# Patient Record
Sex: MALE | Race: WHITE | NOT HISPANIC OR LATINO | URBAN - METROPOLITAN AREA
[De-identification: names, ages, dates, MRNs, and addresses within clinical notes are randomized per-mention and may not be internally consistent; named-entity substitution may affect disease eponyms.]

---

## 2017-06-07 ENCOUNTER — APPOINTMENT (RX ONLY)
Dept: URBAN - METROPOLITAN AREA CLINIC 167 | Facility: CLINIC | Age: 82
Setting detail: DERMATOLOGY
End: 2017-06-07

## 2017-06-07 DIAGNOSIS — Z85.828 PERSONAL HISTORY OF OTHER MALIGNANT NEOPLASM OF SKIN: ICD-10-CM

## 2017-06-07 DIAGNOSIS — Z71.89 OTHER SPECIFIED COUNSELING: ICD-10-CM

## 2017-06-07 DIAGNOSIS — L57.0 ACTINIC KERATOSIS: ICD-10-CM

## 2017-06-07 DIAGNOSIS — Z87.2 PERSONAL HISTORY OF DISEASES OF THE SKIN AND SUBCUTANEOUS TISSUE: ICD-10-CM

## 2017-06-07 DIAGNOSIS — D22 MELANOCYTIC NEVI: ICD-10-CM

## 2017-06-07 DIAGNOSIS — L82.1 OTHER SEBORRHEIC KERATOSIS: ICD-10-CM

## 2017-06-07 PROBLEM — D22.5 MELANOCYTIC NEVI OF TRUNK: Status: ACTIVE | Noted: 2017-06-07

## 2017-06-07 PROCEDURE — 99213 OFFICE O/P EST LOW 20 MIN: CPT | Mod: 25

## 2017-06-07 PROCEDURE — ? COUNSELING

## 2017-06-07 PROCEDURE — ? LIQUID NITROGEN

## 2017-06-07 PROCEDURE — 17000 DESTRUCT PREMALG LESION: CPT

## 2017-06-07 PROCEDURE — 17003 DESTRUCT PREMALG LES 2-14: CPT

## 2017-06-07 ASSESSMENT — LOCATION ZONE DERM
LOCATION ZONE: ARM
LOCATION ZONE: EAR
LOCATION ZONE: LIP
LOCATION ZONE: TRUNK
LOCATION ZONE: FACE
LOCATION ZONE: SCALP

## 2017-06-07 ASSESSMENT — LOCATION DETAILED DESCRIPTION DERM
LOCATION DETAILED: RIGHT LATERAL INFERIOR CHEST
LOCATION DETAILED: LEFT PROXIMAL DORSAL FOREARM
LOCATION DETAILED: RIGHT CENTRAL MALAR CHEEK
LOCATION DETAILED: RIGHT DISTAL DORSAL FOREARM
LOCATION DETAILED: RIGHT SUPERIOR LATERAL MALAR CHEEK
LOCATION DETAILED: RIGHT INFERIOR TEMPLE
LOCATION DETAILED: RIGHT LATERAL MALAR CHEEK
LOCATION DETAILED: LEFT INFERIOR VERMILION LIP
LOCATION DETAILED: RIGHT SUPERIOR CENTRAL MALAR CHEEK
LOCATION DETAILED: RIGHT INFERIOR VERMILION LIP
LOCATION DETAILED: LEFT CENTRAL FRONTAL SCALP
LOCATION DETAILED: RIGHT SUPERIOR MEDIAL UPPER BACK
LOCATION DETAILED: RIGHT PROXIMAL DORSAL FOREARM
LOCATION DETAILED: RIGHT SUPERIOR HELIX
LOCATION DETAILED: LEFT LATERAL MALAR CHEEK

## 2017-06-07 ASSESSMENT — LOCATION SIMPLE DESCRIPTION DERM
LOCATION SIMPLE: RIGHT LIP
LOCATION SIMPLE: RIGHT EAR
LOCATION SIMPLE: LEFT SCALP
LOCATION SIMPLE: RIGHT FOREARM
LOCATION SIMPLE: LEFT FOREARM
LOCATION SIMPLE: CHEST
LOCATION SIMPLE: RIGHT CHEEK
LOCATION SIMPLE: RIGHT TEMPLE
LOCATION SIMPLE: RIGHT UPPER BACK
LOCATION SIMPLE: LEFT CHEEK
LOCATION SIMPLE: LEFT LIP

## 2017-06-07 NOTE — PROCEDURE: LIQUID NITROGEN
Number Of Freeze-Thaw Cycles: 2 freeze-thaw cycles
Detail Level: Detailed
Duration Of Freeze Thaw-Cycle (Seconds): 10
Post-Care Instructions: I reviewed with the patient in detail post-care instructions. Patient is to wear sunprotection, and avoid picking at any of the treated lesions. Pt may apply Vaseline to crusted or scabbing areas.
Consent: The patient's consent was obtained including but not limited to risks of crusting, scabbing, blistering, scarring, darker or lighter pigmentary change, recurrence, incomplete removal and infection.
Render Post-Care Instructions In Note?: no

## 2017-12-06 ENCOUNTER — APPOINTMENT (RX ONLY)
Dept: URBAN - METROPOLITAN AREA CLINIC 167 | Facility: CLINIC | Age: 82
Setting detail: DERMATOLOGY
End: 2017-12-06

## 2017-12-06 DIAGNOSIS — Z87.2 PERSONAL HISTORY OF DISEASES OF THE SKIN AND SUBCUTANEOUS TISSUE: ICD-10-CM

## 2017-12-06 DIAGNOSIS — D22 MELANOCYTIC NEVI: ICD-10-CM

## 2017-12-06 DIAGNOSIS — Z71.89 OTHER SPECIFIED COUNSELING: ICD-10-CM

## 2017-12-06 DIAGNOSIS — L82.1 OTHER SEBORRHEIC KERATOSIS: ICD-10-CM

## 2017-12-06 DIAGNOSIS — L57.0 ACTINIC KERATOSIS: ICD-10-CM

## 2017-12-06 DIAGNOSIS — Z85.828 PERSONAL HISTORY OF OTHER MALIGNANT NEOPLASM OF SKIN: ICD-10-CM

## 2017-12-06 PROBLEM — C44.329 SQUAMOUS CELL CARCINOMA OF SKIN OF OTHER PARTS OF FACE: Status: ACTIVE | Noted: 2017-12-06

## 2017-12-06 PROBLEM — D22.5 MELANOCYTIC NEVI OF TRUNK: Status: ACTIVE | Noted: 2017-12-06

## 2017-12-06 PROCEDURE — ? BIOPSY BY SHAVE METHOD AND DESTRUCTION

## 2017-12-06 PROCEDURE — 99213 OFFICE O/P EST LOW 20 MIN: CPT | Mod: 25

## 2017-12-06 PROCEDURE — 17004 DESTROY PREMAL LESIONS 15/>: CPT

## 2017-12-06 PROCEDURE — 17280 DSTR MAL LS F/E/E/N/L/M .5/<: CPT | Mod: 59

## 2017-12-06 PROCEDURE — ? COUNSELING

## 2017-12-06 PROCEDURE — ? LIQUID NITROGEN

## 2017-12-06 ASSESSMENT — LOCATION DETAILED DESCRIPTION DERM
LOCATION DETAILED: LEFT PROXIMAL DORSAL FOREARM
LOCATION DETAILED: LEFT CENTRAL EYEBROW
LOCATION DETAILED: RIGHT LATERAL EYEBROW
LOCATION DETAILED: RIGHT CENTRAL TEMPLE
LOCATION DETAILED: RIGHT SUPERIOR PARIETAL SCALP
LOCATION DETAILED: RIGHT LATERAL INFERIOR CHEST
LOCATION DETAILED: RIGHT SUPERIOR HELIX
LOCATION DETAILED: RIGHT PROXIMAL DORSAL FOREARM
LOCATION DETAILED: RIGHT SUPERIOR MEDIAL UPPER BACK
LOCATION DETAILED: LEFT SUPERIOR CRUS OF ANTIHELIX
LOCATION DETAILED: LEFT SUPERIOR HELIX
LOCATION DETAILED: RIGHT CENTRAL MALAR CHEEK
LOCATION DETAILED: LEFT INFERIOR VERMILION LIP
LOCATION DETAILED: LEFT CENTRAL ZYGOMA
LOCATION DETAILED: RIGHT DISTAL DORSAL FOREARM
LOCATION DETAILED: RIGHT SUPERIOR CENTRAL MALAR CHEEK
LOCATION DETAILED: RIGHT INFERIOR VERMILION LIP
LOCATION DETAILED: LEFT SUPERIOR PARIETAL SCALP
LOCATION DETAILED: LEFT INFERIOR FOREHEAD
LOCATION DETAILED: RIGHT SUPERIOR LATERAL MALAR CHEEK
LOCATION DETAILED: LEFT FOREHEAD

## 2017-12-06 ASSESSMENT — LOCATION ZONE DERM
LOCATION ZONE: ARM
LOCATION ZONE: LIP
LOCATION ZONE: FACE
LOCATION ZONE: SCALP
LOCATION ZONE: TRUNK
LOCATION ZONE: EAR

## 2017-12-06 ASSESSMENT — LOCATION SIMPLE DESCRIPTION DERM
LOCATION SIMPLE: LEFT EAR
LOCATION SIMPLE: RIGHT FOREARM
LOCATION SIMPLE: LEFT ZYGOMA
LOCATION SIMPLE: RIGHT TEMPLE
LOCATION SIMPLE: RIGHT EYEBROW
LOCATION SIMPLE: RIGHT LIP
LOCATION SIMPLE: RIGHT CHEEK
LOCATION SIMPLE: LEFT LIP
LOCATION SIMPLE: SCALP
LOCATION SIMPLE: RIGHT UPPER BACK
LOCATION SIMPLE: LEFT FOREHEAD
LOCATION SIMPLE: LEFT FOREARM
LOCATION SIMPLE: CHEST
LOCATION SIMPLE: LEFT EYEBROW
LOCATION SIMPLE: RIGHT EAR

## 2017-12-06 NOTE — PROCEDURE: LIQUID NITROGEN
Detail Level: Detailed
Render Post-Care Instructions In Note?: no
Post-Care Instructions: I reviewed with the patient in detail post-care instructions. Patient is to wear sunprotection, and avoid picking at any of the treated lesions. Pt may apply Vaseline to crusted or scabbing areas.
Consent: The patient's consent was obtained including but not limited to risks of crusting, scabbing, blistering, scarring, darker or lighter pigmentary change, recurrence, incomplete removal and infection.
Number Of Freeze-Thaw Cycles: 2 freeze-thaw cycles
Duration Of Freeze Thaw-Cycle (Seconds): 10

## 2017-12-06 NOTE — PROCEDURE: BIOPSY BY SHAVE METHOD AND DESTRUCTION
Lab: [id number]
Anesthesia Volume In Cc: 0.5
Lab Facility: [id number]
Billing Type: Third-Party Bill
Detail Level: Detailed
Post-Care Instructions: I reviewed with the patient in detail post-care instructions. Patient is to keep the biopsy site dry overnight, and then apply bacitracin twice daily until healed. Patient may apply hydrogen peroxide soaks to remove any crusting.
Consent: Written consent was obtained and risks were reviewed including but not limited to scarring, infection, bleeding, scabbing, incomplete removal, nerve damage and allergy to anesthesia.
Destruction Type: electrodesiccation
Bill As?: Malignant Destruction
Render Post-Care Instructions In Note?: no
Biopsy Type: H and E
Number Of Curettages: 2
Hemostasis: Aluminum Chloride
Wound Care: Aquaphor
Size Of Lesion In Cm (Optional): 0.3
Notification Instructions: Patient will be notified of biopsy results. However, patient instructed to call the office if not contacted within 2 weeks.
Anesthesia Type: 1% lidocaine with 1:100,000 epinephrine, 1% lidocaine without epinephrine and 8.4% sodium bicarbonate in a 2:2:1 Ratio

## 2018-06-20 ENCOUNTER — APPOINTMENT (RX ONLY)
Dept: URBAN - METROPOLITAN AREA CLINIC 167 | Facility: CLINIC | Age: 83
Setting detail: DERMATOLOGY
End: 2018-06-20

## 2018-06-20 DIAGNOSIS — D22 MELANOCYTIC NEVI: ICD-10-CM

## 2018-06-20 DIAGNOSIS — Z71.89 OTHER SPECIFIED COUNSELING: ICD-10-CM

## 2018-06-20 DIAGNOSIS — L57.0 ACTINIC KERATOSIS: ICD-10-CM

## 2018-06-20 DIAGNOSIS — L82.1 OTHER SEBORRHEIC KERATOSIS: ICD-10-CM

## 2018-06-20 DIAGNOSIS — Z85.828 PERSONAL HISTORY OF OTHER MALIGNANT NEOPLASM OF SKIN: ICD-10-CM

## 2018-06-20 PROBLEM — D22.5 MELANOCYTIC NEVI OF TRUNK: Status: ACTIVE | Noted: 2018-06-20

## 2018-06-20 PROCEDURE — ? LIQUID NITROGEN

## 2018-06-20 PROCEDURE — ? COUNSELING

## 2018-06-20 PROCEDURE — 17003 DESTRUCT PREMALG LES 2-14: CPT

## 2018-06-20 PROCEDURE — ? TREATMENT REGIMEN

## 2018-06-20 PROCEDURE — 17000 DESTRUCT PREMALG LESION: CPT

## 2018-06-20 PROCEDURE — 99213 OFFICE O/P EST LOW 20 MIN: CPT | Mod: 25

## 2018-06-20 ASSESSMENT — LOCATION SIMPLE DESCRIPTION DERM
LOCATION SIMPLE: LEFT SCALP
LOCATION SIMPLE: POSTERIOR SCALP
LOCATION SIMPLE: RIGHT LIP
LOCATION SIMPLE: NOSE
LOCATION SIMPLE: LEFT OCCIPITAL SCALP
LOCATION SIMPLE: LEFT TEMPLE
LOCATION SIMPLE: LEFT UPPER BACK
LOCATION SIMPLE: SCALP
LOCATION SIMPLE: LEFT FOREARM
LOCATION SIMPLE: CHEST
LOCATION SIMPLE: RIGHT CHEEK
LOCATION SIMPLE: RIGHT FOREARM
LOCATION SIMPLE: RIGHT OCCIPITAL SCALP
LOCATION SIMPLE: RIGHT EAR
LOCATION SIMPLE: LEFT LIP

## 2018-06-20 ASSESSMENT — LOCATION DETAILED DESCRIPTION DERM
LOCATION DETAILED: RIGHT INFERIOR VERMILION LIP
LOCATION DETAILED: MID-OCCIPITAL SCALP
LOCATION DETAILED: RIGHT LATERAL INFERIOR CHEST
LOCATION DETAILED: LEFT SUPERIOR OCCIPITAL SCALP
LOCATION DETAILED: NASAL DORSUM
LOCATION DETAILED: POSTERIOR MID-PARIETAL SCALP
LOCATION DETAILED: LEFT PROXIMAL DORSAL FOREARM
LOCATION DETAILED: RIGHT PROXIMAL DORSAL FOREARM
LOCATION DETAILED: RIGHT SUPERIOR HELIX
LOCATION DETAILED: LEFT INFERIOR VERMILION LIP
LOCATION DETAILED: RIGHT SUPERIOR OCCIPITAL SCALP
LOCATION DETAILED: LEFT INFERIOR TEMPLE
LOCATION DETAILED: LEFT SUPERIOR PARIETAL SCALP
LOCATION DETAILED: LEFT DISTAL DORSAL FOREARM
LOCATION DETAILED: LEFT MEDIAL FRONTAL SCALP
LOCATION DETAILED: LEFT SUPERIOR MEDIAL UPPER BACK
LOCATION DETAILED: RIGHT CENTRAL MALAR CHEEK

## 2018-06-20 ASSESSMENT — LOCATION ZONE DERM
LOCATION ZONE: TRUNK
LOCATION ZONE: FACE
LOCATION ZONE: EAR
LOCATION ZONE: ARM
LOCATION ZONE: SCALP
LOCATION ZONE: NOSE
LOCATION ZONE: LIP

## 2018-06-20 NOTE — PROCEDURE: TREATMENT REGIMEN
Initiate Treatment: Efudex apply to temples, forehead and scalp twice a day for 2 weeks
Detail Level: Detailed

## 2018-06-20 NOTE — PROCEDURE: LIQUID NITROGEN
Duration Of Freeze Thaw-Cycle (Seconds): 10
Render Post-Care Instructions In Note?: no
Post-Care Instructions: I reviewed with the patient in detail post-care instructions. Patient is to wear sunprotection, and avoid picking at any of the treated lesions. Pt may apply Vaseline to crusted or scabbing areas.
Detail Level: Simple
Number Of Freeze-Thaw Cycles: 2 freeze-thaw cycles
Consent: The patient's consent was obtained including but not limited to risks of crusting, scabbing, blistering, scarring, darker or lighter pigmentary change, recurrence, incomplete removal and infection.

## 2018-10-10 ENCOUNTER — TRANSFERRED RECORDS (OUTPATIENT)
Dept: HEALTH INFORMATION MANAGEMENT | Facility: CLINIC | Age: 83
End: 2018-10-10

## 2018-10-10 LAB — EJECTION FRACTION: 25

## 2018-10-30 ENCOUNTER — TRANSFERRED RECORDS (OUTPATIENT)
Dept: HEALTH INFORMATION MANAGEMENT | Facility: CLINIC | Age: 83
End: 2018-10-30

## 2018-11-06 ENCOUNTER — TRANSFERRED RECORDS (OUTPATIENT)
Dept: HEALTH INFORMATION MANAGEMENT | Facility: CLINIC | Age: 83
End: 2018-11-06

## 2018-11-12 ENCOUNTER — TRANSFERRED RECORDS (OUTPATIENT)
Dept: HEALTH INFORMATION MANAGEMENT | Facility: CLINIC | Age: 83
End: 2018-11-12

## 2018-12-19 ENCOUNTER — APPOINTMENT (RX ONLY)
Dept: URBAN - METROPOLITAN AREA CLINIC 167 | Facility: CLINIC | Age: 83
Setting detail: DERMATOLOGY
End: 2018-12-19

## 2018-12-19 DIAGNOSIS — Z85.828 PERSONAL HISTORY OF OTHER MALIGNANT NEOPLASM OF SKIN: ICD-10-CM

## 2018-12-19 DIAGNOSIS — D22 MELANOCYTIC NEVI: ICD-10-CM

## 2018-12-19 DIAGNOSIS — Z71.89 OTHER SPECIFIED COUNSELING: ICD-10-CM

## 2018-12-19 DIAGNOSIS — L57.0 ACTINIC KERATOSIS: ICD-10-CM

## 2018-12-19 DIAGNOSIS — L82.1 OTHER SEBORRHEIC KERATOSIS: ICD-10-CM

## 2018-12-19 PROBLEM — D22.5 MELANOCYTIC NEVI OF TRUNK: Status: ACTIVE | Noted: 2018-12-19

## 2018-12-19 PROBLEM — E03.9 HYPOTHYROIDISM, UNSPECIFIED: Status: ACTIVE | Noted: 2018-12-19

## 2018-12-19 PROCEDURE — ? LIQUID NITROGEN

## 2018-12-19 PROCEDURE — ? TREATMENT REGIMEN

## 2018-12-19 PROCEDURE — 17003 DESTRUCT PREMALG LES 2-14: CPT

## 2018-12-19 PROCEDURE — ? COUNSELING

## 2018-12-19 PROCEDURE — 99213 OFFICE O/P EST LOW 20 MIN: CPT | Mod: 25

## 2018-12-19 PROCEDURE — 17000 DESTRUCT PREMALG LESION: CPT

## 2018-12-19 ASSESSMENT — LOCATION SIMPLE DESCRIPTION DERM
LOCATION SIMPLE: LEFT EAR
LOCATION SIMPLE: LEFT FOREARM
LOCATION SIMPLE: LEFT FOREHEAD
LOCATION SIMPLE: RIGHT ZYGOMA
LOCATION SIMPLE: LEFT TEMPLE
LOCATION SIMPLE: LEFT UPPER BACK
LOCATION SIMPLE: RIGHT TEMPLE
LOCATION SIMPLE: RIGHT EAR
LOCATION SIMPLE: LEFT CHEEK
LOCATION SIMPLE: CHEST
LOCATION SIMPLE: RIGHT SCALP
LOCATION SIMPLE: RIGHT LIP
LOCATION SIMPLE: LEFT LIP
LOCATION SIMPLE: RIGHT FOREARM
LOCATION SIMPLE: RIGHT CHEEK

## 2018-12-19 ASSESSMENT — LOCATION DETAILED DESCRIPTION DERM
LOCATION DETAILED: LEFT INFERIOR VERMILION LIP
LOCATION DETAILED: RIGHT CENTRAL MALAR CHEEK
LOCATION DETAILED: LEFT SUPERIOR FOREHEAD
LOCATION DETAILED: LEFT TRIANGULAR FOSSA
LOCATION DETAILED: LEFT PROXIMAL DORSAL FOREARM
LOCATION DETAILED: RIGHT MEDIAL FRONTAL SCALP
LOCATION DETAILED: LEFT DISTAL DORSAL FOREARM
LOCATION DETAILED: LEFT INFERIOR TEMPLE
LOCATION DETAILED: LEFT FOREHEAD
LOCATION DETAILED: RIGHT INFERIOR VERMILION LIP
LOCATION DETAILED: RIGHT CENTRAL TEMPLE
LOCATION DETAILED: RIGHT SUPERIOR HELIX
LOCATION DETAILED: RIGHT CENTRAL ZYGOMA
LOCATION DETAILED: RIGHT LATERAL INFERIOR CHEST
LOCATION DETAILED: RIGHT CENTRAL FRONTAL SCALP
LOCATION DETAILED: RIGHT PROXIMAL DORSAL FOREARM
LOCATION DETAILED: LEFT SUPERIOR MEDIAL UPPER BACK
LOCATION DETAILED: LEFT LATERAL MALAR CHEEK

## 2018-12-19 ASSESSMENT — LOCATION ZONE DERM
LOCATION ZONE: EAR
LOCATION ZONE: TRUNK
LOCATION ZONE: FACE
LOCATION ZONE: LIP
LOCATION ZONE: SCALP
LOCATION ZONE: ARM

## 2018-12-19 NOTE — PROCEDURE: LIQUID NITROGEN
Consent: The patient's consent was obtained including but not limited to risks of crusting, scabbing, blistering, scarring, darker or lighter pigmentary change, recurrence, incomplete removal and infection.
Detail Level: Simple
Number Of Freeze-Thaw Cycles: 2 freeze-thaw cycles
Render Post-Care Instructions In Note?: no
Duration Of Freeze Thaw-Cycle (Seconds): 10
Post-Care Instructions: I reviewed with the patient in detail post-care instructions. Patient is to wear sunprotection, and avoid picking at any of the treated lesions. Pt may apply Vaseline to crusted or scabbing areas.

## 2018-12-19 NOTE — PROCEDURE: TREATMENT REGIMEN
Detail Level: Zone
Initiate Treatment: Efudex every night at bedtime to face for 2-3 weeks and twice a day to arms, patient has prescription at home

## 2019-04-16 ENCOUNTER — TRANSFERRED RECORDS (OUTPATIENT)
Dept: HEALTH INFORMATION MANAGEMENT | Facility: CLINIC | Age: 84
End: 2019-04-16

## 2019-04-22 ENCOUNTER — TRANSFERRED RECORDS (OUTPATIENT)
Dept: HEALTH INFORMATION MANAGEMENT | Facility: CLINIC | Age: 84
End: 2019-04-22

## 2019-04-26 ENCOUNTER — TRANSFERRED RECORDS (OUTPATIENT)
Dept: HEALTH INFORMATION MANAGEMENT | Facility: CLINIC | Age: 84
End: 2019-04-26

## 2019-05-02 ENCOUNTER — TRANSFERRED RECORDS (OUTPATIENT)
Dept: HEALTH INFORMATION MANAGEMENT | Facility: CLINIC | Age: 84
End: 2019-05-02

## 2019-06-25 ENCOUNTER — TELEPHONE (OUTPATIENT)
Dept: FAMILY MEDICINE | Facility: CLINIC | Age: 84
End: 2019-06-25

## 2019-06-25 NOTE — TELEPHONE ENCOUNTER
"Reason for Call:  Same Day Appointment, Requested Provider:  Mumtaz Hernandez MD    PCP: No primary care provider on file.    Reason for visit: NP visit and referral for arrhythmia    Duration of symptoms: \"couple of years\"    Have you been treated for this in the past? Yes -  In [place]. Recently moved to [facility]    Additional comments: Dudley Woo, son, indicates that he had a conversation with Dr. Hernandez about taking on his parents as new patients. Indicates he was verbally told it was ok.    Can we leave a detailed message on this number? YES    Phone number patient can be reached at: Home number on file [phone] (home)  *no C2C on file for son Isaac Blum Time: afternoon    Call taken on 6/25/2019 at 10:47 AM by Ning Spicer    "

## 2019-07-23 ENCOUNTER — OFFICE VISIT (OUTPATIENT)
Dept: FAMILY MEDICINE | Facility: CLINIC | Age: 84
End: 2019-07-23
Payer: MEDICARE

## 2019-07-23 VITALS
HEIGHT: 67 IN | WEIGHT: 175 LBS | SYSTOLIC BLOOD PRESSURE: 134 MMHG | TEMPERATURE: 97 F | BODY MASS INDEX: 27.47 KG/M2 | HEART RATE: 76 BPM | OXYGEN SATURATION: 96 % | DIASTOLIC BLOOD PRESSURE: 78 MMHG

## 2019-07-23 DIAGNOSIS — I35.1 SEVERE AORTIC INSUFFICIENCY: ICD-10-CM

## 2019-07-23 DIAGNOSIS — I48.20 CHRONIC ATRIAL FIBRILLATION (H): ICD-10-CM

## 2019-07-23 DIAGNOSIS — R26.89 BALANCE PROBLEM: ICD-10-CM

## 2019-07-23 DIAGNOSIS — I34.0 MITRAL VALVE INSUFFICIENCY, UNSPECIFIED ETIOLOGY: ICD-10-CM

## 2019-07-23 DIAGNOSIS — I10 ESSENTIAL HYPERTENSION: ICD-10-CM

## 2019-07-23 DIAGNOSIS — Z96.60 HISTORY OF JOINT REPLACEMENT, UNSPECIFIED JOINT: ICD-10-CM

## 2019-07-23 DIAGNOSIS — E03.9 HYPOTHYROIDISM, UNSPECIFIED TYPE: ICD-10-CM

## 2019-07-23 DIAGNOSIS — G62.9 NEUROPATHY: ICD-10-CM

## 2019-07-23 DIAGNOSIS — C61 PROSTATE CANCER (H): ICD-10-CM

## 2019-07-23 DIAGNOSIS — Z95.0 PACEMAKER: ICD-10-CM

## 2019-07-23 DIAGNOSIS — I42.9 CARDIOMYOPATHY, UNSPECIFIED TYPE (H): Primary | ICD-10-CM

## 2019-07-23 LAB
ERYTHROCYTE [DISTWIDTH] IN BLOOD BY AUTOMATED COUNT: 13.8 % (ref 10–15)
HCT VFR BLD AUTO: 37.5 % (ref 40–53)
HGB BLD-MCNC: 13.2 G/DL (ref 13.3–17.7)
MCH RBC QN AUTO: 35.3 PG (ref 26.5–33)
MCHC RBC AUTO-ENTMCNC: 35.2 G/DL (ref 31.5–36.5)
MCV RBC AUTO: 100 FL (ref 78–100)
PLATELET # BLD AUTO: 145 10E9/L (ref 150–450)
RBC # BLD AUTO: 3.74 10E12/L (ref 4.4–5.9)
WBC # BLD AUTO: 6.2 10E9/L (ref 4–11)

## 2019-07-23 PROCEDURE — 85027 COMPLETE CBC AUTOMATED: CPT | Performed by: INTERNAL MEDICINE

## 2019-07-23 PROCEDURE — 80048 BASIC METABOLIC PNL TOTAL CA: CPT | Performed by: INTERNAL MEDICINE

## 2019-07-23 PROCEDURE — 36415 COLL VENOUS BLD VENIPUNCTURE: CPT | Performed by: INTERNAL MEDICINE

## 2019-07-23 PROCEDURE — 99203 OFFICE O/P NEW LOW 30 MIN: CPT | Performed by: INTERNAL MEDICINE

## 2019-07-23 RX ORDER — POTASSIUM CHLORIDE 750 MG/1
10 CAPSULE, EXTENDED RELEASE ORAL 2 TIMES DAILY
COMMUNITY
End: 2020-02-12

## 2019-07-23 RX ORDER — LEVOTHYROXINE SODIUM 125 UG/1
125 TABLET ORAL DAILY
COMMUNITY
End: 2019-07-23

## 2019-07-23 RX ORDER — AMIODARONE HYDROCHLORIDE 200 MG/1
200 TABLET ORAL DAILY
COMMUNITY
End: 2019-11-25

## 2019-07-23 RX ORDER — HYDROCHLOROTHIAZIDE 25 MG/1
25 TABLET ORAL DAILY
COMMUNITY
End: 2019-09-18

## 2019-07-23 RX ORDER — LOSARTAN POTASSIUM 100 MG/1
100 TABLET ORAL DAILY
COMMUNITY
End: 2019-11-25

## 2019-07-23 RX ORDER — ALLOPURINOL 300 MG/1
300 TABLET ORAL DAILY
COMMUNITY
End: 2019-11-25

## 2019-07-23 RX ORDER — CIPROFLOXACIN 500 MG/1
500 TABLET, FILM COATED ORAL 2 TIMES DAILY
COMMUNITY
End: 2019-07-23

## 2019-07-23 RX ORDER — ACETAMINOPHEN 500 MG
500-1000 TABLET ORAL EVERY 6 HOURS PRN
COMMUNITY

## 2019-07-23 RX ORDER — POTASSIUM CHLORIDE 750 MG/1
10 CAPSULE, EXTENDED RELEASE ORAL 2 TIMES DAILY
Status: CANCELLED | OUTPATIENT
Start: 2019-07-23

## 2019-07-23 RX ORDER — MULTIVIT WITH MINERALS/LUTEIN
1 TABLET ORAL DAILY
COMMUNITY

## 2019-07-23 RX ORDER — POTASSIUM CHLORIDE 750 MG/1
10 TABLET, EXTENDED RELEASE ORAL 2 TIMES DAILY
Qty: 90 TABLET | Refills: 3 | Status: SHIPPED | OUTPATIENT
Start: 2019-07-23 | End: 2019-09-18

## 2019-07-23 RX ORDER — CARVEDILOL 6.25 MG/1
12.5 TABLET ORAL 2 TIMES DAILY WITH MEALS
COMMUNITY
End: 2019-09-18

## 2019-07-23 RX ORDER — PERPHENAZINE 16 MG
600 TABLET ORAL DAILY
COMMUNITY

## 2019-07-23 RX ORDER — FUROSEMIDE 20 MG
20 TABLET ORAL DAILY
COMMUNITY
End: 2019-07-25

## 2019-07-23 RX ORDER — AMOXICILLIN 500 MG/1
CAPSULE ORAL
Qty: 4 CAPSULE | Refills: 3 | Status: SHIPPED | OUTPATIENT
Start: 2019-07-23 | End: 2019-11-25

## 2019-07-23 RX ORDER — LEVOTHYROXINE SODIUM 25 UG/1
25 TABLET ORAL DAILY
COMMUNITY
End: 2019-11-25

## 2019-07-23 ASSESSMENT — MIFFLIN-ST. JEOR: SCORE: 1432.42

## 2019-07-23 NOTE — PROGRESS NOTES
This is an 86-year-old who presents with his wife to establish care.  He is new to me in this clinic.  There are some cardiology records but not a lot.  His son is a colleague of stacey, Dr. Isaac Woo.  The patient and his wife recently moved back here from [place], prior to that [place], and prior to that here.  [place] [facility].    The patient overall is doing well and feels well.  His medical history as noted and reviewed.  He was having dyspnea on exertion but that has resolved.  No chest pain, orthopnea, or PND.  He does have chronic edema.  He takes his medications regularly.  He has a history of prostate cancer years ago with negative PSAs.  He has some chronic balance issues and neuropathy but nothing new.    A complete review of systems is otherwise negative.    Past Medical History:   Diagnosis Date     Balance problem     few years     Cardiomyopathy (H) 10/2018    [place]     Chronic atrial fibrillation (H) 2018    cardioversion 4/19     Essential hypertension      Gross hematuria 04/2019    cysto, ct neg     History of gout     none for years     Hypothyroidism      Mitral regurgitation      Neuropathy 1990    both lower legs     Pacemaker 2018     Prostate cancer (H) 2008    xrt, fine since     Severe aortic insufficiency      Past Surgical History:   Procedure Laterality Date     bilateral hip replacement  2017    done 6 months apart     lip surgery for skin cancer       Social History     Socioeconomic History     Marital status:      Spouse name: Not on file     Number of children: 3     Years of education: Not on file     Highest education level: Not on file   Occupational History     Occupation: owned marketing services company in [place]   Social Needs     Financial resource strain: Not on file     Food insecurity:     Worry: Not on file     Inability: Not on file     Transportation needs:     Medical: Not on file     Non-medical:  Not on file   Tobacco Use     Smoking status: Never Smoker     Smokeless tobacco: Never Used   Substance and Sexual Activity     Alcohol use: Yes     Comment: minimal     Drug use: Never     Sexual activity: Not Currently   Lifestyle     Physical activity:     Days per week: Not on file     Minutes per session: Not on file     Stress: Not on file   Relationships     Social connections:     Talks on phone: Not on file     Gets together: Not on file     Attends Gnosticist service: Not on file     Active member of club or organization: Not on file     Attends meetings of clubs or organizations: Not on file     Relationship status: Not on file     Intimate partner violence:     Fear of current or ex partner: Not on file     Emotionally abused: Not on file     Physically abused: Not on file     Forced sexual activity: Not on file   Other Topics Concern     Not on file   Social History Narrative     Not on file     Current Outpatient Medications   Medication Sig Dispense Refill     acetaminophen (TYLENOL) 500 MG tablet Take 500-1,000 mg by mouth every 6 hours as needed for mild pain       allopurinol (ZYLOPRIM) 300 MG tablet Take 300 mg by mouth daily       alpha-lipoic acid 600 MG capsule        amiodarone (PACERONE/CODARONE) 200 MG tablet Take 200 mg by mouth daily       amoxicillin (AMOXIL) 500 MG capsule Take 4 pills one hour prior to dental work 4 capsule 3     apixaban ANTICOAGULANT (ELIQUIS) 5 MG tablet Take 2.5 mg by mouth 2 times daily       carvedilol (COREG) 6.25 MG tablet Take 12.5 mg by mouth 2 times daily (with meals)        cholecalciferol (VITAMIN D3) 5000 units TABS tablet Take 2,000 Units by mouth daily       Cyanocobalamin (VITAMIN B 12 PO) Take 2,500 mcg by mouth       furosemide (LASIX) 20 MG tablet Take 20 mg by mouth daily       hydrochlorothiazide (HYDRODIURIL) 25 MG tablet Take 25 mg by mouth daily       levothyroxine (SYNTHROID/LEVOTHROID) 25 MCG tablet Take 25 mcg by mouth daily       losartan  "(COZAAR) 100 MG tablet Take 100 mg by mouth daily       multivitamin (CENTRUM SILVER) tablet Take 1 tablet by mouth daily       Niacin (VITAMIN B-3 OR) Take 500 mg by mouth       potassium chloride ER (K-DUR/KLOR-CON M) 10 MEQ CR tablet Take 1 tablet (10 mEq) by mouth 2 times daily 90 tablet 3     potassium chloride ER (MICRO-K) 10 MEQ CR capsule Take 10 mEq by mouth daily        Pyridoxine HCl (VITAMIN B6) 250 MG TABS Take 150 mg by mouth       No Known Allergies  FAMILY HISTORY NOTED AND REVIEWED    REVIEW OF SYSTEMS: above    PHYSICAL EXAM    /78 (BP Location: Right arm, Patient Position: Chair, Cuff Size: Adult Large)   Pulse 76   Temp 97  F (36.1  C) (Oral)   Ht 1.702 m (5' 7\")   Wt 79.4 kg (175 lb)   SpO2 96%   BMI 27.41 kg/m      Patient appears non toxic  Mouth - tongue midline and within normal limits, mucous membranes and posterior pharynx within normal limits, no lesions seen.  Neck - no masses, lesions or tenderness  Nodes - no supraclavicular, cervical or axially adenopathy .  Lungs - faint bilat crackles, soft, lower 1/3, no wheezing  Cardiovascular - regular rate and rhythm, 1/6 systolic murmer, rub or gallop, no jvp 1+ bilat lower leg edema, carotids within normal limits, no bruits.  Abdomen - normal active bowel sounds, soft, non tender, no masses, guarding or rebound, no hepatosplenomegaly      Labs sent    ASSESSMENT:  1. Cmyop, ?idiopathic, well compensated  2. bilat edema, he does have fair amt of salt and suspect in part this, doubt clots, also vavle dz  3. Aortic insuff  4. Mitral insuff  5. Chronic afib, pacer, on eliquis  6. Hypertension, controlled  7. Pacer  8. Hypothyroidism  9. Cap, fran  10. Balance issues, chronic  11. Neuropathy, chronic    PLAN:  To get me records  Labs today  Limit salt  Call if changes  Follow up with cards  See me 4 months or prn      Mumtaz Hernandez M.D.                  "

## 2019-07-23 NOTE — LETTER
[place] [address] [facility]  [address]  [place]  [address]  Tel: [phone]    July 25, 2019    Lico Woo  [id number]  [facility] [address]        Dear Mr. Woo,    It was very nice meeting you.  Enclosed are your labs for your records.    You should have received a phone call from my nurse.  If not let me know.  Your creatinine or kidney test is just slightly elevated above normal which I am not worried about but does need follow-up.  Your hemoglobin is also slightly low and again I am not worried but should be followed.  Please be sure to see me in 4 months.  If you have any questions let me know.        Sincerely,    Mumtaz Hernandez MD/BERONICA          Enclosure: Lab Results  Results for orders placed or performed in visit on 07/23/19   CBC with platelets   Result Value Ref Range    WBC 6.2 4.0 - 11.0 10e9/L    RBC Count 3.74 (L) 4.4 - 5.9 10e12/L    Hemoglobin 13.2 (L) 13.3 - 17.7 g/dL    Hematocrit 37.5 (L) 40.0 - 53.0 %     78 - 100 fl    MCH 35.3 (H) 26.5 - 33.0 pg    MCHC 35.2 31.5 - 36.5 g/dL    RDW 13.8 10.0 - 15.0 %    Platelet Count 145 (L) 150 - 450 10e9/L   Basic metabolic panel   Result Value Ref Range    Sodium 143 133 - 144 mmol/L    Potassium 4.4 3.4 - 5.3 mmol/L    Chloride 108 94 - 109 mmol/L    Carbon Dioxide 29 20 - 32 mmol/L    Anion Gap 6 3 - 14 mmol/L    Glucose 95 70 - 99 mg/dL    Urea Nitrogen 41 (H) 7 - 30 mg/dL    Creatinine 1.51 (H) 0.66 - 1.25 mg/dL    GFR Estimate 41 (L) >60 mL/min/[1.73_m2]    GFR Estimate If Black 48 (L) >60 mL/min/[1.73_m2]    Calcium 9.3 8.5 - 10.1 mg/dL

## 2019-07-23 NOTE — PATIENT INSTRUCTIONS
I will send you the labs from today    Please get me your records    See cardiology.  Dr. Ray De Leon or Dr. Wills are both very good.    See me in 4 months    Mumtaz Hernandez M.D.

## 2019-07-24 LAB
ANION GAP SERPL CALCULATED.3IONS-SCNC: 6 MMOL/L (ref 3–14)
BUN SERPL-MCNC: 41 MG/DL (ref 7–30)
CALCIUM SERPL-MCNC: 9.3 MG/DL (ref 8.5–10.1)
CHLORIDE SERPL-SCNC: 108 MMOL/L (ref 94–109)
CO2 SERPL-SCNC: 29 MMOL/L (ref 20–32)
CREAT SERPL-MCNC: 1.51 MG/DL (ref 0.66–1.25)
GFR SERPL CREATININE-BSD FRML MDRD: 41 ML/MIN/{1.73_M2}
GLUCOSE SERPL-MCNC: 95 MG/DL (ref 70–99)
POTASSIUM SERPL-SCNC: 4.4 MMOL/L (ref 3.4–5.3)
SODIUM SERPL-SCNC: 143 MMOL/L (ref 133–144)

## 2019-07-25 ENCOUNTER — TELEPHONE (OUTPATIENT)
Dept: FAMILY MEDICINE | Facility: CLINIC | Age: 84
End: 2019-07-25

## 2019-07-25 DIAGNOSIS — I35.1 SEVERE AORTIC INSUFFICIENCY: Primary | ICD-10-CM

## 2019-07-25 RX ORDER — FUROSEMIDE 20 MG
20 TABLET ORAL DAILY
Qty: 90 TABLET | Refills: 3 | Status: SHIPPED | OUTPATIENT
Start: 2019-07-25 | End: 2020-06-08

## 2019-07-25 NOTE — TELEPHONE ENCOUNTER
Spoke with patient:     States his son, Dr. Woo, will be sending all of pt's records to Dr. Hernandez for review/scanning into system from his PCP in [place]. Expects this to be done today/tomorrow. This should include lab results.     Rec'd fax # for Cardiology [phone]    Cardiologist is Dr. Rocky Vickers     Faxed lab results as requested

## 2019-07-25 NOTE — TELEPHONE ENCOUNTER
Please call the patient regarding his blood tests.  They need to be faxed to his cardiologist and please find out who that is.  I believe he has the fax number as well.    Please let the patient know that his creatinine test is just slightly elevated.  I do not have an old one to compare to and he should check with cardiology on this.    Please let him know that his hemoglobin is also just very slightly low and again have him check with cardiology to see if that is any different.    I should see him in 4 months.    Mumtaz Hernandez M.D.

## 2019-08-07 ENCOUNTER — DOCUMENTATION ONLY (OUTPATIENT)
Dept: CARDIOLOGY | Facility: CLINIC | Age: 84
End: 2019-08-07

## 2019-09-18 ENCOUNTER — OFFICE VISIT (OUTPATIENT)
Dept: CARDIOLOGY | Facility: CLINIC | Age: 84
End: 2019-09-18
Payer: MEDICARE

## 2019-09-18 VITALS
HEART RATE: 72 BPM | BODY MASS INDEX: 28.49 KG/M2 | DIASTOLIC BLOOD PRESSURE: 74 MMHG | SYSTOLIC BLOOD PRESSURE: 136 MMHG | HEIGHT: 67 IN | WEIGHT: 181.5 LBS

## 2019-09-18 DIAGNOSIS — I50.22 CHRONIC SYSTOLIC CHF (CONGESTIVE HEART FAILURE) (H): ICD-10-CM

## 2019-09-18 DIAGNOSIS — I34.0 NON-RHEUMATIC MITRAL REGURGITATION: ICD-10-CM

## 2019-09-18 DIAGNOSIS — I42.0 DILATED CARDIOMYOPATHY (H): ICD-10-CM

## 2019-09-18 DIAGNOSIS — I35.1 NONRHEUMATIC AORTIC VALVE INSUFFICIENCY: ICD-10-CM

## 2019-09-18 DIAGNOSIS — I48.0 PAROXYSMAL ATRIAL FIBRILLATION (H): ICD-10-CM

## 2019-09-18 DIAGNOSIS — I50.22 CHRONIC SYSTOLIC CHF (CONGESTIVE HEART FAILURE) (H): Primary | ICD-10-CM

## 2019-09-18 DIAGNOSIS — I10 ESSENTIAL HYPERTENSION: ICD-10-CM

## 2019-09-18 LAB
ANION GAP SERPL CALCULATED.3IONS-SCNC: 10.1 MMOL/L (ref 6–17)
AST SERPL W P-5'-P-CCNC: 26 U/L (ref 0–45)
BUN SERPL-MCNC: 32 MG/DL (ref 7–30)
CALCIUM SERPL-MCNC: 10.3 MG/DL (ref 8.5–10.5)
CHLORIDE SERPL-SCNC: 103 MMOL/L (ref 98–107)
CO2 SERPL-SCNC: 34 MMOL/L (ref 23–29)
CREAT SERPL-MCNC: 1.66 MG/DL (ref 0.7–1.3)
GFR SERPL CREATININE-BSD FRML MDRD: 39 ML/MIN/{1.73_M2}
GLUCOSE SERPL-MCNC: 93 MG/DL (ref 70–105)
POTASSIUM SERPL-SCNC: 4.1 MMOL/L (ref 3.5–5.1)
SODIUM SERPL-SCNC: 143 MMOL/L (ref 136–145)
T4 FREE SERPL-MCNC: 0.99 NG/DL (ref 0.76–1.46)
TSH SERPL DL<=0.005 MIU/L-ACNC: 9.41 MU/L (ref 0.4–4)

## 2019-09-18 PROCEDURE — 84439 ASSAY OF FREE THYROXINE: CPT | Performed by: INTERNAL MEDICINE

## 2019-09-18 PROCEDURE — 84443 ASSAY THYROID STIM HORMONE: CPT | Performed by: INTERNAL MEDICINE

## 2019-09-18 PROCEDURE — 80048 BASIC METABOLIC PNL TOTAL CA: CPT | Performed by: INTERNAL MEDICINE

## 2019-09-18 PROCEDURE — 36415 COLL VENOUS BLD VENIPUNCTURE: CPT | Performed by: INTERNAL MEDICINE

## 2019-09-18 PROCEDURE — 93000 ELECTROCARDIOGRAM COMPLETE: CPT | Performed by: INTERNAL MEDICINE

## 2019-09-18 PROCEDURE — 84450 TRANSFERASE (AST) (SGOT): CPT | Performed by: INTERNAL MEDICINE

## 2019-09-18 PROCEDURE — 99204 OFFICE O/P NEW MOD 45 MIN: CPT | Performed by: INTERNAL MEDICINE

## 2019-09-18 RX ORDER — CARVEDILOL 25 MG/1
25 TABLET ORAL 2 TIMES DAILY WITH MEALS
Qty: 180 TABLET | Refills: 3 | Status: SHIPPED | OUTPATIENT
Start: 2019-09-18 | End: 2020-08-10

## 2019-09-18 ASSESSMENT — MIFFLIN-ST. JEOR: SCORE: 1461.91

## 2019-09-18 NOTE — PROGRESS NOTES
Service Date: 09/18/2019      HISTORY OF PRESENT ILLNESS:  I had the opportunity to see Mr. Lico Woo in Cardiology Clinic today at the [facility] in [place] for consultation regarding a cardiomyopathy with valvular heart disease.  As you recall, he is an 86-year-old gentleman who was living down in [place] and receiving cardiology care there.  He recently moved back to [place] in May and is now resuming his cardiology care here with me.  He has a history of valvular heart disease followed by his cardiologist periodically with echocardiograms.  This was being monitored conservatively until last fall.  This consisted of mild aortic stenosis, mild to moderate aortic regurgitation, and moderate mitral regurgitation.  Last fall, he was noted to have a significant decline in left ventricular function with an ejection fraction dropping from 50% to 25%.  It seems that Mr. Woo likely developed atrial fibrillation at that time, although it is unclear to me whether his rates were controlled.  He was having symptoms of shortness of breath with exertion, fatigue, and lower extremity edema.  He was immediately implanted with a biventricular pacemaker and started on Eliquis for stroke prevention.  Unfortunately, the atrial fibrillation persisted and it does not appear that his heart rates were well controlled, even after pacemaker implantation.  I see notes suggesting heart rates around 100 beats per minute with persistent atrial fibrillation.  Eventually, he was started on amiodarone 600 mg a day for a month and then referred for cardioversion.  The cardioversion was performed in 04/2019 and his amiodarone dose was reduced to 200 mg a day.  He has been maintained consistently on carvedilol and losartan since the diagnosis of his cardiomyopathy was made.  He had been on atenolol prior to that.      Since his cardioversion last April, he has been doing much better.  His weeping lower extremity edema  has resolved.  His shortness of breath with exertion has improved and he has had a bit more energy.      He did undergo Lexiscan nuclear perfusion imaging stress testing on 10/30/2018 for evaluation of possible coronary artery disease causing his cardiomyopathy.  This did not show evidence of myocardial ischemia or infarction, but did confirm severe left ventricular dysfunction with an ejection fraction of 25%.  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 Nevertheless, he did go on to implantation of a biventricular pacemaker, presumably for improvement in left ventricular function.  I do not see any suggestion in the records that he had sick sinus syndrome or any other indication for permanent pacemaker implantation.      PHYSICAL EXAMINATION:  His blood pressure is 136/74, heart rate 72 and weight 181 pounds.  His lungs are clear.  Heart rhythm is regular.  He has no cardiac murmurs or carotid bruits.  He has a trace of lower extremity edema with chronic venous stasis changes.      Laboratory studies today show a creatinine of 1.66, BUN 32 and potassium 4.1.  He has had chronic kidney disease and his last creatinine in July was 1.51.  His TSH was 9.41 with a history of hypothyroidism on thyroid supplement.      He had an electrocardiogram today which I reviewed personally.  It appears to show sinus rhythm with synchronous ventricular pacing.  Despite apparent biventricular pacing, the QRS duration was still at 170 milliseconds.      ASSESSMENT AND PLAN:  Mr. Lico Woo is an 86-year-old gentleman with a history of mild to moderate valvular heart disease, including mild aortic stenosis, mild to moderate aortic regurgitation, and moderate mitral regurgitation in addition to hypertension, who was diagnosed with cardiomyopathy 1 year ago with an ejection fraction of  25%.  He was also found to have atrial fibrillation at that time, although records are unclear regarding the adequacy of his rate control.  His nuclear stress testing at the time was normal except for low ejection fraction and he was implanted with a biventricular pacemaker and started on carvedilol instead of atenolol and his dose of losartan increased.  He eventually underwent loading with amiodarone and subsequent cardioversion in 04/2019 with a decrease in his maintenance amiodarone dose to 200 mg a day.  Fortunately, after that procedure, he has started feeling much better.  At this time, I do not clearly see significant congestive heart failure symptoms beyond  New York Heart Association class I or II.  He has no angina.  He has no symptoms of palpitations.  He appears to be maintaining sinus rhythm.      I am pleased that he is doing better.  However, I think some additional evaluation would certainly be reasonable.  I will update his echocardiogram for reevaluation of left ventricular function.  I will enroll him in our Device Clinic for management of his biventricular pacemaker.  If his ejection fraction remains below 35%, we may wish to consider upgrading his pacemaker to an ICD for primary prevention of sudden cardiac death.  I will recheck some laboratory studies today including basic metabolic panel, thyroid functions and liver functions for monitoring of his amiodarone.      I will increase his carvedilol from 12.5 mg b.i.d. to 25 mg b.i.d., have him continue his low-dose furosemide 10 mg a day and stop hydrochlorothiazide due to concerns about his kidney function.  I will refer his elevated TSH level back to Dr. Hernandez to consider adjustment of his Synthroid dose which is currently 25 mcg daily.      I will have him return again to the [facility] in 1 month for reevaluation of these medication adjustments and discussion of his test results.      Thank you for referring Mr. Woo to me for  evaluation in the [facility].  If there are any further questions or concerns, please feel free to contact me.      cc:   [place] [address]         ALESHA STRANGE MD, Located within [facility]             D: 2019   T: 2019   MT: RANJAN      Name:     KYLER THOMAS   MRN:      [id number]        Account:      [id number]   :      1933           Service Date: 2019      Document: [id number]     0

## 2019-09-18 NOTE — PROGRESS NOTES
HPI and Plan:   See dictation    Orders Placed This Encounter   Procedures     Basic metabolic panel     TSH with free T4 reflex     AST     Basic metabolic panel     Hemoglobin     Basic metabolic panel     [facility] with [facility] - [facility] visit     Follow-Up with [facility] - Return MD visit     EKG 12-lead complete w/read - Clinics (performed today)     Echocardiogram Complete       Orders Placed This Encounter   Medications     carvedilol (COREG) 25 MG tablet     Sig: Take 1 tablet (25 mg) by mouth 2 times daily (with meals)     Dispense:  180 tablet     Refill:  3       Medications Discontinued During This Encounter   Medication Reason     potassium chloride ER (K-DUR/KLOR-CON M) 10 MEQ CR tablet Stopped by Patient     carvedilol (COREG) 6.25 MG tablet Reorder     hydrochlorothiazide (HYDRODIURIL) 25 MG tablet          Encounter Diagnoses   Name Primary?     Paroxysmal atrial fibrillation (H)      Essential hypertension      Chronic systolic CHF (congestive heart failure) (H) Yes     Non-rheumatic mitral regurgitation      Nonrheumatic aortic valve insufficiency      Dilated cardiomyopathy (H)        CURRENT MEDICATIONS:  Current Outpatient Medications   Medication Sig Dispense Refill     acetaminophen (TYLENOL) 500 MG tablet Take 500-1,000 mg by mouth every 6 hours as needed for mild pain       allopurinol (ZYLOPRIM) 300 MG tablet Take 300 mg by mouth daily       alpha-lipoic acid 600 MG capsule        amiodarone (PACERONE/CODARONE) 200 MG tablet Take 200 mg by mouth daily       amoxicillin (AMOXIL) 500 MG capsule Take 4 pills one hour prior to dental work 4 capsule 3     apixaban ANTICOAGULANT (ELIQUIS) 5 MG tablet Take 2.5 mg by mouth 2 times daily       carvedilol (COREG) 25 MG tablet Take 1 tablet (25 mg) by mouth 2 times daily (with meals) 180 tablet 3     cholecalciferol (VITAMIN D3) 5000 units TABS tablet Take 2,000 Units by mouth daily       Cyanocobalamin (VITAMIN B 12 PO) Take 2,500 mcg by mouth        furosemide (LASIX) 20 MG tablet Take 1 tablet (20 mg) by mouth daily (Patient taking differently: Take 10 mg by mouth daily ) 90 tablet 3     levothyroxine (SYNTHROID/LEVOTHROID) 25 MCG tablet Take 25 mcg by mouth daily       losartan (COZAAR) 100 MG tablet Take 100 mg by mouth daily       multivitamin (CENTRUM SILVER) tablet Take 1 tablet by mouth daily       Niacin (VITAMIN B-3 OR) Take 500 mg by mouth       potassium chloride ER (MICRO-K) 10 MEQ CR capsule Take 10 mEq by mouth daily        Pyridoxine HCl (VITAMIN B6) 250 MG TABS Take 150 mg by mouth         ALLERGIES   No Known Allergies    PAST MEDICAL HISTORY:  Past Medical History:   Diagnosis Date     Aortic regurgitation     mild-moderate per 10/2018 Echo     Aortic stenosis     mild per 10/2018 Echo     Balance problem     few years     Cardiomyopathy (H) 10/2018    [place]; Echo 10/2018, EF 25%, down from 50% 12/2017, per notes, pt declines further work up to find etiology of drop in EF     Chronic atrial fibrillation (H) 2018    cardioversion 4/19     Chronic systolic CHF (congestive heart failure) (H)      Essential hypertension      Gross hematuria 04/2019    cysto, ct neg     History of gout     none for years     Hypothyroidism      Mitral regurgitation     moderate per 10/2018 Echo     Neuropathy 1990    both lower legs     Pacemaker 2018     Prostate cancer (H) 2008    xrt, fine since     Tricuspid regurgitation     mioderate per 10/2018 Echo       PAST SURGICAL HISTORY:  Past Surgical History:   Procedure Laterality Date     bilateral hip replacement  2017    done 6 months apart     IMPLANT PACEMAKER  11/12/2018    CRT-P     lip surgery for skin cancer         FAMILY HISTORY:  History reviewed. No pertinent family history.    SOCIAL HISTORY:  Social History     Socioeconomic History     Marital status:      Spouse name: None     Number of children: 3     Years of education: None     Highest education level: None   Occupational History  "    Occupation: owned marketing services company in [place]   Social Needs     Financial resource strain: None     Food insecurity:     Worry: None     Inability: None     Transportation needs:     Medical: None     Non-medical: None   Tobacco Use     Smoking status: Never Smoker     Smokeless tobacco: Never Used   Substance and Sexual Activity     Alcohol use: Yes     Comment: minimal     Drug use: Never     Sexual activity: Not Currently   Lifestyle     Physical activity:     Days per week: None     Minutes per session: None     Stress: None   Relationships     Social connections:     Talks on phone: None     Gets together: None     Attends Christianity service: None     Active member of club or organization: None     Attends meetings of clubs or organizations: None     Relationship status: None     Intimate partner violence:     Fear of current or ex partner: None     Emotionally abused: None     Physically abused: None     Forced sexual activity: None   Other Topics Concern     None   Social History Narrative     None       Review of Systems:  Skin:  Positive for pigmentation;scaling     Eyes:  Positive for glasses    ENT:  Positive for tinnitus    Respiratory:  Negative       Cardiovascular:    edema;Positive for slight edema and balance is off  Gastroenterology: Negative      Genitourinary:  Negative      Musculoskeletal:  Negative      Neurologic:  Positive for numbness or tingling of feet cold fingers  Psychiatric:  Negative      Heme/Lymph/Imm:  Negative      Endocrine:  Positive for thyroid disorder      Physical Exam:  Vitals: /74   Pulse 72   Ht 1.702 m (5' 7\")   Wt 82.3 kg (181 lb 8 oz)   BMI 28.43 kg/m      Constitutional:  cooperative, alert and oriented, well developed, well nourished, in no acute distress        Skin:  warm and dry to the touch, no apparent skin lesions or masses noted   pacemaker incision in the left infraclavicular area was well-healed      Head:  normocephalic, no masses or " lesions        Eyes:  pupils equal and round, conjunctivae and lids unremarkable, sclera white, no xanthalasma, EOMS intact, no nystagmus        Lymph:No Cervical lymphadenopathy present     ENT:  no pallor or cyanosis, dentition good        Neck:  carotid pulses are full and equal bilaterally, JVP normal, no carotid bruit        Respiratory:  normal breath sounds, clear to auscultation, normal A-P diameter, normal symmetry, normal respiratory excursion, no use of accessory muscles         Cardiac: regular rhythm, normal S1/S2, no S3 or S4, apical impulse not displaced, no murmurs, gallops or rubs   distant heart sounds no presence of murmur          pulses full and equal, no bruits auscultated                                        GI:  abdomen soft, non-tender, BS normoactive, no mass, no HSM, no bruits        Extremities and Muscular Skeletal:  no deformities, clubbing, cyanosis, erythema observed stasis pigmentation bilateral LE edema;trace          Neurological:  no gross motor deficits;affect appropriate        Psych:  Alert and Oriented x 3        CC  Mumtaz Hernandez MD  [address]  [place] [address]

## 2019-09-18 NOTE — LETTER
9/18/2019    Mumtaz Hernandez MD  [address]  [place] [address]    RE: Lico Amna       Dear Colleague,    I had the pleasure of seeing Lico Woo in the [facility].    HPI and Plan:   See dictation    Orders Placed This Encounter   Procedures     Basic metabolic panel     TSH with free T4 reflex     AST     Basic metabolic panel     Hemoglobin     Basic metabolic panel     [facility] with [facility] visit     [facility] with [facility] - Return MD visit     EKG 12-lead complete w/read - Clinics (performed today)     Echocardiogram Complete       Orders Placed This Encounter   Medications     carvedilol (COREG) 25 MG tablet     Sig: Take 1 tablet (25 mg) by mouth 2 times daily (with meals)     Dispense:  180 tablet     Refill:  3       Medications Discontinued During This Encounter   Medication Reason     potassium chloride ER (K-DUR/KLOR-CON M) 10 MEQ CR tablet Stopped by Patient     carvedilol (COREG) 6.25 MG tablet Reorder     hydrochlorothiazide (HYDRODIURIL) 25 MG tablet          Encounter Diagnoses   Name Primary?     Paroxysmal atrial fibrillation (H)      Essential hypertension      Chronic systolic CHF (congestive heart failure) (H) Yes     Non-rheumatic mitral regurgitation      Nonrheumatic aortic valve insufficiency      Dilated cardiomyopathy (H)        CURRENT MEDICATIONS:  Current Outpatient Medications   Medication Sig Dispense Refill     acetaminophen (TYLENOL) 500 MG tablet Take 500-1,000 mg by mouth every 6 hours as needed for mild pain       allopurinol (ZYLOPRIM) 300 MG tablet Take 300 mg by mouth daily       alpha-lipoic acid 600 MG capsule        amiodarone (PACERONE/CODARONE) 200 MG tablet Take 200 mg by mouth daily       amoxicillin (AMOXIL) 500 MG capsule Take 4 pills one hour prior to dental work 4 capsule 3     apixaban ANTICOAGULANT (ELIQUIS) 5 MG tablet Take 2.5 mg by mouth 2 times daily       carvedilol (COREG) 25 MG tablet Take 1  tablet (25 mg) by mouth 2 times daily (with meals) 180 tablet 3     cholecalciferol (VITAMIN D3) 5000 units TABS tablet Take 2,000 Units by mouth daily       Cyanocobalamin (VITAMIN B 12 PO) Take 2,500 mcg by mouth       furosemide (LASIX) 20 MG tablet Take 1 tablet (20 mg) by mouth daily (Patient taking differently: Take 10 mg by mouth daily ) 90 tablet 3     levothyroxine (SYNTHROID/LEVOTHROID) 25 MCG tablet Take 25 mcg by mouth daily       losartan (COZAAR) 100 MG tablet Take 100 mg by mouth daily       multivitamin (CENTRUM SILVER) tablet Take 1 tablet by mouth daily       Niacin (VITAMIN B-3 OR) Take 500 mg by mouth       potassium chloride ER (MICRO-K) 10 MEQ CR capsule Take 10 mEq by mouth daily        Pyridoxine HCl (VITAMIN B6) 250 MG TABS Take 150 mg by mouth         ALLERGIES   No Known Allergies    PAST MEDICAL HISTORY:  Past Medical History:   Diagnosis Date     Aortic regurgitation     mild-moderate per 10/2018 Echo     Aortic stenosis     mild per 10/2018 Echo     Balance problem     few years     Cardiomyopathy (H) 10/2018    [place]; Echo 10/2018, EF 25%, down from 50% 12/2017, per notes, pt declines further work up to find etiology of drop in EF     Chronic atrial fibrillation (H) 2018    cardioversion 4/19     Chronic systolic CHF (congestive heart failure) (H)      Essential hypertension      Gross hematuria 04/2019    cysto, ct neg     History of gout     none for years     Hypothyroidism      Mitral regurgitation     moderate per 10/2018 Echo     Neuropathy 1990    both lower legs     Pacemaker 2018     Prostate cancer (H) 2008    xrt, fine since     Tricuspid regurgitation     mioderate per 10/2018 Echo       PAST SURGICAL HISTORY:  Past Surgical History:   Procedure Laterality Date     bilateral hip replacement  2017    done 6 months apart     IMPLANT PACEMAKER  11/12/2018    CRT-P     lip surgery for skin cancer         FAMILY HISTORY:  History reviewed. No pertinent family  "history.    SOCIAL HISTORY:  Social History     Socioeconomic History     Marital status:      Spouse name: None     Number of children: 3     Years of education: None     Highest education level: None   Occupational History     Occupation: owned marketing services company in [place]   Social Needs     Financial resource strain: None     Food insecurity:     Worry: None     Inability: None     Transportation needs:     Medical: None     Non-medical: None   Tobacco Use     Smoking status: Never Smoker     Smokeless tobacco: Never Used   Substance and Sexual Activity     Alcohol use: Yes     Comment: minimal     Drug use: Never     Sexual activity: Not Currently   Lifestyle     Physical activity:     Days per week: None     Minutes per session: None     Stress: None   Relationships     Social connections:     Talks on phone: None     Gets together: None     Attends Roman Catholic service: None     Active member of club or organization: None     Attends meetings of clubs or organizations: None     Relationship status: None     Intimate partner violence:     Fear of current or ex partner: None     Emotionally abused: None     Physically abused: None     Forced sexual activity: None   Other Topics Concern     None   Social History Narrative     None       Review of Systems:  Skin:  Positive for pigmentation;scaling     Eyes:  Positive for glasses    ENT:  Positive for tinnitus    Respiratory:  Negative       Cardiovascular:    edema;Positive for slight edema and balance is off  Gastroenterology: Negative      Genitourinary:  Negative      Musculoskeletal:  Negative      Neurologic:  Positive for numbness or tingling of feet cold fingers  Psychiatric:  Negative      Heme/Lymph/Imm:  Negative      Endocrine:  Positive for thyroid disorder      Physical Exam:  Vitals: /74   Pulse 72   Ht 1.702 m (5' 7\")   Wt 82.3 kg (181 lb 8 oz)   BMI 28.43 kg/m       Constitutional:  cooperative, alert and oriented, well " developed, well nourished, in no acute distress        Skin:  warm and dry to the touch, no apparent skin lesions or masses noted   pacemaker incision in the left infraclavicular area was well-healed      Head:  normocephalic, no masses or lesions        Eyes:  pupils equal and round, conjunctivae and lids unremarkable, sclera white, no xanthalasma, EOMS intact, no nystagmus        Lymph:No Cervical lymphadenopathy present     ENT:  no pallor or cyanosis, dentition good        Neck:  carotid pulses are full and equal bilaterally, JVP normal, no carotid bruit        Respiratory:  normal breath sounds, clear to auscultation, normal A-P diameter, normal symmetry, normal respiratory excursion, no use of accessory muscles         Cardiac: regular rhythm, normal S1/S2, no S3 or S4, apical impulse not displaced, no murmurs, gallops or rubs   distant heart sounds no presence of murmur          pulses full and equal, no bruits auscultated                                        GI:  abdomen soft, non-tender, BS normoactive, no mass, no HSM, no bruits        Extremities and Muscular Skeletal:  no deformities, clubbing, cyanosis, erythema observed stasis pigmentation bilateral LE edema;trace          Neurological:  no gross motor deficits;affect appropriate        Psych:  Alert and Oriented x 3        CC  Mumtaz Hernandez MD  [address]  [place] [address]                Thank you for allowing me to participate in the care of your patient.      Sincerely,     ALESHA STRANGE MD     [facility]    cc:   Mumtaz Hernandez MD  [address]  [place] [address]

## 2019-09-18 NOTE — LETTER
9/18/2019      Mumtaz Hernandez MD  [address] [facility] [address]  [facility] [address]      RE: Lico Woo       Dear Colleague,    I had the pleasure of seeing Lico Woo in the [facility].    Service Date: 09/18/2019      HISTORY OF PRESENT ILLNESS:  I had the opportunity to see Mr. Lico Woo in Cardiology Clinic today at the [facility] in [place] for consultation regarding a cardiomyopathy with valvular heart disease.  As you recall, he is an 86-year-old gentleman who was living down in [place] and receiving cardiology care there.  He recently moved back to [place] in May and is now resuming his cardiology care here with me.  He has a history of valvular heart disease followed by his cardiologist periodically with echocardiograms.  This was being monitored conservatively until last fall.  This consisted of mild aortic stenosis, mild to moderate aortic regurgitation, and moderate mitral regurgitation.  Last fall, he was noted to have a significant decline in left ventricular function with an ejection fraction dropping from 50% to 25%.  It seems that Mr. Woo likely developed atrial fibrillation at that time, although it is unclear to me whether his rates were controlled.  He was having symptoms of shortness of breath with exertion, fatigue, and lower extremity edema.  He was immediately implanted with a biventricular pacemaker and started on Eliquis for stroke prevention.  Unfortunately, the atrial fibrillation persisted and it does not appear that his heart rates were well controlled, even after pacemaker implantation.  I see notes suggesting heart rates around 100 beats per minute with persistent atrial fibrillation.  Eventually, he was started on amiodarone 600 mg a day for a month and then referred for cardioversion.  The cardioversion was performed in 04/2019 and his amiodarone dose was reduced to 200 mg a day.  He has been maintained consistently on  carvedilol and losartan since the diagnosis of his cardiomyopathy was made.  He had been on atenolol prior to that.      Since his cardioversion last April, he has been doing much better.  His weeping lower extremity edema has resolved.  His shortness of breath with exertion has improved and he has had a bit more energy.      He did undergo Lexiscan nuclear perfusion imaging stress testing on 10/30/2018 for evaluation of possible coronary artery disease causing his cardiomyopathy.  This did not show evidence of myocardial ischemia or infarction, but did confirm severe left ventricular dysfunction with an ejection fraction of 25%.  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 Nevertheless, he did go on to implantation of a biventricular pacemaker, presumably for improvement in left ventricular function.  I do not see any suggestion in the records that he had sick sinus syndrome or any other indication for permanent pacemaker implantation.      PHYSICAL EXAMINATION:  His blood pressure is 136/74, heart rate 72 and weight 181 pounds.  His lungs are clear.  Heart rhythm is regular.  He has no cardiac murmurs or carotid bruits.  He has a trace of lower extremity edema with chronic venous stasis changes.      Laboratory studies today show a creatinine of 1.66, BUN 32 and potassium 4.1.  He has had chronic kidney disease and his last creatinine in July was 1.51.  His TSH was 9.41 with a history of hypothyroidism on thyroid supplement.      He had an electrocardiogram today which I reviewed personally.  It appears to show sinus rhythm with synchronous ventricular pacing.  Despite apparent biventricular pacing, the QRS duration was still at 170 milliseconds.      ASSESSMENT AND PLAN:  Mr. Lico Woo is an 86-year-old gentleman with a history of mild to moderate valvular heart  disease, including mild aortic stenosis, mild to moderate aortic regurgitation, and moderate mitral regurgitation in addition to hypertension, who was diagnosed with cardiomyopathy 1 year ago with an ejection fraction of 25%.  He was also found to have atrial fibrillation at that time, although records are unclear regarding the adequacy of his rate control.  His nuclear stress testing at the time was normal except for low ejection fraction and he was implanted with a biventricular pacemaker and started on carvedilol instead of atenolol and his dose of losartan increased.  He eventually underwent loading with amiodarone and subsequent cardioversion in 04/2019 with a decrease in his maintenance amiodarone dose to 200 mg a day.  Fortunately, after that procedure, he has started feeling much better.  At this time, I do not clearly see significant congestive heart failure symptoms beyond  [place] Heart Association class I or II.  He has no angina.  He has no symptoms of palpitations.  He appears to be maintaining sinus rhythm.      I am pleased that he is doing better.  However, I think some additional evaluation would certainly be reasonable.  I will update his echocardiogram for reevaluation of left ventricular function.  I will enroll him in our Device Clinic for management of his biventricular pacemaker.  If his ejection fraction remains below 35%, we may wish to consider upgrading his pacemaker to an ICD for primary prevention of sudden cardiac death.  I will recheck some laboratory studies today including basic metabolic panel, thyroid functions and liver functions for monitoring of his amiodarone.      I will increase his carvedilol from 12.5 mg b.i.d. to 25 mg b.i.d., have him continue his low-dose furosemide 10 mg a day and stop hydrochlorothiazide due to concerns about his kidney function.  I will refer his elevated TSH level back to Dr. Hernandez to consider adjustment of his Synthroid dose which is currently  25 mcg daily.      I will have him return again to the [facility] in 1 month for reevaluation of these medication adjustments and discussion of his test results.      Thank you for referring Mr. Thomas to me for evaluation in the [facility].  If there are any further questions or concerns, please feel free to contact me.      cc:   [place] [address]         ALESHA STRANGE MD, [facility]             D: 2019   T: 2019   MT: RANJAN      Name:     KYLER THOMAS   MRN:      [id number]        Account:      [id number]   :      1933           Service Date: 2019      Document: [id number]         Outpatient Encounter Medications as of 2019   Medication Sig Dispense Refill     acetaminophen (TYLENOL) 500 MG tablet Take 500-1,000 mg by mouth every 6 hours as needed for mild pain       allopurinol (ZYLOPRIM) 300 MG tablet Take 300 mg by mouth daily       alpha-lipoic acid 600 MG capsule        amiodarone (PACERONE/CODARONE) 200 MG tablet Take 200 mg by mouth daily       amoxicillin (AMOXIL) 500 MG capsule Take 4 pills one hour prior to dental work 4 capsule 3     apixaban ANTICOAGULANT (ELIQUIS) 5 MG tablet Take 2.5 mg by mouth 2 times daily       carvedilol (COREG) 25 MG tablet Take 1 tablet (25 mg) by mouth 2 times daily (with meals) 180 tablet 3     cholecalciferol (VITAMIN D3) 5000 units TABS tablet Take 2,000 Units by mouth daily       Cyanocobalamin (VITAMIN B 12 PO) Take 2,500 mcg by mouth       furosemide (LASIX) 20 MG tablet Take 1 tablet (20 mg) by mouth daily (Patient taking differently: Take 10 mg by mouth daily ) 90 tablet 3     levothyroxine (SYNTHROID/LEVOTHROID) 25 MCG tablet Take 25 mcg by mouth daily       losartan (COZAAR) 100 MG tablet Take 100 mg by mouth daily       multivitamin (CENTRUM SILVER) tablet Take 1 tablet by mouth daily       Niacin (VITAMIN B-3 OR) Take 500 mg by mouth        potassium chloride ER (MICRO-K) 10 MEQ CR capsule Take 10 mEq by mouth daily        Pyridoxine HCl (VITAMIN B6) 250 MG TABS Take 150 mg by mouth       [DISCONTINUED] carvedilol (COREG) 6.25 MG tablet Take 12.5 mg by mouth 2 times daily (with meals)        [DISCONTINUED] hydrochlorothiazide (HYDRODIURIL) 25 MG tablet Take 25 mg by mouth daily       [DISCONTINUED] potassium chloride ER (K-DUR/KLOR-CON M) 10 MEQ CR tablet Take 1 tablet (10 mEq) by mouth 2 times daily 90 tablet 3     No facility-administered encounter medications on file as of 9/18/2019.        Again, thank you for allowing me to participate in the care of your patient.      Sincerely,    ALESHA STRANGE MD     [facility]

## 2019-09-19 ENCOUNTER — HOSPITAL ENCOUNTER (OUTPATIENT)
Dept: CARDIOLOGY | Facility: CLINIC | Age: 84
Discharge: HOME | End: 2019-09-19
Attending: INTERNAL MEDICINE | Admitting: INTERNAL MEDICINE
Payer: MEDICARE

## 2019-09-19 ENCOUNTER — CARE COORDINATION (OUTPATIENT)
Dept: CARDIOLOGY | Facility: CLINIC | Age: 84
End: 2019-09-19

## 2019-09-19 DIAGNOSIS — I77.810 AORTIC ROOT DILATATION (H): Primary | ICD-10-CM

## 2019-09-19 DIAGNOSIS — I50.22 CHRONIC SYSTOLIC CHF (CONGESTIVE HEART FAILURE) (H): ICD-10-CM

## 2019-09-19 PROCEDURE — 93306 TTE W/DOPPLER COMPLETE: CPT

## 2019-09-19 PROCEDURE — 93306 TTE W/DOPPLER COMPLETE: CPT | Mod: 26 | Performed by: INTERNAL MEDICINE

## 2019-09-19 NOTE — PROGRESS NOTES
Pt seen in clinic yesterday as a new patient OV w/Dr. Strange. Assessment/plan is below. Pt EF still reported < 35%, willl pt need an EP Consult for ICD consideration?    Echo results sent to Dr. Strange for review/recommendations. Next f/u scheduled with SHERRILL on 19. Maria Luisa Pina RN on 2019 at 2:54 PM      The following med changes were made:    - Increase his carvedilol from 12.5 mg b.i.d. to 25 mg b.i.d.  - Continue his low-dose furosemide 10 mg a day.  - Stop hydrochlorothiazide due to concerns about his kidney function.   - Refer his elevated TSH level back to Dr. Hernandez to consider adjustment of his Synthroid dose which is currently 25 mcg daily.       Recent Results (from the past 4320 hour(s))   Echocardiogram Complete    Narrative    [id number]  [id number]  [id number]  [id number]^ALEXANDR^ALESHA^YAQUELIN        [facility]  [facility]  Echocardiography Laboratory  [address] [place] [address] [address]  [place] [address]  Phone [phone]  Fax [phone]        Name: KYLER THOMAS  MRN: [id number]  : 1933  Study Date: 2019 12:53 PM  Age: 86 yrs  Gender: Male  Patient Location: [facility]  Reason For Study: Chronic systolic CHF  Ordering Physician: ALESHA STRANGE  Referring Physician: FRANK HERNANDEZ  Performed By: Poonam Blackburn     BSA: 1.9 m2  Height: 64 in  Weight: 181 lb  HR: 77  BP: 145/88 mmHg  _____________________________________________________________________________  __     Procedure  Complete Echo Adult.     _____________________________________________________________________________  __        Interpretation Summary     Left ventricular systolic function is moderate to severely reduced. LVEF 34%  based on biplane 2D tracing.  The right ventricle is severely dilated with normal global systolic function.  Aortic valve is heavily calcified, cannot exclude native bicuspid morphology.  There does appear to be fusion of the right and left coronary cusps,  however  this could be due to heavy calcification. Doppler profile consistent with  sclerosis without stenosis. Vmax 1.9 m/s, mean gradient 9 mmHg. Trace to mild  AV insufficiency.  Right ventricular systolic pressure is elevated, approximated at 52mmHg plus  the right atrial pressure (estimated RAP 3 mmHg).  Moderate aortic root dilatation. Max diameter of visualized portion 4.5cm.  The ascending aorta is Moderately dilated. Max diameter of visualized portion  5 cm.     No prior studies available for comparison.  _____________________________________________________________________________  __        Left Ventricle  The left ventricle is normal in size. There is borderline concentric left  ventricular hypertrophy. Left ventricular systolic function is moderate to  severely reduced. LVEF 34% based on biplane 2D tracing. Grade II or moderate  diastolic dysfunction. Septal wall motion abnormality may reflect pacemaker  activation.     Right Ventricle  The right ventricle is severely dilated. The right ventricular systolic  function is normal. There is a pacemaker lead in the right ventricle.     Atria  The left atrium is severely dilated. The right atrium is mildly dilated. There  is no color Doppler evidence of an atrial shunt.     Mitral Valve  The mitral valve is normal in structure and function. There is trace mitral  regurgitation.     Tricuspid Valve  There is trace tricuspid regurgitation. The right ventricular systolic  pressure is approximated at 52mmHg plus the right atrial pressure.        Aortic Valve  Aortic valve is heavily calcified. Cannot exclude native bicuspid AV, there  does appear to be fusion of the right and left coronary cusps, however this  could be due to heavy calcification. Vmax 1.9 m/s, mean gradient 9 mmHg. There  is trace to mild aortic regurgitation.     Pulmonic Valve  There is trace to mild pulmonic valvular regurgitation. Reduced PV  acceleration time suggesting elevated PA  pressures.     Vessels  Moderate aortic root dilatation. Max diameter of visualized portion 4.5cm. The  ascending aorta is Moderately dilated. Max diameter of visualized portion 5  cm. The inferior vena cava is normal. IVC diameter <2.1 cm collapsing >50%  with sniff suggests a normal RA pressure of 3 mmHg.     Pericardium  There is no pericardial effusion.     _____________________________________________________________________________  __  MMode/2D Measurements & Calculations  IVSd: 1.2 cm  LVIDd: 5.3 cm  LVIDs: 4.4 cm  LVPWd: 1.2 cm  FS: 15.9 %  LV mass(C)d: 249.2 grams  LV mass(C)dI: 132.9 grams/m2  Ao root diam: 4.5 cm  LA dimension: 4.3 cm  asc Aorta Diam: 5.0 cm  LA/Ao: 0.95  LVOT diam: 2.5 cm  LVOT area: 5.1 cm2  LA Volume (BP): 144.0 ml     LA Volume Index (BP): 77.0 ml/m2  RWT: 0.45        Doppler Measurements & Calculations  MV E max carlos: 88.8 cm/sec  MV A max carlos: 65.5 cm/sec  MV E/A: 1.4  MV dec time: 0.20 sec  Ao V2 max: 207.5 cm/sec  Ao max P.2 mmHg  Ao V2 mean: 143.1 cm/sec  Ao mean P.4 mmHg  Ao V2 VTI: 46.4 cm  LAUREN(I,D): 1.7 cm2  LAUREN(V,D): 1.4 cm2  LV V1 max P.3 mmHg  LV V1 max: 56.6 cm/sec  LV V1 VTI: 15.2 cm  SV(LVOT): 76.8 ml  SI(LVOT): 41.0 ml/m2  PA V2 max: 220.3 cm/sec  PA max P.5 mmHg  PA acc time: 0.07 sec     TR max carlos: 359.7 cm/sec  TR max P.8 mmHg  Qp/Qs (V,LVOT): 12.6/1.0  AV Carlos Ratio (DI): 0.27  LAUREN Index (cm2/m2): 0.88  E/E' av.9  Lateral E/e': 20.5  Medial E/e': 19.2           _____________________________________________________________________________  __           Report approved by: Jesus Albarran 2019 02:03 PM

## 2019-09-19 NOTE — LETTER
2019       TO: Lico Thomas   [address]  [facility] [address]       Dear Mr. Thomas,    The results of your recent echocardiogram.    Results for orders placed or performed during the hospital encounter of 19   Echocardiogram Complete    Narrative    [id number]  [id number]  [id number]  [id number]^ALEXANDR^ALESHA^YAQUELIN        [facility]  [facility]  Echocardiography Laboratory  [address]  [address]  [place] [address]  Phone [phone]  Fax [phone]        Name: LICO THOMAS  MRN: [id number]  : 1933  Study Date: 2019 12:53 PM  Age: 86 yrs  Gender: Male  Patient Location: [facility]  Reason For Study: Chronic systolic CHF  Ordering Physician: ALESHA STRANGE  Referring Physician: FRANK GARDNER  Performed By: Poonam Blackburn     BSA: 1.9 m2  Height: 64 in  Weight: 181 lb  HR: 77  BP: 145/88 mmHg  _____________________________________________________________________________  __     Procedure  Complete Echo Adult.     _____________________________________________________________________________  __        Interpretation Summary     Left ventricular systolic function is moderate to severely reduced. LVEF 34%  based on biplane 2D tracing.  The right ventricle is severely dilated with normal global systolic function.  Aortic valve is heavily calcified, cannot exclude native bicuspid morphology.  There does appear to be fusion of the right and left coronary cusps, however  this could be due to heavy calcification. Doppler profile consistent with  sclerosis without stenosis. Vmax 1.9 m/s, mean gradient 9 mmHg. Trace to mild  AV insufficiency.  Right ventricular systolic pressure is elevated, approximated at 52mmHg plus  the right atrial pressure (estimated RAP 3 mmHg).  Moderate aortic root dilatation. Max diameter of visualized portion 4.5cm.  The ascending aorta is Moderately dilated. Max diameter of visualized portion  5 cm.     No  prior studies available for comparison.  _____________________________________________________________________________  __        Left Ventricle  The left ventricle is normal in size. There is borderline concentric left  ventricular hypertrophy. Left ventricular systolic function is moderate to  severely reduced. LVEF 34% based on biplane 2D tracing. Grade II or moderate  diastolic dysfunction. Septal wall motion abnormality may reflect pacemaker  activation.     Right Ventricle  The right ventricle is severely dilated. The right ventricular systolic  function is normal. There is a pacemaker lead in the right ventricle.     Atria  The left atrium is severely dilated. The right atrium is mildly dilated. There  is no color Doppler evidence of an atrial shunt.     Mitral Valve  The mitral valve is normal in structure and function. There is trace mitral  regurgitation.     Tricuspid Valve  There is trace tricuspid regurgitation. The right ventricular systolic  pressure is approximated at 52mmHg plus the right atrial pressure.        Aortic Valve  Aortic valve is heavily calcified. Cannot exclude native bicuspid AV, there  does appear to be fusion of the right and left coronary cusps, however this  could be due to heavy calcification. Vmax 1.9 m/s, mean gradient 9 mmHg. There  is trace to mild aortic regurgitation.     Pulmonic Valve  There is trace to mild pulmonic valvular regurgitation. Reduced PV  acceleration time suggesting elevated PA pressures.     Vessels  Moderate aortic root dilatation. Max diameter of visualized portion 4.5cm. The  ascending aorta is Moderately dilated. Max diameter of visualized portion 5  cm. The inferior vena cava is normal. IVC diameter <2.1 cm collapsing >50%  with sniff suggests a normal RA pressure of 3 mmHg.     Pericardium  There is no pericardial effusion.     _____________________________________________________________________________  __  MMode/2D Measurements &  Calculations  IVSd: 1.2 cm  LVIDd: 5.3 cm  LVIDs: 4.4 cm  LVPWd: 1.2 cm  FS: 15.9 %  LV mass(C)d: 249.2 grams  LV mass(C)dI: 132.9 grams/m2  Ao root diam: 4.5 cm  LA dimension: 4.3 cm  asc Aorta Diam: 5.0 cm  LA/Ao: 0.95  LVOT diam: 2.5 cm  LVOT area: 5.1 cm2  LA Volume (BP): 144.0 ml     LA Volume Index (BP): 77.0 ml/m2  RWT: 0.45        Doppler Measurements & Calculations  MV E max carlos: 88.8 cm/sec  MV A max carlos: 65.5 cm/sec  MV E/A: 1.4  MV dec time: 0.20 sec  Ao V2 max: 207.5 cm/sec  Ao max P.2 mmHg  Ao V2 mean: 143.1 cm/sec  Ao mean P.4 mmHg  Ao V2 VTI: 46.4 cm  LAUREN(I,D): 1.7 cm2  LAUREN(V,D): 1.4 cm2  LV V1 max P.3 mmHg  LV V1 max: 56.6 cm/sec  LV V1 VTI: 15.2 cm  SV(LVOT): 76.8 ml  SI(LVOT): 41.0 ml/m2  PA V2 max: 220.3 cm/sec  PA max P.5 mmHg  PA acc time: 0.07 sec     TR max carlos: 359.7 cm/sec  TR max P.8 mmHg  Qp/Qs (V,LVOT): 12.6/1.0  AV Carlos Ratio (DI): 0.27  LAUREN Index (cm2/m2): 0.88  E/E' av.9  Lateral E/e': 20.5  Medial E/e': 19.2           _____________________________________________________________________________  __           Report approved by: Jesus Albarran 2019 02:03 PM             would like you to have another echo in 6 months to monitor the size of your aortic root. Also, it was stated on your echo report that you could have a possible bicuspid valve (your aortic valve typically has three leaflets, and bicuspid means that it only has two leaflets).  said it is difficult to tell if this is something you were born with (congenital), or if it appears to be bicuspid due to calcification. When a person does have a bicuspid aortic valve it increases the risk of developing valvular heart disease and dilatation of their aorta.  said that your children may want to have a screening echo to see if they have a bicuspid aortic valve as there can be a  genetic component if it is a congenital bicuspid valve. Please my chart or call [phone] if you  have any questions.       Sincerely,    [facility]

## 2019-09-25 ENCOUNTER — ANCILLARY PROCEDURE (OUTPATIENT)
Dept: CARDIOLOGY | Facility: CLINIC | Age: 84
End: 2019-09-25
Attending: INTERNAL MEDICINE
Payer: MEDICARE

## 2019-09-25 ENCOUNTER — DOCUMENTATION ONLY (OUTPATIENT)
Dept: CARDIOLOGY | Facility: CLINIC | Age: 84
End: 2019-09-25

## 2019-09-25 DIAGNOSIS — I42.0 DILATED CARDIOMYOPATHY (H): Primary | ICD-10-CM

## 2019-09-25 DIAGNOSIS — I50.22 CHRONIC SYSTOLIC CHF (CONGESTIVE HEART FAILURE) (H): ICD-10-CM

## 2019-09-25 DIAGNOSIS — Z95.0 CARDIAC PACEMAKER IN SITU: ICD-10-CM

## 2019-09-25 PROCEDURE — 93281 PM DEVICE PROGR EVAL MULTI: CPT | Performed by: INTERNAL MEDICINE

## 2019-09-25 NOTE — TELEPHONE ENCOUNTER
Pt was seen in device clinic today after transferring care from his [place] clinic.  Pt has a Medtronic Percepta CRT-P and has had 99.8% BiV pacing since his last check on 4/26/19.      On review of arrhythmia episodes, pt had 1 NSVT episode logged on 4/27/19 at 1947.  EGM shows 9 beats NSVT in the 150's.  Pt cannot recall symptoms at time of event.  EF 34% per echo on 9/19/19.      Will route to Dr. De Leon for notification and review per device clinic protocol.      MABEL Mata

## 2019-09-30 NOTE — TELEPHONE ENCOUNTER
One episode of NSVT on BiV PPM check. EF still below 35%. Stable medical therapy for nonischemic CM for > 6 months. Please arrange an EP consult for possible device upgrade to BiV ICD if pt wishes to consider that. Thanks. EE

## 2019-10-01 LAB
MDC_IDC_EPISODE_DTM: NORMAL
MDC_IDC_EPISODE_DURATION: 1 S
MDC_IDC_EPISODE_DURATION: 14 S
MDC_IDC_EPISODE_DURATION: 5 S
MDC_IDC_EPISODE_DURATION: 51 S
MDC_IDC_EPISODE_DURATION: NORMAL S
MDC_IDC_EPISODE_ID: 5006
MDC_IDC_EPISODE_ID: 5007
MDC_IDC_EPISODE_ID: 5008
MDC_IDC_EPISODE_ID: 75
MDC_IDC_EPISODE_ID: 76
MDC_IDC_EPISODE_TYPE: NORMAL
MDC_IDC_LEAD_IMPLANT_DT: NORMAL
MDC_IDC_LEAD_LOCATION: NORMAL
MDC_IDC_LEAD_LOCATION_DETAIL_1: NORMAL
MDC_IDC_LEAD_MFG: NORMAL
MDC_IDC_LEAD_MODEL: NORMAL
MDC_IDC_LEAD_POLARITY_TYPE: NORMAL
MDC_IDC_LEAD_SERIAL: NORMAL
MDC_IDC_MSMT_BATTERY_DTM: NORMAL
MDC_IDC_MSMT_BATTERY_REMAINING_LONGEVITY: 89 MO
MDC_IDC_MSMT_BATTERY_RRT_TRIGGER: 2.6
MDC_IDC_MSMT_BATTERY_STATUS: NORMAL
MDC_IDC_MSMT_BATTERY_VOLTAGE: 3 V
MDC_IDC_MSMT_LEADCHNL_LV_IMPEDANCE_VALUE: 228 OHM
MDC_IDC_MSMT_LEADCHNL_LV_IMPEDANCE_VALUE: 228 OHM
MDC_IDC_MSMT_LEADCHNL_LV_IMPEDANCE_VALUE: 285 OHM
MDC_IDC_MSMT_LEADCHNL_LV_IMPEDANCE_VALUE: 323 OHM
MDC_IDC_MSMT_LEADCHNL_LV_IMPEDANCE_VALUE: 361 OHM
MDC_IDC_MSMT_LEADCHNL_LV_IMPEDANCE_VALUE: 380 OHM
MDC_IDC_MSMT_LEADCHNL_LV_IMPEDANCE_VALUE: 418 OHM
MDC_IDC_MSMT_LEADCHNL_LV_IMPEDANCE_VALUE: 494 OHM
MDC_IDC_MSMT_LEADCHNL_LV_IMPEDANCE_VALUE: 494 OHM
MDC_IDC_MSMT_LEADCHNL_LV_IMPEDANCE_VALUE: 589 OHM
MDC_IDC_MSMT_LEADCHNL_LV_PACING_THRESHOLD_AMPLITUDE: 3.25 V
MDC_IDC_MSMT_LEADCHNL_LV_PACING_THRESHOLD_PULSEWIDTH: 0.4 MS
MDC_IDC_MSMT_LEADCHNL_RA_IMPEDANCE_VALUE: 323 OHM
MDC_IDC_MSMT_LEADCHNL_RA_IMPEDANCE_VALUE: 513 OHM
MDC_IDC_MSMT_LEADCHNL_RA_PACING_THRESHOLD_AMPLITUDE: 0.62 V
MDC_IDC_MSMT_LEADCHNL_RA_PACING_THRESHOLD_PULSEWIDTH: 0.4 MS
MDC_IDC_MSMT_LEADCHNL_RA_SENSING_INTR_AMPL: 2.88 MV
MDC_IDC_MSMT_LEADCHNL_RA_SENSING_INTR_AMPL: 3.62 MV
MDC_IDC_MSMT_LEADCHNL_RV_IMPEDANCE_VALUE: 342 OHM
MDC_IDC_MSMT_LEADCHNL_RV_IMPEDANCE_VALUE: 551 OHM
MDC_IDC_MSMT_LEADCHNL_RV_PACING_THRESHOLD_AMPLITUDE: 0.88 V
MDC_IDC_MSMT_LEADCHNL_RV_PACING_THRESHOLD_PULSEWIDTH: 0.4 MS
MDC_IDC_MSMT_LEADCHNL_RV_SENSING_INTR_AMPL: 5.75 MV
MDC_IDC_MSMT_LEADCHNL_RV_SENSING_INTR_AMPL: 7.25 MV
MDC_IDC_PG_IMPLANT_DTM: NORMAL
MDC_IDC_PG_MFG: NORMAL
MDC_IDC_PG_MODEL: NORMAL
MDC_IDC_PG_SERIAL: NORMAL
MDC_IDC_PG_TYPE: NORMAL
MDC_IDC_SESS_CLINIC_NAME: NORMAL
MDC_IDC_SESS_DTM: NORMAL
MDC_IDC_SESS_TYPE: NORMAL
MDC_IDC_SET_BRADY_AT_MODE_SWITCH_RATE: 171 {BEATS}/MIN
MDC_IDC_SET_BRADY_LOWRATE: 70 {BEATS}/MIN
MDC_IDC_SET_BRADY_MAX_SENSOR_RATE: 115 {BEATS}/MIN
MDC_IDC_SET_BRADY_MAX_TRACKING_RATE: 115 {BEATS}/MIN
MDC_IDC_SET_BRADY_MODE: NORMAL
MDC_IDC_SET_BRADY_PAV_DELAY_HIGH: 100 MS
MDC_IDC_SET_BRADY_PAV_DELAY_LOW: 130 MS
MDC_IDC_SET_BRADY_SAV_DELAY_HIGH: 70 MS
MDC_IDC_SET_BRADY_SAV_DELAY_LOW: 100 MS
MDC_IDC_SET_CRT_LVRV_DELAY: 20 MS
MDC_IDC_SET_CRT_PACED_CHAMBERS: NORMAL
MDC_IDC_SET_LEADCHNL_LV_PACING_AMPLITUDE: 2.5 V
MDC_IDC_SET_LEADCHNL_LV_PACING_ANODE_ELECTRODE_1: NORMAL
MDC_IDC_SET_LEADCHNL_LV_PACING_ANODE_LOCATION_1: NORMAL
MDC_IDC_SET_LEADCHNL_LV_PACING_CAPTURE_MODE: NORMAL
MDC_IDC_SET_LEADCHNL_LV_PACING_CATHODE_ELECTRODE_1: NORMAL
MDC_IDC_SET_LEADCHNL_LV_PACING_CATHODE_LOCATION_1: NORMAL
MDC_IDC_SET_LEADCHNL_LV_PACING_POLARITY: NORMAL
MDC_IDC_SET_LEADCHNL_LV_PACING_PULSEWIDTH: 1 MS
MDC_IDC_SET_LEADCHNL_RA_PACING_AMPLITUDE: 1.5 V
MDC_IDC_SET_LEADCHNL_RA_PACING_ANODE_ELECTRODE_1: NORMAL
MDC_IDC_SET_LEADCHNL_RA_PACING_ANODE_LOCATION_1: NORMAL
MDC_IDC_SET_LEADCHNL_RA_PACING_CAPTURE_MODE: NORMAL
MDC_IDC_SET_LEADCHNL_RA_PACING_CATHODE_ELECTRODE_1: NORMAL
MDC_IDC_SET_LEADCHNL_RA_PACING_CATHODE_LOCATION_1: NORMAL
MDC_IDC_SET_LEADCHNL_RA_PACING_POLARITY: NORMAL
MDC_IDC_SET_LEADCHNL_RA_PACING_PULSEWIDTH: 0.4 MS
MDC_IDC_SET_LEADCHNL_RA_SENSING_ANODE_ELECTRODE_1: NORMAL
MDC_IDC_SET_LEADCHNL_RA_SENSING_ANODE_LOCATION_1: NORMAL
MDC_IDC_SET_LEADCHNL_RA_SENSING_CATHODE_ELECTRODE_1: NORMAL
MDC_IDC_SET_LEADCHNL_RA_SENSING_CATHODE_LOCATION_1: NORMAL
MDC_IDC_SET_LEADCHNL_RA_SENSING_POLARITY: NORMAL
MDC_IDC_SET_LEADCHNL_RA_SENSING_SENSITIVITY: 0.6 MV
MDC_IDC_SET_LEADCHNL_RV_PACING_AMPLITUDE: 2 V
MDC_IDC_SET_LEADCHNL_RV_PACING_ANODE_ELECTRODE_1: NORMAL
MDC_IDC_SET_LEADCHNL_RV_PACING_ANODE_LOCATION_1: NORMAL
MDC_IDC_SET_LEADCHNL_RV_PACING_CAPTURE_MODE: NORMAL
MDC_IDC_SET_LEADCHNL_RV_PACING_CATHODE_ELECTRODE_1: NORMAL
MDC_IDC_SET_LEADCHNL_RV_PACING_CATHODE_LOCATION_1: NORMAL
MDC_IDC_SET_LEADCHNL_RV_PACING_POLARITY: NORMAL
MDC_IDC_SET_LEADCHNL_RV_PACING_PULSEWIDTH: 0.4 MS
MDC_IDC_SET_LEADCHNL_RV_SENSING_ANODE_ELECTRODE_1: NORMAL
MDC_IDC_SET_LEADCHNL_RV_SENSING_ANODE_LOCATION_1: NORMAL
MDC_IDC_SET_LEADCHNL_RV_SENSING_CATHODE_ELECTRODE_1: NORMAL
MDC_IDC_SET_LEADCHNL_RV_SENSING_CATHODE_LOCATION_1: NORMAL
MDC_IDC_SET_LEADCHNL_RV_SENSING_POLARITY: NORMAL
MDC_IDC_SET_LEADCHNL_RV_SENSING_SENSITIVITY: 0.9 MV
MDC_IDC_SET_ZONE_DETECTION_INTERVAL: 350 MS
MDC_IDC_SET_ZONE_DETECTION_INTERVAL: 400 MS
MDC_IDC_SET_ZONE_TYPE: NORMAL
MDC_IDC_STAT_BRADY_AP_VP_PERCENT: 99.01 %
MDC_IDC_STAT_BRADY_AP_VS_PERCENT: 0.09 %
MDC_IDC_STAT_BRADY_AS_VP_PERCENT: 0.84 %
MDC_IDC_STAT_BRADY_AS_VS_PERCENT: 0.07 %
MDC_IDC_STAT_BRADY_DTM_END: NORMAL
MDC_IDC_STAT_BRADY_DTM_START: NORMAL
MDC_IDC_STAT_BRADY_RA_PERCENT_PACED: 99.12 %
MDC_IDC_STAT_BRADY_RV_PERCENT_PACED: 99.84 %
MDC_IDC_STAT_CRT_DTM_END: NORMAL
MDC_IDC_STAT_CRT_DTM_START: NORMAL
MDC_IDC_STAT_CRT_LV_PERCENT_PACED: 99.81 %
MDC_IDC_STAT_CRT_PERCENT_PACED: 99.81 %
MDC_IDC_STAT_EPISODE_RECENT_COUNT: 0
MDC_IDC_STAT_EPISODE_RECENT_COUNT: 0
MDC_IDC_STAT_EPISODE_RECENT_COUNT: 1
MDC_IDC_STAT_EPISODE_RECENT_COUNT_DTM_END: NORMAL
MDC_IDC_STAT_EPISODE_RECENT_COUNT_DTM_START: NORMAL
MDC_IDC_STAT_EPISODE_TOTAL_COUNT: 1
MDC_IDC_STAT_EPISODE_TOTAL_COUNT: 29
MDC_IDC_STAT_EPISODE_TOTAL_COUNT: 45
MDC_IDC_STAT_EPISODE_TOTAL_COUNT_DTM_END: NORMAL
MDC_IDC_STAT_EPISODE_TOTAL_COUNT_DTM_START: NORMAL
MDC_IDC_STAT_EPISODE_TYPE: NORMAL

## 2019-10-01 NOTE — PROGRESS NOTES
I left message for pt to call back to review echo and device check results.  wants pt to follow up with SULAIMAN RHODES to discuss upgrade to biV ICD.     Pt will follow up with Lacy Angeles in November for CORE follow up. Will confirm with  when he wants pt to have his next echo or if he wants any other imaging of his aorta. Echo reported aortic root dilatation of 4.5 cm, and ascending aorta 5 cm. Last echo was 10/2018 and at that time his aortic root was 3.9 cm, and his ascending aorta was 4.2 cm. Dee MONROE October 1, 2019, 2:18 PM

## 2019-10-02 NOTE — TELEPHONE ENCOUNTER
Echo images reviewed. EF looks more like 25-30% to me. I don't always trust the Biplane measurements. The aorta is enlarged at about 5 cm. AV looks bicuspid. I would not recommend surgery of the aorta at this size for his age. Recheck echo in 6 months. EE

## 2019-10-04 NOTE — PROGRESS NOTES
Pt called me back and I reviewed echo, and device check results with him. I reviewed 's recommendation that he have a visit with one of our EP MDs to discuss upgrade to possible ICD. Pt transferred to scheduling to set up appt. I reviewed echo results and let pt know his aortic dilatation has worsened so he needs closer follow up with an echo in 6 months. Echo mentioned possible bicuspid valve. I don't see that mentioned in his previous echo.      Pt requested that I mail him a copy of his echo report and device check.Pt said he has 3 sons who are MDs. He is going to mail copies of his echo/ device check to them, and would like copies sent to his cardiology clinic in [place].    I reviewed the possibility of bicuspid valve with  to clarify if this is something his sons need to follow up on.  said it is hard to differentiate if it is congential bicuspid valve or if he just has heavily calcified valves that have fused together. It would not hurt for his sons to get echo to rule out bicuspid valve. I left message for pt to call back to discuss. Dee MONROE October 4, 2019, 3:20 PM

## 2019-10-08 NOTE — TELEPHONE ENCOUNTER
1 episode of brief NSVT noted. We are making medication adjustments and will consider upgrade to BiV ICD if EF remains low after medication optimization. Pt to return in 1 month. EE

## 2019-10-09 NOTE — PROGRESS NOTES
Letter mailed to pt with echo results and recommendations from . Pt left message yesterday to discuss echo further as I need to mention to him his possible bicuspid aortic valve. Dee MONROE October 9, 2019, 10:23 AM

## 2019-10-22 NOTE — PROGRESS NOTES
Pt left message to discuss his echo results. Pt said he did receive my letter about his echo results. I left another message for pt to call back to review. Dee MONROE October 22, 2019, 2:20 PM

## 2019-11-05 ENCOUNTER — OFFICE VISIT (OUTPATIENT)
Dept: CARDIOLOGY | Facility: CLINIC | Age: 84
End: 2019-11-05
Attending: INTERNAL MEDICINE
Payer: MEDICARE

## 2019-11-05 VITALS
OXYGEN SATURATION: 98 % | HEART RATE: 88 BPM | DIASTOLIC BLOOD PRESSURE: 78 MMHG | SYSTOLIC BLOOD PRESSURE: 134 MMHG | WEIGHT: 182.9 LBS | BODY MASS INDEX: 28.71 KG/M2 | HEIGHT: 67 IN

## 2019-11-05 DIAGNOSIS — R29.898 LEG FATIGUE: Primary | ICD-10-CM

## 2019-11-05 DIAGNOSIS — I50.22 CHRONIC SYSTOLIC CHF (CONGESTIVE HEART FAILURE) (H): ICD-10-CM

## 2019-11-05 DIAGNOSIS — I50.810 RVF (RIGHT VENTRICULAR FAILURE) (H): ICD-10-CM

## 2019-11-05 DIAGNOSIS — R09.89 BIBASILAR CRACKLES: ICD-10-CM

## 2019-11-05 DIAGNOSIS — I73.9 CLAUDICATION OF BOTH LOWER EXTREMITIES (H): ICD-10-CM

## 2019-11-05 LAB
ANION GAP SERPL CALCULATED.3IONS-SCNC: 11.4 MMOL/L (ref 6–17)
BUN SERPL-MCNC: 33 MG/DL (ref 7–30)
CALCIUM SERPL-MCNC: 9.8 MG/DL (ref 8.5–10.5)
CHLORIDE SERPL-SCNC: 106 MMOL/L (ref 98–107)
CO2 SERPL-SCNC: 31 MMOL/L (ref 23–29)
CREAT SERPL-MCNC: 1.7 MG/DL (ref 0.7–1.3)
GFR SERPL CREATININE-BSD FRML MDRD: 38 ML/MIN/{1.73_M2}
GLUCOSE SERPL-MCNC: 103 MG/DL (ref 70–105)
HGB BLD-MCNC: 13.2 G/DL (ref 13.3–17.7)
POTASSIUM SERPL-SCNC: 4.4 MMOL/L (ref 3.5–5.1)
SODIUM SERPL-SCNC: 144 MMOL/L (ref 136–145)

## 2019-11-05 PROCEDURE — 85018 HEMOGLOBIN: CPT | Performed by: INTERNAL MEDICINE

## 2019-11-05 PROCEDURE — 99214 OFFICE O/P EST MOD 30 MIN: CPT | Performed by: PHYSICIAN ASSISTANT

## 2019-11-05 PROCEDURE — 36415 COLL VENOUS BLD VENIPUNCTURE: CPT | Performed by: INTERNAL MEDICINE

## 2019-11-05 PROCEDURE — 80048 BASIC METABOLIC PNL TOTAL CA: CPT | Performed by: INTERNAL MEDICINE

## 2019-11-05 ASSESSMENT — MIFFLIN-ST. JEOR: SCORE: 1468.26

## 2019-11-05 NOTE — PATIENT INSTRUCTIONS
Call CORE nurse for any questions or concerns Mon-Fri 8am-4pm:                                                #[phone]                                       For concerns after hours:                                               #[phone]     1: Medication changes:   For now continue current medications.    Please think about trying Entresto.  It would replace your losartan.       2: Plan from today: we'll do a study to look at your leg arteries to see if that's causing fatigue.   Please see a sleep doctor to if the right side of your heart is weak from having sleep apnea.    We'll also do a chest xray next week to see if we can determine why you have crackles in your lungs.   See Dr. Morin to talk about the ICD.       3: Lab results: see attached; **  Component      Latest Ref Rng & Units 9/18/2019 11/5/2019   Sodium      136 - 145 mmol/L 143 144   Potassium      3.5 - 5.1 mmol/L 4.1 4.4   Chloride      98 - 107 mmol/L 103 106   Carbon Dioxide      23 - 29 mmol/L 34 (H) 31 (H)   Anion Gap      6 - 17 mmol/L 10.1 11.4   Glucose      70 - 105 mg/dL 93 103   Urea Nitrogen      7 - 30 mg/dL 32 (H) 33 (H)   Creatinine      0.70 - 1.30 mg/dL 1.66 (H) 1.70 (H)   GFR Estimate      >60 mL/min/1.73:m2 39 (L) 38 (L)   GFR Estimate If Black      >60 mL/min/1.73:m2 48 (L) 46 (L)   Calcium      8.5 - 10.5 mg/dL 10.3 9.8

## 2019-11-05 NOTE — LETTER
2019      Mumtaz Hernandez MD  [address]  [facility] [address]      RE: Lico Woo       Dear Colleague,    I had the pleasure of seeing Lico Woo in the [facility].    Service Date: 2019      PRIMARY CARDIOLOGIST:  Dr. De Leon.      REASON FOR VISIT:  [facility] enrollment.      HISTORY OF PRESENT ILLNESS:  Mr. Woo is a delightful 86-year-old gentleman who recently moved from [place] to [place] where he previously received cardiology care there.  He has a past cardiac history significant for the followin.  Valvular disease including mild to moderate mitral regurgitation, mild aortic stenosis and mild to moderate aortic regurgitation.   2.  Atrial fibrillation which apparently was initially diagnosed in the  with tachycardia, eventually undergoing cardioversion in 2019 and started on amiodarone with an initial loading dose and then on 200 mg a day on anticoagulation.   3.  Cardiomyopathy with EF maintained at 50% for quite some time, but dropped to 25% sometime in the , we think around the time of diagnosis with AFib, with last echocardiogram done in our clinic in 2019 showing ongoing EF about 35% with severely dilated RV.   4.  Biventricular pacemaker in place as of the  for his cardiomyopathy.   5.  Hypertension.   6.  Hypothyroidism.   7.  History of falls.      He comes in today for enrollment in the [facility].  When he was seen by Dr. De Leon about a month ago, he was doing okay and previously had had weeping edema in his legs.  Today he comes in stating he is doing alright.  He still gets labored breathing if he works too hard, but it is better than before.  He does not necessarily feel short of breath.  He just notes that his breathing is quick and more rapid and more difficult than he would like.  If he stops and rests, it resolves fairly quickly.  He is able to sleep flat with 1 pillow.  He  also walks with a cane with minimal difficulty.  His weights have ranged at home are mostly around 178 pounds but at one point was as high as 205 and another point as low as 175.  He denies chest pain, syncope or presyncope.  He has, though, had multiple falls over the years and he has a hard time keeping his balance.  At times he feels like he were drunk, but this happens way before he started amiodarone and does not seem to be associated with edema in his legs.      SOCIAL HISTORY:  He lives at [facility] with his wife, Brinda.  He eats 1 meal a day in the dining room.  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  He is a lifelong nonsmoker.  Rare alcohol use.      PHYSICAL EXAMINATION:   GENERAL:  Reveals an elderly, well-developed, well-nourished gentleman in no acute distress who appears his stated age.   HEENT:  Normocephalic, atraumatic.   HEART:  Regular with both systolic and diastolic murmurs heard throughout the precordium.   LUNGS:  With bibasilar rales in the lower lobes.  Middle lobes and upper lobes are clear.  No wheezes or rhonchi.   EXTREMITIES:  He has trace peripheral edema in his ankles normally to mid shin.   NECK:  I do not appreciate JVP.     I do not have him get on exam table as he walks with a cane.      ASSESSMENT AND PLAN:   1.  Cardiomyopathy, presumed to be tachycardia-induced but has not recovered his EF with biventricular pacing and rate control.  He has been on appropriate medications including losartan 100 mg daily and carvedilol 25 mg b.i.d.  He is taking only 20 mg of Lasix and seems to be doing okay with that.  At this point, we talked about options for improving his situation.  I recommended him on Entresto.  He is not willing to start that today, but he would like to think about it.  He did mention the mortality and morbidity benefits with this as well as decreased hospitalizations.  He was given information and will consider.  Given his  ongoing bibasilar rales, I did ask him to get a chest x-ray, which will be complete that I can then review and see if he has pulmonary edema or other cause that may need increased diuresis or pulmonary workup.  He does have a dilated RV on echo, and he does have the body habitus that would be consistent with sleep apnea and I have referred him for a sleep study.   2.  Atrial fibrillation on amiodarone.  Last labs were completed for amio monitoring.  He will be seeing EP next week.  At that point, he will be considered for an ICD, which was reviewed today.   3.  CKD, relatively stable with creatinine 1.7.   4.  Leg weakness and fatigue.  We will get exercise ABIs to look for pulmonary arterial disease as a possible cause.  I did encourage ongoing exercise.   5.  Hypothyroidism, per Dr. Hernandez.   6.  Ischemic status is unclear.  He apparently underwent a stress test in 10/2018.  This was a Lexiscan nuclear stress test at [facility] in [place].  This showed no significant ischemia, but if we cannot get his EF to recover, could consider further workup.   7.  Hypertension, reasonable control.      Thank you for allowing me to participate in this delightful patient's care.  He has followup with Dr. Morin next week.  Ii would like him to see a sleep doctor, and I will see him back in 1 month.  He was given heart failure education by our nurse today..  He also was given instruction to weigh daily and will call with weight gain or change in symptoms.      Thank you for allowing me to participate in this delightful patient's care.      LORIE Guillen PA-C             D: 2019   T: 2019   MT: ABBIE      Name:     KYLER THOMAS   MRN:      [id number]        Account:      [id number]   :      1933           Service Date: 2019      Document: [id number]           Outpatient Encounter Medications as of 2019   Medication Sig Dispense Refill     acetaminophen (TYLENOL)  500 MG tablet Take 500-1,000 mg by mouth every 6 hours as needed for mild pain       allopurinol (ZYLOPRIM) 300 MG tablet Take 300 mg by mouth daily       alpha-lipoic acid 600 MG capsule        amiodarone (PACERONE/CODARONE) 200 MG tablet Take 200 mg by mouth daily       amoxicillin (AMOXIL) 500 MG capsule Take 4 pills one hour prior to dental work 4 capsule 3     apixaban ANTICOAGULANT (ELIQUIS) 5 MG tablet Take 2.5 mg by mouth 2 times daily       carvedilol (COREG) 25 MG tablet Take 1 tablet (25 mg) by mouth 2 times daily (with meals) 180 tablet 3     cholecalciferol (VITAMIN D3) 5000 units TABS tablet Take 2,000 Units by mouth daily       Cyanocobalamin (VITAMIN B 12 PO) Take 2,500 mcg by mouth       furosemide (LASIX) 20 MG tablet Take 1 tablet (20 mg) by mouth daily 90 tablet 3     levothyroxine (SYNTHROID/LEVOTHROID) 25 MCG tablet Take 25 mcg by mouth daily       losartan (COZAAR) 100 MG tablet Take 100 mg by mouth daily       multivitamin (CENTRUM SILVER) tablet Take 1 tablet by mouth daily       Niacin (VITAMIN B-3 OR) Take 500 mg by mouth       potassium chloride ER (MICRO-K) 10 MEQ CR capsule Take 10 mEq by mouth daily        Pyridoxine HCl (VITAMIN B6) 250 MG TABS Take 150 mg by mouth       No facility-administered encounter medications on file as of 11/5/2019.        Again, thank you for allowing me to participate in the care of your patient.      Sincerely,    Lacy Angeles PA-C     [facility]

## 2019-11-05 NOTE — LETTER
11/5/2019    Mumtaz Hernandez MD  [address]  [place] [address]    RE: Lico Kimmonster       Dear Colleague,    I had the pleasure of seeing Lico Kimmonster in the [facility].    [id number]  HPI and Plan:   See dictation    Orders this Visit:  Orders Placed This Encounter   Procedures     US BANG Doppler with Exercise Bilateral     X-ray Chest 2 vws*     Basic metabolic panel     N terminal pro BNP outpatient     SLEEP EVALUATION & MANAGEMENT REFERRAL - [facility] [phone]  (Age 18 and up)     Follow-Up with [facility] - [facility]LL visit     No orders of the defined types were placed in this encounter.    There are no discontinued medications.      Encounter Diagnoses   Name Primary?     Chronic systolic CHF (congestive heart failure) (H)      Leg fatigue Yes     Claudication of both lower extremities (H)      RVF (right ventricular failure) (H)      Bibasilar crackles        CURRENT MEDICATIONS:  Current Outpatient Medications   Medication Sig Dispense Refill     acetaminophen (TYLENOL) 500 MG tablet Take 500-1,000 mg by mouth every 6 hours as needed for mild pain       allopurinol (ZYLOPRIM) 300 MG tablet Take 300 mg by mouth daily       alpha-lipoic acid 600 MG capsule        amiodarone (PACERONE/CODARONE) 200 MG tablet Take 200 mg by mouth daily       amoxicillin (AMOXIL) 500 MG capsule Take 4 pills one hour prior to dental work 4 capsule 3     apixaban ANTICOAGULANT (ELIQUIS) 5 MG tablet Take 2.5 mg by mouth 2 times daily       carvedilol (COREG) 25 MG tablet Take 1 tablet (25 mg) by mouth 2 times daily (with meals) 180 tablet 3     cholecalciferol (VITAMIN D3) 5000 units TABS tablet Take 2,000 Units by mouth daily       Cyanocobalamin (VITAMIN B 12 PO) Take 2,500 mcg by mouth       furosemide (LASIX) 20 MG tablet Take 1 tablet (20 mg) by mouth daily 90 tablet 3     levothyroxine (SYNTHROID/LEVOTHROID) 25 MCG tablet Take 25 mcg by mouth daily        losartan (COZAAR) 100 MG tablet Take 100 mg by mouth daily       multivitamin (CENTRUM SILVER) tablet Take 1 tablet by mouth daily       Niacin (VITAMIN B-3 OR) Take 500 mg by mouth       potassium chloride ER (MICRO-K) 10 MEQ CR capsule Take 10 mEq by mouth daily        Pyridoxine HCl (VITAMIN B6) 250 MG TABS Take 150 mg by mouth         ALLERGIES   No Known Allergies    PAST MEDICAL HISTORY:  Past Medical History:   Diagnosis Date     Aortic regurgitation     mild-moderate per 10/2018 Echo     Aortic stenosis     mild per 10/2018 Echo     Balance problem     few years     Cardiomyopathy (H) 10/2018    [place]; Echo 10/2018, EF 25%, down from 50% 12/2017, per notes, pt declines further work up to find etiology of drop in EF     Chronic atrial fibrillation 2018    cardioversion 4/19     Chronic systolic CHF (congestive heart failure) (H)      Essential hypertension      Gross hematuria 04/2019    cysto, ct neg     History of gout     none for years     Hypothyroidism      Mitral regurgitation     moderate per 10/2018 Echo     Neuropathy 1990    both lower legs     Pacemaker 2018     Prostate cancer (H) 2008    xrt, fine since     Tricuspid regurgitation     mioderate per 10/2018 Echo       PAST SURGICAL HISTORY:  Past Surgical History:   Procedure Laterality Date     bilateral hip replacement  2017    done 6 months apart     IMPLANT PACEMAKER  11/12/2018    CRT-P     lip surgery for skin cancer         FAMILY HISTORY:  History reviewed. No pertinent family history.    SOCIAL HISTORY:  Social History     Socioeconomic History     Marital status:      Spouse name: None     Number of children: 3     Years of education: None     Highest education level: None   Occupational History     Occupation: owned marketing services company in [employer]   Social Needs     Financial resource strain: None     Food insecurity:     Worry: None     Inability: None     Transportation needs:     Medical: None     Non-medical: None  "  Tobacco Use     Smoking status: Never Smoker     Smokeless tobacco: Never Used   Substance and Sexual Activity     Alcohol use: Yes     Comment: minimal     Drug use: Never     Sexual activity: Not Currently   Lifestyle     Physical activity:     Days per week: None     Minutes per session: None     Stress: None   Relationships     Social connections:     Talks on phone: None     Gets together: None     Attends Pentecostalism service: None     Active member of club or organization: None     Attends meetings of clubs or organizations: None     Relationship status: None     Intimate partner violence:     Fear of current or ex partner: None     Emotionally abused: None     Physically abused: None     Forced sexual activity: None   Other Topics Concern     None   Social History Narrative     None       Review of Systems:  Skin:  Positive for pigmentation;scaling   Eyes:  Positive for glasses  ENT:  Positive for tinnitus  Respiratory:  Negative    Cardiovascular:  Negative    Gastroenterology: Negative    Genitourinary:  Negative    Musculoskeletal:  Negative    Neurologic:  Positive for numbness or tingling of feet  Psychiatric:  Negative    Heme/Lymph/Imm:  Negative    Endocrine:  Positive for thyroid disorder    Physical Exam:  Vitals: /78   Pulse 88   Ht 1.702 m (5' 7\")   Wt 83 kg (182 lb 14.4 oz)   SpO2 98%   BMI 28.65 kg/m      Please refer to dictation for physical exam    Recent Lab Results:  LIPID RESULTS:  No results found for: CHOL, HDL, LDL, TRIG, CHOLHDLRATIO    LIVER ENZYME RESULTS:  Lab Results   Component Value Date    AST 26 09/18/2019       CBC RESULTS:  Lab Results   Component Value Date    WBC 6.2 07/23/2019    RBC 3.74 (L) 07/23/2019    HGB 13.2 (L) 11/05/2019    HCT 37.5 (L) 07/23/2019     07/23/2019    MCH 35.3 (H) 07/23/2019    MCHC 35.2 07/23/2019    RDW 13.8 07/23/2019     (L) 07/23/2019       BMP RESULTS:  Lab Results   Component Value Date     11/05/2019    " POTASSIUM 4.4 11/05/2019    CHLORIDE 106 11/05/2019    CO2 31 (H) 11/05/2019    ANIONGAP 11.4 11/05/2019     11/05/2019    BUN 33 (H) 11/05/2019    CR 1.70 (H) 11/05/2019    GFRESTIMATED 38 (L) 11/05/2019    GFRESTBLACK 46 (L) 11/05/2019    MIGUEL ÁNGEL 9.8 11/05/2019        A1C RESULTS:  No results found for: A1C    INR RESULTS:  No results found for: INR        CC  Ray De Leon MD  [address]  [place] [id number]        Thank you for allowing me to participate in the care of your patient.      Sincerely,     Lacy Angeles PA-C     [facility]    cc:   Ray De Leon MD  [address]  [place] [id number]

## 2019-11-06 NOTE — NURSING NOTE
Late entry:    [facility]    Met with Will after their visit with Lacy RIVERA. He shared he has 2 sons who are physicians. 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 He's originally from [place], lived in [place] for many years, and recently returned to [place] and lives at a senior facility without an active nursing services.    Living situation:   With wife    Med set up:   Self    Barriers to HF follow-up:   None identified today; exhibited good insight and appropriate questions asked today    CM/HF education topics we reviewed:   Sodium   Fluids   Daily weights   Symptoms of HF to report to [facility]    Education materials provided:   Guide to HF   HF [facility]   [facility] RN contact info   Drug info/pamphlet to consider: entresto       Return appointment:   Future Appointments   Date Time Provider Department Center   11/11/2019 12:45 PM Jovana Morin MD [facility]   11/25/2019  2:00 PM Mumtaz Hernandez MD [facility] CS   12/3/2019  3:00 PM [facility]   12/3/2019  3:50 PM Lacy Angeles PA-C [facility]   1/6/2020 12:00 AM [facility] TECH1 [facility] CLIN   2/12/2020 12:00 PM [facility]   2/12/2020 12:45 PM Ray De Leon MD [facility]   Need to arrange diagnostics rec'd by Lacy RIVERA ( CXR, ABIs) msg'd scheduling to call pt to arrange. Reviewed that referral for sleep med to r/o GABI was ordered and sleep clinic should be contacting him to arrange consult.     Medication changes:   None today    Labs: Lab results from today were reviewed with the patient during the office visit. A copy of the results were provided on the After Visit Summary.     It was a pleasure meeting with Will today.     Ariella Ornelas RN BSN   4:37 PM 11/06/19

## 2019-11-08 NOTE — PROGRESS NOTES
Service Date: 2019      PRIMARY CARDIOLOGIST:  Dr. De Leon.      REASON FOR VISIT:  [facility] enrollment.      HISTORY OF PRESENT ILLNESS:  Mr. Woo is a delightful 86-year-old gentleman who recently moved from [place] to [place] where he previously received cardiology care there.  He has a past cardiac history significant for the followin.  Valvular disease including mild to moderate mitral regurgitation, mild aortic stenosis and mild to moderate aortic regurgitation.   2.  Atrial fibrillation which apparently was initially diagnosed in the  with tachycardia, eventually undergoing cardioversion in 2019 and started on amiodarone with an initial loading dose and then on 200 mg a day on anticoagulation.   3.  Cardiomyopathy with EF maintained at 50% for quite some time, but dropped to 25% sometime in the , we think around the time of diagnosis with AFib, with last echocardiogram done in our clinic in 2019 showing ongoing EF about 35% with severely dilated RV.   4.  Biventricular pacemaker in place as of the  for his cardiomyopathy.   5.  Hypertension.   6.  Hypothyroidism.   7.  History of falls.      He comes in today for enrollment in the [facility].  When he was seen by Dr. De Leon about a month ago, he was doing okay and previously had had weeping edema in his legs.  Today he comes in stating he is doing alright.  He still gets labored breathing if he works too hard, but it is better than before.  He does not necessarily feel short of breath.  He just notes that his breathing is quick and more rapid and more difficult than he would like.  If he stops and rests, it resolves fairly quickly.  He is able to sleep flat with 1 pillow.  He also walks with a cane with minimal difficulty.  His weights have ranged at home are mostly around 178 pounds but at one point was as high as 205 and another point as low as 175.  He denies chest pain, syncope or  presyncope.  He has, though, had multiple falls over the years and he has a hard time keeping his balance.  At times he feels like he were drunk, but this happens way before he started amiodarone and does not seem to be associated with edema in his legs.      SOCIAL HISTORY:  He lives at [facility] with his wife, Brinda.  He eats 1 meal a day in the dining room.  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  He is a lifelong nonsmoker.  Rare alcohol use.      PHYSICAL EXAMINATION:   GENERAL:  Reveals an elderly, well-developed, well-nourished gentleman in no acute distress who appears his stated age.   HEENT:  Normocephalic, atraumatic.   HEART:  Regular with both systolic and diastolic murmurs heard throughout the precordium.   LUNGS:  With bibasilar rales in the lower lobes.  Middle lobes and upper lobes are clear.  No wheezes or rhonchi.   EXTREMITIES:  He has trace peripheral edema in his ankles normally to mid shin.   NECK:  I do not appreciate JVP.     I do not have him get on exam table as he walks with a cane.      ASSESSMENT AND PLAN:   1.  Cardiomyopathy, presumed to be tachycardia-induced but has not recovered his EF with biventricular pacing and rate control.  He has been on appropriate medications including losartan 100 mg daily and carvedilol 25 mg b.i.d.  He is taking only 20 mg of Lasix and seems to be doing okay with that.  At this point, we talked about options for improving his situation.  I recommended him on Entresto.  He is not willing to start that today, but he would like to think about it.  He did mention the mortality and morbidity benefits with this as well as decreased hospitalizations.  He was given information and will consider.  Given his ongoing bibasilar rales, I did ask him to get a chest x-ray, which will be complete that I can then review and see if he has pulmonary edema or other cause that may need increased diuresis or pulmonary workup.  He  does have a dilated RV on echo, and he does have the body habitus that would be consistent with sleep apnea and I have referred him for a sleep study.   2.  Atrial fibrillation on amiodarone.  Last labs were completed for amio monitoring.  He will be seeing EP next week.  At that point, he will be considered for an ICD, which was reviewed today.   3.  CKD, relatively stable with creatinine 1.7.   4.  Leg weakness and fatigue.  We will get exercise ABIs to look for pulmonary arterial disease as a possible cause.  I did encourage ongoing exercise.   5.  Hypothyroidism, per Dr. Hernandez.   6.  Ischemic status is unclear.  He apparently underwent a stress test in 10/2018.  This was a Lexiscan nuclear stress test at [facility] in [place].  This showed no significant ischemia, but if we cannot get his EF to recover, could consider further workup.   7.  Hypertension, reasonable control.      Thank you for allowing me to participate in this delightful patient's care.  He has followup with Dr. Morin next week.  Ii would like him to see a sleep doctor, and I will see him back in 1 month.  He was given heart failure education by our nurse today..  He also was given instruction to weigh daily and will call with weight gain or change in symptoms.      Thank you for allowing me to participate in this delightful patient's care.      LORIE Guillen PA-C             D: 2019   T: 2019   MT: ABBIE      Name:     KYLER THOMAS   MRN:      [id number]        Account:      [id number]   :      1933           Service Date: 2019      Document: [id number]

## 2019-11-08 NOTE — PROGRESS NOTES
[id number]  HPI and Plan:   See dictation    Orders this Visit:  Orders Placed This Encounter   Procedures     US BANG Doppler with Exercise Bilateral     X-ray Chest 2 vws*     Basic metabolic panel     N terminal pro BNP outpatient     SLEEP EVALUATION & MANAGEMENT REFERRAL - ADULT -[facility] [phone]  (Age 18 and up)     Follow-Up with [facility] - [facility]LL visit     No orders of the defined types were placed in this encounter.    There are no discontinued medications.      Encounter Diagnoses   Name Primary?     Chronic systolic CHF (congestive heart failure) (H)      Leg fatigue Yes     Claudication of both lower extremities (H)      RVF (right ventricular failure) (H)      Bibasilar crackles        CURRENT MEDICATIONS:  Current Outpatient Medications   Medication Sig Dispense Refill     acetaminophen (TYLENOL) 500 MG tablet Take 500-1,000 mg by mouth every 6 hours as needed for mild pain       allopurinol (ZYLOPRIM) 300 MG tablet Take 300 mg by mouth daily       alpha-lipoic acid 600 MG capsule        amiodarone (PACERONE/CODARONE) 200 MG tablet Take 200 mg by mouth daily       amoxicillin (AMOXIL) 500 MG capsule Take 4 pills one hour prior to dental work 4 capsule 3     apixaban ANTICOAGULANT (ELIQUIS) 5 MG tablet Take 2.5 mg by mouth 2 times daily       carvedilol (COREG) 25 MG tablet Take 1 tablet (25 mg) by mouth 2 times daily (with meals) 180 tablet 3     cholecalciferol (VITAMIN D3) 5000 units TABS tablet Take 2,000 Units by mouth daily       Cyanocobalamin (VITAMIN B 12 PO) Take 2,500 mcg by mouth       furosemide (LASIX) 20 MG tablet Take 1 tablet (20 mg) by mouth daily 90 tablet 3     levothyroxine (SYNTHROID/LEVOTHROID) 25 MCG tablet Take 25 mcg by mouth daily       losartan (COZAAR) 100 MG tablet Take 100 mg by mouth daily       multivitamin (CENTRUM SILVER) tablet Take 1 tablet by mouth daily       Niacin (VITAMIN B-3 OR) Take 500 mg by mouth       potassium chloride ER  (MICRO-K) 10 MEQ CR capsule Take 10 mEq by mouth daily        Pyridoxine HCl (VITAMIN B6) 250 MG TABS Take 150 mg by mouth         ALLERGIES   No Known Allergies    PAST MEDICAL HISTORY:  Past Medical History:   Diagnosis Date     Aortic regurgitation     mild-moderate per 10/2018 Echo     Aortic stenosis     mild per 10/2018 Echo     Balance problem     few years     Cardiomyopathy (H) 10/2018    [place]; Echo 10/2018, EF 25%, down from 50% 12/2017, per notes, pt declines further work up to find etiology of drop in EF     Chronic atrial fibrillation 2018    cardioversion 4/19     Chronic systolic CHF (congestive heart failure) (H)      Essential hypertension      Gross hematuria 04/2019    cysto, ct neg     History of gout     none for years     Hypothyroidism      Mitral regurgitation     moderate per 10/2018 Echo     Neuropathy 1990    both lower legs     Pacemaker 2018     Prostate cancer (H) 2008    xrt, fine since     Tricuspid regurgitation     mioderate per 10/2018 Echo       PAST SURGICAL HISTORY:  Past Surgical History:   Procedure Laterality Date     bilateral hip replacement  2017    done 6 months apart     IMPLANT PACEMAKER  11/12/2018    CRT-P     lip surgery for skin cancer         FAMILY HISTORY:  History reviewed. No pertinent family history.    SOCIAL HISTORY:  Social History     Socioeconomic History     Marital status:      Spouse name: None     Number of children: 3     Years of education: None     Highest education level: None   Occupational History     Occupation: owned marketing services company in [place]   Social Needs     Financial resource strain: None     Food insecurity:     Worry: None     Inability: None     Transportation needs:     Medical: None     Non-medical: None   Tobacco Use     Smoking status: Never Smoker     Smokeless tobacco: Never Used   Substance and Sexual Activity     Alcohol use: Yes     Comment: minimal     Drug use: Never     Sexual activity: Not Currently  "  Lifestyle     Physical activity:     Days per week: None     Minutes per session: None     Stress: None   Relationships     Social connections:     Talks on phone: None     Gets together: None     Attends [employer] service: None     Active member of club or organization: None     Attends meetings of clubs or organizations: None     Relationship status: None     Intimate partner violence:     Fear of current or ex partner: None     Emotionally abused: None     Physically abused: None     Forced sexual activity: None   Other Topics Concern     None   Social History Narrative     None       Review of Systems:  Skin:  Positive for pigmentation;scaling   Eyes:  Positive for glasses  ENT:  Positive for tinnitus  Respiratory:  Negative    Cardiovascular:  Negative    Gastroenterology: Negative    Genitourinary:  Negative    Musculoskeletal:  Negative    Neurologic:  Positive for numbness or tingling of feet  Psychiatric:  Negative    Heme/Lymph/Imm:  Negative    Endocrine:  Positive for thyroid disorder    Physical Exam:  Vitals: /78   Pulse 88   Ht 1.702 m (5' 7\")   Wt 83 kg (182 lb 14.4 oz)   SpO2 98%   BMI 28.65 kg/m     Please refer to dictation for physical exam    Recent Lab Results:  LIPID RESULTS:  No results found for: CHOL, HDL, LDL, TRIG, CHOLHDLRATIO    LIVER ENZYME RESULTS:  Lab Results   Component Value Date    AST 26 09/18/2019       CBC RESULTS:  Lab Results   Component Value Date    WBC 6.2 07/23/2019    RBC 3.74 (L) 07/23/2019    HGB 13.2 (L) 11/05/2019    HCT 37.5 (L) 07/23/2019     07/23/2019    MCH 35.3 (H) 07/23/2019    MCHC 35.2 07/23/2019    RDW 13.8 07/23/2019     (L) 07/23/2019       BMP RESULTS:  Lab Results   Component Value Date     11/05/2019    POTASSIUM 4.4 11/05/2019    CHLORIDE 106 11/05/2019    CO2 31 (H) 11/05/2019    ANIONGAP 11.4 11/05/2019     11/05/2019    BUN 33 (H) 11/05/2019    CR 1.70 (H) 11/05/2019    GFRESTIMATED 38 (L) 11/05/2019    " GFRESTBLACK 46 (L) 11/05/2019    MIGUEL ÁNGEL 9.8 11/05/2019        A1C RESULTS:  No results found for: A1C    INR RESULTS:  No results found for: INR        CC  Ray De Leon MD  [address]  [place] [id number]

## 2019-11-11 ENCOUNTER — OFFICE VISIT (OUTPATIENT)
Dept: CARDIOLOGY | Facility: CLINIC | Age: 84
End: 2019-11-11
Payer: MEDICARE

## 2019-11-11 ENCOUNTER — HOSPITAL ENCOUNTER (OUTPATIENT)
Dept: GENERAL RADIOLOGY | Facility: CLINIC | Age: 84
Discharge: HOME | End: 2019-11-11
Attending: PHYSICIAN ASSISTANT | Admitting: PHYSICIAN ASSISTANT
Payer: MEDICARE

## 2019-11-11 VITALS
HEIGHT: 67 IN | SYSTOLIC BLOOD PRESSURE: 116 MMHG | WEIGHT: 184.9 LBS | DIASTOLIC BLOOD PRESSURE: 73 MMHG | HEART RATE: 77 BPM | BODY MASS INDEX: 29.02 KG/M2

## 2019-11-11 DIAGNOSIS — Z95.0 CARDIAC PACEMAKER IN SITU: ICD-10-CM

## 2019-11-11 DIAGNOSIS — I50.22 CHRONIC SYSTOLIC CHF (CONGESTIVE HEART FAILURE) (H): Primary | ICD-10-CM

## 2019-11-11 DIAGNOSIS — I48.19 PERSISTENT ATRIAL FIBRILLATION (H): ICD-10-CM

## 2019-11-11 DIAGNOSIS — R09.89 BIBASILAR CRACKLES: ICD-10-CM

## 2019-11-11 PROCEDURE — 71046 X-RAY EXAM CHEST 2 VIEWS: CPT

## 2019-11-11 PROCEDURE — 99214 OFFICE O/P EST MOD 30 MIN: CPT | Performed by: INTERNAL MEDICINE

## 2019-11-11 ASSESSMENT — MIFFLIN-ST. JEOR: SCORE: 1477.33

## 2019-11-11 NOTE — PROGRESS NOTES
HPI and Plan:   See dictation    No orders of the defined types were placed in this encounter.      No orders of the defined types were placed in this encounter.      There are no discontinued medications.      Encounter Diagnoses   Name Primary?     Chronic systolic CHF (congestive heart failure) (H) Yes     Paroxysmal atrial fibrillation (H)      Cardiac pacemaker in situ        CURRENT MEDICATIONS:  Current Outpatient Medications   Medication Sig Dispense Refill     acetaminophen (TYLENOL) 500 MG tablet Take 500-1,000 mg by mouth every 6 hours as needed for mild pain       allopurinol (ZYLOPRIM) 300 MG tablet Take 300 mg by mouth daily       alpha-lipoic acid 600 MG capsule        amiodarone (PACERONE/CODARONE) 200 MG tablet Take 200 mg by mouth daily       amoxicillin (AMOXIL) 500 MG capsule Take 4 pills one hour prior to dental work 4 capsule 3     apixaban ANTICOAGULANT (ELIQUIS) 5 MG tablet Take 2.5 mg by mouth 2 times daily       carvedilol (COREG) 25 MG tablet Take 1 tablet (25 mg) by mouth 2 times daily (with meals) 180 tablet 3     cholecalciferol (VITAMIN D3) 5000 units TABS tablet Take 2,000 Units by mouth daily       Cyanocobalamin (VITAMIN B 12 PO) Take 2,500 mcg by mouth       furosemide (LASIX) 20 MG tablet Take 1 tablet (20 mg) by mouth daily 90 tablet 3     levothyroxine (SYNTHROID/LEVOTHROID) 25 MCG tablet Take 25 mcg by mouth daily       losartan (COZAAR) 100 MG tablet Take 100 mg by mouth daily       multivitamin (CENTRUM SILVER) tablet Take 1 tablet by mouth daily       Niacin (VITAMIN B-3 OR) Take 500 mg by mouth       potassium chloride ER (MICRO-K) 10 MEQ CR capsule Take 10 mEq by mouth 2 times daily        Pyridoxine HCl (VITAMIN B6) 250 MG TABS Take 150 mg by mouth         ALLERGIES   No Known Allergies    PAST MEDICAL HISTORY:  Past Medical History:   Diagnosis Date     Aortic regurgitation     mild-moderate per 10/2018 Echo     Aortic stenosis     mild per 10/2018 Echo     Balance  problem     few years     Cardiomyopathy (H) 10/2018    [place]; Echo 10/2018, EF 25%, down from 50% 12/2017, per notes, pt declines further work up to find etiology of drop in EF     Chronic atrial fibrillation 2018    cardioversion 4/19     Chronic systolic CHF (congestive heart failure) (H)      Essential hypertension      Gross hematuria 04/2019    cysto, ct neg     History of gout     none for years     Hypothyroidism      Mitral regurgitation     moderate per 10/2018 Echo     Neuropathy 1990    both lower legs     Pacemaker 2018     Prostate cancer (H) 2008    xrt, fine since     Tricuspid regurgitation     mioderate per 10/2018 Echo       PAST SURGICAL HISTORY:  Past Surgical History:   Procedure Laterality Date     bilateral hip replacement  2017    done 6 months apart     IMPLANT PACEMAKER  11/12/2018    CRT-P     lip surgery for skin cancer         FAMILY HISTORY:  Family History   Problem Relation Age of Onset     Myocardial Infarction Mother      Cerebrovascular Disease Father        SOCIAL HISTORY:  Social History     Socioeconomic History     Marital status:      Spouse name: None     Number of children: 3     Years of education: None     Highest education level: None   Occupational History     Occupation: owned marketing services company in [place]   Social Needs     Financial resource strain: None     Food insecurity:     Worry: None     Inability: None     Transportation needs:     Medical: None     Non-medical: None   Tobacco Use     Smoking status: Never Smoker     Smokeless tobacco: Never Used   Substance and Sexual Activity     Alcohol use: Yes     Comment: minimal     Drug use: Never     Sexual activity: Not Currently   Lifestyle     Physical activity:     Days per week: None     Minutes per session: None     Stress: None   Relationships     Social connections:     Talks on phone: None     Gets together: None     Attends Rastafari service: None     Active member of club or organization:  None     Attends meetings of clubs or organizations: None     Relationship status: None     Intimate partner violence:     Fear of current or ex partner: None     Emotionally abused: None     Physically abused: None     Forced sexual activity: None   Other Topics Concern     Parent/sibling w/ CABG, MI or angioplasty before 65F 55M? Not Asked   Social History Narrative     None       Review of Systems:  Skin:  Positive for scaling;pigmentation     Eyes:  Positive for glasses    ENT:  Positive for tinnitus    Respiratory:  Positive for dyspnea on exertion     Cardiovascular:  Negative      Gastroenterology: Negative      Genitourinary:  Negative      Musculoskeletal:  Positive for   frequent falls  Neurologic:  Positive for      Psychiatric:  Negative      Heme/Lymph/Imm:  Negative      Endocrine:  Positive for thyroid disorder      [id number]

## 2019-11-11 NOTE — LETTER
11/11/2019      Mumtaz Hernandez MD  [address]  [place] [address]      RE: Lico Woo       Dear Colleague,    I had the pleasure of seeing Lico Woo in the [facility].    Service Date: 11/11/2019      HISTORY OF PRESENT ILLNESS:  It was my pleasure seeing Mr. Lico Woo, a delightful 86-year-old gentleman who has been referred by my colleagues, Dr. Ray De Leon and SAL Guillen for consideration of ICD therapy.      Mr. Woo has the following medical issues:   A.  Nonischemic cardiomyopathy, current EF 34%.  EF used to be around 50% for years, but decreased to 25% in 2018 and most recently has improved to 34%.   B.  Persistent atrial fibrillation diagnosed in the fall of 2018.  Treated with amiodarone and cardioversion.  He is currently treated with amiodarone 200 mg daily and apixaban.   C.  Valvular disease with mild to moderate MR, mild AS, and mild to moderate AI.   D.  Biventricular pacemaker implanted in 2008 for bundle branch block and cardiomyopathy.   E.  Hypertension.   F.  Hypothyroidism.   G.  Leg weakness with falls.   H.  Chronic kidney disease, stage III.      Mr. Woo has been feeling reasonably well.  He tells me he feels well overall, being mentally alert and able to ambulate without difficulty.  He does not have orthopnea, PND or significant recent weight gain.  He tells me that over the past 2 or 3 years his legs have become weaker, so he has a harder time getting up from a chair.      His current cardiac medications include Losartan 100 mg daily, carvedilol 25 mg b.i.d., furosemide 20 mg daily, potassium chloride, amiodarone and apixaban.      The patient is a lifelong nonsmoker and does not drink much alcohol.  He lives with his wife in an independent living apartment at [facility].  He has 2 sons who are physicians, one locally and one in [place].      PHYSICAL EXAMINATION:   VITAL SIGNS:  Blood pressure 116/73, pulse 77 and  regular, weight 84 kg, height 170 cm.   GENERAL:  He is a pleasant elderly man who is alert, oriented, and in no distress.  He is accompanied by his wife.   HEENT:  Head is normocephalic, atraumatic.  He wears glasses.  Mouth, oropharynx clear.   NECK:  Supple without thyromegaly or lymphadenopathy, 1+ carotid pulse without bruits.   LUNGS:  With minimal basilar crackles, mostly on the right side.   CARDIOVASCULAR:  Normal JVP, regular rhythm with +1/6 systolic ejection murmur at the right upper sternal border.  No gallop, murmur or rub.   ABDOMEN:  Mildly obese, soft, nontender.   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   NEUROLOGIC:  Alert and oriented, normal strength in upper extremities.  Slightly reduced strength in upper extremities in that he had a little bit of difficulty getting up from a sitting position.      DIAGNOSTIC STUDIES:  Recent laboratory tests:  Sodium 144, potassium 4.4, creatinine 1.7.  TSH 9.4, hemoglobin 13.2.        His 12-lead ECG from 09/18/2019 showed sinus rhythm with ventricular pacing.  QRS duration is 170 milliseconds.  QRS morphology suggests LV capture.      I have reviewed his most recent device interrogations.  His device functions properly.  LV threshold is moderately elevated.  There was an episode of nonsustained VT in the 150s.  Estimated longevity 5.6 years.      Most recent echocardiogram in 09/2019 showed LVEF of 34% with global LV dysfunction, severely dilated right ventricle with normal function.  Calcified aortic valve with sclerosis, but no significant stenosis.  Mild AI.  Moderate pulmonary hypertension, estimated RVSP of 50 mmHg plus RA pressure.  Ascending aorta maximum diameter was 5 cm.      IMPRESSION:  Mr. Woo is an 86-year-old gentleman who has a nonischemic cardiomyopathy and EF of 34%.  He had a biventricular pacemaker implanted 1 year ago.  He had atrial fibrillation persistently which was felt  to aggravate CHF symptomatology.  He has maintained sinus rhythm after cardioversion and with continued amiodarone therapy.  It is reasonable to continue amiodarone and perform the typical surveillance for thyroid, liver function tests and yearly chest x-rays (and PFTs in case of new symptoms of dyspnea).      ICD implantation in this group of very elderly patients with nonischemic cardiomyopathy has become a point of debate in the field of Electrophysiology.  On one hand, [place] guidelines suggest that this procedure is reasonable.  On the other hand,  guidelines including the results of a recent large study from [place] suggests questionable benefit of ICD therapy for primary prevention in this very elderly age group.      I discussed the rationale for considering an ICD with  and Mrs. Woo.  I must say that Mr. Woo had reviewed the pamphlet regarding ICD therapy in quite detail and could mention several of the statistics quoted in the pamphlet which I was quite impressed with.  I discussed how we perform the procedure, what are the risks and benefits.  I explained there is an approximately 2% risk of serious complication including but not limited to infection, pneumothorax, and lead dislodgement.      After a good discussion, it became clear to me that Mr. Woo understands exactly that the CRT ICD would not improve his current quality of life, but would offer protection from sudden cardiac death.  He told me he is not quite certain whether this is something he desires at the moment.  He would like to discuss this with his 2 sons and get back to me at a later time.  We will certainly follow his wishes.      RECOMMENDATIONS:   A.  Consider upgrade of a CRT-P to CRT-D, details as above.   B.  For long-term cardiomyopathy/CHF management continue seeing Dr. De Leon and SAL Guillen.      I do appreciate the opportunity to be part of his care.      cc:   Mumtaz Hernandez MD   [facility]    [address]    [address]   [place]  [address]      Lacy Angeles PA-C   [facility]    [address]   [place]  [address]      Ray De Leon MD, [facility] [place]    [address]   [place]  [address]         KULDEEP ROSSI MD             D: 2019   T: 2019   MT: RANJAN      Name:     KYLER THOMAS   MRN:      [id number]        Account:      [id number]   :      1933           Service Date: 2019      Document: [id number]         Outpatient Encounter Medications as of 2019   Medication Sig Dispense Refill     acetaminophen (TYLENOL) 500 MG tablet Take 500-1,000 mg by mouth every 6 hours as needed for mild pain       allopurinol (ZYLOPRIM) 300 MG tablet Take 300 mg by mouth daily       alpha-lipoic acid 600 MG capsule        amiodarone (PACERONE/CODARONE) 200 MG tablet Take 200 mg by mouth daily       amoxicillin (AMOXIL) 500 MG capsule Take 4 pills one hour prior to dental work 4 capsule 3     apixaban ANTICOAGULANT (ELIQUIS) 5 MG tablet Take 2.5 mg by mouth 2 times daily       carvedilol (COREG) 25 MG tablet Take 1 tablet (25 mg) by mouth 2 times daily (with meals) 180 tablet 3     cholecalciferol (VITAMIN D3) 5000 units TABS tablet Take 2,000 Units by mouth daily       Cyanocobalamin (VITAMIN B 12 PO) Take 2,500 mcg by mouth       furosemide (LASIX) 20 MG tablet Take 1 tablet (20 mg) by mouth daily 90 tablet 3     levothyroxine (SYNTHROID/LEVOTHROID) 25 MCG tablet Take 25 mcg by mouth daily       losartan (COZAAR) 100 MG tablet Take 100 mg by mouth daily       multivitamin (CENTRUM SILVER) tablet Take 1 tablet by mouth daily       Niacin (VITAMIN B-3 OR) Take 500 mg by mouth       potassium chloride ER (MICRO-K) 10 MEQ CR capsule Take 10 mEq by mouth 2 times daily        Pyridoxine HCl (VITAMIN B6) 250 MG TABS Take 150 mg by mouth       No facility-administered encounter  medications on file as of 11/11/2019.        Again, thank you for allowing me to participate in the care of your patient.      Sincerely,    Jovana Morin MD     [facility]

## 2019-11-11 NOTE — PROGRESS NOTES
Service Date: 11/11/2019      HISTORY OF PRESENT ILLNESS:    It was my pleasure seeing Mr. Lico Woo, a delightful 86-year-old gentleman who has been referred by my colleagues, Dr. Ray De Leon and SAL Guillen for consideration of ICD therapy.      Mr. Woo has the following medical issues:   A.  Nonischemic cardiomyopathy, current EF 34%.  EF used to be around 50% for years, but decreased to 25% in 2018 and most recently has improved to 34%.   B.  Persistent atrial fibrillation diagnosed in the fall of 2018.  Treated with amiodarone and cardioversion.  He is currently on amiodarone 200 mg daily and apixaban.   C.  Valvular disease with mild to moderate MR, mild AS, and mild to moderate AI.   D.  Biventricular pacemaker implanted in 2018 for bundle branch block and cardiomyopathy.   E.  Hypertension.   F.  Hypothyroidism.   G.  Leg weakness with falls.   H.  Chronic kidney disease, stage III.   I.   NSVT detected by his pacemaker.     Mr. Woo has been feeling reasonably well.  He says he feels well overall, being mentally alert and able to ambulate without difficulty.  He does not have orthopnea, PND or significant recent weight gain.  He tells me that over the past 2 or 3 years his legs have become weaker, so he has a harder time getting up from a chair.      His current cardiac medications include Losartan 100 mg daily, carvedilol 25 mg b.i.d., furosemide 20 mg daily, potassium chloride, amiodarone and apixaban.      The patient is a lifelong nonsmoker and does not drink much alcohol.  He lives with his wife in an independent living apartment at [facility].  He has 2 sons who are physicians, one locally and one in [place].        PHYSICAL EXAMINATION:   VITAL SIGNS:  Blood pressure 116/73, pulse 77 and regular, weight 84 kg, height 170 cm.   GENERAL:  He is a pleasant elderly man who is alert, oriented, and in no distress.  He is accompanied by his wife.   HEENT:  Head is normocephalic,  atraumatic.  He wears glasses.  Mouth, oropharynx clear.   NECK:  Supple without thyromegaly or lymphadenopathy, 1+ carotid pulse without bruits.   LUNGS:  With minimal basilar crackles, mostly on the right side.   CARDIOVASCULAR:  Normal JVP, regular rhythm with +1/6 systolic ejection murmur at the right upper sternal border.  No gallop, murmur or rub.   ABDOMEN:  Mildly obese, soft, nontender.   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   NEUROLOGIC:  Alert and oriented, normal strength in upper extremities.  Slightly reduced strength in upper extremities in that he had a little bit of difficulty getting up from a sitting position.   VASCULAR: 1-2+ radial pulses bilaterally.        DIAGNOSTIC STUDIES:    Recent laboratory tests:  Sodium 144, potassium 4.4, creatinine 1.7.  TSH 9.4, hemoglobin 13.2.        His 12-lead ECG from 09/18/2019 showed sinus rhythm with ventricular pacing.  QRS duration is 170 milliseconds.  QRS morphology supports LV capture.      I have reviewed his most recent device interrogations.  His device functions properly.  LV threshold is moderately elevated.  There was an episode of nonsustained VT in the 150s.  Estimated longevity 5.6 years.      Most recent echocardiogram in 09/2019 showed LVEF of 34% with global LV dysfunction, severely dilated right ventricle with normal function.  Calcified aortic valve with sclerosis, but no significant stenosis.  Mild AI.  Moderate pulmonary hypertension, estimated RVSP of 50 mmHg plus RA pressure.  Ascending aorta maximum diameter was 5 cm.        IMPRESSION:    1.  Nonischemic cardiomyopathy.  Mr. Woo is an 86-year-old gentleman with nonischemic cardiomyopathy and EF of 34%.  A biventricular pacemaker was implanted 1 year ago.  He had persistent atrial fibrillation which aggravated CHF symptomatology.  Thankfully, he has maintained sinus rhythm after cardioversion, on amiodarone.  It is  reasonable to continue amiodarone and complete the typical surveillance tests for thyroid, liver function and yearly chest x-rays (add PFTs in case of new dyspnea or persistent cough).      ICD implantation for primary prevention in the group of very elderly patients with nonischemic cardiomyopathy has become a point of debate in the field of Electrophysiology.  On one hand, [place] guidelines suggest that the procedure is reasonable.  On the other hand,  guidelines, reflecting a recent large study from [place], suggest no clear benefit of ICD therapy (for primary prevention) in this age group.       I did discuss the rationale for an ICD with  and Mrs. Woo and provided a lukewarm recommendation.  Mr. Woo had reviewed the pamphlet regarding ICD therapy in detail and accurately quoted several statistics.  I was quite impressed.  I discussed how we perform the procedure, what are its risks and benefits.  I explained there is an approximately 2% risk of serious complication, including but not limited to infection, pneumothorax, cardiac perforation and lead dislodgement.      After a good discussion, it became clear that Mr. Woo understands that a CRT-D would not improve his quality of life, but may offer at best modest protection from sudden cardiac death.  He stated he is not certain whether sudden death protection is something he strongly desires at the moment.  He would like to further discuss this with his 2 sons and get back to me at a later time.      RECOMMENDATIONS:   A.  Consider upgrading his CRT-P to CRT-D, details as above.   B.  For long-term cardiomyopathy/CHF management continue seeing Dr. De Leon and SAL Guillen.      I do appreciate the opportunity to be part of his care.         KULDEEP ROSSI MD, FACC         cc:   Mumtaz Hernandez MD   [address]  [address]      Lacy Angeles PA-C   [address]  [address]  [address]      Ray De Leon MD, [facility] at [address]  [address]          D: 2019   T: 2019   MT: RANJAN      Name:     KYLER THOMAS   MRN:      [id number]        Account:      [id number]   :      1933           Service Date: 2019      Document: [id number]

## 2019-11-11 NOTE — LETTER
11/11/2019    Mumtaz Hernandez MD  [address]  [place] [address]    RE: Lico Amna       Dear Colleague,    I had the pleasure of seeing Lico Amna in the [facility].    HPI and Plan:   See dictation    No orders of the defined types were placed in this encounter.      No orders of the defined types were placed in this encounter.      There are no discontinued medications.      Encounter Diagnoses   Name Primary?     Chronic systolic CHF (congestive heart failure) (H) Yes     Paroxysmal atrial fibrillation (H)      Cardiac pacemaker in situ        CURRENT MEDICATIONS:  Current Outpatient Medications   Medication Sig Dispense Refill     acetaminophen (TYLENOL) 500 MG tablet Take 500-1,000 mg by mouth every 6 hours as needed for mild pain       allopurinol (ZYLOPRIM) 300 MG tablet Take 300 mg by mouth daily       alpha-lipoic acid 600 MG capsule        amiodarone (PACERONE/CODARONE) 200 MG tablet Take 200 mg by mouth daily       amoxicillin (AMOXIL) 500 MG capsule Take 4 pills one hour prior to dental work 4 capsule 3     apixaban ANTICOAGULANT (ELIQUIS) 5 MG tablet Take 2.5 mg by mouth 2 times daily       carvedilol (COREG) 25 MG tablet Take 1 tablet (25 mg) by mouth 2 times daily (with meals) 180 tablet 3     cholecalciferol (VITAMIN D3) 5000 units TABS tablet Take 2,000 Units by mouth daily       Cyanocobalamin (VITAMIN B 12 PO) Take 2,500 mcg by mouth       furosemide (LASIX) 20 MG tablet Take 1 tablet (20 mg) by mouth daily 90 tablet 3     levothyroxine (SYNTHROID/LEVOTHROID) 25 MCG tablet Take 25 mcg by mouth daily       losartan (COZAAR) 100 MG tablet Take 100 mg by mouth daily       multivitamin (CENTRUM SILVER) tablet Take 1 tablet by mouth daily       Niacin (VITAMIN B-3 OR) Take 500 mg by mouth       potassium chloride ER (MICRO-K) 10 MEQ CR capsule Take 10 mEq by mouth 2 times daily        Pyridoxine HCl (VITAMIN B6) 250 MG TABS Take 150 mg by mouth          ALLERGIES   No Known Allergies    PAST MEDICAL HISTORY:  Past Medical History:   Diagnosis Date     Aortic regurgitation     mild-moderate per 10/2018 Echo     Aortic stenosis     mild per 10/2018 Echo     Balance problem     few years     Cardiomyopathy (H) 10/2018    [place]; Echo 10/2018, EF 25%, down from 50% 12/2017, per notes, pt declines further work up to find etiology of drop in EF     Chronic atrial fibrillation 2018    cardioversion 4/19     Chronic systolic CHF (congestive heart failure) (H)      Essential hypertension      Gross hematuria 04/2019    cysto, ct neg     History of gout     none for years     Hypothyroidism      Mitral regurgitation     moderate per 10/2018 Echo     Neuropathy 1990    both lower legs     Pacemaker 2018     Prostate cancer (H) 2008    xrt, fine since     Tricuspid regurgitation     mioderate per 10/2018 Echo       PAST SURGICAL HISTORY:  Past Surgical History:   Procedure Laterality Date     bilateral hip replacement  2017    done 6 months apart     IMPLANT PACEMAKER  11/12/2018    CRT-P     lip surgery for skin cancer         FAMILY HISTORY:  Family History   Problem Relation Age of Onset     Myocardial Infarction Mother      Cerebrovascular Disease Father        SOCIAL HISTORY:  Social History     Socioeconomic History     Marital status:      Spouse name: None     Number of children: 3     Years of education: None     Highest education level: None   Occupational History     Occupation: owned marketing services company in [employer]   Social Needs     Financial resource strain: None     Food insecurity:     Worry: None     Inability: None     Transportation needs:     Medical: None     Non-medical: None   Tobacco Use     Smoking status: Never Smoker     Smokeless tobacco: Never Used   Substance and Sexual Activity     Alcohol use: Yes     Comment: minimal     Drug use: Never     Sexual activity: Not Currently   Lifestyle     Physical activity:     Days per week:  None     Minutes per session: None     Stress: None   Relationships     Social connections:     Talks on phone: None     Gets together: None     Attends Yazdanism service: None     Active member of club or organization: None     Attends meetings of clubs or organizations: None     Relationship status: None     Intimate partner violence:     Fear of current or ex partner: None     Emotionally abused: None     Physically abused: None     Forced sexual activity: None   Other Topics Concern     Parent/sibling w/ CABG, MI or angioplasty before 65F 55M? Not Asked   Social History Narrative     None       Review of Systems:  Skin:  Positive for scaling;pigmentation     Eyes:  Positive for glasses    ENT:  Positive for tinnitus    Respiratory:  Positive for dyspnea on exertion     Cardiovascular:  Negative      Gastroenterology: Negative      Genitourinary:  Negative      Musculoskeletal:  Positive for   frequent falls  Neurologic:  Positive for      Psychiatric:  Negative      Heme/Lymph/Imm:  Negative      Endocrine:  Positive for thyroid disorder      [id number]            Thank you for allowing me to participate in the care of your patient.      Sincerely,     Jovana Morin MD     [facility]    cc:   Mumtaz Hernandez MD  [address] [address]

## 2019-11-13 ENCOUNTER — CARE COORDINATION (OUTPATIENT)
Dept: CARDIOLOGY | Facility: CLINIC | Age: 84
End: 2019-11-13

## 2019-11-13 DIAGNOSIS — I50.22 CHRONIC SYSTOLIC CHF (CONGESTIVE HEART FAILURE) (H): Primary | ICD-10-CM

## 2019-11-13 NOTE — PROGRESS NOTES
Received phone call from patient regarding scheduling PFT's. Order placed. Patient confirmed understanding of this. Instructed to call if he has any further questions.       MABEL Zazueta November 13, 2019 2:53 PM

## 2019-11-13 NOTE — PROGRESS NOTES
Some atelectasis noted, this is likely source of crackles.  Not read as edema.  Given that he's on amiodarone, should have pfts.  Lacy Angeles PA-C 11/12/2019 5:25 PM       Called patient to review results and to schedule PFT's done. Left voice mail for call back.       MABEL Zazueta November 13, 2019 10:42 AM

## 2019-11-15 ENCOUNTER — HOSPITAL ENCOUNTER (OUTPATIENT)
Dept: ULTRASOUND IMAGING | Facility: CLINIC | Age: 84
Discharge: HOME | End: 2019-11-15
Attending: PHYSICIAN ASSISTANT | Admitting: PHYSICIAN ASSISTANT
Payer: MEDICARE

## 2019-11-15 DIAGNOSIS — I73.9 CLAUDICATION OF BOTH LOWER EXTREMITIES (H): ICD-10-CM

## 2019-11-15 PROCEDURE — 93922 UPR/L XTREMITY ART 2 LEVELS: CPT | Mod: 26 | Performed by: INTERNAL MEDICINE

## 2019-11-15 PROCEDURE — 93922 UPR/L XTREMITY ART 2 LEVELS: CPT

## 2019-11-19 ENCOUNTER — CARE COORDINATION (OUTPATIENT)
Dept: CARDIOLOGY | Facility: CLINIC | Age: 84
End: 2019-11-19

## 2019-11-19 NOTE — PROGRESS NOTES
Notes recorded by Lacy Angeles PA-C on 11/19/2019 at 9:50 AM CST  Arteries to the legs are normal at rest.  Unable to exercise due to weakness.  Unlikely given very normal flow at rest that is would worsen significantly enough to cause weakness with exercise.  Please have him see Dr. Hernandez to discuss other causes.    Called patient to follow up on Ultrasound of LE. Left voice mail for call back.       MABEL Zazueta November 19, 2019 10:07 AM

## 2019-11-19 NOTE — PROGRESS NOTES
[facility]    Called pt and LVM w/ Lacy Angeles PAC's message re: 11/15/19 BANG results, noting that he sees Dr. Hernandez on 11/25. Asked him to call [facility] RN w/ questions/concerns.      Notes recorded by Lacy Angeles PA-C on 11/19/2019 at 9:50 AM CST  Arteries to the legs are normal at rest.  Unable to exercise due to weakness.  Unlikely given very normal flow at rest that is would worsen significantly enough to cause weakness with exercise.  Please have him see Dr. Hernandez to discuss other causes.    Ariella Ornelas RN BSN   3:50 PM 11/19/19

## 2019-11-20 NOTE — PROGRESS NOTES
Received voice mail from patient about ultrasound to LE. Called patient and left voice mail for call back.       MABEL Zazueta November 20, 2019 3:53 PM

## 2019-11-25 ENCOUNTER — OFFICE VISIT (OUTPATIENT)
Dept: FAMILY MEDICINE | Facility: CLINIC | Age: 84
End: 2019-11-25
Payer: MEDICARE

## 2019-11-25 VITALS
HEART RATE: 76 BPM | BODY MASS INDEX: 28.51 KG/M2 | SYSTOLIC BLOOD PRESSURE: 136 MMHG | DIASTOLIC BLOOD PRESSURE: 78 MMHG | OXYGEN SATURATION: 97 % | WEIGHT: 182 LBS | TEMPERATURE: 97 F

## 2019-11-25 DIAGNOSIS — Z87.39 HISTORY OF GOUT: ICD-10-CM

## 2019-11-25 DIAGNOSIS — E03.9 HYPOTHYROIDISM, UNSPECIFIED TYPE: ICD-10-CM

## 2019-11-25 DIAGNOSIS — I48.91 ATRIAL FIBRILLATION, UNSPECIFIED TYPE (H): ICD-10-CM

## 2019-11-25 DIAGNOSIS — I42.0 DILATED CARDIOMYOPATHY (H): ICD-10-CM

## 2019-11-25 DIAGNOSIS — R26.89 BALANCE PROBLEM: Primary | ICD-10-CM

## 2019-11-25 DIAGNOSIS — N18.30 CKD (CHRONIC KIDNEY DISEASE) STAGE 3, GFR 30-59 ML/MIN (H): ICD-10-CM

## 2019-11-25 DIAGNOSIS — Z96.60 HISTORY OF JOINT REPLACEMENT, UNSPECIFIED JOINT: ICD-10-CM

## 2019-11-25 DIAGNOSIS — I10 ESSENTIAL HYPERTENSION: ICD-10-CM

## 2019-11-25 PROCEDURE — 99214 OFFICE O/P EST MOD 30 MIN: CPT | Performed by: INTERNAL MEDICINE

## 2019-11-25 RX ORDER — AMIODARONE HYDROCHLORIDE 200 MG/1
200 TABLET ORAL DAILY
Qty: 90 TABLET | Refills: 3 | Status: SHIPPED | OUTPATIENT
Start: 2019-11-25 | End: 2020-02-12

## 2019-11-25 RX ORDER — AMOXICILLIN 500 MG/1
CAPSULE ORAL
Qty: 4 CAPSULE | Refills: 3 | Status: SHIPPED | OUTPATIENT
Start: 2019-11-25 | End: 2021-07-29

## 2019-11-25 RX ORDER — LOSARTAN POTASSIUM 100 MG/1
100 TABLET ORAL DAILY
Qty: 90 TABLET | Refills: 3 | Status: SHIPPED | OUTPATIENT
Start: 2019-11-25 | End: 2020-02-12

## 2019-11-25 RX ORDER — ALLOPURINOL 300 MG/1
300 TABLET ORAL DAILY
Qty: 90 TABLET | Refills: 3 | Status: SHIPPED | OUTPATIENT
Start: 2019-11-25 | End: 2020-11-03

## 2019-11-25 RX ORDER — LEVOTHYROXINE SODIUM 25 UG/1
25 TABLET ORAL DAILY
Qty: 90 TABLET | Refills: 3 | Status: SHIPPED | OUTPATIENT
Start: 2019-11-25 | End: 2020-05-15

## 2019-11-25 NOTE — PROGRESS NOTES
Patient presents for follow-up of multiple medical problems with his wife.  He recently has been in cardiology as noted and reviewed on November 11.  He was seen by EP for a nonischemic cardiomyopathy.  He also has persistent A. fib for which he takes amiodarone and apixaban.  He has valvular disease with mild to moderate MR and mild left ear and mild to moderate aortic insufficiency.  He has a biventricular pacemaker in place.  At that visit the patient was feeling well.  A discussion was had it was decided to continue amiodarone.  We discussed ICD implantation and it was decided at that point not to pursue that.    He was also seen by the nurse practitioner that they produced lung crackles and he did have a chest x-ray as noted and reviewed.  Because of his dilated RV she was concerned with sleep apnea and referred him for a sleep study.  He does have CKD and labs have been stable.    At this time overall the patient notes he is doing quite well.  His biggest issue which sounds like is been present for years his bilateral leg weakness although it sounds like it could be more of a balance issue.  If he uses a cane is fine.  His legs just feel tired but no pain or claudication.  He does not describe back pain and he does not have tingling or numbness.  No loss of control of his bowels or bladder.  He notes he has had neuropathy for years with occasional sharp pain in his feet.    He does not have cardiovascular symptoms, respiratory or GI symptoms.  He takes his medicines regularly.    Past Medical History:   Diagnosis Date     Aortic regurgitation     mild-moderate per 10/2018 Echo     Aortic stenosis     mild per 10/2018 Echo     Balance problem     few years     Cardiomyopathy (H) 10/2018    [place]; Echo 10/2018, EF 25%, down from 50% 12/2017, per notes, pt declines further work up to find etiology of drop in EF     Chronic atrial fibrillation 2018    cardioversion 4/19     Chronic systolic CHF (congestive heart  failure) (H)      Essential hypertension      Gross hematuria 04/2019    cysto, ct neg     History of gout     none for years     Hypothyroidism      Mitral regurgitation     moderate per 10/2018 Echo     Neuropathy 1990    both lower legs     Pacemaker 2018     Prostate cancer (H) 2008    xrt, fine since     Tricuspid regurgitation     mioderate per 10/2018 Echo     Past Surgical History:   Procedure Laterality Date     bilateral hip replacement  2017    done 6 months apart     IMPLANT PACEMAKER  11/12/2018    CRT-P     lip surgery for skin cancer       Social History     Socioeconomic History     Marital status:      Spouse name: Not on file     Number of children: 3     Years of education: Not on file     Highest education level: Not on file   Occupational History     Occupation: owned marketing services company in [place]   Social Needs     Financial resource strain: Not on file     Food insecurity:     Worry: Not on file     Inability: Not on file     Transportation needs:     Medical: Not on file     Non-medical: Not on file   Tobacco Use     Smoking status: Never Smoker     Smokeless tobacco: Never Used   Substance and Sexual Activity     Alcohol use: Yes     Comment: minimal     Drug use: Never     Sexual activity: Not Currently   Lifestyle     Physical activity:     Days per week: Not on file     Minutes per session: Not on file     Stress: Not on file   Relationships     Social connections:     Talks on phone: Not on file     Gets together: Not on file     Attends Scientology service: Not on file     Active member of club or organization: Not on file     Attends meetings of clubs or organizations: Not on file     Relationship status: Not on file     Intimate partner violence:     Fear of current or ex partner: Not on file     Emotionally abused: Not on file     Physically abused: Not on file     Forced sexual activity: Not on file   Other Topics Concern     Parent/sibling w/ CABG, MI or angioplasty  before 65F 55M? Not Asked   Social History Narrative     Not on file     Current Outpatient Medications   Medication Sig Dispense Refill     acetaminophen (TYLENOL) 500 MG tablet Take 500-1,000 mg by mouth every 6 hours as needed for mild pain       allopurinol (ZYLOPRIM) 300 MG tablet Take 1 tablet (300 mg) by mouth daily 90 tablet 3     alpha-lipoic acid 600 MG capsule        amiodarone (PACERONE/CODARONE) 200 MG tablet Take 1 tablet (200 mg) by mouth daily 90 tablet 3     amoxicillin (AMOXIL) 500 MG capsule Take 4 pills one hour prior to dental work 4 capsule 3     apixaban ANTICOAGULANT (ELIQUIS) 5 MG tablet Take 1/2 pill twice daily 45 tablet 3     apixaban ANTICOAGULANT (ELIQUIS) 5 MG tablet Take 2.5 mg by mouth 2 times daily       carvedilol (COREG) 25 MG tablet Take 1 tablet (25 mg) by mouth 2 times daily (with meals) 180 tablet 3     cholecalciferol (VITAMIN D3) 5000 units TABS tablet Take 2,000 Units by mouth daily       Cyanocobalamin (VITAMIN B 12 PO) Take 2,500 mcg by mouth       furosemide (LASIX) 20 MG tablet Take 1 tablet (20 mg) by mouth daily 90 tablet 3     levothyroxine (SYNTHROID/LEVOTHROID) 25 MCG tablet Take 1 tablet (25 mcg) by mouth daily 90 tablet 3     losartan (COZAAR) 100 MG tablet Take 1 tablet (100 mg) by mouth daily 90 tablet 3     multivitamin (CENTRUM SILVER) tablet Take 1 tablet by mouth daily       Niacin (VITAMIN B-3 OR) Take 500 mg by mouth       potassium chloride ER (MICRO-K) 10 MEQ CR capsule Take 10 mEq by mouth 2 times daily        Pyridoxine HCl (VITAMIN B6) 250 MG TABS Take 150 mg by mouth       No Known Allergies  FAMILY HISTORY NOTED AND REVIEWED    REVIEW OF SYSTEMS: above    PHYSICAL EXAM    /78   Pulse 76   Temp 97  F (36.1  C) (Tympanic)   Wt 82.6 kg (182 lb)   SpO2 97%   BMI 28.51 kg/m       Patient appears non toxic  Lungs - bibasilar crackles, otherwise clear  Cardiovascular - regular rate and rhythm, no murmer, rub or gallop, no jvp or edema, carotids  within normal limits, no bruits.  Abdomen - normal active bowel sounds, soft, non tender, no masses, guarding or rebound, no hepatosplenomegaly    Labs noted    ASSESSMENT:  1. Cmyop, stable, med mgmt  2. Afib, on eliquis  3. Prior gout, no issues  4. Lung crackles, could be atelect, doubt pulm fibrosis, no signs of chf  5. Leg weakness, or balance issue.  I did discuss with the patient and his wife seeing a neurologist for further evaluation but they would like to wait at this time.  6. Hypothyroid, on med  7. Ckd, stable  8. ritesh win, follow up cards  9. Hypertension, controlled    PLAN:  Call if changes  Follow up cards  See me 6 months and check labs then      Mumtaz Hernandez M.D.

## 2019-12-03 ENCOUNTER — OFFICE VISIT (OUTPATIENT)
Dept: CARDIOLOGY | Facility: CLINIC | Age: 84
End: 2019-12-03
Payer: MEDICARE

## 2019-12-03 VITALS
HEART RATE: 84 BPM | WEIGHT: 184.1 LBS | SYSTOLIC BLOOD PRESSURE: 110 MMHG | BODY MASS INDEX: 28.9 KG/M2 | HEIGHT: 67 IN | OXYGEN SATURATION: 95 % | DIASTOLIC BLOOD PRESSURE: 70 MMHG

## 2019-12-03 DIAGNOSIS — I50.22 CHRONIC SYSTOLIC CHF (CONGESTIVE HEART FAILURE) (H): ICD-10-CM

## 2019-12-03 LAB — NT-PROBNP SERPL-MCNC: 6634 PG/ML (ref 0–450)

## 2019-12-03 PROCEDURE — 99214 OFFICE O/P EST MOD 30 MIN: CPT | Performed by: PHYSICIAN ASSISTANT

## 2019-12-03 PROCEDURE — 36415 COLL VENOUS BLD VENIPUNCTURE: CPT | Performed by: INTERNAL MEDICINE

## 2019-12-03 PROCEDURE — 80048 BASIC METABOLIC PNL TOTAL CA: CPT | Performed by: INTERNAL MEDICINE

## 2019-12-03 PROCEDURE — 83880 ASSAY OF NATRIURETIC PEPTIDE: CPT | Performed by: PHYSICIAN ASSISTANT

## 2019-12-03 ASSESSMENT — MIFFLIN-ST. JEOR: SCORE: 1473.7

## 2019-12-03 NOTE — LETTER
12/3/2019      Mumtaz Hernandez MD  [address]  [facility] [address]      RE: Lico Woo       Dear Colleague,    I had the pleasure of seeing Lico Woo in the [facility].    Service Date: 2019      PRIMARY CARDIOLOGIST:  Dr. De Leon.      REASON FOR VISIT:  [facility] followup.      HISTORY OF PRESENT ILLNESS:  Mr. Woo is a delightful 86-year-old gentleman with past medical history significant for the followin.  Valvular disease including mild to moderate MR, mild aortic stenosis and mild AR.   2.  AFib, which was initially diagnosed in the  with tachycardia, eventually undergoing cardioversion in 2019.  Started on amiodarone and anticoagulation.   3.  Cardiomyopathy with EF previously about 50%, dropped down to 25% in the , we believe at the time of diagnosis of AFib, with last echo in September showing an EF about 35% with biventricular failure.   4.  BiV pacemaker placed in the  for his cardiomyopathy.  Recent discussion with Dr. Morin, the patient declined an ICD.   5.  Hypertension.   6.  Hypothyroidism.   7.  History of falls and disequilibrium.      I met him in the [facility] about a month ago.  At that point, he had seen Dr. De Leon and had weeping in his legs.  We discussed getting a sleep study and trial of Entresto, and he declined both.  He underwent a chest x-ray that did show some atelectasis and is scheduled for PFTs next week.      He comes in today feeling well.  He denies chest pain, shortness of breath, orthopnea or PND.  He is able to walk 20-30 minutes in the hallways and gets winded but only because he pushes himself.  He does not have to stop very often.  His weight at home has been about 179 pounds and it is up a little bit from Thanksgiving, he thinks caloric and not volume.  He has not had any pedal edema.  He tells me today that he sleeps well.  He gets up a couple times to go to the  bathroom and falls right to sleep.  He wakes up rested and has not been told he snores.  He sleeps slightly elevated in a recliner and thinks it is unlikely he has sleep apnea.  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  He does not remember any significant treatment with that.      SOCIAL HISTORY:  He is living at [facility] with his wife, Brinda.  He eats 1 meal a day in the dining room and then gets a soup that he then needs for lunch the following day.  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  Lifelong nonsmoker.  Rare alcohol use.      PHYSICAL EXAMINATION:   GENERAL:  Elderly, well-developed, well-nourished gentleman in no acute distress, appears his stated age but a healthy 86.   HEENT:  He is normocephalic, atraumatic.   HEART:  Regular with both systolic and diastolic murmurs throughout the precordium.   LUNGS:  Minimal bibasilar rales today which are improved from previous with clear upper lobes, no rhonchi.   EXTREMITIES:  He has trace peripheral edema.   NECK:  I do not appreciate JVP at 90 degrees.     He walks with a cane.      ASSESSMENT AND PLAN:   1.  Cardiomyopathy, presumed to be tachycardia-induced but has not recovered with biventricular pacing and excellent medications including losartan and carvedilol.  He did have a stress test at one point last year that did not show ischemia.  At this point with no anginal symptoms, I am not particularly interested in pushing him towards angiogram given the risks for that and will follow conservatively.  I will defer to Dr. De Leon if he would like to send him for a repeat stress test or angiography.  We again discussed Entresto.  The patient states that he feels well, and his Eliquis is already expensive.  He does not want to be put on another expensive medication.  At this point I am  agreeable to this.  He is not on spironolactone due to renal insufficiency.  We did again discuss today limiting his salt and also having him eat, at least put away the salt shaker as he does not have a lot of control over the food in his dining room.  He will also watch his weights and call with weight gain of 3 pounds in 1 day or 5 pounds in 1 week.  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  He thinks this is low risk and at his age does not feel like he needs to go down that road.  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  If these are markedly abnormal, I will refer him to Pulmonary and he is agreeable to that.   2.  AFib, on amiodarone and Eliquis for anticoagulation appropriately.   3.  BiV ICD in place.  Last device check September here shows 99% BiV pacing, well controlled.   4.  CKD.  Labs pending.   5.  Hypothyroidism, per Dr. Hernandez.   6.  Hypertension, excellent control.      This kind gentleman currently has class II, stage C heart failure and is doing extremely well.  He will see Dr. De Leon back in about 2-1/2 months.  I will be happy to see him sooner with concerns.  Thank you for allowing me to participate in his care.      LORIE Guillen PA-C             D: 2019   T: 2019   MT: ABBIE      Name:     KYLER THOMAS   MRN:      [id number]        Account:      [id number]   :      1933           Service Date: 2019      Document: [id number]         Outpatient Encounter Medications as of 12/3/2019   Medication Sig Dispense Refill     acetaminophen (TYLENOL) 500 MG tablet Take 500-1,000 mg by mouth every 6 hours as needed for mild pain       allopurinol (ZYLOPRIM) 300 MG tablet Take 1 tablet (300 mg) by mouth daily 90 tablet 3     alpha-lipoic acid 600 MG capsule Take by mouth daily        amiodarone (PACERONE/CODARONE) 200 MG tablet Take 1 tablet  (200 mg) by mouth daily 90 tablet 3     amoxicillin (AMOXIL) 500 MG capsule Take 4 pills one hour prior to dental work 4 capsule 3     apixaban ANTICOAGULANT (ELIQUIS) 5 MG tablet Take 1/2 pill twice daily 45 tablet 3     carvedilol (COREG) 25 MG tablet Take 1 tablet (25 mg) by mouth 2 times daily (with meals) 180 tablet 3     cholecalciferol (VITAMIN D3) 5000 units TABS tablet Take 2,000 Units by mouth daily       Cyanocobalamin (VITAMIN B 12 PO) Take 2,500 mcg by mouth       furosemide (LASIX) 20 MG tablet Take 1 tablet (20 mg) by mouth daily 90 tablet 3     levothyroxine (SYNTHROID/LEVOTHROID) 25 MCG tablet Take 1 tablet (25 mcg) by mouth daily 90 tablet 3     losartan (COZAAR) 100 MG tablet Take 1 tablet (100 mg) by mouth daily 90 tablet 3     multivitamin (CENTRUM SILVER) tablet Take 1 tablet by mouth daily       Niacin (VITAMIN B-3 OR) Take 500 mg by mouth       potassium chloride ER (MICRO-K) 10 MEQ CR capsule Take 10 mEq by mouth 2 times daily        Pyridoxine HCl (VITAMIN B6) 250 MG TABS Take 150 mg by mouth       [DISCONTINUED] apixaban ANTICOAGULANT (ELIQUIS) 5 MG tablet Take 2.5 mg by mouth 2 times daily       No facility-administered encounter medications on file as of 12/3/2019.        Again, thank you for allowing me to participate in the care of your patient.      Sincerely,    Lacy Angeles PA-C     [facility]

## 2019-12-03 NOTE — PATIENT INSTRUCTIONS
Call [facility] nurse for any questions or concerns Mon-Fri 8am-4pm:                                                #[phone]                                       For concerns after hours:                                               #[phone]     1: Medication changes: continue current medications.    Please keep an eye on your salt intake, please try and not use the salt shaker.    Keep weighing yourself, please call if your weight is up more than 3 pounds in a day or 5 pounds in a week.      2: Plan from today:   We'll get your breathing tests next week, if they're significantly abnormal we'll have you see a lung doctor.    We'll have you see Dr. De Leon as scheduled in February.      3: Lab results: you labs are still running, we'll call with the results.

## 2019-12-03 NOTE — LETTER
12/3/2019    Mumtaz Hernandez MD  [address] [facility] [address]  [place] [address]    RE: Lico Amna       Dear Colleague,    I had the pleasure of seeing Lico Amna in the [facility].    [id number]  HPI and Plan:   See dictation    Orders this Visit:  No orders of the defined types were placed in this encounter.    No orders of the defined types were placed in this encounter.    Medications Discontinued During This Encounter   Medication Reason     apixaban ANTICOAGULANT (ELIQUIS) 5 MG tablet Discontinued by another Health Care Provider         Encounter Diagnosis   Name Primary?     Chronic systolic CHF (congestive heart failure) (H)        CURRENT MEDICATIONS:  Current Outpatient Medications   Medication Sig Dispense Refill     acetaminophen (TYLENOL) 500 MG tablet Take 500-1,000 mg by mouth every 6 hours as needed for mild pain       allopurinol (ZYLOPRIM) 300 MG tablet Take 1 tablet (300 mg) by mouth daily 90 tablet 3     alpha-lipoic acid 600 MG capsule Take by mouth daily        amiodarone (PACERONE/CODARONE) 200 MG tablet Take 1 tablet (200 mg) by mouth daily 90 tablet 3     amoxicillin (AMOXIL) 500 MG capsule Take 4 pills one hour prior to dental work 4 capsule 3     apixaban ANTICOAGULANT (ELIQUIS) 5 MG tablet Take 1/2 pill twice daily 45 tablet 3     carvedilol (COREG) 25 MG tablet Take 1 tablet (25 mg) by mouth 2 times daily (with meals) 180 tablet 3     cholecalciferol (VITAMIN D3) 5000 units TABS tablet Take 2,000 Units by mouth daily       Cyanocobalamin (VITAMIN B 12 PO) Take 2,500 mcg by mouth       furosemide (LASIX) 20 MG tablet Take 1 tablet (20 mg) by mouth daily 90 tablet 3     levothyroxine (SYNTHROID/LEVOTHROID) 25 MCG tablet Take 1 tablet (25 mcg) by mouth daily 90 tablet 3     losartan (COZAAR) 100 MG tablet Take 1 tablet (100 mg) by mouth daily 90 tablet 3     multivitamin (CENTRUM SILVER) tablet Take 1 tablet by mouth daily       Niacin (VITAMIN B-3 OR) Take 500 mg  by mouth       potassium chloride ER (MICRO-K) 10 MEQ CR capsule Take 10 mEq by mouth 2 times daily        Pyridoxine HCl (VITAMIN B6) 250 MG TABS Take 150 mg by mouth         ALLERGIES   No Known Allergies    PAST MEDICAL HISTORY:  Past Medical History:   Diagnosis Date     Aortic regurgitation     mild-moderate per 10/2018 Echo     Aortic stenosis     mild per 10/2018 Echo     Balance problem     few years     Cardiomyopathy (H) 10/2018    [place]; Echo 10/2018, EF 25%, down from 50% 12/2017, per notes, pt declines further work up to find etiology of drop in EF     Chronic atrial fibrillation 2018    cardioversion 4/19     Chronic systolic CHF (congestive heart failure) (H)      Essential hypertension      Gross hematuria 04/2019    cysto, ct neg     History of gout     none for years     Hypothyroidism      Mitral regurgitation     moderate per 10/2018 Echo     Neuropathy 1990    both lower legs     Pacemaker 2018     Prostate cancer (H) 2008    xrt, fine since     Tricuspid regurgitation     mioderate per 10/2018 Echo       PAST SURGICAL HISTORY:  Past Surgical History:   Procedure Laterality Date     bilateral hip replacement  2017    done 6 months apart     IMPLANT PACEMAKER  11/12/2018    CRT-P     lip surgery for skin cancer         FAMILY HISTORY:  Family History   Problem Relation Age of Onset     Myocardial Infarction Mother      Cerebrovascular Disease Father        SOCIAL HISTORY:  Social History     Socioeconomic History     Marital status:      Spouse name: None     Number of children: 3     Years of education: None     Highest education level: None   Occupational History     Occupation: owned marketing services company in [facility]   Social Needs     Financial resource strain: None     Food insecurity:     Worry: None     Inability: None     Transportation needs:     Medical: None     Non-medical: None   Tobacco Use     Smoking status: Never Smoker     Smokeless tobacco: Never Used   Substance  "and Sexual Activity     Alcohol use: Yes     Comment: minimal     Drug use: Never     Sexual activity: Not Currently   Lifestyle     Physical activity:     Days per week: None     Minutes per session: None     Stress: None   Relationships     Social connections:     Talks on phone: None     Gets together: None     Attends [employer] service: None     Active member of club or organization: None     Attends meetings of clubs or organizations: None     Relationship status: None     Intimate partner violence:     Fear of current or ex partner: None     Emotionally abused: None     Physically abused: None     Forced sexual activity: None   Other Topics Concern     Parent/sibling w/ CABG, MI or angioplasty before 65F 55M? Not Asked   Social History Narrative     None       Review of Systems:  Skin:  Positive for scaling;pigmentation   Eyes:  Positive for glasses  ENT:  Positive for tinnitus  Respiratory:  Positive for dyspnea on exertion  Cardiovascular:    edema;Positive for  Gastroenterology: Negative    Genitourinary:  Negative    Musculoskeletal:  Positive for    Neurologic:  Positive for numbness or tingling of feet  Psychiatric:  Negative    Heme/Lymph/Imm:  Negative    Endocrine:  Positive for thyroid disorder    Physical Exam:  Vitals: /70   Pulse 84   Ht 1.702 m (5' 7\")   Wt 83.5 kg (184 lb 1.6 oz)   SpO2 95%   BMI 28.83 kg/m      Please refer to dictation for physical exam    Recent Lab Results:  LIPID RESULTS:  No results found for: CHOL, HDL, LDL, TRIG, CHOLHDLRATIO    LIVER ENZYME RESULTS:  Lab Results   Component Value Date    AST 26 09/18/2019       CBC RESULTS:  Lab Results   Component Value Date    WBC 6.2 07/23/2019    RBC 3.74 (L) 07/23/2019    HGB 13.2 (L) 11/05/2019    HCT 37.5 (L) 07/23/2019     07/23/2019    MCH 35.3 (H) 07/23/2019    MCHC 35.2 07/23/2019    RDW 13.8 07/23/2019     (L) 07/23/2019       BMP RESULTS:  Lab Results   Component Value Date     11/05/2019    " POTASSIUM 4.4 2019    CHLORIDE 106 2019    CO2 31 (H) 2019    ANIONGAP 11.4 2019     2019    BUN 33 (H) 2019    CR 1.70 (H) 2019    GFRESTIMATED 38 (L) 2019    GFRESTBLACK 46 (L) 2019    MIGUEL ÁNGEL 9.8 2019        A1C RESULTS:  No results found for: A1C    INR RESULTS:  No results found for: INR        CC  Lacy Angeles PA-C  [address]  [place] [address]        Service Date: 2019      PRIMARY CARDIOLOGIST:  Dr. De Leon.      REASON FOR VISIT:  [facility] followup.      HISTORY OF PRESENT ILLNESS:  Mr. Woo is a delightful 86-year-old gentleman with past medical history significant for the followin.  Valvular disease including mild to moderate MR, mild aortic stenosis and mild AR.   2.  AFib, which was initially diagnosed in the  with tachycardia, eventually undergoing cardioversion in 2019.  Started on amiodarone and anticoagulation.   3.  Cardiomyopathy with EF previously about 50%, dropped down to 25% in the , we believe at the time of diagnosis of AFib, with last echo in September showing an EF about 35% with biventricular failure.   4.  BiV pacemaker placed in the  for his cardiomyopathy.  Recent discussion with Dr. Morin, the patient declined an ICD.   5.  Hypertension.   6.  Hypothyroidism.   7.  History of falls and disequilibrium.      I met him in the [facility] about a month ago.  At that point, he had seen Dr. De Leon and had weeping in his legs.  We discussed getting a sleep study and trial of Entresto, and he declined both.  He underwent a chest x-ray that did show some atelectasis and is scheduled for PFTs next week.      He comes in today feeling well.  He denies chest pain, shortness of breath, orthopnea or PND.  He is able to walk 20-30 minutes in the hallways and gets winded but only because he pushes himself.  He does not have to stop very often.  His weight at  home has been about 179 pounds and it is up a little bit from Thanksgiving, he thinks caloric and not volume.  He has not had any pedal edema.  He tells me today that he sleeps well.  He gets up a couple times to go to the bathroom and falls right to sleep.  He wakes up rested and has not been told he snores.  He sleeps slightly elevated in a recliner and thinks it is unlikely he has sleep apnea.  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  He does not remember any significant treatment with that.      SOCIAL HISTORY:  He is living at [facility] with his wife, Brinda.  He eats 1 meal a day in the dining room and then gets a soup that he then needs for lunch the following day.  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  Lifelong nonsmoker.  Rare alcohol use.      PHYSICAL EXAMINATION:   GENERAL:  Elderly, well-developed, well-nourished gentleman in no acute distress, appears his stated age but a healthy 86.   HEENT:  He is normocephalic, atraumatic.   HEART:  Regular with both systolic and diastolic murmurs throughout the precordium.   LUNGS:  Minimal bibasilar rales today which are improved from previous with clear upper lobes, no rhonchi.   EXTREMITIES:  He has trace peripheral edema.   NECK:  I do not appreciate JVP at 90 degrees.     He walks with a cane.      ASSESSMENT AND PLAN:   1.  Cardiomyopathy, presumed to be tachycardia-induced but has not recovered with biventricular pacing and excellent medications including losartan and carvedilol.  He did have a stress test at one point last year that did not show ischemia.  At this point with no anginal symptoms, I am not particularly interested in pushing him towards angiogram given the risks for that and will follow conservatively.  I will defer to Dr. De Leon if he would like to send him for a  repeat stress test or angiography.  We again discussed Entresto.  The patient states that he feels well, and his Eliquis is already expensive.  He does not want to be put on another expensive medication.  At this point I am agreeable to this.  He is not on spironolactone due to renal insufficiency.  We did again discuss today limiting his salt and also having him eat, at least put away the salt shaker as he does not have a lot of control over the food in his dining room.  He will also watch his weights and call with weight gain of 3 pounds in 1 day or 5 pounds in 1 week.  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  He thinks this is low risk and at his age does not feel like he needs to go down that road.  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  If these are markedly abnormal, I will refer him to Pulmonary and he is agreeable to that.   2.  AFib, on amiodarone and Eliquis for anticoagulation appropriately.   3.  BiV ICD in place.  Last device check September here shows 99% BiV pacing, well controlled.   4.  CKD.  Labs pending.   5.  Hypothyroidism, per Dr. Hernandez.   6.  Hypertension, excellent control.      This kind gentleman currently has class II, stage C heart failure and is doing extremely well.  He will see Dr. De Leon back in about 2-1/2 months.  I will be happy to see him sooner with concerns.  Thank you for allowing me to participate in his care.      LORIE Guillen PA-C             D: 2019   T: 2019   MT: ABBIE      Name:     KYLER THOMAS   MRN:      [id number]        Account:      [id number]   :      1933           Service Date: 2019      Document: [id number]       Thank you for allowing me to participate in the care of your patient.      Sincerely,     Lacy Angeles PA-C     [facility]    cc:   Lacy Pineda  More, LORIE  [address]  [place] [id number]

## 2019-12-03 NOTE — PROGRESS NOTES
[id number]  HPI and Plan:   See dictation    Orders this Visit:  No orders of the defined types were placed in this encounter.    No orders of the defined types were placed in this encounter.    Medications Discontinued During This Encounter   Medication Reason     apixaban ANTICOAGULANT (ELIQUIS) 5 MG tablet Discontinued by another Health Care Provider         Encounter Diagnosis   Name Primary?     Chronic systolic CHF (congestive heart failure) (H)        CURRENT MEDICATIONS:  Current Outpatient Medications   Medication Sig Dispense Refill     acetaminophen (TYLENOL) 500 MG tablet Take 500-1,000 mg by mouth every 6 hours as needed for mild pain       allopurinol (ZYLOPRIM) 300 MG tablet Take 1 tablet (300 mg) by mouth daily 90 tablet 3     alpha-lipoic acid 600 MG capsule Take by mouth daily        amiodarone (PACERONE/CODARONE) 200 MG tablet Take 1 tablet (200 mg) by mouth daily 90 tablet 3     amoxicillin (AMOXIL) 500 MG capsule Take 4 pills one hour prior to dental work 4 capsule 3     apixaban ANTICOAGULANT (ELIQUIS) 5 MG tablet Take 1/2 pill twice daily 45 tablet 3     carvedilol (COREG) 25 MG tablet Take 1 tablet (25 mg) by mouth 2 times daily (with meals) 180 tablet 3     cholecalciferol (VITAMIN D3) 5000 units TABS tablet Take 2,000 Units by mouth daily       Cyanocobalamin (VITAMIN B 12 PO) Take 2,500 mcg by mouth       furosemide (LASIX) 20 MG tablet Take 1 tablet (20 mg) by mouth daily 90 tablet 3     levothyroxine (SYNTHROID/LEVOTHROID) 25 MCG tablet Take 1 tablet (25 mcg) by mouth daily 90 tablet 3     losartan (COZAAR) 100 MG tablet Take 1 tablet (100 mg) by mouth daily 90 tablet 3     multivitamin (CENTRUM SILVER) tablet Take 1 tablet by mouth daily       Niacin (VITAMIN B-3 OR) Take 500 mg by mouth       potassium chloride ER (MICRO-K) 10 MEQ CR capsule Take 10 mEq by mouth 2 times daily        Pyridoxine HCl (VITAMIN B6) 250 MG TABS Take 150 mg by mouth         ALLERGIES   No Known Allergies    PAST  MEDICAL HISTORY:  Past Medical History:   Diagnosis Date     Aortic regurgitation     mild-moderate per 10/2018 Echo     Aortic stenosis     mild per 10/2018 Echo     Balance problem     few years     Cardiomyopathy (H) 10/2018    [place]; Echo 10/2018, EF 25%, down from 50% 12/2017, per notes, pt declines further work up to find etiology of drop in EF     Chronic atrial fibrillation 2018    cardioversion 4/19     Chronic systolic CHF (congestive heart failure) (H)      Essential hypertension      Gross hematuria 04/2019    cysto, ct neg     History of gout     none for years     Hypothyroidism      Mitral regurgitation     moderate per 10/2018 Echo     Neuropathy 1990    both lower legs     Pacemaker 2018     Prostate cancer (H) 2008    xrt, fine since     Tricuspid regurgitation     mioderate per 10/2018 Echo       PAST SURGICAL HISTORY:  Past Surgical History:   Procedure Laterality Date     bilateral hip replacement  2017    done 6 months apart     IMPLANT PACEMAKER  11/12/2018    CRT-P     lip surgery for skin cancer         FAMILY HISTORY:  Family History   Problem Relation Age of Onset     Myocardial Infarction Mother      Cerebrovascular Disease Father        SOCIAL HISTORY:  Social History     Socioeconomic History     Marital status:      Spouse name: None     Number of children: 3     Years of education: None     Highest education level: None   Occupational History     Occupation: owned marketing services company in [employer]   Social Needs     Financial resource strain: None     Food insecurity:     Worry: None     Inability: None     Transportation needs:     Medical: None     Non-medical: None   Tobacco Use     Smoking status: Never Smoker     Smokeless tobacco: Never Used   Substance and Sexual Activity     Alcohol use: Yes     Comment: minimal     Drug use: Never     Sexual activity: Not Currently   Lifestyle     Physical activity:     Days per week: None     Minutes per session: None     Stress:  "None   Relationships     Social connections:     Talks on phone: None     Gets together: None     Attends Pentecostalism service: None     Active member of club or organization: None     Attends meetings of clubs or organizations: None     Relationship status: None     Intimate partner violence:     Fear of current or ex partner: None     Emotionally abused: None     Physically abused: None     Forced sexual activity: None   Other Topics Concern     Parent/sibling w/ CABG, MI or angioplasty before 65F 55M? Not Asked   Social History Narrative     None       Review of Systems:  Skin:  Positive for scaling;pigmentation   Eyes:  Positive for glasses  ENT:  Positive for tinnitus  Respiratory:  Positive for dyspnea on exertion  Cardiovascular:    edema;Positive for  Gastroenterology: Negative    Genitourinary:  Negative    Musculoskeletal:  Positive for    Neurologic:  Positive for numbness or tingling of feet  Psychiatric:  Negative    Heme/Lymph/Imm:  Negative    Endocrine:  Positive for thyroid disorder    Physical Exam:  Vitals: /70   Pulse 84   Ht 1.702 m (5' 7\")   Wt 83.5 kg (184 lb 1.6 oz)   SpO2 95%   BMI 28.83 kg/m     Please refer to dictation for physical exam    Recent Lab Results:  LIPID RESULTS:  No results found for: CHOL, HDL, LDL, TRIG, CHOLHDLRATIO    LIVER ENZYME RESULTS:  Lab Results   Component Value Date    AST 26 09/18/2019       CBC RESULTS:  Lab Results   Component Value Date    WBC 6.2 07/23/2019    RBC 3.74 (L) 07/23/2019    HGB 13.2 (L) 11/05/2019    HCT 37.5 (L) 07/23/2019     07/23/2019    MCH 35.3 (H) 07/23/2019    MCHC 35.2 07/23/2019    RDW 13.8 07/23/2019     (L) 07/23/2019       BMP RESULTS:  Lab Results   Component Value Date     11/05/2019    POTASSIUM 4.4 11/05/2019    CHLORIDE 106 11/05/2019    CO2 31 (H) 11/05/2019    ANIONGAP 11.4 11/05/2019     11/05/2019    BUN 33 (H) 11/05/2019    CR 1.70 (H) 11/05/2019    GFRESTIMATED 38 (L) 11/05/2019    " GFRESTBLACK 46 (L) 11/05/2019    MIGUEL ÁNGEL 9.8 11/05/2019        A1C RESULTS:  No results found for: A1C    INR RESULTS:  No results found for: INR        CC  Lacy Angeles PA-C  [address]  [place] [id number]

## 2019-12-04 LAB
ANION GAP SERPL CALCULATED.3IONS-SCNC: 10.3 MMOL/L (ref 6–17)
BUN SERPL-MCNC: 38 MG/DL (ref 7–30)
CALCIUM SERPL-MCNC: 9.5 MG/DL (ref 8.5–10.5)
CHLORIDE SERPL-SCNC: 107 MMOL/L (ref 98–107)
CO2 SERPL-SCNC: 31 MMOL/L (ref 23–29)
CREAT SERPL-MCNC: 1.76 MG/DL (ref 0.7–1.3)
GFR SERPL CREATININE-BSD FRML MDRD: 37 ML/MIN/{1.73_M2}
GLUCOSE SERPL-MCNC: 117 MG/DL (ref 70–105)
POTASSIUM SERPL-SCNC: 4.3 MMOL/L (ref 3.5–5.1)
SODIUM SERPL-SCNC: 144 MMOL/L (ref 136–145)

## 2019-12-04 NOTE — PROGRESS NOTES
Service Date: 2019      PRIMARY CARDIOLOGIST:  Dr. De Leon.      REASON FOR VISIT:  [facility] followup.      HISTORY OF PRESENT ILLNESS:  Mr. Woo is a delightful 86-year-old gentleman with past medical history significant for the followin.  Valvular disease including mild to moderate MR, mild aortic stenosis and mild AR.   2.  AFib, which was initially diagnosed in the  with tachycardia, eventually undergoing cardioversion in 2019.  Started on amiodarone and anticoagulation.   3.  Cardiomyopathy with EF previously about 50%, dropped down to 25% in the , we believe at the time of diagnosis of AFib, with last echo in September showing an EF about 35% with biventricular failure.   4.  BiV pacemaker placed in the  for his cardiomyopathy.  Recent discussion with Dr. Morin, the patient declined an ICD.   5.  Hypertension.   6.  Hypothyroidism.   7.  History of falls and disequilibrium.      I met him in the [facility] about a month ago.  At that point, he had seen Dr. De Leon and had weeping in his legs.  We discussed getting a sleep study and trial of Entresto, and he declined both.  He underwent a chest x-ray that did show some atelectasis and is scheduled for PFTs next week.      He comes in today feeling well.  He denies chest pain, shortness of breath, orthopnea or PND.  He is able to walk 20-30 minutes in the hallways and gets winded but only because he pushes himself.  He does not have to stop very often.  His weight at home has been about 179 pounds and it is up a little bit from Thanksgiving, he thinks caloric and not volume.  He has not had any pedal edema.  He tells me today that he sleeps well.  He gets up a couple times to go to the bathroom and falls right to sleep.  He wakes up rested and has not been told he snores.  He sleeps slightly elevated in a recliner and thinks it is unlikely he has sleep apnea.  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  He does not remember any significant treatment with that.      SOCIAL HISTORY:  He is living at [place] with his wife, Brinda.  He eats 1 meal a day in the dining room and then gets a soup that he then needs for lunch the following day.  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  Lifelong nonsmoker.  Rare alcohol use.      PHYSICAL EXAMINATION:   GENERAL:  Elderly, well-developed, well-nourished gentleman in no acute distress, appears his stated age but a healthy 86.   HEENT:  He is normocephalic, atraumatic.   HEART:  Regular with both systolic and diastolic murmurs throughout the precordium.   LUNGS:  Minimal bibasilar rales today which are improved from previous with clear upper lobes, no rhonchi.   EXTREMITIES:  He has trace peripheral edema.   NECK:  I do not appreciate JVP at 90 degrees.     He walks with a cane.      ASSESSMENT AND PLAN:   1.  Cardiomyopathy, presumed to be tachycardia-induced but has not recovered with biventricular pacing and excellent medications including losartan and carvedilol.  He did have a stress test at one point last year that did not show ischemia.  At this point with no anginal symptoms, I am not particularly interested in pushing him towards angiogram given the risks for that and will follow conservatively.  I will defer to Dr. De Leon if he would like to send him for a repeat stress test or angiography.  We again discussed Entresto.  The patient states that he feels well, and his Eliquis is already expensive.  He does not want to be put on another expensive medication.  At this point I am agreeable to this.  He is not on spironolactone due to renal insufficiency.  We did again discuss today limiting his salt and also having him eat, at least put away the salt shaker as he does not have a lot of control over the  food in his dining room.  He will also watch his weights and call with weight gain of 3 pounds in 1 day or 5 pounds in 1 week.  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  He thinks this is low risk and at his age does not feel like he needs to go down that road.  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  If these are markedly abnormal, I will refer him to Pulmonary and he is agreeable to that.   2.  AFib, on amiodarone and Eliquis for anticoagulation appropriately.   3.  BiV ICD in place.  Last device check September here shows 99% BiV pacing, well controlled.   4.  CKD.  Labs pending.   5.  Hypothyroidism, per Dr. Hernandez.   6.  Hypertension, excellent control.      This kind gentleman currently has class II, stage C heart failure and is doing extremely well.  He will see Dr. De Leon back in about 2-1/2 months.  I will be happy to see him sooner with concerns.  Thank you for allowing me to participate in his care.      LORIE Guillen PA-C             D: 2019   T: 2019   MT: ABBIE      Name:     KYLER THOMAS   MRN:      [id number]        Account:      [id number]   :      1933           Service Date: 2019      Document: [id number]

## 2019-12-05 ENCOUNTER — CARE COORDINATION (OUTPATIENT)
Dept: CARDIOLOGY | Facility: CLINIC | Age: 84
End: 2019-12-05

## 2019-12-05 NOTE — PROGRESS NOTES
Notes recorded by Lacy Angeles PA-C on 12/5/2019 at 9:26 AM CST  NTprobnp done for baseline.  Appeared euvolemic.    Cr slightly elevated and is bun.  This is slightly higher than previous.  At this point he should stay on losartan.  Entresto may improve his renal function if insurance would cover it better next year.    Pls apologize that late lab results.        Called patient to review lab results. Left voice mail for call back.         MABEL Zazueta December 5, 2019 10:07 AM

## 2019-12-05 NOTE — PROGRESS NOTES
Received phone call back from patient regarding lab results. Confirmed understanding of this. Discussed the possibility of switching to Entresto next year, but patient at this time would like to continue on Lorsartan due to cost. Spoke with patient about doing a tier exception or a prior authorization to try and decrease the cost of the medication. Patient stated that he will think about this and get back to us in 2020. Instructed patient to call us if he experiences any symptoms and we could get him seen prior to his follow up appointment. Has follow up scheduled with Dr. De Leon 2/12/2020. Patient states that he feels great, weights have been stable and has not experienced any symptoms.           Future Appointments   Date Time Provider Department Center   12/10/2019  2:00 PM 1[facility]   1/6/2020 12:00 AM GONZALEZ TECH1 [facility]   2/12/2020 12:00 PM GONZALEZ LAB S[facility]   2/12/2020 12:45 PM Ray De Leon MD [facility]   5/26/2020 11:00 AM Mumtaz Hernandez MD [facility] Zazueta RN December 5, 2019 10:29 AM

## 2019-12-10 ENCOUNTER — HOSPITAL ENCOUNTER (OUTPATIENT)
Dept: CARDIAC REHAB | Facility: CLINIC | Age: 84
End: 2019-12-10
Attending: PHYSICIAN ASSISTANT
Payer: MEDICARE

## 2019-12-10 DIAGNOSIS — I50.22 CHRONIC SYSTOLIC CHF (CONGESTIVE HEART FAILURE) (H): ICD-10-CM

## 2019-12-10 PROCEDURE — 94729 DIFFUSING CAPACITY: CPT

## 2019-12-10 PROCEDURE — 94375 RESPIRATORY FLOW VOLUME LOOP: CPT

## 2019-12-10 PROCEDURE — 40001038 ZZH STATISTIC RESPIRATORY TESTING VISIT

## 2019-12-10 PROCEDURE — 94726 PLETHYSMOGRAPHY LUNG VOLUMES: CPT

## 2019-12-17 LAB
DLCOUNC-%PRED-PRE: 80 %
DLCOUNC-PRE: 15.86 ML/MIN/MMHG
DLCOUNC-PRED: 19.58 ML/MIN/MMHG
ERV-%PRED-PRE: 146 %
ERV-PRE: 0.37 L
ERV-PRED: 0.25 L
EXPTIME-PRE: 5.44 SEC
FEF2575-%PRED-PRE: 168 %
FEF2575-PRE: 2.66 L/SEC
FEF2575-PRED: 1.58 L/SEC
FEFMAX-%PRED-PRE: 120 %
FEFMAX-PRE: 6.22 L/SEC
FEFMAX-PRED: 5.18 L/SEC
FEV1-%PRED-PRE: 95 %
FEV1-PRE: 2.14 L
FEV1FEV6-PRE: 86 %
FEV1FEV6-PRED: 75 %
FEV1FVC-PRE: 85 %
FEV1FVC-PRED: 75 %
FEV1SVC-PRE: 86 %
FEV1SVC-PRED: 66 %
FIFMAX-PRE: 5.21 L/SEC
FRCPLETH-%PRED-PRE: 70 %
FRCPLETH-PRE: 2.48 L
FRCPLETH-PRED: 3.5 L
FVC-%PRED-PRE: 84 %
FVC-PRE: 2.53 L
FVC-PRED: 3.01 L
IC-%PRED-PRE: 67 %
IC-PRE: 2.12 L
IC-PRED: 3.12 L
RVPLETH-%PRED-PRE: 75 %
RVPLETH-PRE: 2.1 L
RVPLETH-PRED: 2.8 L
TLCPLETH-%PRED-PRE: 77 %
TLCPLETH-PRE: 4.6 L
TLCPLETH-PRED: 5.95 L
VA-%PRED-PRE: 86 %
VA-PRE: 4.34 L
VC-%PRED-PRE: 73 %
VC-PRE: 2.5 L
VC-PRED: 3.38 L

## 2020-01-15 ENCOUNTER — ANCILLARY PROCEDURE (OUTPATIENT)
Dept: CARDIOLOGY | Facility: CLINIC | Age: 85
End: 2020-01-15
Attending: INTERNAL MEDICINE
Payer: MEDICARE

## 2020-01-15 DIAGNOSIS — Z95.0 CARDIAC PACEMAKER IN SITU: ICD-10-CM

## 2020-01-15 DIAGNOSIS — I42.0 DILATED CARDIOMYOPATHY (H): ICD-10-CM

## 2020-01-15 LAB
MDC_IDC_LEAD_IMPLANT_DT: NORMAL
MDC_IDC_LEAD_LOCATION: NORMAL
MDC_IDC_LEAD_LOCATION_DETAIL_1: NORMAL
MDC_IDC_LEAD_MFG: NORMAL
MDC_IDC_LEAD_MODEL: NORMAL
MDC_IDC_LEAD_POLARITY_TYPE: NORMAL
MDC_IDC_LEAD_SERIAL: NORMAL
MDC_IDC_MSMT_BATTERY_DTM: NORMAL
MDC_IDC_MSMT_BATTERY_REMAINING_LONGEVITY: 96 MO
MDC_IDC_MSMT_BATTERY_RRT_TRIGGER: 2.6
MDC_IDC_MSMT_BATTERY_STATUS: NORMAL
MDC_IDC_MSMT_BATTERY_VOLTAGE: 3 V
MDC_IDC_MSMT_LEADCHNL_LV_IMPEDANCE_VALUE: 247 OHM
MDC_IDC_MSMT_LEADCHNL_LV_IMPEDANCE_VALUE: 266 OHM
MDC_IDC_MSMT_LEADCHNL_LV_IMPEDANCE_VALUE: 399 OHM
MDC_IDC_MSMT_LEADCHNL_LV_IMPEDANCE_VALUE: 437 OHM
MDC_IDC_MSMT_LEADCHNL_LV_IMPEDANCE_VALUE: 475 OHM
MDC_IDC_MSMT_LEADCHNL_LV_IMPEDANCE_VALUE: 532 OHM
MDC_IDC_MSMT_LEADCHNL_LV_IMPEDANCE_VALUE: 608 OHM
MDC_IDC_MSMT_LEADCHNL_LV_IMPEDANCE_VALUE: 627 OHM
MDC_IDC_MSMT_LEADCHNL_LV_IMPEDANCE_VALUE: 646 OHM
MDC_IDC_MSMT_LEADCHNL_LV_IMPEDANCE_VALUE: 817 OHM
MDC_IDC_MSMT_LEADCHNL_LV_PACING_THRESHOLD_AMPLITUDE: 1.62 V
MDC_IDC_MSMT_LEADCHNL_LV_PACING_THRESHOLD_PULSEWIDTH: 1 MS
MDC_IDC_MSMT_LEADCHNL_RA_IMPEDANCE_VALUE: 323 OHM
MDC_IDC_MSMT_LEADCHNL_RA_IMPEDANCE_VALUE: 437 OHM
MDC_IDC_MSMT_LEADCHNL_RA_PACING_THRESHOLD_AMPLITUDE: 0.75 V
MDC_IDC_MSMT_LEADCHNL_RA_PACING_THRESHOLD_PULSEWIDTH: 0.4 MS
MDC_IDC_MSMT_LEADCHNL_RA_SENSING_INTR_AMPL: 2 MV
MDC_IDC_MSMT_LEADCHNL_RA_SENSING_INTR_AMPL: 2 MV
MDC_IDC_MSMT_LEADCHNL_RV_IMPEDANCE_VALUE: 361 OHM
MDC_IDC_MSMT_LEADCHNL_RV_IMPEDANCE_VALUE: 570 OHM
MDC_IDC_MSMT_LEADCHNL_RV_PACING_THRESHOLD_AMPLITUDE: 0.88 V
MDC_IDC_MSMT_LEADCHNL_RV_PACING_THRESHOLD_PULSEWIDTH: 0.4 MS
MDC_IDC_MSMT_LEADCHNL_RV_SENSING_INTR_AMPL: 6.12 MV
MDC_IDC_MSMT_LEADCHNL_RV_SENSING_INTR_AMPL: 6.12 MV
MDC_IDC_PG_IMPLANT_DTM: NORMAL
MDC_IDC_PG_MFG: NORMAL
MDC_IDC_PG_MODEL: NORMAL
MDC_IDC_PG_SERIAL: NORMAL
MDC_IDC_PG_TYPE: NORMAL
MDC_IDC_SESS_CLINIC_NAME: NORMAL
MDC_IDC_SESS_DTM: NORMAL
MDC_IDC_SESS_TYPE: NORMAL
MDC_IDC_SET_BRADY_AT_MODE_SWITCH_RATE: 171 {BEATS}/MIN
MDC_IDC_SET_BRADY_LOWRATE: 70 {BEATS}/MIN
MDC_IDC_SET_BRADY_MAX_SENSOR_RATE: 115 {BEATS}/MIN
MDC_IDC_SET_BRADY_MAX_TRACKING_RATE: 115 {BEATS}/MIN
MDC_IDC_SET_BRADY_MODE: NORMAL
MDC_IDC_SET_BRADY_PAV_DELAY_HIGH: 100 MS
MDC_IDC_SET_BRADY_PAV_DELAY_LOW: 130 MS
MDC_IDC_SET_BRADY_SAV_DELAY_HIGH: 70 MS
MDC_IDC_SET_BRADY_SAV_DELAY_LOW: 100 MS
MDC_IDC_SET_CRT_LVRV_DELAY: 20 MS
MDC_IDC_SET_CRT_PACED_CHAMBERS: NORMAL
MDC_IDC_SET_LEADCHNL_LV_PACING_AMPLITUDE: 2.25 V
MDC_IDC_SET_LEADCHNL_LV_PACING_ANODE_ELECTRODE_1: NORMAL
MDC_IDC_SET_LEADCHNL_LV_PACING_ANODE_LOCATION_1: NORMAL
MDC_IDC_SET_LEADCHNL_LV_PACING_CAPTURE_MODE: NORMAL
MDC_IDC_SET_LEADCHNL_LV_PACING_CATHODE_ELECTRODE_1: NORMAL
MDC_IDC_SET_LEADCHNL_LV_PACING_CATHODE_LOCATION_1: NORMAL
MDC_IDC_SET_LEADCHNL_LV_PACING_POLARITY: NORMAL
MDC_IDC_SET_LEADCHNL_LV_PACING_PULSEWIDTH: 1 MS
MDC_IDC_SET_LEADCHNL_RA_PACING_AMPLITUDE: 1.5 V
MDC_IDC_SET_LEADCHNL_RA_PACING_ANODE_ELECTRODE_1: NORMAL
MDC_IDC_SET_LEADCHNL_RA_PACING_ANODE_LOCATION_1: NORMAL
MDC_IDC_SET_LEADCHNL_RA_PACING_CAPTURE_MODE: NORMAL
MDC_IDC_SET_LEADCHNL_RA_PACING_CATHODE_ELECTRODE_1: NORMAL
MDC_IDC_SET_LEADCHNL_RA_PACING_CATHODE_LOCATION_1: NORMAL
MDC_IDC_SET_LEADCHNL_RA_PACING_POLARITY: NORMAL
MDC_IDC_SET_LEADCHNL_RA_PACING_PULSEWIDTH: 0.4 MS
MDC_IDC_SET_LEADCHNL_RA_SENSING_ANODE_ELECTRODE_1: NORMAL
MDC_IDC_SET_LEADCHNL_RA_SENSING_ANODE_LOCATION_1: NORMAL
MDC_IDC_SET_LEADCHNL_RA_SENSING_CATHODE_ELECTRODE_1: NORMAL
MDC_IDC_SET_LEADCHNL_RA_SENSING_CATHODE_LOCATION_1: NORMAL
MDC_IDC_SET_LEADCHNL_RA_SENSING_POLARITY: NORMAL
MDC_IDC_SET_LEADCHNL_RA_SENSING_SENSITIVITY: 0.6 MV
MDC_IDC_SET_LEADCHNL_RV_PACING_AMPLITUDE: 2 V
MDC_IDC_SET_LEADCHNL_RV_PACING_ANODE_ELECTRODE_1: NORMAL
MDC_IDC_SET_LEADCHNL_RV_PACING_ANODE_LOCATION_1: NORMAL
MDC_IDC_SET_LEADCHNL_RV_PACING_CAPTURE_MODE: NORMAL
MDC_IDC_SET_LEADCHNL_RV_PACING_CATHODE_ELECTRODE_1: NORMAL
MDC_IDC_SET_LEADCHNL_RV_PACING_CATHODE_LOCATION_1: NORMAL
MDC_IDC_SET_LEADCHNL_RV_PACING_POLARITY: NORMAL
MDC_IDC_SET_LEADCHNL_RV_PACING_PULSEWIDTH: 0.4 MS
MDC_IDC_SET_LEADCHNL_RV_SENSING_ANODE_ELECTRODE_1: NORMAL
MDC_IDC_SET_LEADCHNL_RV_SENSING_ANODE_LOCATION_1: NORMAL
MDC_IDC_SET_LEADCHNL_RV_SENSING_CATHODE_ELECTRODE_1: NORMAL
MDC_IDC_SET_LEADCHNL_RV_SENSING_CATHODE_LOCATION_1: NORMAL
MDC_IDC_SET_LEADCHNL_RV_SENSING_POLARITY: NORMAL
MDC_IDC_SET_LEADCHNL_RV_SENSING_SENSITIVITY: 0.9 MV
MDC_IDC_SET_ZONE_DETECTION_INTERVAL: 350 MS
MDC_IDC_SET_ZONE_DETECTION_INTERVAL: 400 MS
MDC_IDC_SET_ZONE_TYPE: NORMAL
MDC_IDC_STAT_BRADY_AP_VP_PERCENT: 99.77 %
MDC_IDC_STAT_BRADY_AP_VS_PERCENT: 0.02 %
MDC_IDC_STAT_BRADY_AS_VP_PERCENT: 0.11 %
MDC_IDC_STAT_BRADY_AS_VS_PERCENT: 0.1 %
MDC_IDC_STAT_BRADY_DTM_END: NORMAL
MDC_IDC_STAT_BRADY_DTM_START: NORMAL
MDC_IDC_STAT_BRADY_RA_PERCENT_PACED: 99.87 %
MDC_IDC_STAT_BRADY_RV_PERCENT_PACED: 99.87 %
MDC_IDC_STAT_CRT_DTM_END: NORMAL
MDC_IDC_STAT_CRT_DTM_START: NORMAL
MDC_IDC_STAT_CRT_LV_PERCENT_PACED: 99.85 %
MDC_IDC_STAT_CRT_PERCENT_PACED: 99.85 %
MDC_IDC_STAT_EPISODE_RECENT_COUNT: 0
MDC_IDC_STAT_EPISODE_RECENT_COUNT_DTM_END: NORMAL
MDC_IDC_STAT_EPISODE_RECENT_COUNT_DTM_START: NORMAL
MDC_IDC_STAT_EPISODE_TOTAL_COUNT: 1
MDC_IDC_STAT_EPISODE_TOTAL_COUNT: 29
MDC_IDC_STAT_EPISODE_TOTAL_COUNT: 45
MDC_IDC_STAT_EPISODE_TOTAL_COUNT_DTM_END: NORMAL
MDC_IDC_STAT_EPISODE_TOTAL_COUNT_DTM_START: NORMAL
MDC_IDC_STAT_EPISODE_TYPE: NORMAL

## 2020-01-15 PROCEDURE — 93294 REM INTERROG EVL PM/LDLS PM: CPT | Performed by: INTERNAL MEDICINE

## 2020-01-15 PROCEDURE — 93296 REM INTERROG EVL PM/IDS: CPT | Performed by: INTERNAL MEDICINE

## 2020-01-18 DIAGNOSIS — I42.9 CARDIOMYOPATHY, UNSPECIFIED TYPE (H): ICD-10-CM

## 2020-01-18 NOTE — TELEPHONE ENCOUNTER
"Last Written Prescription Date:  ?  Last Fill Quantity: ?,  # refills: ?   Last office visit: 11/25/2019 with prescribing provider:  David   Future Office Visit:      Routing refill request to provider for review/approval because:  Medication is reported/historical    Requested Prescriptions   Pending Prescriptions Disp Refills     potassium chloride ER (K-DUR/KLOR-CON M) 10 MEQ CR tablet [Pharmacy Med Name: POTASSIUM  10MEQ CONTROLLED RELEASE TABLET  SR CHLORIDE MC TB] 180 tablet      Sig: TAKE 1 TABLET BY MOUTH TWO  TIMES DAILY       Potassium Supplements Protocol Failed - 1/18/2020  3:56 PM        Failed - Medication is active on med list        Passed - Recent (12 mo) or future (30 days) visit within the authorizing provider's department     Patient has had an office visit with the authorizing provider or a provider within the authorizing providers department within the previous 12 mos or has a future within next 30 days. See \"Patient Info\" tab in inbasket, or \"Choose Columns\" in Meds & Orders section of the refill encounter.              Passed - Patient is age 18 or older        Passed - Normal serum potassium in past 12 months     Recent Labs   Lab Test 12/03/19  1456   POTASSIUM 4.3                      "

## 2020-01-20 RX ORDER — POTASSIUM CHLORIDE 750 MG/1
TABLET, EXTENDED RELEASE ORAL
Qty: 180 TABLET | Refills: 1 | Status: SHIPPED | OUTPATIENT
Start: 2020-01-20 | End: 2020-06-08

## 2020-01-20 NOTE — TELEPHONE ENCOUNTER
Routing refill request to provider for review/approval because:  Medication is reported/historical  Denise DAMICO RN

## 2020-02-12 ENCOUNTER — OFFICE VISIT (OUTPATIENT)
Dept: CARDIOLOGY | Facility: CLINIC | Age: 85
End: 2020-02-12
Payer: MEDICARE

## 2020-02-12 VITALS
HEIGHT: 65 IN | OXYGEN SATURATION: 96 % | SYSTOLIC BLOOD PRESSURE: 122 MMHG | DIASTOLIC BLOOD PRESSURE: 77 MMHG | HEART RATE: 80 BPM | WEIGHT: 184.9 LBS | BODY MASS INDEX: 30.81 KG/M2

## 2020-02-12 DIAGNOSIS — I77.810 AORTIC ROOT DILATATION (H): ICD-10-CM

## 2020-02-12 DIAGNOSIS — I42.0 DILATED CARDIOMYOPATHY (H): ICD-10-CM

## 2020-02-12 DIAGNOSIS — I50.22 CHRONIC SYSTOLIC CHF (CONGESTIVE HEART FAILURE) (H): ICD-10-CM

## 2020-02-12 DIAGNOSIS — I10 ESSENTIAL HYPERTENSION: ICD-10-CM

## 2020-02-12 DIAGNOSIS — I48.0 PAROXYSMAL ATRIAL FIBRILLATION (H): Primary | ICD-10-CM

## 2020-02-12 DIAGNOSIS — Z95.0 CARDIAC PACEMAKER IN SITU: ICD-10-CM

## 2020-02-12 LAB
ANION GAP SERPL CALCULATED.3IONS-SCNC: 8.5 MMOL/L (ref 6–17)
BUN SERPL-MCNC: 33 MG/DL (ref 7–30)
CALCIUM SERPL-MCNC: 9.7 MG/DL (ref 8.5–10.5)
CHLORIDE SERPL-SCNC: 106 MMOL/L (ref 98–107)
CO2 SERPL-SCNC: 34 MMOL/L (ref 23–29)
CREAT SERPL-MCNC: 1.78 MG/DL (ref 0.7–1.3)
GFR SERPL CREATININE-BSD FRML MDRD: 36 ML/MIN/{1.73_M2}
GLUCOSE SERPL-MCNC: 108 MG/DL (ref 70–105)
POTASSIUM SERPL-SCNC: 4.5 MMOL/L (ref 3.5–5.1)
SODIUM SERPL-SCNC: 144 MMOL/L (ref 136–145)

## 2020-02-12 PROCEDURE — 80048 BASIC METABOLIC PNL TOTAL CA: CPT | Performed by: INTERNAL MEDICINE

## 2020-02-12 PROCEDURE — 99214 OFFICE O/P EST MOD 30 MIN: CPT | Performed by: INTERNAL MEDICINE

## 2020-02-12 PROCEDURE — 36415 COLL VENOUS BLD VENIPUNCTURE: CPT | Performed by: INTERNAL MEDICINE

## 2020-02-12 RX ORDER — AMIODARONE HYDROCHLORIDE 100 MG/1
100 TABLET ORAL DAILY
Qty: 90 TABLET | Refills: 3 | Status: SHIPPED | OUTPATIENT
Start: 2020-02-12 | End: 2020-03-16

## 2020-02-12 ASSESSMENT — MIFFLIN-ST. JEOR: SCORE: 1445.58

## 2020-02-12 NOTE — PROGRESS NOTES
Service Date: 02/12/2020      CARDIOLOGY OFFICE PROGRESS NOTE       HISTORY OF PRESENT ILLNESS:  I had the opportunity to see Mr. 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  He is an 86-year-old gentleman who moved back to [place] from [place] last year.  He was being treated for cardiomyopathy in [place].  He was actually feeling fine prior to discovery of rapid atrial fibrillation.  Apparently, this was discovered incidentally but his ejection fraction had already dropped to 25%.  I presume this was thought to be a rate-related cardiomyopathy.  Rate control medications were not very effective and he was loaded on amiodarone and cardioverted last spring.  He has been on Eliquis for stroke prevention.  He also underwent implantation of a biventricular pacemaker but not a defibrillator.      Since returning to [place], he has apparently maintained normal rhythm consistently according to his pacemaker checks.  His ejection fraction has improved somewhat from 25% to 34% by echocardiogram in 10/2019.  He has not had concerning heart failure symptoms but does get a little short of breath with activity.  He indicates that his exercise level has diminished, although he tries to continue to walk.  He has noticed a couple of different issues including balance problems, proximal leg weakness and hearing loss.  He will be addressing the hearing loss separately.  He has longstanding neuropathy in his feet causing numbness but not pain or discomfort.  He has not had any lightheadedness or syncope nor has he had any room spinning or vertigo sensations.  He denies chest pain, PND, orthopnea and significant edema.  He had 1 brief episode of heart racing lasting about 10 seconds a month ago.  This was apparently not captured on his pacemaker check.      PHYSICAL EXAMINATION:     VITAL  SIGNS:  Today, his blood pressure is 122/77, heart rate 80 and weight 184 pounds.     LUNGS:  Clear.     CARDIAC:  His heart rhythm is regular.  There are no cardiac murmurs or carotid bruits.     EXTREMITIES:  He has a trace of lower extremity edema.      IMPRESSIONS:  Mr. 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  He had a nuclear stress test for evaluation of coronary disease and it was reportedly normal.  He has a biventricular pacemaker in place and was cardioverted back to sinus rhythm and has been maintaining sinus rhythm according to his pacemaker checks.  He remains on amiodarone 200 mg a day for maintenance of normal rhythm.  He is also on Eliquis for stroke prevention.      His heart failure symptoms seem to be mild, New York Heart Association class II.  He has no angina.  He has some balance problems which I do not think are related to his cardiac issues.  He has some proximal leg weakness but we have looked at his circulation with an BANG study and it was normal.  I do not think this is related to his cardiac issue either.  I encouraged him to get more regular exercise and do some strength building, possibly with a  or at least additional walking.      I suggested that we switch his losartan to Entresto to bring his medication program up to standard of care for systolic heart failure.  He will check out the cost and proceed with that change if it is affordable.  I urged him to stop the losartan before starting the Entresto.  Otherwise, his medication program looks good and we will plan to have him follow up here in Cardiology for reassessment in the [facility] in about 3 months.  I will see him back myself in the [facility] in 6 months with an echocardiogram again at that time.      He discussed the possibility of upgrading his biventricular pacemaker to an ICD with Dr. Morin and they  decided not to pursue that.      Alesha Strange MD, FACC       cc:   Mumtaz Hernandez MD    [facility]    [address]   [place] [address]         ALESHA STRANGE MD, FACC             D: 2020   T: 2020   MT: CARLOS      Name:     KYLER THOMAS   MRN:      [id number]        Account:      [id number]   :      1933           Service Date: 2020      Document: [id number]

## 2020-02-12 NOTE — LETTER
2/12/2020      Mumtaz Hernandez MD  [address]  [facility] [address]      RE: Lico Woo       Dear Colleague,    I had the pleasure of seeing Lico Woo in the [facility].    Service Date: 02/12/2020      CARDIOLOGY OFFICE PROGRESS NOTE       HISTORY OF PRESENT ILLNESS:  I had the opportunity to see Mr. 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  He is an 86-year-old gentleman who moved back to [place] from [place] last year.  He was being treated for cardiomyopathy in [place].  He was actually feeling fine prior to discovery of rapid atrial fibrillation.  Apparently, this was discovered incidentally but his ejection fraction had already dropped to 25%.  I presume this was thought to be a rate-related cardiomyopathy.  Rate control medications were not very effective and he was loaded on amiodarone and cardioverted last spring.  He has been on Eliquis for stroke prevention.  He also underwent implantation of a biventricular pacemaker but not a defibrillator.      Since returning to [place], he has apparently maintained normal rhythm consistently according to his pacemaker checks.  His ejection fraction has improved somewhat from 25% to 34% by echocardiogram in 10/2019.  He has not had concerning heart failure symptoms but does get a little short of breath with activity.  He indicates that his exercise level has diminished, although he tries to continue to walk.  He has noticed a couple of different issues including balance problems, proximal leg weakness and hearing loss.  He will be addressing the hearing loss separately.  He has longstanding neuropathy in his feet causing numbness but not pain or discomfort.  He has not had any lightheadedness or syncope nor has he had any room spinning or vertigo sensations.  He denies chest  pain, PND, orthopnea and significant edema.  He had 1 brief episode of heart racing lasting about 10 seconds a month ago.  This was apparently not captured on his pacemaker check.      PHYSICAL EXAMINATION:     VITAL SIGNS:  Today, his blood pressure is 122/77, heart rate 80 and weight 184 pounds.     LUNGS:  Clear.     CARDIAC:  His heart rhythm is regular.  There are no cardiac murmurs or carotid bruits.     EXTREMITIES:  He has a trace of lower extremity edema.      IMPRESSIONS:  Mr. 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  He had a nuclear stress test for evaluation of coronary disease and it was reportedly normal.  He has a biventricular pacemaker in place and was cardioverted back to sinus rhythm and has been maintaining sinus rhythm according to his pacemaker checks.  He remains on amiodarone 200 mg a day for maintenance of normal rhythm.  He is also on Eliquis for stroke prevention.      His heart failure symptoms seem to be mild, New York Heart Association class II.  He has no angina.  He has some balance problems which I do not think are related to his cardiac issues.  He has some proximal leg weakness but we have looked at his circulation with an BANG study and it was normal.  I do not think this is related to his cardiac issue either.  I encouraged him to get more regular exercise and do some strength building, possibly with a  or at least additional walking.      I suggested that we switch his losartan to Entresto to bring his medication program up to standard of care for systolic heart failure.  He will check out the cost and proceed with that change if it is affordable.  I urged him to stop the losartan before starting the Entresto.  Otherwise, his medication program looks good and we will plan to have him follow up here in Cardiology for reassessment in the [facility] in about 3 months.  I  will see him back myself in the [facility] in 6 months with an echocardiogram again at that time.      He discussed the possibility of upgrading his biventricular pacemaker to an ICD with Dr. Morin and they decided not to pursue that.      Alesha Strange MD, [facility]       cc:   Mumtaz Hernandez MD    [facility]    [address]   [place] [address]         ALESHA STRANGE MD, [facility]             D: 2020   T: 2020   MT: CARLOS      Name:     KYLER THOMAS   MRN:      [id number]        Account:      [id number]   :      1933           Service Date: 2020      Document: [id number]         Outpatient Encounter Medications as of 2020   Medication Sig Dispense Refill     acetaminophen (TYLENOL) 500 MG tablet Take 500-1,000 mg by mouth every 6 hours as needed for mild pain       allopurinol (ZYLOPRIM) 300 MG tablet Take 1 tablet (300 mg) by mouth daily 90 tablet 3     alpha-lipoic acid 600 MG capsule Take by mouth daily        amiodarone (PACERONE/CODARONE) 100 MG TABS tablet Take 1 tablet (100 mg) by mouth daily 90 tablet 3     amoxicillin (AMOXIL) 500 MG capsule Take 4 pills one hour prior to dental work 4 capsule 3     apixaban ANTICOAGULANT (ELIQUIS) 5 MG tablet Take 1/2 pill twice daily 45 tablet 3     carvedilol (COREG) 25 MG tablet Take 1 tablet (25 mg) by mouth 2 times daily (with meals) 180 tablet 3     cholecalciferol (VITAMIN D3) 5000 units TABS tablet Take 2,000 Units by mouth daily       Cyanocobalamin (VITAMIN B 12 PO) Take 2,500 mcg by mouth       furosemide (LASIX) 20 MG tablet Take 1 tablet (20 mg) by mouth daily 90 tablet 3     levothyroxine (SYNTHROID/LEVOTHROID) 25 MCG tablet Take 1 tablet (25 mcg) by mouth daily 90 tablet 3     multivitamin (CENTRUM SILVER) tablet Take 1 tablet by mouth daily       Niacin (VITAMIN B-3 OR) Take 500 mg by mouth       potassium chloride ER (K-DUR/KLOR-CON M) 10 MEQ CR tablet TAKE 1 TABLET BY MOUTH TWO  TIMES DAILY 180 tablet 1      Pyridoxine HCl (VITAMIN B6) 250 MG TABS Take 150 mg by mouth       sacubitril-valsartan (ENTRESTO) 49-51 MG per tablet Take 1 tablet by mouth 2 times daily 180 tablet 3     [DISCONTINUED] amiodarone (PACERONE/CODARONE) 200 MG tablet Take 1 tablet (200 mg) by mouth daily 90 tablet 3     [DISCONTINUED] losartan (COZAAR) 100 MG tablet Take 1 tablet (100 mg) by mouth daily 90 tablet 3     [DISCONTINUED] potassium chloride ER (MICRO-K) 10 MEQ CR capsule Take 10 mEq by mouth 2 times daily        No facility-administered encounter medications on file as of 2/12/2020.              Again, thank you for allowing me to participate in the care of your patient.      Sincerely,    ALESHA STRANGE MD     [facility]

## 2020-02-12 NOTE — PROGRESS NOTES
HPI and Plan:   See dictation    Orders Placed This Encounter   Procedures     Basic metabolic panel     Basic metabolic panel     Follow-Up with [facility] - Return MD visit     Follow-Up with [facility] - SHERRILL visit     Echocardiogram Complete       Orders Placed This Encounter   Medications     amiodarone (PACERONE/CODARONE) 100 MG TABS tablet     Sig: Take 1 tablet (100 mg) by mouth daily     Dispense:  90 tablet     Refill:  3     sacubitril-valsartan (ENTRESTO) 49-51 MG per tablet     Sig: Take 1 tablet by mouth 2 times daily     Dispense:  180 tablet     Refill:  3       Medications Discontinued During This Encounter   Medication Reason     potassium chloride ER (MICRO-K) 10 MEQ CR capsule Medication Reconciliation Clean Up     amiodarone (PACERONE/CODARONE) 200 MG tablet      losartan (COZAAR) 100 MG tablet          Encounter Diagnoses   Name Primary?     Paroxysmal atrial fibrillation (H) Yes     Dilated cardiomyopathy (H)      Cardiac pacemaker in situ      Aortic root dilatation (H)      Chronic systolic CHF (congestive heart failure) (H)      Essential hypertension        CURRENT MEDICATIONS:  Current Outpatient Medications   Medication Sig Dispense Refill     acetaminophen (TYLENOL) 500 MG tablet Take 500-1,000 mg by mouth every 6 hours as needed for mild pain       allopurinol (ZYLOPRIM) 300 MG tablet Take 1 tablet (300 mg) by mouth daily 90 tablet 3     alpha-lipoic acid 600 MG capsule Take by mouth daily        amiodarone (PACERONE/CODARONE) 100 MG TABS tablet Take 1 tablet (100 mg) by mouth daily 90 tablet 3     amoxicillin (AMOXIL) 500 MG capsule Take 4 pills one hour prior to dental work 4 capsule 3     apixaban ANTICOAGULANT (ELIQUIS) 5 MG tablet Take 1/2 pill twice daily 45 tablet 3     carvedilol (COREG) 25 MG tablet Take 1 tablet (25 mg) by mouth 2 times daily (with meals) 180 tablet 3     cholecalciferol (VITAMIN D3) 5000 units TABS tablet Take 2,000 Units by mouth daily       Cyanocobalamin  (VITAMIN B 12 PO) Take 2,500 mcg by mouth       furosemide (LASIX) 20 MG tablet Take 1 tablet (20 mg) by mouth daily 90 tablet 3     levothyroxine (SYNTHROID/LEVOTHROID) 25 MCG tablet Take 1 tablet (25 mcg) by mouth daily 90 tablet 3     multivitamin (CENTRUM SILVER) tablet Take 1 tablet by mouth daily       Niacin (VITAMIN B-3 OR) Take 500 mg by mouth       potassium chloride ER (K-DUR/KLOR-CON M) 10 MEQ CR tablet TAKE 1 TABLET BY MOUTH TWO  TIMES DAILY 180 tablet 1     Pyridoxine HCl (VITAMIN B6) 250 MG TABS Take 150 mg by mouth       sacubitril-valsartan (ENTRESTO) 49-51 MG per tablet Take 1 tablet by mouth 2 times daily 180 tablet 3       ALLERGIES   No Known Allergies    PAST MEDICAL HISTORY:  Past Medical History:   Diagnosis Date     Aortic regurgitation     mild-moderate per 10/2018 Echo     Aortic stenosis     mild per 10/2018 Echo     Balance problem     few years     Cardiomyopathy (H) 10/2018    [place]; Echo 10/2018, EF 25%, down from 50% 12/2017, per notes, pt declines further work up to find etiology of drop in EF     Chronic atrial fibrillation 2018    cardioversion 4/19     Chronic systolic CHF (congestive heart failure) (H)      Essential hypertension      Gross hematuria 04/2019    cysto, ct neg     History of gout     none for years     Hypothyroidism      Mitral regurgitation     moderate per 10/2018 Echo     Neuropathy 1990    both lower legs     Pacemaker 2018     Prostate cancer (H) 2008    xrt, fine since     Tricuspid regurgitation     mioderate per 10/2018 Echo       PAST SURGICAL HISTORY:  Past Surgical History:   Procedure Laterality Date     bilateral hip replacement  2017    done 6 months apart     IMPLANT PACEMAKER  11/12/2018    CRT-P     lip surgery for skin cancer         FAMILY HISTORY:  Family History   Problem Relation Age of Onset     Myocardial Infarction Mother      Cerebrovascular Disease Father        SOCIAL HISTORY:  Social History     Socioeconomic History     Marital  status:      Spouse name: None     Number of children: 3     Years of education: None     Highest education level: None   Occupational History     Occupation: owned marketing services company in [place]   Social Needs     Financial resource strain: None     Food insecurity:     Worry: None     Inability: None     Transportation needs:     Medical: None     Non-medical: None   Tobacco Use     Smoking status: Never Smoker     Smokeless tobacco: Never Used   Substance and Sexual Activity     Alcohol use: Yes     Comment: 1 drink week     Drug use: Never     Sexual activity: Not Currently   Lifestyle     Physical activity:     Days per week: None     Minutes per session: None     Stress: None   Relationships     Social connections:     Talks on phone: None     Gets together: None     Attends Oriental orthodox service: None     Active member of club or organization: None     Attends meetings of clubs or organizations: None     Relationship status: None     Intimate partner violence:     Fear of current or ex partner: None     Emotionally abused: None     Physically abused: None     Forced sexual activity: None   Other Topics Concern     Parent/sibling w/ CABG, MI or angioplasty before 65F 55M? Not Asked   Social History Narrative     None       Review of Systems:  Skin:  Negative       Eyes:  Positive for glasses    ENT:  Positive for tinnitus;hearing loss sneezing  Respiratory:  Positive for dyspnea on exertion     Cardiovascular:  Negative;chest pain;palpitations;lightheadedness;dizziness;syncope or near-syncope;cyanosis;exercise intolerance;fatigue Positive for;edema    Gastroenterology: Negative      Genitourinary:  Negative      Musculoskeletal:  Positive for muscular weakness balance off, states fell within last month 1X  Neurologic:  Positive for local weakness neuropathy  Psychiatric:  Negative      Heme/Lymph/Imm:  Negative      Endocrine:  Positive for thyroid disorder      Physical Exam:  Vitals: /77 (BP  "Location: Right arm, Cuff Size: Adult Large)   Pulse 80   Ht 1.651 m (5' 5\")   Wt 83.9 kg (184 lb 14.4 oz)   SpO2 96%   BMI 30.77 kg/m      Constitutional:  cooperative, alert and oriented, well developed, well nourished, in no acute distress        Skin:  warm and dry to the touch, no apparent skin lesions or masses noted   pacemaker incision in the left infraclavicular area was well-healed      Head:  normocephalic, no masses or lesions        Eyes:  pupils equal and round, conjunctivae and lids unremarkable, sclera white, no xanthalasma, EOMS intact, no nystagmus        Lymph:No Cervical lymphadenopathy present     ENT:  no pallor or cyanosis, dentition good        Neck:  carotid pulses are full and equal bilaterally, JVP normal, no carotid bruit        Respiratory:  normal breath sounds, clear to auscultation, normal A-P diameter, normal symmetry, normal respiratory excursion, no use of accessory muscles         Cardiac: regular rhythm, normal S1/S2, no S3 or S4, apical impulse not displaced, no murmurs, gallops or rubs   distant heart sounds no presence of murmur          pulses full and equal, no bruits auscultated                                        GI:  abdomen soft, non-tender, BS normoactive, no mass, no HSM, no bruits        Extremities and Muscular Skeletal:  no deformities, clubbing, cyanosis, erythema observed stasis pigmentation bilateral LE edema;trace          Neurological:  no gross motor deficits;affect appropriate        Psych:  Alert and Oriented x 3        CC  No referring provider defined for this encounter.              "

## 2020-02-12 NOTE — LETTER
2/12/2020    Mumtaz Hernandez MD  [address]  [place] [address]    RE: Lico Amna       Dear Colleague,    I had the pleasure of seeing Lico Woo in the [facility].    HPI and Plan:   See dictation    Orders Placed This Encounter   Procedures     Basic metabolic panel     Basic metabolic panel     Follow-Up with [facility] - Return MD visit     Follow-Up with [facility] - SHERRILL visit     Echocardiogram Complete       Orders Placed This Encounter   Medications     amiodarone (PACERONE/CODARONE) 100 MG TABS tablet     Sig: Take 1 tablet (100 mg) by mouth daily     Dispense:  90 tablet     Refill:  3     sacubitril-valsartan (ENTRESTO) 49-51 MG per tablet     Sig: Take 1 tablet by mouth 2 times daily     Dispense:  180 tablet     Refill:  3       Medications Discontinued During This Encounter   Medication Reason     potassium chloride ER (MICRO-K) 10 MEQ CR capsule Medication Reconciliation Clean Up     amiodarone (PACERONE/CODARONE) 200 MG tablet      losartan (COZAAR) 100 MG tablet          Encounter Diagnoses   Name Primary?     Paroxysmal atrial fibrillation (H) Yes     Dilated cardiomyopathy (H)      Cardiac pacemaker in situ      Aortic root dilatation (H)      Chronic systolic CHF (congestive heart failure) (H)      Essential hypertension        CURRENT MEDICATIONS:  Current Outpatient Medications   Medication Sig Dispense Refill     acetaminophen (TYLENOL) 500 MG tablet Take 500-1,000 mg by mouth every 6 hours as needed for mild pain       allopurinol (ZYLOPRIM) 300 MG tablet Take 1 tablet (300 mg) by mouth daily 90 tablet 3     alpha-lipoic acid 600 MG capsule Take by mouth daily        amiodarone (PACERONE/CODARONE) 100 MG TABS tablet Take 1 tablet (100 mg) by mouth daily 90 tablet 3     amoxicillin (AMOXIL) 500 MG capsule Take 4 pills one hour prior to dental work 4 capsule 3     apixaban ANTICOAGULANT (ELIQUIS) 5 MG tablet Take 1/2 pill twice daily 45 tablet 3      carvedilol (COREG) 25 MG tablet Take 1 tablet (25 mg) by mouth 2 times daily (with meals) 180 tablet 3     cholecalciferol (VITAMIN D3) 5000 units TABS tablet Take 2,000 Units by mouth daily       Cyanocobalamin (VITAMIN B 12 PO) Take 2,500 mcg by mouth       furosemide (LASIX) 20 MG tablet Take 1 tablet (20 mg) by mouth daily 90 tablet 3     levothyroxine (SYNTHROID/LEVOTHROID) 25 MCG tablet Take 1 tablet (25 mcg) by mouth daily 90 tablet 3     multivitamin (CENTRUM SILVER) tablet Take 1 tablet by mouth daily       Niacin (VITAMIN B-3 OR) Take 500 mg by mouth       potassium chloride ER (K-DUR/KLOR-CON M) 10 MEQ CR tablet TAKE 1 TABLET BY MOUTH TWO  TIMES DAILY 180 tablet 1     Pyridoxine HCl (VITAMIN B6) 250 MG TABS Take 150 mg by mouth       sacubitril-valsartan (ENTRESTO) 49-51 MG per tablet Take 1 tablet by mouth 2 times daily 180 tablet 3       ALLERGIES   No Known Allergies    PAST MEDICAL HISTORY:  Past Medical History:   Diagnosis Date     Aortic regurgitation     mild-moderate per 10/2018 Echo     Aortic stenosis     mild per 10/2018 Echo     Balance problem     few years     Cardiomyopathy (H) 10/2018    [place]; Echo 10/2018, EF 25%, down from 50% 12/2017, per notes, pt declines further work up to find etiology of drop in EF     Chronic atrial fibrillation 2018    cardioversion 4/19     Chronic systolic CHF (congestive heart failure) (H)      Essential hypertension      Gross hematuria 04/2019    cysto, ct neg     History of gout     none for years     Hypothyroidism      Mitral regurgitation     moderate per 10/2018 Echo     Neuropathy 1990    both lower legs     Pacemaker 2018     Prostate cancer (H) 2008    xrt, fine since     Tricuspid regurgitation     mioderate per 10/2018 Echo       PAST SURGICAL HISTORY:  Past Surgical History:   Procedure Laterality Date     bilateral hip replacement  2017    done 6 months apart     IMPLANT PACEMAKER  11/12/2018    CRT-P     lip surgery for skin cancer          FAMILY HISTORY:  Family History   Problem Relation Age of Onset     Myocardial Infarction Mother      Cerebrovascular Disease Father        SOCIAL HISTORY:  Social History     Socioeconomic History     Marital status:      Spouse name: None     Number of children: 3     Years of education: None     Highest education level: None   Occupational History     Occupation: owned marketing services company in [place]   Social Needs     Financial resource strain: None     Food insecurity:     Worry: None     Inability: None     Transportation needs:     Medical: None     Non-medical: None   Tobacco Use     Smoking status: Never Smoker     Smokeless tobacco: Never Used   Substance and Sexual Activity     Alcohol use: Yes     Comment: 1 drink week     Drug use: Never     Sexual activity: Not Currently   Lifestyle     Physical activity:     Days per week: None     Minutes per session: None     Stress: None   Relationships     Social connections:     Talks on phone: None     Gets together: None     Attends Hinduism service: None     Active member of club or organization: None     Attends meetings of clubs or organizations: None     Relationship status: None     Intimate partner violence:     Fear of current or ex partner: None     Emotionally abused: None     Physically abused: None     Forced sexual activity: None   Other Topics Concern     Parent/sibling w/ CABG, MI or angioplasty before 65F 55M? Not Asked   Social History Narrative     None       Review of Systems:  Skin:  Negative       Eyes:  Positive for glasses    ENT:  Positive for tinnitus;hearing loss sneezing  Respiratory:  Positive for dyspnea on exertion     Cardiovascular:  Negative;chest pain;palpitations;lightheadedness;dizziness;syncope or near-syncope;cyanosis;exercise intolerance;fatigue Positive for;edema    Gastroenterology: Negative      Genitourinary:  Negative      Musculoskeletal:  Positive for muscular weakness balance off, states fell  "within last month 1X  Neurologic:  Positive for local weakness neuropathy  Psychiatric:  Negative      Heme/Lymph/Imm:  Negative      Endocrine:  Positive for thyroid disorder      Physical Exam:  Vitals: /77 (BP Location: Right arm, Cuff Size: Adult Large)   Pulse 80   Ht 1.651 m (5' 5\")   Wt 83.9 kg (184 lb 14.4 oz)   SpO2 96%   BMI 30.77 kg/m       Constitutional:  cooperative, alert and oriented, well developed, well nourished, in no acute distress        Skin:  warm and dry to the touch, no apparent skin lesions or masses noted   pacemaker incision in the left infraclavicular area was well-healed      Head:  normocephalic, no masses or lesions        Eyes:  pupils equal and round, conjunctivae and lids unremarkable, sclera white, no xanthalasma, EOMS intact, no nystagmus        Lymph:No Cervical lymphadenopathy present     ENT:  no pallor or cyanosis, dentition good        Neck:  carotid pulses are full and equal bilaterally, JVP normal, no carotid bruit        Respiratory:  normal breath sounds, clear to auscultation, normal A-P diameter, normal symmetry, normal respiratory excursion, no use of accessory muscles         Cardiac: regular rhythm, normal S1/S2, no S3 or S4, apical impulse not displaced, no murmurs, gallops or rubs   distant heart sounds no presence of murmur          pulses full and equal, no bruits auscultated                                        GI:  abdomen soft, non-tender, BS normoactive, no mass, no HSM, no bruits        Extremities and Muscular Skeletal:  no deformities, clubbing, cyanosis, erythema observed stasis pigmentation bilateral LE edema;trace          Neurological:  no gross motor deficits;affect appropriate        Psych:  Alert and Oriented x 3        CC  No referring provider defined for this encounter.                Thank you for allowing me to participate in the care of your patient.      Sincerely,     ALESHA STRANGE MD     [facility]    cc:   No referring provider defined for this encounter.

## 2020-02-15 ENCOUNTER — MEDICAL CORRESPONDENCE (OUTPATIENT)
Dept: HEALTH INFORMATION MANAGEMENT | Facility: CLINIC | Age: 85
End: 2020-02-15

## 2020-02-20 ENCOUNTER — OFFICE VISIT (OUTPATIENT)
Dept: FAMILY MEDICINE | Facility: CLINIC | Age: 85
End: 2020-02-20
Payer: MEDICARE

## 2020-02-20 VITALS
TEMPERATURE: 97 F | BODY MASS INDEX: 30.87 KG/M2 | DIASTOLIC BLOOD PRESSURE: 76 MMHG | HEART RATE: 84 BPM | SYSTOLIC BLOOD PRESSURE: 122 MMHG | WEIGHT: 185.3 LBS | HEIGHT: 65 IN | OXYGEN SATURATION: 94 %

## 2020-02-20 DIAGNOSIS — H90.11 CONDUCTIVE HEARING LOSS OF RIGHT EAR, UNSPECIFIED HEARING STATUS ON CONTRALATERAL SIDE: Primary | ICD-10-CM

## 2020-02-20 PROCEDURE — 69209 REMOVE IMPACTED EAR WAX UNI: CPT | Mod: RT | Performed by: INTERNAL MEDICINE

## 2020-02-20 PROCEDURE — 99212 OFFICE O/P EST SF 10 MIN: CPT | Mod: 25 | Performed by: INTERNAL MEDICINE

## 2020-02-20 ASSESSMENT — MIFFLIN-ST. JEOR: SCORE: 1447.4

## 2020-02-20 NOTE — PROGRESS NOTES
The patient presents with his wife for hearing loss in his right ear.  It occurred suddenly 2 weeks ago after going in the shower.  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  He does not feel ill.  No coughs or colds.  No headaches.  Some chronic tinnitus.  No dizziness.    Past Medical History:   Diagnosis Date     Aortic regurgitation     mild-moderate per 10/2018 Echo     Aortic stenosis     mild per 10/2018 Echo     Balance problem     few years     Cardiomyopathy (H) 10/2018    [place]; Echo 10/2018, EF 25%, down from 50% 12/2017, per notes, pt declines further work up to find etiology of drop in EF     Chronic atrial fibrillation 2018    cardioversion 4/19     Chronic systolic CHF (congestive heart failure) (H)      Essential hypertension      Gross hematuria 04/2019    cysto, ct neg     History of gout     none for years     Hypothyroidism      Mitral regurgitation     moderate per 10/2018 Echo     Neuropathy 1990    both lower legs     Pacemaker 2018     Prostate cancer (H) 2008    xrt, fine since     Tricuspid regurgitation     mioderate per 10/2018 Echo     Past Surgical History:   Procedure Laterality Date     bilateral hip replacement  2017    done 6 months apart     IMPLANT PACEMAKER  11/12/2018    CRT-P     lip surgery for skin cancer       Social History     Socioeconomic History     Marital status:      Spouse name: Not on file     Number of children: 3     Years of education: Not on file     Highest education level: Not on file   Occupational History     Occupation: owned marketing services company in [place]   Social Needs     Financial resource strain: Not on file     Food insecurity:     Worry: Not on file     Inability: Not on file     Transportation needs:     Medical: Not on file     Non-medical: Not on file   Tobacco Use     Smoking status: Never Smoker     Smokeless tobacco: Never Used   Substance and Sexual Activity     Alcohol use: Yes     Comment: 1  drink week     Drug use: Never     Sexual activity: Not Currently   Lifestyle     Physical activity:     Days per week: Not on file     Minutes per session: Not on file     Stress: Not on file   Relationships     Social connections:     Talks on phone: Not on file     Gets together: Not on file     Attends Mandaeism service: Not on file     Active member of club or organization: Not on file     Attends meetings of clubs or organizations: Not on file     Relationship status: Not on file     Intimate partner violence:     Fear of current or ex partner: Not on file     Emotionally abused: Not on file     Physically abused: Not on file     Forced sexual activity: Not on file   Other Topics Concern     Parent/sibling w/ CABG, MI or angioplasty before 65F 55M? Not Asked   Social History Narrative     Not on file     Current Outpatient Medications   Medication Sig Dispense Refill     acetaminophen (TYLENOL) 500 MG tablet Take 500-1,000 mg by mouth every 6 hours as needed for mild pain       allopurinol (ZYLOPRIM) 300 MG tablet Take 1 tablet (300 mg) by mouth daily 90 tablet 3     alpha-lipoic acid 600 MG capsule Take by mouth daily        amiodarone (PACERONE/CODARONE) 100 MG TABS tablet Take 1 tablet (100 mg) by mouth daily 90 tablet 3     amoxicillin (AMOXIL) 500 MG capsule Take 4 pills one hour prior to dental work 4 capsule 3     apixaban ANTICOAGULANT (ELIQUIS) 5 MG tablet Take 1/2 pill twice daily 45 tablet 3     carvedilol (COREG) 25 MG tablet Take 1 tablet (25 mg) by mouth 2 times daily (with meals) 180 tablet 3     cholecalciferol (VITAMIN D3) 5000 units TABS tablet Take 2,000 Units by mouth daily       Cyanocobalamin (VITAMIN B 12 PO) Take 2,500 mcg by mouth       furosemide (LASIX) 20 MG tablet Take 1 tablet (20 mg) by mouth daily 90 tablet 3     levothyroxine (SYNTHROID/LEVOTHROID) 25 MCG tablet Take 1 tablet (25 mcg) by mouth daily 90 tablet 3     multivitamin (CENTRUM SILVER) tablet Take 1 tablet by mouth  "daily       Niacin (VITAMIN B-3 OR) Take 500 mg by mouth       potassium chloride ER (K-DUR/KLOR-CON M) 10 MEQ CR tablet TAKE 1 TABLET BY MOUTH TWO  TIMES DAILY 180 tablet 1     Pyridoxine HCl (VITAMIN B6) 250 MG TABS Take 150 mg by mouth       sacubitril-valsartan (ENTRESTO) 49-51 MG per tablet Take 1 tablet by mouth 2 times daily 180 tablet 3     No Known Allergies  FAMILY HISTORY NOTED AND REVIEWED    REVIEW OF SYSTEMS: above    PHYSICAL EXAM    /76 (BP Location: Left arm, Patient Position: Sitting, Cuff Size: Adult Large)   Pulse 84   Temp 97  F (36.1  C) (Oral)   Ht 1.651 m (5' 5\")   Wt 84.1 kg (185 lb 4.8 oz)   SpO2 94%   BMI 30.84 kg/m      Patient appears non toxic  Left tm and canal, min wax, otherwise within normal limits  Right canal obstructed by wax, after flushing hearing back to normal, tm and canal within normal limits    ASSESSMENT:  Hearing loss, due to cerumen    PLAN:  Call if not back to normal    Mumtaz Hernandez M.D.        "

## 2020-02-20 NOTE — PROGRESS NOTES
Patient identified using two patient identifiers.  Ear exam showing wax occlusion completed by provider.  Solution: warm water was placed in the right ear(s) via irrigation tool: elephant ear.  Rell Pedro CMA on 2/20/2020 at 2:55 PM

## 2020-03-16 DIAGNOSIS — I48.0 PAROXYSMAL ATRIAL FIBRILLATION (H): ICD-10-CM

## 2020-03-16 RX ORDER — AMIODARONE HYDROCHLORIDE 200 MG/1
100 TABLET ORAL DAILY
Qty: 45 TABLET | Refills: 3 | Status: SHIPPED | OUTPATIENT
Start: 2020-03-16 | End: 2021-04-14

## 2020-03-16 NOTE — TELEPHONE ENCOUNTER
Per letter from pt, his insurance does not cover amiodarone 100 mg tabs. Pt letter states he needs refill for 200 mg tab, take 1/2 tab (100 mg) daily sent to [facility]. Rx sent. Maria Luisa Pina RN on 3/16/2020 at 3:54 PM

## 2020-04-13 DIAGNOSIS — I48.91 ATRIAL FIBRILLATION, UNSPECIFIED TYPE (H): ICD-10-CM

## 2020-04-13 NOTE — TELEPHONE ENCOUNTER
apixaban ANTICOAGULANT (ELIQUIS) 5 MG tablet  45 tablet  3  11/25/2019   --    Sig: Take 1/2 pill twice daily      Last Written Prescription Date:  11-  Last Fill Quantity: 45,  # refills: 3   Last office visit: 2/20/2020 with prescribing provider:     Future Office Visit:   Next 5 appointments (look out 90 days)    May 26, 2020 11:00 AM CDT  Office Visit with Mumtaz Hernandez MD  [facility] [address] [address] [phone]         Requested Prescriptions   Pending Prescriptions Disp Refills     ELIQUIS ANTICOAGULANT 5 MG tablet [Pharmacy Med Name: ELIQUIS  5MG  TAB] 90 tablet      Sig: TAKE 1/2 TABLET BY MOUTH  TWO TIMES DAILY       Direct Oral Anticoagulant Agents Failed - 4/13/2020  5:41 AM        Failed - Normal Platelets on file in past 12 months     Recent Labs   Lab Test 07/23/19  1544   *               Failed - Patient is 18-79 years of age        Failed - Serum creatinine less than or equal to 1.4 on file in past 12 mos     Recent Labs   Lab Test 02/12/20  1210   CR 1.78*       Ok to refill medication if creatinine is low          Passed - Medication is active on med list        Passed - Weight is greater than 60 kg for the past year     Wt Readings from Last 3 Encounters:   02/20/20 84.1 kg (185 lb 4.8 oz)   02/12/20 83.9 kg (184 lb 14.4 oz)   12/03/19 83.5 kg (184 lb 1.6 oz)             Passed - Recent (6 mo) or future (30 days) visit within the authorizing provider's specialty

## 2020-04-14 RX ORDER — APIXABAN 5 MG/1
TABLET, FILM COATED ORAL
Qty: 90 TABLET | Refills: 0 | Status: SHIPPED | OUTPATIENT
Start: 2020-04-14 | End: 2020-07-24

## 2020-04-14 NOTE — TELEPHONE ENCOUNTER
Routing refill request to provider for review/approval because:  Labs out of range:  PLT, Age, Cr    DYLAN BurlesonN, RN  [employer] [facility]

## 2020-04-29 ENCOUNTER — TELEPHONE (OUTPATIENT)
Dept: CARDIOLOGY | Facility: CLINIC | Age: 85
End: 2020-04-29

## 2020-04-29 ENCOUNTER — ANCILLARY PROCEDURE (OUTPATIENT)
Dept: CARDIOLOGY | Facility: CLINIC | Age: 85
End: 2020-04-29
Attending: INTERNAL MEDICINE
Payer: MEDICARE

## 2020-04-29 DIAGNOSIS — Z95.0 PACEMAKER: ICD-10-CM

## 2020-04-29 LAB
MDC_IDC_LEAD_IMPLANT_DT: NORMAL
MDC_IDC_LEAD_LOCATION: NORMAL
MDC_IDC_LEAD_LOCATION_DETAIL_1: NORMAL
MDC_IDC_LEAD_MFG: NORMAL
MDC_IDC_LEAD_MODEL: NORMAL
MDC_IDC_LEAD_POLARITY_TYPE: NORMAL
MDC_IDC_LEAD_SERIAL: NORMAL
MDC_IDC_MSMT_BATTERY_DTM: NORMAL
MDC_IDC_MSMT_BATTERY_REMAINING_LONGEVITY: 95 MO
MDC_IDC_MSMT_BATTERY_RRT_TRIGGER: 2.6
MDC_IDC_MSMT_BATTERY_STATUS: NORMAL
MDC_IDC_MSMT_BATTERY_VOLTAGE: 2.99 V
MDC_IDC_MSMT_LEADCHNL_LV_IMPEDANCE_VALUE: 228 OHM
MDC_IDC_MSMT_LEADCHNL_LV_IMPEDANCE_VALUE: 380 OHM
MDC_IDC_MSMT_LEADCHNL_LV_IMPEDANCE_VALUE: 418 OHM
MDC_IDC_MSMT_LEADCHNL_LV_IMPEDANCE_VALUE: 494 OHM
MDC_IDC_MSMT_LEADCHNL_LV_IMPEDANCE_VALUE: 551 OHM
MDC_IDC_MSMT_LEADCHNL_LV_IMPEDANCE_VALUE: 570 OHM
MDC_IDC_MSMT_LEADCHNL_LV_IMPEDANCE_VALUE: 627 OHM
MDC_IDC_MSMT_LEADCHNL_LV_IMPEDANCE_VALUE: 703 OHM
MDC_IDC_MSMT_LEADCHNL_LV_IMPEDANCE_VALUE: 722 OHM
MDC_IDC_MSMT_LEADCHNL_LV_IMPEDANCE_VALUE: 817 OHM
MDC_IDC_MSMT_LEADCHNL_LV_PACING_THRESHOLD_AMPLITUDE: 1.5 V
MDC_IDC_MSMT_LEADCHNL_LV_PACING_THRESHOLD_PULSEWIDTH: 1 MS
MDC_IDC_MSMT_LEADCHNL_RA_IMPEDANCE_VALUE: 285 OHM
MDC_IDC_MSMT_LEADCHNL_RA_IMPEDANCE_VALUE: 399 OHM
MDC_IDC_MSMT_LEADCHNL_RA_PACING_THRESHOLD_AMPLITUDE: 0.75 V
MDC_IDC_MSMT_LEADCHNL_RA_PACING_THRESHOLD_PULSEWIDTH: 0.4 MS
MDC_IDC_MSMT_LEADCHNL_RA_SENSING_INTR_AMPL: 2 MV
MDC_IDC_MSMT_LEADCHNL_RA_SENSING_INTR_AMPL: 2 MV
MDC_IDC_MSMT_LEADCHNL_RV_IMPEDANCE_VALUE: 323 OHM
MDC_IDC_MSMT_LEADCHNL_RV_IMPEDANCE_VALUE: 532 OHM
MDC_IDC_MSMT_LEADCHNL_RV_PACING_THRESHOLD_AMPLITUDE: 0.88 V
MDC_IDC_MSMT_LEADCHNL_RV_PACING_THRESHOLD_PULSEWIDTH: 0.4 MS
MDC_IDC_MSMT_LEADCHNL_RV_SENSING_INTR_AMPL: 6.12 MV
MDC_IDC_MSMT_LEADCHNL_RV_SENSING_INTR_AMPL: 6.12 MV
MDC_IDC_PG_IMPLANT_DTM: NORMAL
MDC_IDC_PG_MFG: NORMAL
MDC_IDC_PG_MODEL: NORMAL
MDC_IDC_PG_SERIAL: NORMAL
MDC_IDC_PG_TYPE: NORMAL
MDC_IDC_SESS_CLINIC_NAME: NORMAL
MDC_IDC_SESS_DTM: NORMAL
MDC_IDC_SESS_TYPE: NORMAL
MDC_IDC_SET_BRADY_AT_MODE_SWITCH_RATE: 171 {BEATS}/MIN
MDC_IDC_SET_BRADY_LOWRATE: 70 {BEATS}/MIN
MDC_IDC_SET_BRADY_MAX_SENSOR_RATE: 115 {BEATS}/MIN
MDC_IDC_SET_BRADY_MAX_TRACKING_RATE: 115 {BEATS}/MIN
MDC_IDC_SET_BRADY_MODE: NORMAL
MDC_IDC_SET_BRADY_PAV_DELAY_HIGH: 100 MS
MDC_IDC_SET_BRADY_PAV_DELAY_LOW: 130 MS
MDC_IDC_SET_BRADY_SAV_DELAY_HIGH: 70 MS
MDC_IDC_SET_BRADY_SAV_DELAY_LOW: 100 MS
MDC_IDC_SET_CRT_LVRV_DELAY: 20 MS
MDC_IDC_SET_CRT_PACED_CHAMBERS: NORMAL
MDC_IDC_SET_LEADCHNL_LV_PACING_AMPLITUDE: 2 V
MDC_IDC_SET_LEADCHNL_LV_PACING_ANODE_ELECTRODE_1: NORMAL
MDC_IDC_SET_LEADCHNL_LV_PACING_ANODE_LOCATION_1: NORMAL
MDC_IDC_SET_LEADCHNL_LV_PACING_CAPTURE_MODE: NORMAL
MDC_IDC_SET_LEADCHNL_LV_PACING_CATHODE_ELECTRODE_1: NORMAL
MDC_IDC_SET_LEADCHNL_LV_PACING_CATHODE_LOCATION_1: NORMAL
MDC_IDC_SET_LEADCHNL_LV_PACING_POLARITY: NORMAL
MDC_IDC_SET_LEADCHNL_LV_PACING_PULSEWIDTH: 1 MS
MDC_IDC_SET_LEADCHNL_RA_PACING_AMPLITUDE: 1.75 V
MDC_IDC_SET_LEADCHNL_RA_PACING_ANODE_ELECTRODE_1: NORMAL
MDC_IDC_SET_LEADCHNL_RA_PACING_ANODE_LOCATION_1: NORMAL
MDC_IDC_SET_LEADCHNL_RA_PACING_CAPTURE_MODE: NORMAL
MDC_IDC_SET_LEADCHNL_RA_PACING_CATHODE_ELECTRODE_1: NORMAL
MDC_IDC_SET_LEADCHNL_RA_PACING_CATHODE_LOCATION_1: NORMAL
MDC_IDC_SET_LEADCHNL_RA_PACING_POLARITY: NORMAL
MDC_IDC_SET_LEADCHNL_RA_PACING_PULSEWIDTH: 0.4 MS
MDC_IDC_SET_LEADCHNL_RA_SENSING_ANODE_ELECTRODE_1: NORMAL
MDC_IDC_SET_LEADCHNL_RA_SENSING_ANODE_LOCATION_1: NORMAL
MDC_IDC_SET_LEADCHNL_RA_SENSING_CATHODE_ELECTRODE_1: NORMAL
MDC_IDC_SET_LEADCHNL_RA_SENSING_CATHODE_LOCATION_1: NORMAL
MDC_IDC_SET_LEADCHNL_RA_SENSING_POLARITY: NORMAL
MDC_IDC_SET_LEADCHNL_RA_SENSING_SENSITIVITY: 0.6 MV
MDC_IDC_SET_LEADCHNL_RV_PACING_AMPLITUDE: 2 V
MDC_IDC_SET_LEADCHNL_RV_PACING_ANODE_ELECTRODE_1: NORMAL
MDC_IDC_SET_LEADCHNL_RV_PACING_ANODE_LOCATION_1: NORMAL
MDC_IDC_SET_LEADCHNL_RV_PACING_CAPTURE_MODE: NORMAL
MDC_IDC_SET_LEADCHNL_RV_PACING_CATHODE_ELECTRODE_1: NORMAL
MDC_IDC_SET_LEADCHNL_RV_PACING_CATHODE_LOCATION_1: NORMAL
MDC_IDC_SET_LEADCHNL_RV_PACING_POLARITY: NORMAL
MDC_IDC_SET_LEADCHNL_RV_PACING_PULSEWIDTH: 0.4 MS
MDC_IDC_SET_LEADCHNL_RV_SENSING_ANODE_ELECTRODE_1: NORMAL
MDC_IDC_SET_LEADCHNL_RV_SENSING_ANODE_LOCATION_1: NORMAL
MDC_IDC_SET_LEADCHNL_RV_SENSING_CATHODE_ELECTRODE_1: NORMAL
MDC_IDC_SET_LEADCHNL_RV_SENSING_CATHODE_LOCATION_1: NORMAL
MDC_IDC_SET_LEADCHNL_RV_SENSING_POLARITY: NORMAL
MDC_IDC_SET_LEADCHNL_RV_SENSING_SENSITIVITY: 0.9 MV
MDC_IDC_SET_ZONE_DETECTION_INTERVAL: 350 MS
MDC_IDC_SET_ZONE_DETECTION_INTERVAL: 400 MS
MDC_IDC_SET_ZONE_TYPE: NORMAL
MDC_IDC_STAT_BRADY_AP_VP_PERCENT: 99.7 %
MDC_IDC_STAT_BRADY_AP_VS_PERCENT: 0.02 %
MDC_IDC_STAT_BRADY_AS_VP_PERCENT: 0.1 %
MDC_IDC_STAT_BRADY_AS_VS_PERCENT: 0.18 %
MDC_IDC_STAT_BRADY_DTM_END: NORMAL
MDC_IDC_STAT_BRADY_DTM_START: NORMAL
MDC_IDC_STAT_BRADY_RA_PERCENT_PACED: 99.88 %
MDC_IDC_STAT_BRADY_RV_PERCENT_PACED: 99.8 %
MDC_IDC_STAT_CRT_DTM_END: NORMAL
MDC_IDC_STAT_CRT_DTM_START: NORMAL
MDC_IDC_STAT_CRT_LV_PERCENT_PACED: 99.77 %
MDC_IDC_STAT_CRT_PERCENT_PACED: 99.77 %
MDC_IDC_STAT_EPISODE_RECENT_COUNT: 0
MDC_IDC_STAT_EPISODE_RECENT_COUNT_DTM_END: NORMAL
MDC_IDC_STAT_EPISODE_RECENT_COUNT_DTM_START: NORMAL
MDC_IDC_STAT_EPISODE_TOTAL_COUNT: 1
MDC_IDC_STAT_EPISODE_TOTAL_COUNT: 29
MDC_IDC_STAT_EPISODE_TOTAL_COUNT: 45
MDC_IDC_STAT_EPISODE_TOTAL_COUNT_DTM_END: NORMAL
MDC_IDC_STAT_EPISODE_TOTAL_COUNT_DTM_START: NORMAL
MDC_IDC_STAT_EPISODE_TYPE: NORMAL

## 2020-04-29 PROCEDURE — 93294 REM INTERROG EVL PM/LDLS PM: CPT | Performed by: INTERNAL MEDICINE

## 2020-04-29 PROCEDURE — 93296 REM INTERROG EVL PM/IDS: CPT | Performed by: INTERNAL MEDICINE

## 2020-04-29 NOTE — TELEPHONE ENCOUNTER
I was asked to call pt by device tech to assess for any heart failure symptoms because his PPM check today showed elevated OptiVol, trending up since February. He is a [facility] pt who sees Dr. De Leon.         Attempted to call pt, no answer, left VM to call back to device clinic for another questions. Awaiting return call.

## 2020-04-30 NOTE — TELEPHONE ENCOUNTER
Pt returned call from yesterday. His OptiVol was elevated on routine PPM check yesterday. I asked about symptoms, he said he is feeling fine. Specifically he said his ankles get occasionally swollen but nothing out of the ordinary lately, his daily weight is very consistent, and he has no SOB, he is able to do his daily walks with no changes.     Reviewed date of next remote with pt. He states no other questions.

## 2020-05-14 ENCOUNTER — CARE COORDINATION (OUTPATIENT)
Dept: CARDIOLOGY | Facility: CLINIC | Age: 85
End: 2020-05-14

## 2020-05-14 NOTE — PROGRESS NOTES
Call from patient stating he has concerns over the cost of Entresto. The reported cost of the drug for his is $6400 with his quarterly copay of $400. This is up from his copay in feburary of $120. Patient is on entresto 49/51 mg twice a day and recently he decreased his night dose by half - taking a full pill in am and 1/2 pill in pm. Patient is calling to review if Dr De Leon would be ok with him cutting the pill in half to reduce his cost. Patient states he has a 10 day supply left. Patient reports he is doing well without compaints.    Plan per DR De Leon visit 2/12/2020:  I suggested that we switch his losartan to Entresto to bring his medication program up to standard of care for systolic heart failure.  He will check out the cost and proceed with that change if it is affordable.  I urged him to stop the losartan before starting the Entresto.  Otherwise, his medication program looks good and we will plan to have him follow up here in Cardiology for reassessment in the [facility] in about 3 months.  I will see him back myself in the [facility] in 6 months with an echocardiogram again at that time.     Due for  3 month SHERRILL and has time sensitive questions on drugs. Patient is available for visit with Lacy HUANG tomorrow [phone] via phone. Messaged to Scheduling to finalize appointment.  Margie Ruiz RN 05/14/2020 5:59 PM

## 2020-05-15 ENCOUNTER — ALLIED HEALTH/NURSE VISIT (OUTPATIENT)
Dept: CARDIOLOGY | Facility: CLINIC | Age: 85
End: 2020-05-15
Payer: MEDICARE

## 2020-05-15 ENCOUNTER — CARE COORDINATION (OUTPATIENT)
Dept: CARDIOLOGY | Facility: CLINIC | Age: 85
End: 2020-05-15

## 2020-05-15 ENCOUNTER — VIRTUAL VISIT (OUTPATIENT)
Dept: CARDIOLOGY | Facility: CLINIC | Age: 85
End: 2020-05-15
Payer: MEDICARE

## 2020-05-15 VITALS — DIASTOLIC BLOOD PRESSURE: 70 MMHG | HEART RATE: 72 BPM | SYSTOLIC BLOOD PRESSURE: 109 MMHG

## 2020-05-15 DIAGNOSIS — I48.0 PAROXYSMAL ATRIAL FIBRILLATION (H): Primary | ICD-10-CM

## 2020-05-15 DIAGNOSIS — E03.9 HYPOTHYROIDISM, UNSPECIFIED TYPE: ICD-10-CM

## 2020-05-15 DIAGNOSIS — I10 ESSENTIAL HYPERTENSION: Primary | ICD-10-CM

## 2020-05-15 DIAGNOSIS — I50.22 CHRONIC SYSTOLIC CHF (CONGESTIVE HEART FAILURE) (H): ICD-10-CM

## 2020-05-15 DIAGNOSIS — I42.0 DILATED CARDIOMYOPATHY (H): ICD-10-CM

## 2020-05-15 DIAGNOSIS — I73.9 CLAUDICATION OF BOTH LOWER EXTREMITIES (H): ICD-10-CM

## 2020-05-15 DIAGNOSIS — I77.810 AORTIC ROOT DILATATION (H): ICD-10-CM

## 2020-05-15 LAB
ALBUMIN SERPL-MCNC: 3.9 G/DL (ref 3.4–5)
ALP SERPL-CCNC: 93 U/L (ref 40–150)
ALT SERPL W P-5'-P-CCNC: 28 U/L (ref 0–70)
ANION GAP SERPL CALCULATED.3IONS-SCNC: 4 MMOL/L (ref 3–14)
AST SERPL W P-5'-P-CCNC: 27 U/L (ref 0–45)
BILIRUB SERPL-MCNC: 0.7 MG/DL (ref 0.2–1.3)
BUN SERPL-MCNC: 37 MG/DL (ref 7–30)
CALCIUM SERPL-MCNC: 9.4 MG/DL (ref 8.5–10.1)
CHLORIDE SERPL-SCNC: 109 MMOL/L (ref 94–109)
CO2 SERPL-SCNC: 30 MMOL/L (ref 20–32)
CREAT SERPL-MCNC: 1.93 MG/DL (ref 0.66–1.25)
GFR SERPL CREATININE-BSD FRML MDRD: 30 ML/MIN/{1.73_M2}
GLUCOSE SERPL-MCNC: 101 MG/DL (ref 70–99)
HGB BLD-MCNC: 12.4 G/DL (ref 13.3–17.7)
POTASSIUM SERPL-SCNC: 4.4 MMOL/L (ref 3.4–5.3)
PROT SERPL-MCNC: 7.9 G/DL (ref 6.8–8.8)
SODIUM SERPL-SCNC: 143 MMOL/L (ref 133–144)
T4 FREE SERPL-MCNC: 0.46 NG/DL (ref 0.76–1.46)
TSH SERPL DL<=0.005 MIU/L-ACNC: 92.14 MU/L (ref 0.4–4)

## 2020-05-15 PROCEDURE — 84443 ASSAY THYROID STIM HORMONE: CPT | Performed by: PHYSICIAN ASSISTANT

## 2020-05-15 PROCEDURE — 36415 COLL VENOUS BLD VENIPUNCTURE: CPT | Performed by: PHYSICIAN ASSISTANT

## 2020-05-15 PROCEDURE — 85018 HEMOGLOBIN: CPT | Performed by: PHYSICIAN ASSISTANT

## 2020-05-15 PROCEDURE — 99211 OFF/OP EST MAY X REQ PHY/QHP: CPT

## 2020-05-15 PROCEDURE — 80053 COMPREHEN METABOLIC PANEL: CPT | Performed by: PHYSICIAN ASSISTANT

## 2020-05-15 PROCEDURE — 84439 ASSAY OF FREE THYROXINE: CPT | Performed by: PHYSICIAN ASSISTANT

## 2020-05-15 PROCEDURE — 99443: CPT | Performed by: PHYSICIAN ASSISTANT

## 2020-05-15 RX ORDER — LOSARTAN POTASSIUM 100 MG/1
100 TABLET ORAL DAILY
COMMUNITY
End: 2020-05-15

## 2020-05-15 RX ORDER — LEVOTHYROXINE SODIUM 25 UG/1
50 TABLET ORAL DAILY
Qty: 90 TABLET | Refills: 3 | COMMUNITY
Start: 2020-05-15 | End: 2020-05-19 | Stop reason: DRUGHIGH

## 2020-05-15 NOTE — LETTER
5/15/2020      RE: Lioc Woo  [address]  [place] [facility] [address]       Dear Colleague,    Thank you for the opportunity to participate in the care of your patient, Lico Woo, at the [facility] Please see a copy of my visit note below.    HPI: (Please see Dr. De Leon's note from 2/2020 for the patient's detailed history)    In brief, Mr. 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  He had a nuclear stress test for evaluation of coronary disease and it was reportedly normal.  He has a biventricular pacemaker in place and was cardioverted back to sinus rhythm and has been maintaining sinus rhythm according to his pacemaker checks.  He remains on amiodarone 200 mg a day for maintenance of normal rhythm.  He is also on Eliquis for stroke prevention (low dose which he states is due to frequent bruising).     Last visit, he reported stable FC II symptoms. Dr. De Leon switched Will from Losartan to Entresto to optimize his CMP regimen. He's noted no changes in his symptoms since that time. Entresto is now becoming cost-prohibitive. For this reason, he has reduced his dosing from 49/51 BID to one full tab in the AM and 1/2 tab in the PM to try and extended his amount. Of concern, he just realized that he failed to stop taking his Losartan as it was automatically refilled from [facility]. Thankfully, he feels fine. Specifically, he denies symptoms consistent with angioedema, hypotension, or reduced UOP. Of note, he has not had a BMP drawn since that time.     [employer]onic Percepta Quad (CRT-P) Device Check on 4/29/20  AP: 99.1% BiVP: 99.8%  OptiVol was elevated on routine PPM check - RN asked about symptoms, he said he is feeling fine. Specifically he said his ankles get occasionally swollen but nothing out of the ordinary lately, his  daily weight is very consistent, and he has no SOB, he is able to do his daily walks without problem.     Assessment/Plan:  1. Chronic HFrEF - We will check a CMP on him today (as well as TFT's) given concomitant Losartan and Entresto use for the past 3 months, as well as AMIO monitoring. He will discontinue the Losartan immediately. Will discuss with [place] whether he is able to take advantage of the patient assistance program. In the meantime, he will continue the Entresto at 1 tab in the AM and 1/2 tab in the PM. We will continue to prescribe it at 1 tab BID in case this becomes more affordable. I will call him if further adjustments are needed prior to the weekend based on lab results. We will also get a BP on him when he goes in for the labs, and he will purchase an [employer] home BP cuff this weekend for ongoing monitoring.    Follow-up:  - Video visit with me in 2 weeks.  - Dr. De Leon in 3 months + labs.     Phone call duration: 25 minutes    [place] Chawla PA-C  [facility]  Pager: [phone]  Text Page  (7:30am - 4pm M-F)     Please do not hesitate to contact me if you have any questions/concerns.     Sincerely,     [place] Chawla PA-C

## 2020-05-15 NOTE — PROGRESS NOTES
"Lico Woo is a 87 year old male who is being evaluated via a billable telephone visit.  This visit is being conducted as a virtual visit due to the emphasis on mitigation of the COVID-19 virus pandemic. The clinician has decided that the risk of an in-office visit outweighs the benefit for this patient.     The patient has been notified of following:     \"This telephone visit will be conducted via a call between you and your physician/provider. We have found that certain health care needs can be provided without the need for a physical exam.  This service lets us provide the care you need with a short phone conversation.  If a prescription is necessary we can send it directly to your pharmacy.  If lab work is needed we can place an order for that and you can then stop by our lab to have the test done at a later time.  Telephone visits are billed at different rates depending on your insurance coverage. During this emergency period, for some insurers they may be billed the same as an in-person visit.  Please reach out to your insurance provider with any questions.  If during the course of the call the physician/provider feels a telephone visit is not appropriate, you will not be charged for this service.\"    Patient has given verbal consent for Telephone visit?  Yes    How would you like to obtain your AVS? Mail a copy    MA STUART for [facility] follow-ups:  - Any difficulty breathing, including when trying to lie in bed at night?  Denies  - Any leg swelling (?compression or wraps), abdominal bloating, reduction in appetite? Denies edema.  - Any chest pain or palpitations? Denies  - Weight trend over the past week? -  - O2 or CPAP use? No  - Do they have a pulse oximeter they can use to check O2 sat/HR?  No  Today's Home Vitals:  Weight- has not taken any vitals at home  BP- -  Pulse- -  COLLETTE Soliz, MARYJANE    I have reviewed and updated the patient's Past Medical History, Social History, Family History and Medication " List.    ALLERGIES  Patient has no known allergies.    MEDICATIONS  Current Outpatient Medications   Medication Sig Dispense Refill     acetaminophen (TYLENOL) 500 MG tablet Take 500-1,000 mg by mouth every 6 hours as needed for mild pain       allopurinol (ZYLOPRIM) 300 MG tablet Take 1 tablet (300 mg) by mouth daily 90 tablet 3     alpha-lipoic acid 600 MG capsule Take by mouth daily        amiodarone (PACERONE) 200 MG tablet Take 0.5 tablets (100 mg) by mouth daily 45 tablet 3     amoxicillin (AMOXIL) 500 MG capsule Take 4 pills one hour prior to dental work 4 capsule 3     carvedilol (COREG) 25 MG tablet Take 1 tablet (25 mg) by mouth 2 times daily (with meals) 180 tablet 3     cholecalciferol (VITAMIN D3) 5000 units TABS tablet Take 2,000 Units by mouth daily       Cyanocobalamin (VITAMIN B 12 PO) Take 2,500 mcg by mouth       ELIQUIS ANTICOAGULANT 5 MG tablet TAKE 1/2 TABLET BY MOUTH  TWO TIMES DAILY 90 tablet 0     furosemide (LASIX) 20 MG tablet Take 1 tablet (20 mg) by mouth daily 90 tablet 3     levothyroxine (SYNTHROID/LEVOTHROID) 25 MCG tablet Take 1 tablet (25 mcg) by mouth daily 90 tablet 3     losartan (COZAAR) 100 MG tablet Take 100 mg by mouth daily       multivitamin (CENTRUM SILVER) tablet Take 1 tablet by mouth daily       Niacin (VITAMIN B-3 OR) Take 500 mg by mouth       potassium chloride ER (K-DUR/KLOR-CON M) 10 MEQ CR tablet TAKE 1 TABLET BY MOUTH TWO  TIMES DAILY 180 tablet 1     Pyridoxine HCl (VITAMIN B6) 250 MG TABS Take 150 mg by mouth       sacubitril-valsartan (ENTRESTO) 49-51 MG per tablet Take 1 tablet by mouth 2 times daily 180 tablet 3       HPI: (Please see Dr. De Leon's note from 2/2020 for the patient's detailed history)    In brief, Mr. 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  He had a nuclear stress test for evaluation of coronary disease and it was reportedly normal.  He has a  biventricular pacemaker in place and was cardioverted back to sinus rhythm and has been maintaining sinus rhythm according to his pacemaker checks.  He remains on amiodarone 200 mg a day for maintenance of normal rhythm.  He is also on Eliquis for stroke prevention (low dose which he states is due to frequent bruising).     Last visit, he reported stable FC II symptoms. Dr. De Leon switched Will from Losartan to Entresto to optimize his CMP regimen. He's noted no changes in his symptoms since that time. Entresto is now becoming cost-prohibitive. For this reason, he has reduced his dosing from 49/51 BID to one full tab in the AM and 1/2 tab in the PM to try and extended his amount. Of concern, he just realized that he failed to stop taking his Losartan as it was automatically refilled from [facility]. Thankfully, he feels fine. Specifically, he denies symptoms consistent with angioedema, hypotension, or reduced UOP. Of note, he has not had a BMP drawn since that time.     Medtronic Percepta Quad (CRT-P) Device Check on 4/29/20  AP: 99.1% BiVP: 99.8%  OptiVol was elevated on routine PPM check - RN asked about symptoms, he said he is feeling fine. Specifically he said his ankles get occasionally swollen but nothing out of the ordinary lately, his daily weight is very consistent, and he has no SOB, he is able to do his daily walks without problem.     Assessment/Plan:  1. Chronic HFrEF - We will check a CMP on him today (as well as TFT's) given concomitant Losartan and Entresto use for the past 3 months, as well as AMIO monitoring. He will discontinue the Losartan immediately. Will discuss with [place] whether he is able to take advantage of the patient assistance program. In the meantime, he will continue the Entresto at 1 tab in the AM and 1/2 tab in the PM. We will continue to prescribe it at 1 tab BID in case this becomes more affordable. I will call him if further adjustments are needed prior to the weekend based  on lab results. We will also get a BP on him when he goes in for the labs, and he will purchase an [employer] home BP cuff this weekend for ongoing monitoring.    Follow-up:  - Video visit with me in 2 weeks.  - Dr. De Leon in 3 months + labs.     Phone call duration: 25 minutes    [place] Chawla PA-C  [facility]  Pager: [phone]  Text Page  (7:30am - 4pm M-F)

## 2020-05-15 NOTE — PROGRESS NOTES
[facility]  Notes recorded by [place] Chawla PA-C on 5/15/2020 at 4:24 PM CDT   Please let Will know that his renal function is thankfully not too far off from his baseline. Blood pressure measured earlier today was OK. Therefore, I think we should be fine with our initial plan of just stopping the Losartan.   Regarding his TFT's; I attempted to get ahold of Dr. Perdomo for direction however there is no answer at that office. For now, I would ask him to increase his levothyroxine from 25 to 50 daily and would like him to follow up with Dr. Perdomo next week. I will also route this to Dr. De Leon as he is on (low dose) amiodarone therapy. Thank you!         Called pt, reviewed lab results from today. Reviewed need to increase Levothyroxine to 50mcg daily. Pt has enough tablets, and will increased to 2 tablets daily (he has 25mcg tablets). Reviewed need to have visit with PCP next week. Pt has previously schedule visit with Dr. Hernandez on 5/26, but he will call office next week to update them on this, and see if that can be moved up. Will also send this note as FYI to Dr. Hernandez, so he is aware we are making adjustments to thyroid medication and he can follow up with pt when he see's him next. Pt denied further questions at this time.     Med list updated.   DYLAN WelchN, RN, CHFN  05/15/20 at 4:32 PM

## 2020-05-18 DIAGNOSIS — E03.9 HYPOTHYROIDISM, UNSPECIFIED TYPE: ICD-10-CM

## 2020-05-18 RX ORDER — LEVOTHYROXINE SODIUM 50 UG/1
50 TABLET ORAL DAILY
Qty: 90 TABLET | Refills: 3 | Status: CANCELLED | OUTPATIENT
Start: 2020-05-18

## 2020-05-18 NOTE — TELEPHONE ENCOUNTER
Please call the patient regarding his thyroid tests.  Please find out if he has been taking his Synthroid and if he takes it daily or does he forget it often.  Please find out exactly what dose he has been on and let me know.    Thank you  Mumtaz Hernandez M.D.

## 2020-05-18 NOTE — TELEPHONE ENCOUNTER
"Spoke with Pt:     Takes this everyday, usually around noon- States that he does have an empty stomach at this time    Denies having skipped or missed doses \"100% accurate\"     Increased to 50mcg (Increased by heart center on 5/15)     Pt needs refill if he is to remain on this double dose- pended, but if alternative dosing, please advise/send RX    Also- Pt has visit scheduled for next week- OK to convert to Telephone visit?     Thank you,   Ivone CASEY RN            "

## 2020-05-19 RX ORDER — LEVOTHYROXINE SODIUM 75 UG/1
75 TABLET ORAL DAILY
Qty: 90 TABLET | Refills: 3 | Status: SHIPPED | OUTPATIENT
Start: 2020-05-19 | End: 2020-06-22 | Stop reason: DRUGHIGH

## 2020-05-19 NOTE — TELEPHONE ENCOUNTER
Spoke with Pt:     Pt agrees with increasing dose to 75mcg daily for 4 weeks, and then 100mcg daily after that.     He will start today     Lab Only scheduled for 6/29 for 6 week check up     Ivone CASEY RN

## 2020-05-19 NOTE — TELEPHONE ENCOUNTER
He will need a higher dose of Synthroid than that.  I would like him to change his Synthroid dose to a total daily dose of 75 mcg daily for the next 4 weeks and then increase it to 100 mcg daily after that.  He needs to do a follow-up blood test in 6 weeks.    Thank you    Mumtaz Hernandez M.D.

## 2020-05-20 ENCOUNTER — CARE COORDINATION (OUTPATIENT)
Dept: CARDIOLOGY | Facility: CLINIC | Age: 85
End: 2020-05-20

## 2020-05-20 NOTE — PROGRESS NOTES
[facility]    Called patient in response to his message re:the cost of entresto. Patient last seen by [place] Chawla on 5/15. Per her clinic notes:  Dr. De Leon switched Will from Losartan to Entresto to optimize his CMP regimen. He's noted no changes in his symptoms since that time. Entresto is now becoming cost-prohibitive. For this reason, he has reduced his dosing from 49/51 BID to one full tab in the AM and 1/2 tab in the PM to try and extended his amount....Will discuss with [place] whether he is able to take advantage of the patient assistance program.     Patient has not heard anything and is now down to a 5 day supply. I sent an urgent staff message to pharmacy brigitte and will re-connect w/patient as soon as we get a response. Patent verbalized understanding.  Oly Martinez RN on 5/20/2020 at 3:16 PM

## 2020-06-06 DIAGNOSIS — I42.9 CARDIOMYOPATHY, UNSPECIFIED TYPE (H): ICD-10-CM

## 2020-06-06 DIAGNOSIS — I35.1 SEVERE AORTIC INSUFFICIENCY: ICD-10-CM

## 2020-06-08 RX ORDER — FUROSEMIDE 20 MG
TABLET ORAL
Qty: 90 TABLET | Refills: 3 | Status: SHIPPED | OUTPATIENT
Start: 2020-06-08 | End: 2020-11-13

## 2020-06-08 RX ORDER — POTASSIUM CHLORIDE 750 MG/1
TABLET, EXTENDED RELEASE ORAL
Qty: 180 TABLET | Refills: 1 | Status: SHIPPED | OUTPATIENT
Start: 2020-06-08 | End: 2020-12-31

## 2020-06-22 ENCOUNTER — VIRTUAL VISIT (OUTPATIENT)
Dept: CARDIOLOGY | Facility: CLINIC | Age: 85
End: 2020-06-22
Attending: PHYSICIAN ASSISTANT
Payer: MEDICARE

## 2020-06-22 VITALS
HEIGHT: 65 IN | BODY MASS INDEX: 30.66 KG/M2 | SYSTOLIC BLOOD PRESSURE: 125 MMHG | HEART RATE: 82 BPM | WEIGHT: 184 LBS | DIASTOLIC BLOOD PRESSURE: 78 MMHG

## 2020-06-22 DIAGNOSIS — I50.22 CHRONIC SYSTOLIC CHF (CONGESTIVE HEART FAILURE) (H): ICD-10-CM

## 2020-06-22 DIAGNOSIS — I42.0 DILATED CARDIOMYOPATHY (H): ICD-10-CM

## 2020-06-22 PROCEDURE — 99214 OFFICE O/P EST MOD 30 MIN: CPT | Mod: 95 | Performed by: PHYSICIAN ASSISTANT

## 2020-06-22 RX ORDER — LEVOTHYROXINE SODIUM 100 UG/1
100 TABLET ORAL DAILY
COMMUNITY
End: 2021-12-06

## 2020-06-22 ASSESSMENT — MIFFLIN-ST. JEOR: SCORE: 1436.5

## 2020-06-22 NOTE — PROGRESS NOTES
"Lico Woo is a 87 year old male who is being evaluated via a billable video visit.      The patient has been notified of following:     \"This video visit will be conducted via a call between you and your physician/provider. We have found that certain health care needs can be provided without the need for an in-person physical exam.  This service lets us provide the care you need with a video conversation.  If a prescription is necessary we can send it directly to your pharmacy.  If lab work is needed we can place an order for that and you can then stop by our lab to have the test done at a later time.    Video visits are billed at different rates depending on your insurance coverage.  Please reach out to your insurance provider with any questions.    If during the course of the call the physician/provider feels a video visit is not appropriate, you will not be charged for this service.\"    Patient has given verbal consent for Video visit? Yes    How would you like to obtain your AVS? Mail a copy     Patient would like the video invitation sent by: Text to cell phone: [phone]    Will anyone else be joining your video visit? No       Review Of Systems  Skin: NEGATIVE  Eyes:Ears/Nose/Throat: wears glasses, tinnitus  Respiratory: developed chronic cough-allergies?  Cardiovascular:edema, sleeping more than normal  Gastrointestinal: NEGATIVE  Genitourinary:less quantity  Musculoskeletal: NEGATIVE  Neurologic: NEGATIVE  Psychiatric: NEGATIVE  Hematologic/Lymphatic/Immunologic: NEGATIVE  Endocrine:  Thyroid disorder  Vitals today are:  /78  Pulse 82  Weight  184.0 lb  Patient would like to discuss going off Entresto and going on Losartan due to cost of Entresto.  MARYJANE Rodriguez      Video-Visit Details    Type of service:  Video Visit [facility] [phone]    Video Start Time: 1:19 PM  Video End Time: 1:50 PM    Originating Location (pt. Location): Home    Distant Location (provider location):  [facility]     Platform used for Video Visit: DOX    I have reviewed and updated the patient's Past Medical History, Social History, Family History and Medication List.    ALLERGIES  Patient has no known allergies.    MEDICATIONS  Current Outpatient Medications   Medication Sig Dispense Refill     acetaminophen (TYLENOL) 500 MG tablet Take 500-1,000 mg by mouth every 6 hours as needed for mild pain       allopurinol (ZYLOPRIM) 300 MG tablet Take 1 tablet (300 mg) by mouth daily 90 tablet 3     alpha-lipoic acid 600 MG capsule Take by mouth daily        amiodarone (PACERONE) 200 MG tablet Take 0.5 tablets (100 mg) by mouth daily 45 tablet 3     amoxicillin (AMOXIL) 500 MG capsule Take 4 pills one hour prior to dental work 4 capsule 3     carvedilol (COREG) 25 MG tablet Take 1 tablet (25 mg) by mouth 2 times daily (with meals) 180 tablet 3     cholecalciferol (VITAMIN D3) 5000 units TABS tablet Take 2,000 Units by mouth daily       Cyanocobalamin (VITAMIN B 12 PO) Take 2,500 mcg by mouth       ELIQUIS ANTICOAGULANT 5 MG tablet TAKE 1/2 TABLET BY MOUTH  TWO TIMES DAILY 90 tablet 0     furosemide (LASIX) 20 MG tablet TAKE 1 TABLET BY MOUTH  DAILY 90 tablet 3     levothyroxine (SYNTHROID/LEVOTHROID) 100 MCG tablet Take 100 mcg by mouth daily       multivitamin (CENTRUM SILVER) tablet Take 1 tablet by mouth daily       Niacin (VITAMIN B-3 OR) Take 500 mg by mouth       potassium chloride ER (KLOR-CON M) 10 MEQ CR tablet TAKE 1 TABLET BY MOUTH TWO  TIMES DAILY 180 tablet 1     Pyridoxine HCl (VITAMIN B6) 250 MG TABS Take 150 mg by mouth       sacubitril-valsartan (ENTRESTO) 49-51 MG per tablet Take 1 tablet by mouth 2 times daily 180 tablet 3       HPI: (Please see Dr. De Leon's note from 2/2020 for the patient's detailed history)    In brief, Mr. Lico Woo is an 86-year-old gentleman with nonischemic cardiomyopathy, presumed to be due to rapid atrial fibrillation.  He had a nuclear stress test  for evaluation of coronary disease and it was reportedly normal.  He has a biventricular pacemaker in place and was cardioverted back to sinus rhythm and has been maintaining sinus rhythm according to his pacemaker checks.  He is completely BiV paced per his device check in April. He remains on low-dose amiodarone 100 mg a day for maintenance of normal rhythm.  He is also on Eliquis for stroke prevention (low dose given age and renal function).    In February, Dr. De Leon switched Will from Losartan to Entresto to optimize his CMP regimen. Of concern, he forgot to stop taking his Losartan and at his last visit with me on 5/15, we made sure he discontinued that. I had him get labs that day which showed (not surprisingly) VADIM with a creatinine of 1.9 (up from prior of 1.7). I also checked his TSH which was very high around 90. I increased his levothyroxine and referred him to his PCP who increased this further. He's now on 100 mcg daily and his PCP will continue to follow this.     Unfortunately, Entresto is now cost-prohibitive. For this reason, he has been taking  49/51 mg one full tab in the AM and 1/2 tab in the PM to try and extended his amount. He notes no symptomatic improvement on the Entresto. He walks the halls of his building routinely and walks his dog during the day. He stops occasionally to catch his breath and then can continue. Otherwise he notices no problems with his breathing. He tells me that he's still fatigued even though his thyroid supplement has been adjusted. Has follow up with Dr. Perdomo for repeat labs next week. He also notices pedal edema which is new. He began to notice this after he had several falls over the course of one week about a week ago. He states he's had issues with falling in the past, which may be partially attributable to his peripheral neuropathy. No history of syncope or dizziness. I asked him to discuss the falls with Dr. Perdomo as well.  He's been taking his vitals  consistently since our last visit. His weight has come up a little from about 180 lbs to 184 lbs. His average SBP is 120 mmHg, DBP is 78 mmHg. HR average is 75 bpm.     Component      Latest Ref Rng & Units 2/12/2020 5/15/2020   Sodium      133 - 144 mmol/L 144 143   Potassium      3.4 - 5.3 mmol/L 4.5 4.4   Chloride      94 - 109 mmol/L 106 109   Carbon Dioxide      20 - 32 mmol/L 34 (H) 30   Anion Gap      3 - 14 mmol/L 8.5 4   Glucose      70 - 99 mg/dL 108 (H) 101 (H)   Urea Nitrogen      7 - 30 mg/dL 33 (H) 37 (H)   Creatinine      0.66 - 1.25 mg/dL 1.78 (H) 1.93 (H)   GFR Estimate      >60 mL/min/1.73:m2 36 (L) 30 (L)   GFR Estimate If Black      >60 mL/min/1.73:m2 44 (L) 35 (L)   Calcium      8.5 - 10.1 mg/dL 9.7 9.4   Bilirubin Total      0.2 - 1.3 mg/dL  0.7   Albumin      3.4 - 5.0 g/dL  3.9   Protein Total      6.8 - 8.8 g/dL  7.9   Alkaline Phosphatase      40 - 150 U/L  93   ALT      0 - 70 U/L  28   AST      0 - 45 U/L  27   Hemoglobin      13.3 - 17.7 g/dL  12.4 (L)   TSH      0.40 - 4.00 mU/L  92.14 (H)   T4 Free      0.76 - 1.46 ng/dL  0.46 (L)       Assessment/Plan:  1. Chronic HFrEF - Stable, FC II sxs with new-onset pedal edema and mild weight gain. This may be related to his uncontrolled thyroid function (on AMIO + synthroid) and synthroid is being adjusted by Dr. Hernandez, due for repeat TFT's and renal function next week.  Unfortunately, he is unable to afford Entresto and notes no symptomatic benefit on it. We will put him back on Losartan once he runs out of the current bottle of Entresto. Increase Furosemide to 40 mg daily x 1 week. After that, if he notes no improvement or recurrence of peripheral edema, he will notify our clinic.     Follow-up:  - Dr. De Leon in 3 months + labs.     Phone call duration: 31 minutes    [place] Chawla PA-C  [facility]  Pager: [phone]  Text Page  (7:30am - 4pm M-F)

## 2020-06-22 NOTE — PATIENT INSTRUCTIONS
Today, we discussed the following:   - Medication changes:  Increase the furosemide to 40 mg daily x 1 week. If no improvement in your weight/symptoms, call me. Likewise, if you go back down to the original dose after one week and you gain weight and swelling again, call me.  Once you run out of the Entresto, you can go back to the Losartan.    We will look out for the blood draw for the kidney function and thyroid with Dr. Perdomo.    - Follow up: If everything goes well, we will plan to have you see Dr. De Leon in 2 months.    Please, remember to continue the followin.  Weigh yourself daily. Call if your weight is up > than 2 pounds in one day, or 5 pounds in one week; if you feel more short of breath or have worsening swelling in your legs or abdomen.  2.  Eat a low sodium diet (less than 2,000mg or 2g daily). If you eat less salt, you will retain less fluid.   3.  Avoid alcohol, as this can worsen heart failure.   4.  Avoid NSAIDs as able (For example, Ibuprofen / aleve / advil / naprosen / diclofenac).    Please call [facility] nurse for any questions or concerns Mon-Fri 8am-4pm:   [phone]  For concerns after hours:   [phone] option 2   Scheduling phone number:   [phone]    Thank you for visiting with us today.   [place] Chawla PA-C  ______________________________________________________________

## 2020-06-22 NOTE — LETTER
6/22/2020    Mumtaz Hernandez MD  [address]  [place] [address]    RE: Lico Woo       Dear Colleague,    I had the pleasure of seeing Lico Woo in the [facility].    Lico Woo is a 87 year old male who is being evaluated via a billable video visit.      I have reviewed and updated the patient's Past Medical History, Social History, Family History and Medication List.    ALLERGIES  Patient has no known allergies.    MEDICATIONS  Current Outpatient Medications   Medication Sig Dispense Refill     acetaminophen (TYLENOL) 500 MG tablet Take 500-1,000 mg by mouth every 6 hours as needed for mild pain       allopurinol (ZYLOPRIM) 300 MG tablet Take 1 tablet (300 mg) by mouth daily 90 tablet 3     alpha-lipoic acid 600 MG capsule Take by mouth daily        amiodarone (PACERONE) 200 MG tablet Take 0.5 tablets (100 mg) by mouth daily 45 tablet 3     amoxicillin (AMOXIL) 500 MG capsule Take 4 pills one hour prior to dental work 4 capsule 3     carvedilol (COREG) 25 MG tablet Take 1 tablet (25 mg) by mouth 2 times daily (with meals) 180 tablet 3     cholecalciferol (VITAMIN D3) 5000 units TABS tablet Take 2,000 Units by mouth daily       Cyanocobalamin (VITAMIN B 12 PO) Take 2,500 mcg by mouth       ELIQUIS ANTICOAGULANT 5 MG tablet TAKE 1/2 TABLET BY MOUTH  TWO TIMES DAILY 90 tablet 0     furosemide (LASIX) 20 MG tablet TAKE 1 TABLET BY MOUTH  DAILY 90 tablet 3     levothyroxine (SYNTHROID/LEVOTHROID) 100 MCG tablet Take 100 mcg by mouth daily       multivitamin (CENTRUM SILVER) tablet Take 1 tablet by mouth daily       Niacin (VITAMIN B-3 OR) Take 500 mg by mouth       potassium chloride ER (KLOR-CON M) 10 MEQ CR tablet TAKE 1 TABLET BY MOUTH TWO  TIMES DAILY 180 tablet 1     Pyridoxine HCl (VITAMIN B6) 250 MG TABS Take 150 mg by mouth       sacubitril-valsartan (ENTRESTO) 49-51 MG per tablet Take 1 tablet by mouth 2 times daily 180 tablet 3       HPI: (Please see   Moises's note from 2/2020 for the patient's detailed history)    In brief, Mr. Lico Woo is an 86-year-old gentleman with nonischemic cardiomyopathy, presumed to be due to rapid atrial fibrillation.  He had a nuclear stress test for evaluation of coronary disease and it was reportedly normal.  He has a biventricular pacemaker in place and was cardioverted back to sinus rhythm and has been maintaining sinus rhythm according to his pacemaker checks.  He is completely BiV paced per his device check in April. He remains on low-dose amiodarone 100 mg a day for maintenance of normal rhythm.  He is also on Eliquis for stroke prevention (low dose given age and renal function).    In February, Dr. De Leon switched Will from Losartan to Entresto to optimize his CMP regimen. Of concern, he forgot to stop taking his Losartan and at his last visit with me on 5/15, we made sure he discontinued that. I had him get labs that day which showed (not surprisingly) VADIM with a creatinine of 1.9 (up from prior of 1.7). I also checked his TSH which was very high around 90. I increased his levothyroxine and referred him to his PCP who increased this further. He's now on 100 mcg daily and his PCP will continue to follow this.     Unfortunately, Entresto is now cost-prohibitive. For this reason, he has been taking  49/51 mg one full tab in the AM and 1/2 tab in the PM to try and extended his amount. He notes no symptomatic improvement on the Entresto. He walks the halls of his building routinely and walks his dog during the day. He stops occasionally to catch his breath and then can continue. Otherwise he notices no problems with his breathing. He tells me that he's still fatigued even though his thyroid supplement has been adjusted. Has follow up with Dr. Perdomo for repeat labs next week. He also notices pedal edema which is new. He began to notice this after he had several falls over the course of one week about a week ago. He states  he's had issues with falling in the past, which may be partially attributable to his peripheral neuropathy. No history of syncope or dizziness. I asked him to discuss the falls with Dr. Perdomo as well.  He's been taking his vitals consistently since our last visit. His weight has come up a little from about 180 lbs to 184 lbs. His average SBP is 120 mmHg, DBP is 78 mmHg. HR average is 75 bpm.     Component      Latest Ref Rng & Units 2/12/2020 5/15/2020   Sodium      133 - 144 mmol/L 144 143   Potassium      3.4 - 5.3 mmol/L 4.5 4.4   Chloride      94 - 109 mmol/L 106 109   Carbon Dioxide      20 - 32 mmol/L 34 (H) 30   Anion Gap      3 - 14 mmol/L 8.5 4   Glucose      70 - 99 mg/dL 108 (H) 101 (H)   Urea Nitrogen      7 - 30 mg/dL 33 (H) 37 (H)   Creatinine      0.66 - 1.25 mg/dL 1.78 (H) 1.93 (H)   GFR Estimate      >60 mL/min/1.73:m2 36 (L) 30 (L)   GFR Estimate If Black      >60 mL/min/1.73:m2 44 (L) 35 (L)   Calcium      8.5 - 10.1 mg/dL 9.7 9.4   Bilirubin Total      0.2 - 1.3 mg/dL  0.7   Albumin      3.4 - 5.0 g/dL  3.9   Protein Total      6.8 - 8.8 g/dL  7.9   Alkaline Phosphatase      40 - 150 U/L  93   ALT      0 - 70 U/L  28   AST      0 - 45 U/L  27   Hemoglobin      13.3 - 17.7 g/dL  12.4 (L)   TSH      0.40 - 4.00 mU/L  92.14 (H)   T4 Free      0.76 - 1.46 ng/dL  0.46 (L)       Assessment/Plan:  1. Chronic HFrEF - Stable, FC II sxs with new-onset pedal edema and mild weight gain. This may be related to his uncontrolled thyroid function (on AMIO + synthroid) and synthroid is being adjusted by Dr. Hernandez, due for repeat TFT's and renal function next week.  Unfortunately, he is unable to afford Entresto and notes no symptomatic benefit on it. We will put him back on Losartan once he runs out of the current bottle of Entresto. Increase Furosemide to 40 mg daily x 1 week. After that, if he notes no improvement or recurrence of peripheral edema, he will notify our clinic.     Follow-up:  - Dr. De Leon in  3 months + labs.     Phone call duration: 31 minutes    Florida Chawla PA-C  [facility]  Pager: [phone]  Text Page  (7:30am - 4pm M-F)     Thank you for allowing me to participate in the care of your patient.    Sincerely,     [place] Chawla PA-C     [facility]

## 2020-06-29 ENCOUNTER — VIRTUAL VISIT (OUTPATIENT)
Dept: FAMILY MEDICINE | Facility: CLINIC | Age: 85
End: 2020-06-29
Payer: MEDICARE

## 2020-06-29 DIAGNOSIS — E03.9 HYPOTHYROIDISM, UNSPECIFIED TYPE: ICD-10-CM

## 2020-06-29 DIAGNOSIS — R60.0 BILATERAL LEG EDEMA: ICD-10-CM

## 2020-06-29 DIAGNOSIS — I50.22 CHRONIC SYSTOLIC CHF (CONGESTIVE HEART FAILURE) (H): ICD-10-CM

## 2020-06-29 DIAGNOSIS — I48.0 PAROXYSMAL ATRIAL FIBRILLATION (H): ICD-10-CM

## 2020-06-29 DIAGNOSIS — G62.9 NEUROPATHY: ICD-10-CM

## 2020-06-29 DIAGNOSIS — I42.0 DILATED CARDIOMYOPATHY (H): ICD-10-CM

## 2020-06-29 DIAGNOSIS — R26.89 BALANCE PROBLEM: ICD-10-CM

## 2020-06-29 DIAGNOSIS — N18.30 CKD (CHRONIC KIDNEY DISEASE) STAGE 3, GFR 30-59 ML/MIN (H): ICD-10-CM

## 2020-06-29 DIAGNOSIS — C61 PROSTATE CANCER (H): ICD-10-CM

## 2020-06-29 DIAGNOSIS — R29.6 FREQUENT FALLS: Primary | ICD-10-CM

## 2020-06-29 PROBLEM — I48.91 ATRIAL FIBRILLATION (H): Status: RESOLVED | Noted: 2019-11-25 | Resolved: 2020-06-29

## 2020-06-29 PROCEDURE — 99214 OFFICE O/P EST MOD 30 MIN: CPT | Mod: 95 | Performed by: INTERNAL MEDICINE

## 2020-06-29 NOTE — PROGRESS NOTES
"Lico Woo is a 87 year old male who is being evaluated via a billable video visit.      The patient has been notified of following:     \"This video visit will be conducted via a call between you and your physician/provider. We have found that certain health care needs can be provided without the need for an in-person physical exam.  This service lets us provide the care you need with a video conversation.  If a prescription is necessary we can send it directly to your pharmacy.  If lab work is needed we can place an order for that and you can then stop by our lab to have the test done at a later time.    Video visits are billed at different rates depending on your insurance coverage.  Please reach out to your insurance provider with any questions.    If during the course of the call the physician/provider feels a video visit is not appropriate, you will not be charged for this service.\"    Patient has given verbal consent for Video visit? Yes  How would you like to obtain your AVS? Mail a copy  Patient would like the video invitation sent by: Text to cell phone: [phone]  Will anyone else be joining your video visit? No      Subjective     iLco Woo is a 87 year old male who presents today via video visit for the following health issues:    HPI  Edema in legs x2 weeks; gained about 6 lbs, but has lost 8 lbs now; still has some edema but not as bad as when it started    The patient has had edema and falls.  I reviewed recent cards note.  As noted has cmyop with low ef, was on entresto but due to cost having to go back to losartan.  Recently he has had edema and also falls.  Prior neurop and balance issues as noted.      He has had falls in past for several years.  It has happened to him several times recently.  If he were to stand and close his eyes he feels like he would be off balance.  With any quick movement he is thrown off balance.  Recently he has had backward falls, just loses balance and falls " backwards.  None of the falls are due to dizziness or syncope or vertigo.  Once he fell when turned his ankle.  His last fall was Saturday the 13th of June.  In terms of frequency of falls he is not sure if this is more than normal, as he has fallen before.  However, the more recent falls are backwards which is different.  He does not have the ability to catch himself anymore.  The falls are not associated with standing or rising.  It is just vertical balance.  He is not having chest pain or shortness of breath.  No focal neuro weakness, vision or balance issues.  He is not sure he has seen neuro in the past.    He also notes edema.  Lately he has gained 6 pounds then called cardiology and she raised lasix dose from 20mg to 40mg.  With this he has lost 10 pounds in last week.  The edema is now minimal.  No pnd or chest pain or shortness of breath.      He takes all his meds reg.  No nsaids, sj diet.    Video Start Time: 1:08 PM        Physical Exam     Per patient weight today 177pounds    GENERAL: Healthy, alert and no distress  EYES: Eyes grossly normal to inspection.  No discharge or erythema, or obvious scleral/conjunctival abnormalities.  RESP: No audible wheeze, cough, or visible cyanosis.  No visible retractions or increased work of breathing.    SKIN: Visible skin clear. No significant rash, abnormal pigmentation or lesions.  NEURO: Cranial nerves grossly intact.  Mentation and speech appropriate for age.  PSYCH: Mentation appears normal, affect normal/bright, judgement and insight intact, normal speech and appearance well-groomed.  Hard for patient to direct camera so hard to see if has any edema.      ASSESSMENT:  1. Frequent falls, due to balance issues, neuro issue  2. Edema, resolving  3. Chronic chf, stable  4. Hypothyroidism, needs follow up tsh but only on the higher dose for 2 weeks so will need to wait  5. Cap, fran  6 ckd, follow up labs  7. Afib, cverted, his risk of noac is too great right  now    PLAN:  Discontinue eliquis and to asa 81mg  Change lasix to 20mg, daily weight, and if up more than 2 pounds to 40mg  Follow up video visit 2 weeks  I rec neuro eval  Follow up labs in 2 weeks    Mumtaz Hernandez M.D.          Video-Visit Details    Type of service:  Video Visit    Video End Time:1:32 PM    Originating Location (pt. Location): Home    Distant Location (provider location):  [facility]     Platform used for Video Visit: Doximity    No follow-ups on file.       Mumtaz Hernandez MD

## 2020-07-01 DIAGNOSIS — I48.91 ATRIAL FIBRILLATION, UNSPECIFIED TYPE (H): ICD-10-CM

## 2020-07-02 NOTE — TELEPHONE ENCOUNTER
Please call patient to inform them of Dr. Hernandez response, should be off Eliquis due to falls and on aspirin.

## 2020-07-02 NOTE — TELEPHONE ENCOUNTER
I had told patient to not take eliquis but to change to asa due to falls, please make sure patient is aware of this.    Mumtaz Hernandez M.D.

## 2020-07-02 NOTE — TELEPHONE ENCOUNTER
Routing refill request to provider for review/approval because:  Labs out of range:  Scr  Labs not current:  Platelets  Age>79

## 2020-07-09 ENCOUNTER — VIRTUAL VISIT (OUTPATIENT)
Dept: FAMILY MEDICINE | Facility: CLINIC | Age: 85
End: 2020-07-09
Payer: MEDICARE

## 2020-07-09 DIAGNOSIS — R60.0 BILATERAL LEG EDEMA: ICD-10-CM

## 2020-07-09 DIAGNOSIS — R29.6 FREQUENT FALLS: Primary | ICD-10-CM

## 2020-07-09 PROCEDURE — 99214 OFFICE O/P EST MOD 30 MIN: CPT | Mod: 95 | Performed by: INTERNAL MEDICINE

## 2020-07-09 NOTE — PROGRESS NOTES
"Lico Woo is a 87 year old male who is being evaluated via a billable video visit.      The patient has been notified of following:     \"This video visit will be conducted via a call between you and your physician/provider. We have found that certain health care needs can be provided without the need for an in-person physical exam.  This service lets us provide the care you need with a video conversation.  If a prescription is necessary we can send it directly to your pharmacy.  If lab work is needed we can place an order for that and you can then stop by our lab to have the test done at a later time.    Video visits are billed at different rates depending on your insurance coverage.  Please reach out to your insurance provider with any questions.    If during the course of the call the physician/provider feels a video visit is not appropriate, you will not be charged for this service.\"    Patient has given verbal consent for Video visit? Yes  How would you like to obtain your AVS? Misti  Patient would like the video invitation sent by: Text to cell phone: [phone]  Will anyone else be joining your video visit? No    Subjective     Lico Woo is a 87 year old male who presents today via video visit for the following health issues:    1. His weight had gone up and then down with lasix as noted last virtual visit.  This was 6/29/20. I had him go back to lasix 20mg and since weight has not changed, staying at 178 since then.  The edema has mostly resolved, just a bit in the ankles.    2. He is off the eliquis and on asa since last office visit due to falling at my rec.    3. He has not been to neuro yet due to the risk of going out and his facility is not wanting people to go out unless necessary.  Since the last virtual visit he fell on July 3, fell backwards, did not hurt himself.      He otherwise feels fine, no chest pain or shortness of breath.      HPI  Recheck       Video Start Time: 3:24 PM     "   Physical Exam     GENERAL: Healthy, alert and no distress  EYES: Eyes grossly normal to inspection.  No discharge or erythema, or obvious scleral/conjunctival abnormalities.  RESP: No audible wheeze, cough, or visible cyanosis.  No visible retractions or increased work of breathing.    SKIN: Visible skin clear. No significant rash, abnormal pigmentation or lesions.  NEURO: Cranial nerves grossly intact.  Mentation and speech appropriate for age.  PSYCH: Mentation appears normal, affect normal/bright, judgement and insight intact, normal speech and appearance well-groomed.    ASSESSMENT:  1. Falling, to see neuro, I rec using walker for now, see neuro asap, call if changes  2. Afib, off noac and on asa due to falls  3. Cmyop, stable    PLAN:  Neuro  See me with labs as scheduled    Mumtaz Hernandez M.D.          Video-Visit Details    Type of service:  Video Visit    Video End Time:3:40 PM    Originating Location (pt. Location): Home    Distant Location (provider location):  [facility]     Platform used for Video Visit: Doximity    No follow-ups on file.       Mumtaz Hernandez MD

## 2020-07-20 DIAGNOSIS — N18.30 CKD (CHRONIC KIDNEY DISEASE) STAGE 3, GFR 30-59 ML/MIN (H): ICD-10-CM

## 2020-07-20 DIAGNOSIS — E03.9 HYPOTHYROIDISM, UNSPECIFIED TYPE: ICD-10-CM

## 2020-07-20 DIAGNOSIS — R29.6 FREQUENT FALLS: ICD-10-CM

## 2020-07-20 LAB
ERYTHROCYTE [DISTWIDTH] IN BLOOD BY AUTOMATED COUNT: 14 % (ref 10–15)
FOLATE SERPL-MCNC: 39.6 NG/ML
HCT VFR BLD AUTO: 34.5 % (ref 40–53)
HGB BLD-MCNC: 11.3 G/DL (ref 13.3–17.7)
MCH RBC QN AUTO: 34.6 PG (ref 26.5–33)
MCHC RBC AUTO-ENTMCNC: 32.8 G/DL (ref 31.5–36.5)
MCV RBC AUTO: 106 FL (ref 78–100)
PLATELET # BLD AUTO: 140 10E9/L (ref 150–450)
RBC # BLD AUTO: 3.27 10E12/L (ref 4.4–5.9)
VIT B12 SERPL-MCNC: 1778 PG/ML (ref 193–986)
WBC # BLD AUTO: 4.9 10E9/L (ref 4–11)

## 2020-07-20 PROCEDURE — 00000402 ZZHCL STATISTIC TOTAL PROTEIN: Performed by: INTERNAL MEDICINE

## 2020-07-20 PROCEDURE — 82607 VITAMIN B-12: CPT | Performed by: INTERNAL MEDICINE

## 2020-07-20 PROCEDURE — 82746 ASSAY OF FOLIC ACID SERUM: CPT | Performed by: INTERNAL MEDICINE

## 2020-07-20 PROCEDURE — 36415 COLL VENOUS BLD VENIPUNCTURE: CPT | Performed by: INTERNAL MEDICINE

## 2020-07-20 PROCEDURE — 84439 ASSAY OF FREE THYROXINE: CPT | Performed by: INTERNAL MEDICINE

## 2020-07-20 PROCEDURE — 80048 BASIC METABOLIC PNL TOTAL CA: CPT | Performed by: INTERNAL MEDICINE

## 2020-07-20 PROCEDURE — 85027 COMPLETE CBC AUTOMATED: CPT | Performed by: INTERNAL MEDICINE

## 2020-07-20 PROCEDURE — 84443 ASSAY THYROID STIM HORMONE: CPT | Performed by: INTERNAL MEDICINE

## 2020-07-20 PROCEDURE — 84165 PROTEIN E-PHORESIS SERUM: CPT | Performed by: INTERNAL MEDICINE

## 2020-07-21 LAB
ALBUMIN SERPL ELPH-MCNC: 4.1 G/DL (ref 3.7–5.1)
ALPHA1 GLOB SERPL ELPH-MCNC: 0.3 G/DL (ref 0.2–0.4)
ALPHA2 GLOB SERPL ELPH-MCNC: 0.9 G/DL (ref 0.5–0.9)
ANION GAP SERPL CALCULATED.3IONS-SCNC: 8 MMOL/L (ref 3–14)
B-GLOBULIN SERPL ELPH-MCNC: 0.7 G/DL (ref 0.6–1)
BUN SERPL-MCNC: 37 MG/DL (ref 7–30)
CALCIUM SERPL-MCNC: 8.6 MG/DL (ref 8.5–10.1)
CHLORIDE SERPL-SCNC: 108 MMOL/L (ref 94–109)
CO2 SERPL-SCNC: 25 MMOL/L (ref 20–32)
CREAT SERPL-MCNC: 1.61 MG/DL (ref 0.66–1.25)
GAMMA GLOB SERPL ELPH-MCNC: 1.4 G/DL (ref 0.7–1.6)
GFR SERPL CREATININE-BSD FRML MDRD: 38 ML/MIN/{1.73_M2}
GLUCOSE SERPL-MCNC: 119 MG/DL (ref 70–99)
M PROTEIN SERPL ELPH-MCNC: 0.9 G/DL
POTASSIUM SERPL-SCNC: 4.5 MMOL/L (ref 3.4–5.3)
PROT PATTERN SERPL ELPH-IMP: ABNORMAL
SODIUM SERPL-SCNC: 141 MMOL/L (ref 133–144)
T4 FREE SERPL-MCNC: 1.08 NG/DL (ref 0.76–1.46)
TSH SERPL DL<=0.005 MIU/L-ACNC: 13.63 MU/L (ref 0.4–4)

## 2020-07-24 ENCOUNTER — OFFICE VISIT (OUTPATIENT)
Dept: FAMILY MEDICINE | Facility: CLINIC | Age: 85
End: 2020-07-24
Payer: MEDICARE

## 2020-07-24 VITALS
BODY MASS INDEX: 30.66 KG/M2 | HEART RATE: 85 BPM | SYSTOLIC BLOOD PRESSURE: 119 MMHG | DIASTOLIC BLOOD PRESSURE: 74 MMHG | WEIGHT: 184 LBS | HEIGHT: 65 IN | TEMPERATURE: 97.1 F | OXYGEN SATURATION: 96 %

## 2020-07-24 DIAGNOSIS — I50.22 CHRONIC SYSTOLIC CHF (CONGESTIVE HEART FAILURE) (H): ICD-10-CM

## 2020-07-24 DIAGNOSIS — I48.91 ATRIAL FIBRILLATION, UNSPECIFIED TYPE (H): ICD-10-CM

## 2020-07-24 DIAGNOSIS — G62.9 NEUROPATHY: ICD-10-CM

## 2020-07-24 DIAGNOSIS — R60.0 BILATERAL LEG EDEMA: ICD-10-CM

## 2020-07-24 DIAGNOSIS — R26.89 BALANCE PROBLEM: ICD-10-CM

## 2020-07-24 DIAGNOSIS — E03.9 HYPOTHYROIDISM, UNSPECIFIED TYPE: ICD-10-CM

## 2020-07-24 DIAGNOSIS — I10 ESSENTIAL HYPERTENSION: ICD-10-CM

## 2020-07-24 DIAGNOSIS — R77.8 ABNORMAL SERUM PROTEIN ELECTROPHORESIS: ICD-10-CM

## 2020-07-24 DIAGNOSIS — R29.6 FREQUENT FALLS: Primary | ICD-10-CM

## 2020-07-24 DIAGNOSIS — D64.9 LOW HEMOGLOBIN: ICD-10-CM

## 2020-07-24 DIAGNOSIS — I48.0 PAROXYSMAL ATRIAL FIBRILLATION (H): ICD-10-CM

## 2020-07-24 DIAGNOSIS — N18.30 CKD (CHRONIC KIDNEY DISEASE) STAGE 3, GFR 30-59 ML/MIN (H): ICD-10-CM

## 2020-07-24 LAB
BASOPHILS # BLD AUTO: 0 10E9/L (ref 0–0.2)
BASOPHILS NFR BLD AUTO: 0.4 %
DIFFERENTIAL METHOD BLD: ABNORMAL
EOSINOPHIL # BLD AUTO: 0.2 10E9/L (ref 0–0.7)
EOSINOPHIL NFR BLD AUTO: 3.5 %
ERYTHROCYTE [DISTWIDTH] IN BLOOD BY AUTOMATED COUNT: 13.9 % (ref 10–15)
HCT VFR BLD AUTO: 35.8 % (ref 40–53)
HGB BLD-MCNC: 11.9 G/DL (ref 13.3–17.7)
LYMPHOCYTES # BLD AUTO: 0.9 10E9/L (ref 0.8–5.3)
LYMPHOCYTES NFR BLD AUTO: 16.8 %
MCH RBC QN AUTO: 34.8 PG (ref 26.5–33)
MCHC RBC AUTO-ENTMCNC: 33.2 G/DL (ref 31.5–36.5)
MCV RBC AUTO: 105 FL (ref 78–100)
MONOCYTES # BLD AUTO: 0.4 10E9/L (ref 0–1.3)
MONOCYTES NFR BLD AUTO: 8.2 %
NEUTROPHILS # BLD AUTO: 3.6 10E9/L (ref 1.6–8.3)
NEUTROPHILS NFR BLD AUTO: 71.1 %
PLATELET # BLD AUTO: 153 10E9/L (ref 150–450)
RBC # BLD AUTO: 3.42 10E12/L (ref 4.4–5.9)
WBC # BLD AUTO: 5.1 10E9/L (ref 4–11)

## 2020-07-24 PROCEDURE — 82728 ASSAY OF FERRITIN: CPT | Performed by: INTERNAL MEDICINE

## 2020-07-24 PROCEDURE — 86335 IMMUNFIX E-PHORSIS/URINE/CSF: CPT | Performed by: INTERNAL MEDICINE

## 2020-07-24 PROCEDURE — 36415 COLL VENOUS BLD VENIPUNCTURE: CPT | Performed by: INTERNAL MEDICINE

## 2020-07-24 PROCEDURE — 99214 OFFICE O/P EST MOD 30 MIN: CPT | Performed by: INTERNAL MEDICINE

## 2020-07-24 PROCEDURE — 82784 ASSAY IGA/IGD/IGG/IGM EACH: CPT | Performed by: INTERNAL MEDICINE

## 2020-07-24 PROCEDURE — 83550 IRON BINDING TEST: CPT | Performed by: INTERNAL MEDICINE

## 2020-07-24 PROCEDURE — 83540 ASSAY OF IRON: CPT | Performed by: INTERNAL MEDICINE

## 2020-07-24 PROCEDURE — 85025 COMPLETE CBC W/AUTO DIFF WBC: CPT | Performed by: INTERNAL MEDICINE

## 2020-07-24 PROCEDURE — 86334 IMMUNOFIX E-PHORESIS SERUM: CPT | Performed by: INTERNAL MEDICINE

## 2020-07-24 RX ORDER — LEVOTHYROXINE SODIUM 125 UG/1
125 TABLET ORAL DAILY
Qty: 90 TABLET | Refills: 3 | Status: SHIPPED | OUTPATIENT
Start: 2020-07-24 | End: 2021-05-27

## 2020-07-24 RX ORDER — LEVOTHYROXINE SODIUM 100 UG/1
100 TABLET ORAL DAILY
Status: CANCELLED | OUTPATIENT
Start: 2020-07-24

## 2020-07-24 ASSESSMENT — MIFFLIN-ST. JEOR: SCORE: 1436.5

## 2020-07-24 NOTE — PROGRESS NOTES
This is a complicated patient who presents with his wife for multiple issues.    As noted, he has had frequent falling.  He has had it for years and is not clear that it significantly different change.  He does describe 2 issues.  One is where he can be standing and loses balance and possibly fall backwards.  The other is his right leg can give out on him.  None of his falling is associated with dizziness or syncope.  Because of his falls I have stopped his Eliquis and he is taking aspirin now.    The patient also has chronic heart failure.  He has atrial fibrillation as noted.  He was having leg edema so we adjusted the Lasix dose and with that it has improved.  His weight has varied a little bit and it appears that his dry weight at home is around 175.  He does have some edema today and his weight at home is 180.    Patient has chronic kidney disease.  Because of his neuropathy I did recent labs on him.  He is scheduled to see neurology in a week or 2.  He otherwise feels well.  No chest pain or shortness of breath.  No abdominal pain or nausea or vomiting.    Past Medical History:   Diagnosis Date     Aortic regurgitation     mild-moderate per 10/2018 Echo     Aortic stenosis     mild per 10/2018 Echo     Balance problem     few years     Cardiomyopathy (H) 10/2018    [place]; Echo 10/2018, EF 25%, down from 50% 12/2017, per notes, pt declines further work up to find etiology of drop in EF     Chronic atrial fibrillation (H) 2018    cardioversion 4/19     Chronic systolic CHF (congestive heart failure) (H)      CKD (chronic kidney disease) stage 3, GFR 30-59 ml/min (H)      Essential hypertension      Frequent falls      Gross hematuria 04/2019    cysto, ct neg     History of gout     none for years     Hypothyroidism      Mitral regurgitation     moderate per 10/2018 Echo     Neuropathy 1990    both lower legs     Pacemaker 2018     Prostate cancer (H) 2008    xrt, fine since     Tricuspid regurgitation      mioderate per 10/2018 Echo     Past Surgical History:   Procedure Laterality Date     bilateral hip replacement  2017    done 6 months apart     IMPLANT PACEMAKER  11/12/2018    CRT-P     lip surgery for skin cancer       Social History     Socioeconomic History     Marital status:      Spouse name: Not on file     Number of children: 3     Years of education: Not on file     Highest education level: Not on file   Occupational History     Occupation: owned marketing services company in [employer]   Social Needs     Financial resource strain: Not on file     Food insecurity     Worry: Not on file     Inability: Not on file     Transportation needs     Medical: Not on file     Non-medical: Not on file   Tobacco Use     Smoking status: Never Smoker     Smokeless tobacco: Never Used   Substance and Sexual Activity     Alcohol use: Yes     Comment: 1 drink week     Drug use: Never     Sexual activity: Not Currently   Lifestyle     Physical activity     Days per week: Not on file     Minutes per session: Not on file     Stress: Not on file   Relationships     Social connections     Talks on phone: Not on file     Gets together: Not on file     Attends [employer] service: Not on file     Active member of club or organization: Not on file     Attends meetings of clubs or organizations: Not on file     Relationship status: Not on file     Intimate partner violence     Fear of current or ex partner: Not on file     Emotionally abused: Not on file     Physically abused: Not on file     Forced sexual activity: Not on file   Other Topics Concern     Parent/sibling w/ CABG, MI or angioplasty before 65F 55M? Not Asked   Social History Narrative     Not on file     Current Outpatient Medications   Medication Sig Dispense Refill     acetaminophen (TYLENOL) 500 MG tablet Take 500-1,000 mg by mouth every 6 hours as needed for mild pain       allopurinol (ZYLOPRIM) 300 MG tablet Take 1 tablet (300 mg) by mouth daily 90 tablet 3      "alpha-lipoic acid 600 MG capsule Take by mouth daily        amiodarone (PACERONE) 200 MG tablet Take 0.5 tablets (100 mg) by mouth daily 45 tablet 3     amoxicillin (AMOXIL) 500 MG capsule Take 4 pills one hour prior to dental work 4 capsule 3     aspirin (ASA) 81 MG EC tablet Take 1 tablet (81 mg) by mouth daily       carvedilol (COREG) 25 MG tablet Take 1 tablet (25 mg) by mouth 2 times daily (with meals) 180 tablet 3     cholecalciferol (VITAMIN D3) 5000 units TABS tablet Take 2,000 Units by mouth daily       Cyanocobalamin (VITAMIN B 12 PO) Take 2,500 mcg by mouth       furosemide (LASIX) 20 MG tablet TAKE 1 TABLET BY MOUTH  DAILY (Patient taking differently: 40 mg ) 90 tablet 3     levothyroxine (SYNTHROID/LEVOTHROID) 100 MCG tablet Take 100 mcg by mouth daily       levothyroxine (SYNTHROID/LEVOTHROID) 125 MCG tablet Take 1 tablet (125 mcg) by mouth daily 90 tablet 3     multivitamin (CENTRUM SILVER) tablet Take 1 tablet by mouth daily       Niacin (VITAMIN B-3 OR) Take 500 mg by mouth       potassium chloride ER (KLOR-CON M) 10 MEQ CR tablet TAKE 1 TABLET BY MOUTH TWO  TIMES DAILY 180 tablet 1     Pyridoxine HCl (VITAMIN B6 PO) Take 150 mg by mouth        sacubitril-valsartan (ENTRESTO) 49-51 MG per tablet Take 1 tablet by mouth 2 times daily 180 tablet 3     No Known Allergies  FAMILY HISTORY NOTED AND REVIEWED    REVIEW OF SYSTEMS: above    PHYSICAL EXAM    /74 (Patient Position: Sitting)   Pulse 85   Temp 97.1  F (36.2  C) (Temporal)   Ht 1.651 m (5' 5\")   Wt 83.5 kg (184 lb)   SpO2 96%   BMI 30.62 kg/m      Patient appears non toxic  Gait is intact  Eyes within normal limits  Neck within normal limits  No supraclavicular, cervical or axillary lymphadenopathy  Lungs bilat fine crackles up 1/2  Abdomen normal active bowel sounds, soft non-tender  cv reglar rate and rhythm no murmer, rub or gallop, no jvp, 1+ bilat lower leg edema    Labs reviewed and others sent    ASSESSMENT:  1. bilat leg " edema, due to chf, afib, diast dysfxn  2. Hypothyroidism, to adjust synthroid dose  3. Chf, stable  4. Abnormal hemoglobin and spep, needs eval  5. Chronic balance issues and falls, to neuro  6. Neuropathy  7. Hypertension, blood pressure fine    PLAN:  Labs today  Change synthroid dose  See neuro  Raise lasix prn weight over 177    Mumtaz Hernandez M.D.

## 2020-07-24 NOTE — PATIENT INSTRUCTIONS
1. See neurology    2. Change the thyroid dose to 125mcg daily    3. I will get back to you on the tests from today, re the anemia and proteins    4. For the swelling.  I would suggest taking 40mg of the furosemide if you weight is above 177 pounds, otherwise keep it at 20mg    Mumtaz Hernandez M.D.

## 2020-07-24 NOTE — LETTER
August 11, 2020      Lico Woo  [address]  [place] [address]        Dear ,    I apologize for the delay in getting back to you with your test results.     What the results show is that you have a slightly increased amount of a certain protein in your blood.  We all make protein and sometimes we can make a bit extra of 1 type.  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  It is very common and we find it more so as people get older.  A very small percentage of people over time can develop a blood cancer called multiple myeloma from this.  However, that is not likely.  At this point it is important that we monitor the blood so I would want to check it every 6 months.  There is nothing that you should be doing differently and it is unlikely that this will ever cause you a problem.  If at any point you would like to visit with a blood specialist about this just let me know.     If you have questions please call me.  Please remember to come back in 6 months to do the follow-up blood test.       Sincerely,      Mumtaz Hernandez MD    Resulted Orders   CBC with platelets differential   Result Value Ref Range    WBC 5.1 4.0 - 11.0 10e9/L    RBC Count 3.42 (L) 4.4 - 5.9 10e12/L    Hemoglobin 11.9 (L) 13.3 - 17.7 g/dL    Hematocrit 35.8 (L) 40.0 - 53.0 %     (H) 78 - 100 fl    MCH 34.8 (H) 26.5 - 33.0 pg    MCHC 33.2 31.5 - 36.5 g/dL    RDW 13.9 10.0 - 15.0 %    Platelet Count 153 150 - 450 10e9/L    % Neutrophils 71.1 %    % Lymphocytes 16.8 %    % Monocytes 8.2 %    % Eosinophils 3.5 %    % Basophils 0.4 %    Absolute Neutrophil 3.6 1.6 - 8.3 10e9/L    Absolute Lymphocytes 0.9 0.8 - 5.3 10e9/L    Absolute Monocytes 0.4 0.0 - 1.3 10e9/L    Absolute Eosinophils 0.2 0.0 - 0.7 10e9/L    Absolute Basophils 0.0 0.0 - 0.2 10e9/L    Diff Method Automated Method    Ferritin   Result Value Ref Range    Ferritin 138 26 - 388 ng/mL   Iron and iron binding capacity   Result  Value Ref Range    Iron 75 35 - 180 ug/dL    Iron Binding Cap 255 240 - 430 ug/dL    Iron Saturation Index 29 15 - 46 %   Protein Immunofixation Serum   Result Value Ref Range    Immunofixation ELP (Note)       Comment:      Monoclonal IgG immunoglobulin of lambda light chain type.  Pathological significance requires clinical correlation.  MARIANNA Siu M.D., Ph.D.      IGG 1,478 610 - 1,616 mg/dL     84 - 499 mg/dL    IGM 34 (L) 35 - 242 mg/dL   Protein immunofixation urine   Result Value Ref Range    Immunofix ELP Urine (Note)       Comment:      Monoclonal IgG immunoglobulin of lambda light chain type.  Pathological significance requires clinical correlation.  MARIANNA Siu M.D., Ph.D.

## 2020-07-25 LAB
FERRITIN SERPL-MCNC: 138 NG/ML (ref 26–388)
IRON SATN MFR SERPL: 29 % (ref 15–46)
IRON SERPL-MCNC: 75 UG/DL (ref 35–180)
TIBC SERPL-MCNC: 255 UG/DL (ref 240–430)

## 2020-07-27 LAB
IGA SERPL-MCNC: 204 MG/DL (ref 84–499)
IGG SERPL-MCNC: 1478 MG/DL (ref 610–1616)
IGM SERPL-MCNC: 34 MG/DL (ref 35–242)
PROT ELPH PNL UR ELPH: NORMAL
PROT PATTERN SERPL IFE-IMP: ABNORMAL

## 2020-07-28 PROBLEM — D47.2 MGUS (MONOCLONAL GAMMOPATHY OF UNKNOWN SIGNIFICANCE): Status: ACTIVE | Noted: 2020-07-01

## 2020-08-05 ENCOUNTER — ANCILLARY PROCEDURE (OUTPATIENT)
Dept: CARDIOLOGY | Facility: CLINIC | Age: 85
End: 2020-08-05
Attending: INTERNAL MEDICINE
Payer: MEDICARE

## 2020-08-05 DIAGNOSIS — Z95.0 CARDIAC PACEMAKER IN SITU: ICD-10-CM

## 2020-08-05 LAB
MDC_IDC_LEAD_IMPLANT_DT: NORMAL
MDC_IDC_LEAD_LOCATION: NORMAL
MDC_IDC_LEAD_LOCATION_DETAIL_1: NORMAL
MDC_IDC_LEAD_MFG: NORMAL
MDC_IDC_LEAD_MODEL: NORMAL
MDC_IDC_LEAD_POLARITY_TYPE: NORMAL
MDC_IDC_LEAD_SERIAL: NORMAL
MDC_IDC_MSMT_BATTERY_DTM: NORMAL
MDC_IDC_MSMT_BATTERY_REMAINING_LONGEVITY: 100 MO
MDC_IDC_MSMT_BATTERY_RRT_TRIGGER: 2.6
MDC_IDC_MSMT_BATTERY_STATUS: NORMAL
MDC_IDC_MSMT_BATTERY_VOLTAGE: 2.99 V
MDC_IDC_MSMT_LEADCHNL_LV_IMPEDANCE_VALUE: 1007 OHM
MDC_IDC_MSMT_LEADCHNL_LV_IMPEDANCE_VALUE: 228 OHM
MDC_IDC_MSMT_LEADCHNL_LV_IMPEDANCE_VALUE: 437 OHM
MDC_IDC_MSMT_LEADCHNL_LV_IMPEDANCE_VALUE: 437 OHM
MDC_IDC_MSMT_LEADCHNL_LV_IMPEDANCE_VALUE: 570 OHM
MDC_IDC_MSMT_LEADCHNL_LV_IMPEDANCE_VALUE: 589 OHM
MDC_IDC_MSMT_LEADCHNL_LV_IMPEDANCE_VALUE: 665 OHM
MDC_IDC_MSMT_LEADCHNL_LV_IMPEDANCE_VALUE: 798 OHM
MDC_IDC_MSMT_LEADCHNL_LV_IMPEDANCE_VALUE: 798 OHM
MDC_IDC_MSMT_LEADCHNL_LV_IMPEDANCE_VALUE: 893 OHM
MDC_IDC_MSMT_LEADCHNL_LV_PACING_THRESHOLD_AMPLITUDE: 1 V
MDC_IDC_MSMT_LEADCHNL_LV_PACING_THRESHOLD_PULSEWIDTH: 1 MS
MDC_IDC_MSMT_LEADCHNL_RA_IMPEDANCE_VALUE: 285 OHM
MDC_IDC_MSMT_LEADCHNL_RA_IMPEDANCE_VALUE: 399 OHM
MDC_IDC_MSMT_LEADCHNL_RA_PACING_THRESHOLD_AMPLITUDE: 0.88 V
MDC_IDC_MSMT_LEADCHNL_RA_PACING_THRESHOLD_PULSEWIDTH: 0.4 MS
MDC_IDC_MSMT_LEADCHNL_RA_SENSING_INTR_AMPL: 1.25 MV
MDC_IDC_MSMT_LEADCHNL_RA_SENSING_INTR_AMPL: 1.25 MV
MDC_IDC_MSMT_LEADCHNL_RV_IMPEDANCE_VALUE: 323 OHM
MDC_IDC_MSMT_LEADCHNL_RV_IMPEDANCE_VALUE: 551 OHM
MDC_IDC_MSMT_LEADCHNL_RV_PACING_THRESHOLD_AMPLITUDE: 1 V
MDC_IDC_MSMT_LEADCHNL_RV_PACING_THRESHOLD_PULSEWIDTH: 0.4 MS
MDC_IDC_MSMT_LEADCHNL_RV_SENSING_INTR_AMPL: 4.5 MV
MDC_IDC_MSMT_LEADCHNL_RV_SENSING_INTR_AMPL: 4.5 MV
MDC_IDC_PG_IMPLANT_DTM: NORMAL
MDC_IDC_PG_MFG: NORMAL
MDC_IDC_PG_MODEL: NORMAL
MDC_IDC_PG_SERIAL: NORMAL
MDC_IDC_PG_TYPE: NORMAL
MDC_IDC_SESS_CLINIC_NAME: NORMAL
MDC_IDC_SESS_DTM: NORMAL
MDC_IDC_SESS_TYPE: NORMAL
MDC_IDC_SET_BRADY_AT_MODE_SWITCH_RATE: 171 {BEATS}/MIN
MDC_IDC_SET_BRADY_LOWRATE: 70 {BEATS}/MIN
MDC_IDC_SET_BRADY_MAX_SENSOR_RATE: 115 {BEATS}/MIN
MDC_IDC_SET_BRADY_MAX_TRACKING_RATE: 115 {BEATS}/MIN
MDC_IDC_SET_BRADY_MODE: NORMAL
MDC_IDC_SET_BRADY_PAV_DELAY_HIGH: 100 MS
MDC_IDC_SET_BRADY_PAV_DELAY_LOW: 130 MS
MDC_IDC_SET_BRADY_SAV_DELAY_HIGH: 70 MS
MDC_IDC_SET_BRADY_SAV_DELAY_LOW: 100 MS
MDC_IDC_SET_CRT_LVRV_DELAY: 20 MS
MDC_IDC_SET_CRT_PACED_CHAMBERS: NORMAL
MDC_IDC_SET_LEADCHNL_LV_PACING_AMPLITUDE: 1.5 V
MDC_IDC_SET_LEADCHNL_LV_PACING_ANODE_ELECTRODE_1: NORMAL
MDC_IDC_SET_LEADCHNL_LV_PACING_ANODE_LOCATION_1: NORMAL
MDC_IDC_SET_LEADCHNL_LV_PACING_CAPTURE_MODE: NORMAL
MDC_IDC_SET_LEADCHNL_LV_PACING_CATHODE_ELECTRODE_1: NORMAL
MDC_IDC_SET_LEADCHNL_LV_PACING_CATHODE_LOCATION_1: NORMAL
MDC_IDC_SET_LEADCHNL_LV_PACING_POLARITY: NORMAL
MDC_IDC_SET_LEADCHNL_LV_PACING_PULSEWIDTH: 1 MS
MDC_IDC_SET_LEADCHNL_RA_PACING_AMPLITUDE: 1.75 V
MDC_IDC_SET_LEADCHNL_RA_PACING_ANODE_ELECTRODE_1: NORMAL
MDC_IDC_SET_LEADCHNL_RA_PACING_ANODE_LOCATION_1: NORMAL
MDC_IDC_SET_LEADCHNL_RA_PACING_CAPTURE_MODE: NORMAL
MDC_IDC_SET_LEADCHNL_RA_PACING_CATHODE_ELECTRODE_1: NORMAL
MDC_IDC_SET_LEADCHNL_RA_PACING_CATHODE_LOCATION_1: NORMAL
MDC_IDC_SET_LEADCHNL_RA_PACING_POLARITY: NORMAL
MDC_IDC_SET_LEADCHNL_RA_PACING_PULSEWIDTH: 0.4 MS
MDC_IDC_SET_LEADCHNL_RA_SENSING_ANODE_ELECTRODE_1: NORMAL
MDC_IDC_SET_LEADCHNL_RA_SENSING_ANODE_LOCATION_1: NORMAL
MDC_IDC_SET_LEADCHNL_RA_SENSING_CATHODE_ELECTRODE_1: NORMAL
MDC_IDC_SET_LEADCHNL_RA_SENSING_CATHODE_LOCATION_1: NORMAL
MDC_IDC_SET_LEADCHNL_RA_SENSING_POLARITY: NORMAL
MDC_IDC_SET_LEADCHNL_RA_SENSING_SENSITIVITY: 0.6 MV
MDC_IDC_SET_LEADCHNL_RV_PACING_AMPLITUDE: 2 V
MDC_IDC_SET_LEADCHNL_RV_PACING_ANODE_ELECTRODE_1: NORMAL
MDC_IDC_SET_LEADCHNL_RV_PACING_ANODE_LOCATION_1: NORMAL
MDC_IDC_SET_LEADCHNL_RV_PACING_CAPTURE_MODE: NORMAL
MDC_IDC_SET_LEADCHNL_RV_PACING_CATHODE_ELECTRODE_1: NORMAL
MDC_IDC_SET_LEADCHNL_RV_PACING_CATHODE_LOCATION_1: NORMAL
MDC_IDC_SET_LEADCHNL_RV_PACING_POLARITY: NORMAL
MDC_IDC_SET_LEADCHNL_RV_PACING_PULSEWIDTH: 0.4 MS
MDC_IDC_SET_LEADCHNL_RV_SENSING_ANODE_ELECTRODE_1: NORMAL
MDC_IDC_SET_LEADCHNL_RV_SENSING_ANODE_LOCATION_1: NORMAL
MDC_IDC_SET_LEADCHNL_RV_SENSING_CATHODE_ELECTRODE_1: NORMAL
MDC_IDC_SET_LEADCHNL_RV_SENSING_CATHODE_LOCATION_1: NORMAL
MDC_IDC_SET_LEADCHNL_RV_SENSING_POLARITY: NORMAL
MDC_IDC_SET_LEADCHNL_RV_SENSING_SENSITIVITY: 0.9 MV
MDC_IDC_SET_ZONE_DETECTION_INTERVAL: 350 MS
MDC_IDC_SET_ZONE_DETECTION_INTERVAL: 400 MS
MDC_IDC_SET_ZONE_TYPE: NORMAL
MDC_IDC_STAT_BRADY_AP_VP_PERCENT: 99.49 %
MDC_IDC_STAT_BRADY_AP_VS_PERCENT: 0.03 %
MDC_IDC_STAT_BRADY_AS_VP_PERCENT: 0.15 %
MDC_IDC_STAT_BRADY_AS_VS_PERCENT: 0.33 %
MDC_IDC_STAT_BRADY_DTM_END: NORMAL
MDC_IDC_STAT_BRADY_DTM_START: NORMAL
MDC_IDC_STAT_BRADY_RA_PERCENT_PACED: 99.81 %
MDC_IDC_STAT_BRADY_RV_PERCENT_PACED: 99.63 %
MDC_IDC_STAT_CRT_DTM_END: NORMAL
MDC_IDC_STAT_CRT_DTM_START: NORMAL
MDC_IDC_STAT_CRT_LV_PERCENT_PACED: 99.61 %
MDC_IDC_STAT_CRT_PERCENT_PACED: 99.61 %
MDC_IDC_STAT_EPISODE_RECENT_COUNT: 0
MDC_IDC_STAT_EPISODE_RECENT_COUNT_DTM_END: NORMAL
MDC_IDC_STAT_EPISODE_RECENT_COUNT_DTM_START: NORMAL
MDC_IDC_STAT_EPISODE_TOTAL_COUNT: 1
MDC_IDC_STAT_EPISODE_TOTAL_COUNT: 29
MDC_IDC_STAT_EPISODE_TOTAL_COUNT: 46
MDC_IDC_STAT_EPISODE_TOTAL_COUNT_DTM_END: NORMAL
MDC_IDC_STAT_EPISODE_TOTAL_COUNT_DTM_START: NORMAL
MDC_IDC_STAT_EPISODE_TYPE: NORMAL

## 2020-08-05 PROCEDURE — 93296 REM INTERROG EVL PM/IDS: CPT | Performed by: INTERNAL MEDICINE

## 2020-08-05 PROCEDURE — 93294 REM INTERROG EVL PM/LDLS PM: CPT | Performed by: INTERNAL MEDICINE

## 2020-08-10 DIAGNOSIS — I42.0 DILATED CARDIOMYOPATHY (H): ICD-10-CM

## 2020-08-10 RX ORDER — CARVEDILOL 25 MG/1
25 TABLET ORAL 2 TIMES DAILY WITH MEALS
Qty: 180 TABLET | Refills: 0 | Status: SHIPPED | OUTPATIENT
Start: 2020-08-10 | End: 2020-11-13

## 2020-08-11 ENCOUNTER — OFFICE VISIT (OUTPATIENT)
Dept: NEUROLOGY | Facility: CLINIC | Age: 85
End: 2020-08-11
Payer: MEDICARE

## 2020-08-11 VITALS
DIASTOLIC BLOOD PRESSURE: 58 MMHG | BODY MASS INDEX: 29.95 KG/M2 | HEART RATE: 72 BPM | TEMPERATURE: 98.2 F | SYSTOLIC BLOOD PRESSURE: 110 MMHG | OXYGEN SATURATION: 97 % | WEIGHT: 180 LBS

## 2020-08-11 DIAGNOSIS — R29.818 DIFFICULTY BALANCING: Primary | ICD-10-CM

## 2020-08-11 DIAGNOSIS — R29.6 FALLS FREQUENTLY: ICD-10-CM

## 2020-08-11 DIAGNOSIS — G62.9 PERIPHERAL POLYNEUROPATHY: ICD-10-CM

## 2020-08-11 PROCEDURE — 99205 OFFICE O/P NEW HI 60 MIN: CPT | Performed by: PSYCHIATRY & NEUROLOGY

## 2020-08-11 RX ORDER — LOSARTAN POTASSIUM 100 MG/1
TABLET ORAL
COMMUNITY
Start: 2020-02-11 | End: 2020-08-19

## 2020-08-11 RX ORDER — APIXABAN 5 MG/1
TABLET, FILM COATED ORAL
COMMUNITY
Start: 2020-04-29 | End: 2020-08-19

## 2020-08-11 NOTE — PROGRESS NOTES
INITIAL NEUROLOGY CONSULTATION    DATE OF VISIT: 8/11/2020  CLINIC LOCATION: [facility]  MRN: [id number]  PATIENT NAME: Lico Woo  YOB: 1933    PRIMARY CARE PROVIDER: Mumtaz Hernandez MD     REASON FOR VISIT:   Chief Complaint   Patient presents with     Balance/ Vestibular     Extremity Weakness     legs     HISTORY OF PRESENT ILLNESS:                                                    Mr. Lico Woo is 87 year old right handed male patient with past medical history of hypertension, prostate cancer, cardiomyopathy, chronic atrial fibrillation, chronic kidney disease, monoclonal gammopathy of unknown significance, and neuropathy, who was seen in consultation today requested by Mumtaz Hernandez MD, for balance difficulty/neuropathy.  The patient is accompanied by his wife, who participates in the interview.    Per patient's report, he has longstanding history of neuropathy with gradual development of balance difficulty and associated falls.  Recently (since June) it worsened because he started to fall backwards without ability to catch himself.  Denies any associated dizziness or lightheadedness, but always is seeking for the object to hold on to maintain balance.  Closing or covering eyes will cause a fall if he is not holding onto something.  Quick movements in any direction could worsen balance.  Moving backwards, even slightly, causes falls.  Very unsteady with the cane.  Has walker at home, but does not use it.  Much slower since the pacemaker placement.  Had total of 8 falls with most recent fall on August 6, 2020.  Thigh muscles fatigue quickly.  Uses arms to get up and falls.  He denies any additional focal neurological symptoms, aggravating, or alleviating factors.  No treatments tried.     Most recent labs from July 2020 include elevated creatinine of 1.61, vitamin B12 of 1778, TSH of 13.63 with normal free T4 of 1.08, normal folate, low hemoglobin of 11.3, platelet count  of 140, and MCV of 106, and large monoclonal protein in gamma fraction (lambda light chain type).    No prior spinal brain imaging.    Review of Systems - the patient endorses pacemaker insertion (2018), swollen feet, left hearing loss, tinnitus, thyroid problems, and easy skin bruising.  All of them have been previously discussed with other medical providers. Otherwise, he denies any other complaints on 14-point comprehensive review of systems.  PAST MEDICAL/SURGICAL HISTORY:                                                    I personally reviewed patient's past medical and surgical history with the patient at today's visit.  Patient Active Problem List   Diagnosis     Cardiomyopathy (H)     Chronic atrial fibrillation (H)     Mitral regurgitation     Essential hypertension     Pacemaker     Prostate cancer (H)     Hypothyroidism     Balance problem     Neuropathy     Aortic stenosis     Chronic systolic CHF (congestive heart failure) (H)     Aortic regurgitation     Tricuspid regurgitation     History of gout     CKD (chronic kidney disease) stage 3, GFR 30-59 ml/min (H)     Frequent falls     MGUS (monoclonal gammopathy of unknown significance)     Past Medical History:   Diagnosis Date     Aortic regurgitation     mild-moderate per 10/2018 Echo     Aortic stenosis     mild per 10/2018 Echo     Balance problem     few years     Cardiomyopathy (H) 10/2018    [place]; Echo 10/2018, EF 25%, down from 50% 12/2017, per notes, pt declines further work up to find etiology of drop in EF     Chronic atrial fibrillation (H) 2018    cardioversion 4/19     Chronic systolic CHF (congestive heart failure) (H)      CKD (chronic kidney disease) stage 3, GFR 30-59 ml/min (H)      Essential hypertension      Frequent falls      Gross hematuria 04/2019    cysto, ct neg     History of gout     none for years     Hypothyroidism      MGUS (monoclonal gammopathy of unknown significance) 07/2020     Mitral regurgitation     moderate per  10/2018 Echo     Neuropathy 1990    both lower legs     Pacemaker 2018     Prostate cancer (H) 2008    xrt, fine since     Tricuspid regurgitation     mioderate per 10/2018 Echo     Past Surgical History:   Procedure Laterality Date     bilateral hip replacement  2017    done 6 months apart     IMPLANT PACEMAKER  11/12/2018    CRT-P     lip surgery for skin cancer       MEDICATIONS:                                                    I personally reviewed patient's medications and allergies with the patient at today's visit.  acetaminophen (TYLENOL) 500 MG tablet, Take 500-1,000 mg by mouth every 6 hours as needed for mild pain  allopurinol (ZYLOPRIM) 300 MG tablet, Take 1 tablet (300 mg) by mouth daily  alpha-lipoic acid 600 MG capsule, Take by mouth daily   amiodarone (PACERONE) 200 MG tablet, Take 0.5 tablets (100 mg) by mouth daily  amoxicillin (AMOXIL) 500 MG capsule, Take 4 pills one hour prior to dental work  aspirin (ASA) 81 MG EC tablet, Take 1 tablet (81 mg) by mouth daily  carvedilol (COREG) 25 MG tablet, Take 1 tablet (25 mg) by mouth 2 times daily (with meals)  cholecalciferol (VITAMIN D3) 5000 units TABS tablet, Take 2,000 Units by mouth daily  Cyanocobalamin (VITAMIN B 12 PO), Take 2,500 mcg by mouth  furosemide (LASIX) 20 MG tablet, TAKE 1 TABLET BY MOUTH  DAILY (Patient taking differently: 40 mg )  levothyroxine (SYNTHROID/LEVOTHROID) 100 MCG tablet, Take 100 mcg by mouth daily  levothyroxine (SYNTHROID/LEVOTHROID) 125 MCG tablet, Take 1 tablet (125 mcg) by mouth daily  multivitamin (CENTRUM SILVER) tablet, Take 1 tablet by mouth daily  Niacin (VITAMIN B-3 OR), Take 500 mg by mouth  potassium chloride ER (KLOR-CON M) 10 MEQ CR tablet, TAKE 1 TABLET BY MOUTH TWO  TIMES DAILY  Pyridoxine HCl (VITAMIN B6 PO), Take 150 mg by mouth   sacubitril-valsartan (ENTRESTO) 49-51 MG per tablet, Take 1 tablet by mouth 2 times daily    No current facility-administered medications on file prior to visit.      ALLERGIES:                                                    No Known Allergies  FAMILY/SOCIAL HISTORY:                                                    Family and social history was reviewed with the patient at today's visit.  No family history of neurological disorders, except stroke (father).  Never smoker.  1 alcohol drink or less per week.  Denies recreational drug use.  Retired (worked in marketing).  .  REVIEW OF SYSTEMS:                                                    Patient has completed a [facility] Patient Health History, including a 14-system review, which was personally reviewed, and pertinent positives are listed in HPI. He denies any additional problems on the further questioning.  EXAM:                                                    VITAL SIGNS:   /58 (BP Location: Right arm, Patient Position: Sitting, Cuff Size: Adult Regular)   Pulse 72   Temp 98.2  F (36.8  C) (Oral)   Wt 81.6 kg (180 lb)   SpO2 97%   BMI 29.95 kg/m    Mini-Cog Assessment:  Mini Cog Assessment  Clock Draw Score: 2 Normal  3 Item Recall: 3 objects recalled  Mini Cog Total Score: 5    General: pt is in NAD, cooperative.  Skin: normal turgor, moist mucous membranes, no lesions/rashes noticed.  HEENT: ATNC, EOMI, PERRL, white sclera, normal conjunctiva, no nystagmus or ptosis. No carotid bruits bilaterally.  Respiratory: lung sounds clear to auscultation bilaterally, no crackles, wheezes, rhonchi. Symmetric lung excursion, no accessory respiratory muscle use.  Cardiovascular: normal S1/S2, no murmurs/rubs/gallops.   Abdomen: Not distended.  : deferred.    Neurological:  Mental: alert, follows commands, Mini Cog Total Score: 5/5 with 3/3 on memory recall, no aphasia or dysarthria. Fund of knowledge is appropriate for age.  Cranial Nerves:  CN II: visual acuity - able to accurately count fingers with each eye. Visual fields intact, fundi: discs sharp, no papilledema and normal vessels  bilaterally.  CN III, IV, VI: EOM intact, pupils equal and reactive  CN V: facial sensation nl  CN VII: face symmetric, no facial droop  CN VIII: hearing reduced bilaterally, left greater than right  CN IX: palate elevation symmetric, uvula at midline  CN XI SCM normal, shoulder shrug nl  CN XII: tongue midline  Motor: Strength: 5/5 in all major groups of upper extremities and 4/5 in proximal lower extremities bilaterally. Normal tone, even with provoking maneuvers. No abnormal movements. No pronator drift b/l.  Reflexes: Triceps, biceps, brachioradialis, and patellar reflexes normal and symmetric, achilles reflexes are absent bilaterally. No clonus noted. Toes are down-going b/l.   Sensory: temperature, light touch, pinprick, and vibration are symmetrically reduced in both feet and distal lower extremities.  Proprioception is also affected bilaterally. Romberg: Positive.  Coordination: FNF and heel-shin tests intact b/l.  Gait: Unsteady gait using a cane.  Slightly stooped forward posture.  Preserved arm swings.  Normal stride length.  DATA:   LABS/IMAGING/OTHER STUDIES: I reviewed pertinent medical records, as detailed in the history of present illness.  ASSESSMENT AND PLAN:      ASSESSMENT: Lico Woo is a 87 year old male patient with listed above past medical history, including peripheral polyneuropathy for many years, who presents with worsening of balance difficulty/frequent falls and proximal lower extremity weakness for the last several months.    We had a detailed discussion with the patient and his wife regarding his presenting complaints.  The neurological exam today is consistent with known history of peripheral neuropathy in addition to proximal lower extremity weakness.  We reviewed that the clinical presentation is likely multifactorial, and the perceived balance worsening could be a combination of different conditions, including worsening of peripheral polyneuropathy, side effects of the  medication (amiodarone), cerebellar atrophy, and possibly lower extremity weakness.  We reviewed the utility of obtaining spine (and possibly brain) MRI and EMG.  We also discussed the importance of physical therapy for lower extremity strengthening and balance.  The patient needed more time to think about these options.  He will discuss amiodarone use with his cardiologist.  He wants to proceed with additional testing if therapy is not effective.    DIAGNOSES:    ICD-10-CM    1. Difficulty balancing  R29.818    2. Falls frequently  R29.6    3. Peripheral polyneuropathy  G62.9      PLAN: At today's visit we thoroughly discussed various diagnostic possibilities for patient's symptoms, possible future evaluation (imaging and EMG), and the plan.    We reviewed that his symptoms might be partially related to amiodarone use.  I advised the patient to discuss if it could be discontinued with his cardiologist.  We also reviewed the importance of physical therapy for lower extremity strengthening and balance.  The patient needed more time to think about it.  He was advised to contact my office or his primary care provider when he is ready to proceed.    Next follow-up appointment is in the next 8 weeks or earlier if needed.    Total Time:  81 minutes with > 50% spent counseling the patient and his wife on stated above assessment and recommendations, including nature of the diagnosis, needed w/u, available treatment options, and proposed plan.  Additional time was used to answer questions regarding patient's symptoms, my recommendations, and the plan.    Mario Ramsay MD  [facility]  [place]  (Chart documentation was completed in part with Dragon voice-recognition software. Even though reviewed, some grammatical, spelling, and word errors may remain.)

## 2020-08-11 NOTE — LETTER
8/11/2020         RE: Lico Woo  [address]  [facility] [address]        Dear Colleague,    Thank you for referring your patient, Lico Woo, to the [facility]. Please see a copy of my visit note below.    INITIAL NEUROLOGY CONSULTATION    DATE OF VISIT: 8/11/2020  CLINIC LOCATION: [facility]  MRN: [id number]  PATIENT NAME: Lico Woo  YOB: 1933    PRIMARY CARE PROVIDER: Mumtaz Hernandez MD     REASON FOR VISIT:   Chief Complaint   Patient presents with     Balance/ Vestibular     Extremity Weakness     legs     HISTORY OF PRESENT ILLNESS:                                                    Mr. Lico Woo is 87 year old right handed male patient with past medical history of hypertension, prostate cancer, cardiomyopathy, chronic atrial fibrillation, chronic kidney disease, monoclonal gammopathy of unknown significance, and neuropathy, who was seen in consultation today requested by Mumtaz Hernandez MD, for balance difficulty/neuropathy.  The patient is accompanied by his wife, who participates in the interview.    Per patient's report, he has longstanding history of neuropathy with gradual development of balance difficulty and associated falls.  Recently (since June) it worsened because he started to fall backwards without ability to catch himself.  Denies any associated dizziness or lightheadedness, but always is seeking for the object to hold on to maintain balance.  Closing or covering eyes will cause a fall if he is not holding onto something.  Quick movements in any direction could worsen balance.  Moving backwards, even slightly, causes falls.  Very unsteady with the cane.  Has walker at home, but does not use it.  Much slower since the pacemaker placement.  Had total of 8 falls with most recent fall on August 6, 2020.  Thigh muscles fatigue quickly.  Uses arms to get up and falls.  He denies any additional focal neurological  symptoms, aggravating, or alleviating factors.  No treatments tried.     Most recent labs from July 2020 include elevated creatinine of 1.61, vitamin B12 of 1778, TSH of 13.63 with normal free T4 of 1.08, normal folate, low hemoglobin of 11.3, platelet count of 140, and MCV of 106, and large monoclonal protein in gamma fraction (lambda light chain type).    No prior spinal brain imaging.    Review of Systems - the patient endorses pacemaker insertion (2018), swollen feet, left hearing loss, tinnitus, thyroid problems, and easy skin bruising.  All of them have been previously discussed with other medical providers. Otherwise, he denies any other complaints on 14-point comprehensive review of systems.  PAST MEDICAL/SURGICAL HISTORY:                                                    I personally reviewed patient's past medical and surgical history with the patient at today's visit.  Patient Active Problem List   Diagnosis     Cardiomyopathy (H)     Chronic atrial fibrillation (H)     Mitral regurgitation     Essential hypertension     Pacemaker     Prostate cancer (H)     Hypothyroidism     Balance problem     Neuropathy     Aortic stenosis     Chronic systolic CHF (congestive heart failure) (H)     Aortic regurgitation     Tricuspid regurgitation     History of gout     CKD (chronic kidney disease) stage 3, GFR 30-59 ml/min (H)     Frequent falls     MGUS (monoclonal gammopathy of unknown significance)     Past Medical History:   Diagnosis Date     Aortic regurgitation     mild-moderate per 10/2018 Echo     Aortic stenosis     mild per 10/2018 Echo     Balance problem     few years     Cardiomyopathy (H) 10/2018    [place]; Echo 10/2018, EF 25%, down from 50% 12/2017, per notes, pt declines further work up to find etiology of drop in EF     Chronic atrial fibrillation (H) 2018    cardioversion 4/19     Chronic systolic CHF (congestive heart failure) (H)      CKD (chronic kidney disease) stage 3, GFR 30-59 ml/min (H)       Essential hypertension      Frequent falls      Gross hematuria 04/2019    cysto, ct neg     History of gout     none for years     Hypothyroidism      MGUS (monoclonal gammopathy of unknown significance) 07/2020     Mitral regurgitation     moderate per 10/2018 Echo     Neuropathy 1990    both lower legs     Pacemaker 2018     Prostate cancer (H) 2008    xrt, fine since     Tricuspid regurgitation     mioderate per 10/2018 Echo     Past Surgical History:   Procedure Laterality Date     bilateral hip replacement  2017    done 6 months apart     IMPLANT PACEMAKER  11/12/2018    CRT-P     lip surgery for skin cancer       MEDICATIONS:                                                    I personally reviewed patient's medications and allergies with the patient at today's visit.  acetaminophen (TYLENOL) 500 MG tablet, Take 500-1,000 mg by mouth every 6 hours as needed for mild pain  allopurinol (ZYLOPRIM) 300 MG tablet, Take 1 tablet (300 mg) by mouth daily  alpha-lipoic acid 600 MG capsule, Take by mouth daily   amiodarone (PACERONE) 200 MG tablet, Take 0.5 tablets (100 mg) by mouth daily  amoxicillin (AMOXIL) 500 MG capsule, Take 4 pills one hour prior to dental work  aspirin (ASA) 81 MG EC tablet, Take 1 tablet (81 mg) by mouth daily  carvedilol (COREG) 25 MG tablet, Take 1 tablet (25 mg) by mouth 2 times daily (with meals)  cholecalciferol (VITAMIN D3) 5000 units TABS tablet, Take 2,000 Units by mouth daily  Cyanocobalamin (VITAMIN B 12 PO), Take 2,500 mcg by mouth  furosemide (LASIX) 20 MG tablet, TAKE 1 TABLET BY MOUTH  DAILY (Patient taking differently: 40 mg )  levothyroxine (SYNTHROID/LEVOTHROID) 100 MCG tablet, Take 100 mcg by mouth daily  levothyroxine (SYNTHROID/LEVOTHROID) 125 MCG tablet, Take 1 tablet (125 mcg) by mouth daily  multivitamin (CENTRUM SILVER) tablet, Take 1 tablet by mouth daily  Niacin (VITAMIN B-3 OR), Take 500 mg by mouth  potassium chloride ER (KLOR-CON M) 10 MEQ CR tablet, TAKE 1  TABLET BY MOUTH TWO  TIMES DAILY  Pyridoxine HCl (VITAMIN B6 PO), Take 150 mg by mouth   sacubitril-valsartan (ENTRESTO) 49-51 MG per tablet, Take 1 tablet by mouth 2 times daily    No current facility-administered medications on file prior to visit.     ALLERGIES:                                                    No Known Allergies  FAMILY/SOCIAL HISTORY:                                                    Family and social history was reviewed with the patient at today's visit.  No family history of neurological disorders, except stroke (father).  Never smoker.  1 alcohol drink or less per week.  Denies recreational drug use.  Retired (worked in marketing).  .  REVIEW OF SYSTEMS:                                                    Patient has completed a [facility] Patient Health History, including a 14-system review, which was personally reviewed, and pertinent positives are listed in HPI. He denies any additional problems on the further questioning.  EXAM:                                                    VITAL SIGNS:   /58 (BP Location: Right arm, Patient Position: Sitting, Cuff Size: Adult Regular)   Pulse 72   Temp 98.2  F (36.8  C) (Oral)   Wt 81.6 kg (180 lb)   SpO2 97%   BMI 29.95 kg/m    Mini-Cog Assessment:  Mini Cog Assessment  Clock Draw Score: 2 Normal  3 Item Recall: 3 objects recalled  Mini Cog Total Score: 5    General: pt is in NAD, cooperative.  Skin: normal turgor, moist mucous membranes, no lesions/rashes noticed.  HEENT: ATNC, EOMI, PERRL, white sclera, normal conjunctiva, no nystagmus or ptosis. No carotid bruits bilaterally.  Respiratory: lung sounds clear to auscultation bilaterally, no crackles, wheezes, rhonchi. Symmetric lung excursion, no accessory respiratory muscle use.  Cardiovascular: normal S1/S2, no murmurs/rubs/gallops.   Abdomen: Not distended.  : deferred.    Neurological:  Mental: alert, follows commands, Mini Cog Total Score: 5/5 with 3/3 on  memory recall, no aphasia or dysarthria. Fund of knowledge is appropriate for age.  Cranial Nerves:  CN II: visual acuity - able to accurately count fingers with each eye. Visual fields intact, fundi: discs sharp, no papilledema and normal vessels bilaterally.  CN III, IV, VI: EOM intact, pupils equal and reactive  CN V: facial sensation nl  CN VII: face symmetric, no facial droop  CN VIII: hearing reduced bilaterally, left greater than right  CN IX: palate elevation symmetric, uvula at midline  CN XI SCM normal, shoulder shrug nl  CN XII: tongue midline  Motor: Strength: 5/5 in all major groups of upper extremities and 4/5 in proximal lower extremities bilaterally. Normal tone, even with provoking maneuvers. No abnormal movements. No pronator drift b/l.  Reflexes: Triceps, biceps, brachioradialis, and patellar reflexes normal and symmetric, achilles reflexes are absent bilaterally. No clonus noted. Toes are down-going b/l.   Sensory: temperature, light touch, pinprick, and vibration are symmetrically reduced in both feet and distal lower extremities.  Proprioception is also affected bilaterally. Romberg: Positive.  Coordination: FNF and heel-shin tests intact b/l.  Gait: Unsteady gait using a cane.  Slightly stooped forward posture.  Preserved arm swings.  Normal stride length.  DATA:   LABS/IMAGING/OTHER STUDIES: I reviewed pertinent medical records, as detailed in the history of present illness.  ASSESSMENT AND PLAN:      ASSESSMENT: Lico Woo is a 87 year old male patient with listed above past medical history, including peripheral polyneuropathy for many years, who presents with worsening of balance difficulty/frequent falls and proximal lower extremity weakness for the last several months.    We had a detailed discussion with the patient and his wife regarding his presenting complaints.  The neurological exam today is consistent with known history of peripheral neuropathy in addition to proximal lower  extremity weakness.  We reviewed that the clinical presentation is likely multifactorial, and the perceived balance worsening could be a combination of different conditions, including worsening of peripheral polyneuropathy, side effects of the medication (amiodarone), cerebellar atrophy, and possibly lower extremity weakness.  We reviewed the utility of obtaining spine (and possibly brain) MRI and EMG.  We also discussed the importance of physical therapy for lower extremity strengthening and balance.  The patient needed more time to think about these options.  He will discuss amiodarone use with his cardiologist.  He wants to proceed with additional testing if therapy is not effective.    DIAGNOSES:    ICD-10-CM    1. Difficulty balancing  R29.818    2. Falls frequently  R29.6    3. Peripheral polyneuropathy  G62.9      PLAN: At today's visit we thoroughly discussed various diagnostic possibilities for patient's symptoms, possible future evaluation (imaging and EMG), and the plan.    We reviewed that his symptoms might be partially related to amiodarone use.  I advised the patient to discuss if it could be discontinued with his cardiologist.  We also reviewed the importance of physical therapy for lower extremity strengthening and balance.  The patient needed more time to think about it.  He was advised to contact my office or his primary care provider when he is ready to proceed.    Next follow-up appointment is in the next 8 weeks or earlier if needed.    Total Time:  81 minutes with > 50% spent counseling the patient and his wife on stated above assessment and recommendations, including nature of the diagnosis, needed w/u, available treatment options, and proposed plan.  Additional time was used to answer questions regarding patient's symptoms, my recommendations, and the plan.    Mario Ramsay MD  [facility]  [place]  (Chart documentation was completed in part with [employer]  voice-recognition software. Even though reviewed, some grammatical, spelling, and word errors may remain.)            Again, thank you for allowing me to participate in the care of your patient.        Sincerely,        Mario Ramsay MD

## 2020-08-11 NOTE — PATIENT INSTRUCTIONS
AFTER VISIT SUMMARY (AVS):    At today's visit we thoroughly discussed various diagnostic possibilities for your symptoms, possible future evaluation (imaging and EMG), and the plan.    We reviewed that your symptoms might be partially related to amiodarone use.  Please discuss if it could be discontinued with your cardiologist.  We also reviewed the importance of physical therapy for lower extremity strengthening and balance.  Please contact my office or your primary care provider when you are ready to proceed.    Next follow-up appointment is in the next 8 weeks or earlier if needed.    Please do not hesitate to call me with any questions or concerns.    Thanks.

## 2020-08-19 ENCOUNTER — VIRTUAL VISIT (OUTPATIENT)
Dept: CARDIOLOGY | Facility: CLINIC | Age: 85
End: 2020-08-19
Attending: INTERNAL MEDICINE
Payer: MEDICARE

## 2020-08-19 VITALS
BODY MASS INDEX: 29.29 KG/M2 | WEIGHT: 176 LBS | DIASTOLIC BLOOD PRESSURE: 78 MMHG | SYSTOLIC BLOOD PRESSURE: 110 MMHG | HEART RATE: 72 BPM

## 2020-08-19 DIAGNOSIS — E03.9 HYPOTHYROIDISM, UNSPECIFIED TYPE: ICD-10-CM

## 2020-08-19 DIAGNOSIS — I48.0 PAROXYSMAL ATRIAL FIBRILLATION (H): ICD-10-CM

## 2020-08-19 DIAGNOSIS — I77.810 AORTIC ROOT DILATATION (H): ICD-10-CM

## 2020-08-19 DIAGNOSIS — Z95.0 CARDIAC PACEMAKER IN SITU: ICD-10-CM

## 2020-08-19 DIAGNOSIS — I42.0 DILATED CARDIOMYOPATHY (H): Primary | ICD-10-CM

## 2020-08-19 DIAGNOSIS — I50.22 CHRONIC SYSTOLIC CHF (CONGESTIVE HEART FAILURE) (H): ICD-10-CM

## 2020-08-19 DIAGNOSIS — I10 ESSENTIAL HYPERTENSION: ICD-10-CM

## 2020-08-19 PROCEDURE — 99214 OFFICE O/P EST MOD 30 MIN: CPT | Mod: 95 | Performed by: INTERNAL MEDICINE

## 2020-08-19 RX ORDER — LOSARTAN POTASSIUM 100 MG/1
100 TABLET ORAL DAILY
Qty: 90 TABLET | Refills: 3 | Status: SHIPPED | OUTPATIENT
Start: 2020-08-19 | End: 2020-11-13

## 2020-08-19 NOTE — LETTER
8/19/2020    Mumtaz Hernandez MD  [address]  [facility] [address]    RE: Lico Woo       Dear Colleague,    I had the pleasure of seeing Lico Woo in the [facility].    Lico Woo is a 87 year old male who is being evaluated via a billable video visit.        HPI and Plan:   See dictation    No orders of the defined types were placed in this encounter.    Orders Placed This Encounter   Medications     losartan (COZAAR) 100 MG tablet     Sig: Take 1 tablet (100 mg) by mouth daily     Dispense:  90 tablet     Refill:  3     Medications Discontinued During This Encounter   Medication Reason     losartan (COZAAR) 100 MG tablet Reorder     ELIQUIS ANTICOAGULANT 5 MG tablet          Encounter Diagnoses   Name Primary?     Chronic systolic CHF (congestive heart failure) (H)      Dilated cardiomyopathy (H) Yes     Cardiac pacemaker in situ      Essential hypertension      Paroxysmal atrial fibrillation (H)      Hypothyroidism, unspecified type      Aortic root dilatation (H)        CURRENT MEDICATIONS:  Current Outpatient Medications   Medication Sig Dispense Refill     acetaminophen (TYLENOL) 500 MG tablet Take 500-1,000 mg by mouth every 6 hours as needed for mild pain       allopurinol (ZYLOPRIM) 300 MG tablet Take 1 tablet (300 mg) by mouth daily 90 tablet 3     alpha-lipoic acid 600 MG capsule Take by mouth daily        amiodarone (PACERONE) 200 MG tablet Take 0.5 tablets (100 mg) by mouth daily 45 tablet 3     aspirin (ASA) 81 MG EC tablet Take 1 tablet (81 mg) by mouth daily       carvedilol (COREG) 25 MG tablet Take 1 tablet (25 mg) by mouth 2 times daily (with meals) 180 tablet 0     cholecalciferol (VITAMIN D3) 5000 units TABS tablet Take 2,000 Units by mouth daily       Cyanocobalamin (VITAMIN B 12 PO) Take 2,500 mcg by mouth       furosemide (LASIX) 20 MG tablet TAKE 1 TABLET BY MOUTH  DAILY (Patient taking differently: 40 mg ) 90 tablet 3     levothyroxine  (SYNTHROID/LEVOTHROID) 100 MCG tablet Take 100 mcg by mouth daily       levothyroxine (SYNTHROID/LEVOTHROID) 125 MCG tablet Take 1 tablet (125 mcg) by mouth daily 90 tablet 3     losartan (COZAAR) 100 MG tablet Take 1 tablet (100 mg) by mouth daily 90 tablet 3     multivitamin (CENTRUM SILVER) tablet Take 1 tablet by mouth daily       Niacin (VITAMIN B-3 OR) Take 500 mg by mouth       potassium chloride ER (KLOR-CON M) 10 MEQ CR tablet TAKE 1 TABLET BY MOUTH TWO  TIMES DAILY 180 tablet 1     Pyridoxine HCl (VITAMIN B6 PO) Take 150 mg by mouth        sacubitril-valsartan (ENTRESTO) 49-51 MG per tablet Take 1 tablet by mouth 2 times daily 180 tablet 3     amoxicillin (AMOXIL) 500 MG capsule Take 4 pills one hour prior to dental work (Patient not taking: Reported on 8/19/2020) 4 capsule 3       ALLERGIES   No Known Allergies    PAST MEDICAL HISTORY:  Past Medical History:   Diagnosis Date     Aortic regurgitation     mild-moderate per 10/2018 Echo     Aortic stenosis     mild per 10/2018 Echo     Balance problem     few years     Cardiomyopathy (H) 10/2018    [place]; Echo 10/2018, EF 25%, down from 50% 12/2017, per notes, pt declines further work up to find etiology of drop in EF     Chronic atrial fibrillation (H) 2018    cardioversion 4/19     Chronic systolic CHF (congestive heart failure) (H)      CKD (chronic kidney disease) stage 3, GFR 30-59 ml/min (H)      Essential hypertension      Frequent falls      Gross hematuria 04/2019    cysto, ct neg     History of gout     none for years     Hypothyroidism      MGUS (monoclonal gammopathy of unknown significance) 07/2020    found during eval of falls and neuropathy.  Per ck consult from heme to repeat labs every 6 months: CBC, BMP, SPEP, IgG and kappa-lambda free light chain     Mitral regurgitation     moderate per 10/2018 Echo     Neuropathy 1990    both lower legs     Pacemaker 2018     Prostate cancer (H) 2008    xrt, fine since     Tricuspid regurgitation      mioderate per 10/2018 Echo       PAST SURGICAL HISTORY:  Past Surgical History:   Procedure Laterality Date     bilateral hip replacement  2017    done 6 months apart     IMPLANT PACEMAKER  11/12/2018    CRT-P     lip surgery for skin cancer         FAMILY HISTORY:  Family History   Problem Relation Age of Onset     Myocardial Infarction Mother      Cerebrovascular Disease Father        SOCIAL HISTORY:  Social History     Socioeconomic History     Marital status:      Spouse name: None     Number of children: 3     Years of education: None     Highest education level: None   Occupational History     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[facility] [facility]   Social Needs     Financial resource strain: None     Food insecurity     Worry: None     Inability: None     Transportation needs     Medical: None     Non-medical: None   Tobacco Use     Smoking status: Never Smoker     Smokeless tobacco: Never Used   Substance and Sexual Activity     Alcohol use: Yes     Comment: 1 drink week     Drug use: Never     Sexual activity: Not Currently   Lifestyle     Physical activity     Days per week: None     Minutes per session: None     Stress: None   Relationships     Social connections     Talks on phone: None     Gets together: None     Attends Judaism service: None     Active member of club or organization: None     Attends meetings of clubs or organizations: None     Relationship status: None     Intimate partner violence     Fear of current or ex partner: None     Emotionally abused: None     Physically abused: None     Forced sexual activity: None   Other Topics Concern     Parent/sibling w/ CABG, MI or angioplasty before 65F 55M? Not Asked   Social History Narrative     None       Physical Exam:  Vitals: /78   Pulse 72   Wt 79.8 kg (176 lb)   BMI 29.29 kg/m      General:  no apparent distress, normal body habitus, sitting upright.  ENT/Mouth:  membranes moist, no nasal discharge.  Normal head shape,  no apparent injury or laceration.  Eyes:  no scleral icterus, normal conjunctivae.  No observed jaundice.  Neck:  no apparent neck swelling.   Chest/Lungs:  No breathing difficulty while speaking.  No audible wheezing.  No cough during conversation.  Cardiovascular:  No obviously elevated jugular venous pressure.  No apparent edema bilaterally in LE.   Abdomen:  no obvious abdominal distention.   Extremities:  no apparent cyanosis.  Skin:  no xanthelasma.  No facial lacerations.  Neurologic:  Normal arm motion bilateral, no tremors.    Psychiatric:  Alert and oriented x3, calm demeanor    The rest of the comprehensive physical examination is deferred due to public health emergency video visit restrictions.        Recent Lab Results:  LIPID RESULTS:  No results found for: CHOL, HDL, LDL, TRIG, CHOLHDLRATIO    LIVER ENZYME RESULTS:  Lab Results   Component Value Date    AST 27 05/15/2020    ALT 28 05/15/2020       CBC RESULTS:  Lab Results   Component Value Date    WBC 5.1 07/24/2020    RBC 3.42 (L) 07/24/2020    HGB 11.9 (L) 07/24/2020    HCT 35.8 (L) 07/24/2020     (H) 07/24/2020    MCH 34.8 (H) 07/24/2020    MCHC 33.2 07/24/2020    RDW 13.9 07/24/2020     07/24/2020       BMP RESULTS:  Lab Results   Component Value Date     07/20/2020    POTASSIUM 4.5 07/20/2020    CHLORIDE 108 07/20/2020    CO2 25 07/20/2020    ANIONGAP 8 07/20/2020     (H) 07/20/2020    BUN 37 (H) 07/20/2020    CR 1.61 (H) 07/20/2020    GFRESTIMATED 38 (L) 07/20/2020    GFRESTBLACK 44 (L) 07/20/2020    MIGUEL ÁNGEL 8.6 07/20/2020        A1C RESULTS:  No results found for: A1C    INR RESULTS:  No results found for: INR        Service Date: 08/19/2020      VIDEO VISIT      HISTORY OF PRESENT ILLNESS:  I had the opportunity to see Mr. Lei Woo in Cardiology Clinic today for a video visit at [facility] in [place].        Mr. 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  He moved back to [place] from [place] about a year ago.  While he was in [place], he had developed rapid atrial fibrillation which apparently had become prolonged and resulted in development of a cardiomyopathy.  However, with cardioversion, initiation of amiodarone and implantation of a biventricular pacemaker, his left ventricular function failed to normalize.  His last echocardiogram in 09/2019 demonstrated a persistent cardiomyopathy with an ejection fraction of about 35%.        He has done well with regard to his congestive heart failure issues.  He is not complaining of any significant problems with shortness of breath, palpitations, lightheadedness or chest discomfort currently.  He continues to have severe balance difficulties and frequent falls and for that reason was taken off of Eliquis earlier this summer.  I reviewed his device checks and he has been 100% biventricular pacing consistently since at least 01/2020 and has had no recorded episodes of recurrent atrial fibrillation during that time.  Based on his lack of atrial fibrillation, it may be reasonable to stop the Eliquis for now due to the high risk of bleeding with falls.      We have adjusted his diuretic a bit.  He had some swelling and shortness of breath earlier in the year and his Lasix was increased from 20 mg to 40 mg a day in June.  His weight came down and his swelling improved.  His creatinine remained relatively stable.  He does have chronic kidney disease with a creatinine of about 1.6 to 1.7.        The OptiVol indicator on his pacemaker had indicated no evidence of volume overload recently.  He is now back down to the previous lower dose of Lasix 20 mg a day.  He has been on Entresto consistently for the last year, but the cost is excessive and he has questions about the necessity for Entresto versus losartan.  He does not seem to have had any significant improvement in  symptoms after starting Entresto.      PHYSICAL EXAMINATION:  Today, his blood pressure was 110/78, heart rate 72 and weight 176 pounds.  His weight seems to be down about 8-10 pounds since earlier this year.      IMPRESSIONS:  Mr. Lei Woo is an 87-year-old gentleman with chronic systolic heart failure with an ejection fraction most recently of about 35% by echocardiogram.  He has a history of paroxysmal atrial fibrillation and initially we thought that he probably had a rate-related cardiomyopathy due to his rapid heart rates during atrial fibrillation.  However, with correction of his atrial fibrillation for well over a year now and consistent maintenance of sinus rhythm using amiodarone, he has not had significant improvement in left ventricular function.  He has a biventricular pacemaker in place, but not a defibrillator.  I suspect this has helped significantly improve his heart failure symptoms.  He is also on Entresto, but has difficulty with excessive cost of that medication.  I suggested that we update his echocardiogram which has not been done for a year, and if his ejection fraction is the same or worse, I would reinforce the benefit of Entresto.  If his ejection fraction continues to look better, the data for Entresto is certainly questionable, and we could transition him more safely to losartan.  He may need additional diuretic once he goes off of Entresto because of the intrinsic diuretic effect of Entresto.  We may need to increase his furosemide back to 40 mg a day.      I agree with stopping his Eliquis for now, primarily because his device checks have not shown any evidence of atrial fibrillation in the last 8 months.  I would watch his device checks closely for recurrent atrial fibrillation and restart Eliquis for stroke prevention if we see atrial fibrillation come back, despite his frequent falls.  I will inquire about setting an alarm to notify us when his atrial fibrillation returns.      I  will plan to have him touch base with us in the [facility] in about 3 months and make sure he is on track with his medications at that time.      cc:      Mumtaz Hernandez MD    [facility]   [address]   [place] [address]        Thank you for allowing me to participate in the care of your patient.    Sincerely,     ALESHA STRANGE MD     [facility]

## 2020-08-19 NOTE — PROGRESS NOTES
Service Date: 08/19/2020      VIDEO VISIT      HISTORY OF PRESENT ILLNESS:  I had the opportunity to see Mr. Lei Woo in Cardiology Clinic today for a video visit at [facility] in [place].        Mr. Woo is an 87-year-old gentleman with a history of chronic systolic congestive heart failure and presumed nonischemic cardiomyopathy.  He moved back to [place] from [place] about a year ago.  While he was in [place], he had developed rapid atrial fibrillation which apparently had become prolonged and resulted in development of a cardiomyopathy.  However, with cardioversion, initiation of amiodarone and implantation of a biventricular pacemaker, his left ventricular function failed to normalize.  His last echocardiogram in 09/2019 demonstrated a persistent cardiomyopathy with an ejection fraction of about 35%.        He has done well with regard to his congestive heart failure issues.  He is not complaining of any significant problems with shortness of breath, palpitations, lightheadedness or chest discomfort currently.  He continues to have severe balance difficulties and frequent falls and for that reason was taken off of Eliquis earlier this summer.  I reviewed his device checks and he has been 100% biventricular pacing consistently since at least 01/2020 and has had no recorded episodes of recurrent atrial fibrillation during that time.  Based on his lack of atrial fibrillation, it may be reasonable to stop the Eliquis for now due to the high risk of bleeding with falls.      We have adjusted his diuretic a bit.  He had some swelling and shortness of breath earlier in the year and his Lasix was increased from 20 mg to 40 mg a day in June.  His weight came down and his swelling improved.  His creatinine remained relatively stable.  He does have chronic kidney disease with a creatinine of about 1.6 to 1.7.        The OptiVol indicator on his pacemaker had indicated no evidence of volume  overload recently.  He is now back down to the previous lower dose of Lasix 20 mg a day.  He has been on Entresto consistently for the last year, but the cost is excessive and he has questions about the necessity for Entresto versus losartan.  He does not seem to have had any significant improvement in symptoms after starting Entresto.      PHYSICAL EXAMINATION:  Today, his blood pressure was 110/78, heart rate 72 and weight 176 pounds.  His weight seems to be down about 8-10 pounds since earlier this year.      IMPRESSIONS:  Mr. Lei Woo is an 87-year-old gentleman with chronic systolic heart failure with an ejection fraction most recently of about 35% by echocardiogram.  He has a history of paroxysmal atrial fibrillation and initially we thought that he probably had a rate-related cardiomyopathy due to his rapid heart rates during atrial fibrillation.  However, with correction of his atrial fibrillation for well over a year now and consistent maintenance of sinus rhythm using amiodarone, he has not had significant improvement in left ventricular function.  He has a biventricular pacemaker in place, but not a defibrillator.  I suspect this has helped significantly improve his heart failure symptoms.  He is also on Entresto, but has difficulty with excessive cost of that medication.  I suggested that we update his echocardiogram which has not been done for a year, and if his ejection fraction is the same or worse, I would reinforce the benefit of Entresto.  If his ejection fraction continues to look better, the data for Entresto is certainly questionable, and we could transition him more safely to losartan.  He may need additional diuretic once he goes off of Entresto because of the intrinsic diuretic effect of Entresto.  We may need to increase his furosemide back to 40 mg a day.      I agree with stopping his Eliquis for now, primarily because his device checks have not shown any evidence of atrial fibrillation  in the last 8 months.  I would watch his device checks closely for recurrent atrial fibrillation and restart Eliquis for stroke prevention if we see atrial fibrillation come back, despite his frequent falls.  I will inquire about setting an alarm to notify us when his atrial fibrillation returns.      I will plan to have him touch base with us in the [facility] in about 3 months and make sure he is on track with his medications at that time.      cc:      Mumtaz Hernandez MD    [facility]   [address]   [place] [address]         ALESHA STRANGE MD, [facility]             D: 2020   T: 2020   MT: HARJINDER      Name:     KYLER THOMAS   MRN:      [id number]        Account:      [id number]   :      1933           Service Date: 2020      Document: [id number]

## 2020-08-19 NOTE — PROGRESS NOTES
"Lico Woo is a 87 year old male who is being evaluated via a billable video visit.      The patient has been notified of following:     \"This video visit will be conducted via a call between you and your physician/provider. We have found that certain health care needs can be provided without the need for an in-person physical exam.  This service lets us provide the care you need with a video conversation.  If a prescription is necessary we can send it directly to your pharmacy.  If lab work is needed we can place an order for that and you can then stop by our lab to have the test done at a later time.    Video visits are billed at different rates depending on your insurance coverage.  Please reach out to your insurance provider with any questions.    If during the course of the call the physician/provider feels a video visit is not appropriate, you will not be charged for this service.\"    Patient has given verbal consent for Video visit? Yes  How would you like to obtain your AVS? Mail a copy  If you are dropped from the video visit, the video invite should be resent to: Text to cell phone: [phone]  Will anyone else be joining your video visit? No  Review Of Systems  Skin: NEGATIVE  Eyes:Ears/Nose/Throat: NEGATIVE  Respiratory: NEGATIVE  Cardiovascular:NEGATIVE Edema under control  Gastrointestinal: NEGATIVE  Genitourinary:NEGATIVE   Musculoskeletal: NEGATIVE  Neurologic: NEGATIVE  Psychiatric: NEGATIVE  Hematologic/Lymphatic/Immunologic: NEGATIVE  Endocrine:  NEGATIVE  Vitals:  /78  Pulse 72  Weight 176lbs      Telephone number of patient: [phone]      Video-Visit Details    Type of service:  Video Visit    Video Start Time: 11:51 AM  Video End Time: 12:20 PM    Originating Location (pt. Location): Home    Distant Location (provider location):  [facility]     Platform used for Video Visit: Dillan Dinh LPN    HPI and Plan:   See " dictation    No orders of the defined types were placed in this encounter.    Orders Placed This Encounter   Medications     losartan (COZAAR) 100 MG tablet     Sig: Take 1 tablet (100 mg) by mouth daily     Dispense:  90 tablet     Refill:  3     Medications Discontinued During This Encounter   Medication Reason     losartan (COZAAR) 100 MG tablet Reorder     ELIQUIS ANTICOAGULANT 5 MG tablet          Encounter Diagnoses   Name Primary?     Chronic systolic CHF (congestive heart failure) (H)      Dilated cardiomyopathy (H) Yes     Cardiac pacemaker in situ      Essential hypertension      Paroxysmal atrial fibrillation (H)      Hypothyroidism, unspecified type      Aortic root dilatation (H)        CURRENT MEDICATIONS:  Current Outpatient Medications   Medication Sig Dispense Refill     acetaminophen (TYLENOL) 500 MG tablet Take 500-1,000 mg by mouth every 6 hours as needed for mild pain       allopurinol (ZYLOPRIM) 300 MG tablet Take 1 tablet (300 mg) by mouth daily 90 tablet 3     alpha-lipoic acid 600 MG capsule Take by mouth daily        amiodarone (PACERONE) 200 MG tablet Take 0.5 tablets (100 mg) by mouth daily 45 tablet 3     aspirin (ASA) 81 MG EC tablet Take 1 tablet (81 mg) by mouth daily       carvedilol (COREG) 25 MG tablet Take 1 tablet (25 mg) by mouth 2 times daily (with meals) 180 tablet 0     cholecalciferol (VITAMIN D3) 5000 units TABS tablet Take 2,000 Units by mouth daily       Cyanocobalamin (VITAMIN B 12 PO) Take 2,500 mcg by mouth       furosemide (LASIX) 20 MG tablet TAKE 1 TABLET BY MOUTH  DAILY (Patient taking differently: 40 mg ) 90 tablet 3     levothyroxine (SYNTHROID/LEVOTHROID) 100 MCG tablet Take 100 mcg by mouth daily       levothyroxine (SYNTHROID/LEVOTHROID) 125 MCG tablet Take 1 tablet (125 mcg) by mouth daily 90 tablet 3     losartan (COZAAR) 100 MG tablet Take 1 tablet (100 mg) by mouth daily 90 tablet 3     multivitamin (CENTRUM SILVER) tablet Take 1 tablet by mouth daily        Niacin (VITAMIN B-3 OR) Take 500 mg by mouth       potassium chloride ER (KLOR-CON M) 10 MEQ CR tablet TAKE 1 TABLET BY MOUTH TWO  TIMES DAILY 180 tablet 1     Pyridoxine HCl (VITAMIN B6 PO) Take 150 mg by mouth        sacubitril-valsartan (ENTRESTO) 49-51 MG per tablet Take 1 tablet by mouth 2 times daily 180 tablet 3     amoxicillin (AMOXIL) 500 MG capsule Take 4 pills one hour prior to dental work (Patient not taking: Reported on 8/19/2020) 4 capsule 3       ALLERGIES   No Known Allergies    PAST MEDICAL HISTORY:  Past Medical History:   Diagnosis Date     Aortic regurgitation     mild-moderate per 10/2018 Echo     Aortic stenosis     mild per 10/2018 Echo     Balance problem     few years     Cardiomyopathy (H) 10/2018    [place]; Echo 10/2018, EF 25%, down from 50% 12/2017, per notes, pt declines further work up to find etiology of drop in EF     Chronic atrial fibrillation (H) 2018    cardioversion 4/19     Chronic systolic CHF (congestive heart failure) (H)      CKD (chronic kidney disease) stage 3, GFR 30-59 ml/min (H)      Essential hypertension      Frequent falls      Gross hematuria 04/2019    cysto, ct neg     History of gout     none for years     Hypothyroidism      MGUS (monoclonal gammopathy of unknown significance) 07/2020    found during eval of falls and neuropathy.  Per curbside consult from heme to repeat labs every 6 months: CBC, BMP, SPEP, IgG and kappa-lambda free light chain     Mitral regurgitation     moderate per 10/2018 Echo     Neuropathy 1990    both lower legs     Pacemaker 2018     Prostate cancer (H) 2008    xrt, fine since     Tricuspid regurgitation     mioderate per 10/2018 Echo       PAST SURGICAL HISTORY:  Past Surgical History:   Procedure Laterality Date     bilateral hip replacement  2017    done 6 months apart     IMPLANT PACEMAKER  11/12/2018    CRT-P     lip surgery for skin cancer         FAMILY HISTORY:  Family History   Problem Relation Age of Onset     Myocardial  Infarction Mother      Cerebrovascular Disease Father        SOCIAL HISTORY:  Social History     Socioeconomic History     Marital status:      Spouse name: None     Number of children: 3     Years of education: None     Highest education level: None   Occupational History     Occupation: owned marketing services company in [place]   Social Needs     Financial resource strain: None     Food insecurity     Worry: None     Inability: None     Transportation needs     Medical: None     Non-medical: None   Tobacco Use     Smoking status: Never Smoker     Smokeless tobacco: Never Used   Substance and Sexual Activity     Alcohol use: Yes     Comment: 1 drink week     Drug use: Never     Sexual activity: Not Currently   Lifestyle     Physical activity     Days per week: None     Minutes per session: None     Stress: None   Relationships     Social connections     Talks on phone: None     Gets together: None     Attends Jainism service: None     Active member of club or organization: None     Attends meetings of clubs or organizations: None     Relationship status: None     Intimate partner violence     Fear of current or ex partner: None     Emotionally abused: None     Physically abused: None     Forced sexual activity: None   Other Topics Concern     Parent/sibling w/ CABG, MI or angioplasty before 65F 55M? Not Asked   Social History Narrative     None       Physical Exam:  Vitals: /78   Pulse 72   Wt 79.8 kg (176 lb)   BMI 29.29 kg/m      General:  no apparent distress, normal body habitus, sitting upright.  ENT/Mouth:  membranes moist, no nasal discharge.  Normal head shape, no apparent injury or laceration.  Eyes:  no scleral icterus, normal conjunctivae.  No observed jaundice.  Neck:  no apparent neck swelling.   Chest/Lungs:  No breathing difficulty while speaking.  No audible wheezing.  No cough during conversation.  Cardiovascular:  No obviously elevated jugular venous pressure.  No apparent edema  bilaterally in LE.   Abdomen:  no obvious abdominal distention.   Extremities:  no apparent cyanosis.  Skin:  no xanthelasma.  No facial lacerations.  Neurologic:  Normal arm motion bilateral, no tremors.    Psychiatric:  Alert and oriented x3, calm demeanor    The rest of the comprehensive physical examination is deferred due to public health emergency video visit restrictions.        Recent Lab Results:  LIPID RESULTS:  No results found for: CHOL, HDL, LDL, TRIG, CHOLHDLRATIO    LIVER ENZYME RESULTS:  Lab Results   Component Value Date    AST 27 05/15/2020    ALT 28 05/15/2020       CBC RESULTS:  Lab Results   Component Value Date    WBC 5.1 07/24/2020    RBC 3.42 (L) 07/24/2020    HGB 11.9 (L) 07/24/2020    HCT 35.8 (L) 07/24/2020     (H) 07/24/2020    MCH 34.8 (H) 07/24/2020    MCHC 33.2 07/24/2020    RDW 13.9 07/24/2020     07/24/2020       BMP RESULTS:  Lab Results   Component Value Date     07/20/2020    POTASSIUM 4.5 07/20/2020    CHLORIDE 108 07/20/2020    CO2 25 07/20/2020    ANIONGAP 8 07/20/2020     (H) 07/20/2020    BUN 37 (H) 07/20/2020    CR 1.61 (H) 07/20/2020    GFRESTIMATED 38 (L) 07/20/2020    GFRESTBLACK 44 (L) 07/20/2020    MIGUEL ÁNGEL 8.6 07/20/2020        A1C RESULTS:  No results found for: A1C    INR RESULTS:  No results found for: INR        CC  Ray De Leon MD  [id number]  [place] [id number]

## 2020-09-17 ENCOUNTER — TELEPHONE (OUTPATIENT)
Dept: FAMILY MEDICINE | Facility: CLINIC | Age: 85
End: 2020-09-17

## 2020-09-17 NOTE — TELEPHONE ENCOUNTER
Reason for Call:  Other vaccine    Detailed comments: pt would like to know if he and his wife should get the tri or quardrivalent vaccine for Flu this year. Needs to know for the flu clinic at his residental building    Phone Number Patient can be reached at: Home number on file [phone] (home)    Best Time: any    Can we leave a detailed message on this number? YES    Call taken on 9/17/2020 at 3:49 PM by Otliio Cornell

## 2020-09-17 NOTE — TELEPHONE ENCOUNTER
Returned call to patient.     Advised patient and spouse to get the High Dose Quadrivalent flu vaccine this year.      Patient stated understanding and agreement with advise/plan.      Radha SORENSEN RN,BSN

## 2020-11-01 DIAGNOSIS — Z87.39 HISTORY OF GOUT: ICD-10-CM

## 2020-11-03 RX ORDER — ALLOPURINOL 300 MG/1
TABLET ORAL
Qty: 90 TABLET | Refills: 3 | Status: SHIPPED | OUTPATIENT
Start: 2020-11-03 | End: 2021-09-27

## 2020-11-03 NOTE — TELEPHONE ENCOUNTER
Prescription approved per [facility].  Camila De Leon RN on 11/3/2020 at 11:02 AM

## 2020-11-04 ENCOUNTER — ANCILLARY PROCEDURE (OUTPATIENT)
Dept: CARDIOLOGY | Facility: CLINIC | Age: 85
End: 2020-11-04
Attending: INTERNAL MEDICINE
Payer: MEDICARE

## 2020-11-04 ENCOUNTER — TELEPHONE (OUTPATIENT)
Dept: CARDIOLOGY | Facility: CLINIC | Age: 85
End: 2020-11-04

## 2020-11-04 DIAGNOSIS — Z95.0 CARDIAC PACEMAKER IN SITU: Primary | ICD-10-CM

## 2020-11-04 DIAGNOSIS — I42.0 DILATED CARDIOMYOPATHY (H): ICD-10-CM

## 2020-11-04 DIAGNOSIS — Z95.0 CARDIAC PACEMAKER IN SITU: ICD-10-CM

## 2020-11-04 PROCEDURE — 93281 PM DEVICE PROGR EVAL MULTI: CPT | Performed by: INTERNAL MEDICINE

## 2020-11-04 NOTE — TELEPHONE ENCOUNTER
Patient seen in device clinic for annual in office pacemaker check. Patient concerned that annual echo has not been scheduled. Reviewed Dr. De Leon's note from 8/19/2020 visit and echo discussed along with lab and follow up with [facility] in November 2020. Echo order placed. Called patient with update and transferred to scheduling to coordinate future appointments. Patient verbalized understanding.

## 2020-11-09 LAB
MDC_IDC_LEAD_IMPLANT_DT: NORMAL
MDC_IDC_LEAD_LOCATION: NORMAL
MDC_IDC_LEAD_LOCATION_DETAIL_1: NORMAL
MDC_IDC_LEAD_MFG: NORMAL
MDC_IDC_LEAD_MODEL: NORMAL
MDC_IDC_LEAD_POLARITY_TYPE: NORMAL
MDC_IDC_LEAD_SERIAL: NORMAL
MDC_IDC_MSMT_BATTERY_DTM: NORMAL
MDC_IDC_MSMT_BATTERY_REMAINING_LONGEVITY: 98 MO
MDC_IDC_MSMT_BATTERY_RRT_TRIGGER: 2.6
MDC_IDC_MSMT_BATTERY_STATUS: NORMAL
MDC_IDC_MSMT_BATTERY_VOLTAGE: 2.99 V
MDC_IDC_MSMT_LEADCHNL_LV_IMPEDANCE_VALUE: 247 OHM
MDC_IDC_MSMT_LEADCHNL_LV_IMPEDANCE_VALUE: 418 OHM
MDC_IDC_MSMT_LEADCHNL_LV_IMPEDANCE_VALUE: 494 OHM
MDC_IDC_MSMT_LEADCHNL_LV_IMPEDANCE_VALUE: 513 OHM
MDC_IDC_MSMT_LEADCHNL_LV_IMPEDANCE_VALUE: 646 OHM
MDC_IDC_MSMT_LEADCHNL_LV_IMPEDANCE_VALUE: 646 OHM
MDC_IDC_MSMT_LEADCHNL_LV_IMPEDANCE_VALUE: 665 OHM
MDC_IDC_MSMT_LEADCHNL_LV_IMPEDANCE_VALUE: 779 OHM
MDC_IDC_MSMT_LEADCHNL_LV_IMPEDANCE_VALUE: 798 OHM
MDC_IDC_MSMT_LEADCHNL_LV_IMPEDANCE_VALUE: 893 OHM
MDC_IDC_MSMT_LEADCHNL_LV_PACING_THRESHOLD_AMPLITUDE: 0.88 V
MDC_IDC_MSMT_LEADCHNL_LV_PACING_THRESHOLD_PULSEWIDTH: 1 MS
MDC_IDC_MSMT_LEADCHNL_RA_IMPEDANCE_VALUE: 304 OHM
MDC_IDC_MSMT_LEADCHNL_RA_IMPEDANCE_VALUE: 475 OHM
MDC_IDC_MSMT_LEADCHNL_RA_PACING_THRESHOLD_AMPLITUDE: 0.88 V
MDC_IDC_MSMT_LEADCHNL_RA_PACING_THRESHOLD_PULSEWIDTH: 0.4 MS
MDC_IDC_MSMT_LEADCHNL_RA_SENSING_INTR_AMPL: 1.38 MV
MDC_IDC_MSMT_LEADCHNL_RA_SENSING_INTR_AMPL: 2.5 MV
MDC_IDC_MSMT_LEADCHNL_RV_IMPEDANCE_VALUE: 342 OHM
MDC_IDC_MSMT_LEADCHNL_RV_IMPEDANCE_VALUE: 532 OHM
MDC_IDC_MSMT_LEADCHNL_RV_PACING_THRESHOLD_AMPLITUDE: 0.88 V
MDC_IDC_MSMT_LEADCHNL_RV_PACING_THRESHOLD_PULSEWIDTH: 0.4 MS
MDC_IDC_MSMT_LEADCHNL_RV_SENSING_INTR_AMPL: 4.12 MV
MDC_IDC_MSMT_LEADCHNL_RV_SENSING_INTR_AMPL: 4.25 MV
MDC_IDC_PG_IMPLANT_DTM: NORMAL
MDC_IDC_PG_MFG: NORMAL
MDC_IDC_PG_MODEL: NORMAL
MDC_IDC_PG_SERIAL: NORMAL
MDC_IDC_PG_TYPE: NORMAL
MDC_IDC_SESS_CLINIC_NAME: NORMAL
MDC_IDC_SESS_DTM: NORMAL
MDC_IDC_SESS_TYPE: NORMAL
MDC_IDC_SET_BRADY_AT_MODE_SWITCH_RATE: 171 {BEATS}/MIN
MDC_IDC_SET_BRADY_LOWRATE: 70 {BEATS}/MIN
MDC_IDC_SET_BRADY_MAX_SENSOR_RATE: 115 {BEATS}/MIN
MDC_IDC_SET_BRADY_MAX_TRACKING_RATE: 115 {BEATS}/MIN
MDC_IDC_SET_BRADY_MODE: NORMAL
MDC_IDC_SET_BRADY_PAV_DELAY_HIGH: 100 MS
MDC_IDC_SET_BRADY_PAV_DELAY_LOW: 130 MS
MDC_IDC_SET_BRADY_SAV_DELAY_HIGH: 70 MS
MDC_IDC_SET_BRADY_SAV_DELAY_LOW: 100 MS
MDC_IDC_SET_CRT_LVRV_DELAY: 20 MS
MDC_IDC_SET_CRT_PACED_CHAMBERS: NORMAL
MDC_IDC_SET_LEADCHNL_LV_PACING_AMPLITUDE: 1.5 V
MDC_IDC_SET_LEADCHNL_LV_PACING_ANODE_ELECTRODE_1: NORMAL
MDC_IDC_SET_LEADCHNL_LV_PACING_ANODE_LOCATION_1: NORMAL
MDC_IDC_SET_LEADCHNL_LV_PACING_CAPTURE_MODE: NORMAL
MDC_IDC_SET_LEADCHNL_LV_PACING_CATHODE_ELECTRODE_1: NORMAL
MDC_IDC_SET_LEADCHNL_LV_PACING_CATHODE_LOCATION_1: NORMAL
MDC_IDC_SET_LEADCHNL_LV_PACING_POLARITY: NORMAL
MDC_IDC_SET_LEADCHNL_LV_PACING_PULSEWIDTH: 1 MS
MDC_IDC_SET_LEADCHNL_RA_PACING_AMPLITUDE: 1.75 V
MDC_IDC_SET_LEADCHNL_RA_PACING_ANODE_ELECTRODE_1: NORMAL
MDC_IDC_SET_LEADCHNL_RA_PACING_ANODE_LOCATION_1: NORMAL
MDC_IDC_SET_LEADCHNL_RA_PACING_CAPTURE_MODE: NORMAL
MDC_IDC_SET_LEADCHNL_RA_PACING_CATHODE_ELECTRODE_1: NORMAL
MDC_IDC_SET_LEADCHNL_RA_PACING_CATHODE_LOCATION_1: NORMAL
MDC_IDC_SET_LEADCHNL_RA_PACING_POLARITY: NORMAL
MDC_IDC_SET_LEADCHNL_RA_PACING_PULSEWIDTH: 0.4 MS
MDC_IDC_SET_LEADCHNL_RA_SENSING_ANODE_ELECTRODE_1: NORMAL
MDC_IDC_SET_LEADCHNL_RA_SENSING_ANODE_LOCATION_1: NORMAL
MDC_IDC_SET_LEADCHNL_RA_SENSING_CATHODE_ELECTRODE_1: NORMAL
MDC_IDC_SET_LEADCHNL_RA_SENSING_CATHODE_LOCATION_1: NORMAL
MDC_IDC_SET_LEADCHNL_RA_SENSING_POLARITY: NORMAL
MDC_IDC_SET_LEADCHNL_RA_SENSING_SENSITIVITY: 0.6 MV
MDC_IDC_SET_LEADCHNL_RV_PACING_AMPLITUDE: 2 V
MDC_IDC_SET_LEADCHNL_RV_PACING_ANODE_ELECTRODE_1: NORMAL
MDC_IDC_SET_LEADCHNL_RV_PACING_ANODE_LOCATION_1: NORMAL
MDC_IDC_SET_LEADCHNL_RV_PACING_CAPTURE_MODE: NORMAL
MDC_IDC_SET_LEADCHNL_RV_PACING_CATHODE_ELECTRODE_1: NORMAL
MDC_IDC_SET_LEADCHNL_RV_PACING_CATHODE_LOCATION_1: NORMAL
MDC_IDC_SET_LEADCHNL_RV_PACING_POLARITY: NORMAL
MDC_IDC_SET_LEADCHNL_RV_PACING_PULSEWIDTH: 0.4 MS
MDC_IDC_SET_LEADCHNL_RV_SENSING_ANODE_ELECTRODE_1: NORMAL
MDC_IDC_SET_LEADCHNL_RV_SENSING_ANODE_LOCATION_1: NORMAL
MDC_IDC_SET_LEADCHNL_RV_SENSING_CATHODE_ELECTRODE_1: NORMAL
MDC_IDC_SET_LEADCHNL_RV_SENSING_CATHODE_LOCATION_1: NORMAL
MDC_IDC_SET_LEADCHNL_RV_SENSING_POLARITY: NORMAL
MDC_IDC_SET_LEADCHNL_RV_SENSING_SENSITIVITY: 0.9 MV
MDC_IDC_SET_ZONE_DETECTION_INTERVAL: 350 MS
MDC_IDC_SET_ZONE_DETECTION_INTERVAL: 400 MS
MDC_IDC_SET_ZONE_TYPE: NORMAL
MDC_IDC_STAT_AT_BURDEN_PERCENT: 0 %
MDC_IDC_STAT_AT_DTM_END: NORMAL
MDC_IDC_STAT_AT_DTM_START: NORMAL
MDC_IDC_STAT_BRADY_AP_VP_PERCENT: 99.27 %
MDC_IDC_STAT_BRADY_AP_VS_PERCENT: 0.06 %
MDC_IDC_STAT_BRADY_AS_VP_PERCENT: 0.26 %
MDC_IDC_STAT_BRADY_AS_VS_PERCENT: 0.41 %
MDC_IDC_STAT_BRADY_DTM_END: NORMAL
MDC_IDC_STAT_BRADY_DTM_START: NORMAL
MDC_IDC_STAT_BRADY_RA_PERCENT_PACED: 99.71 %
MDC_IDC_STAT_BRADY_RV_PERCENT_PACED: 99.53 %
MDC_IDC_STAT_CRT_DTM_END: NORMAL
MDC_IDC_STAT_CRT_DTM_START: NORMAL
MDC_IDC_STAT_CRT_LV_PERCENT_PACED: 99.5 %
MDC_IDC_STAT_CRT_PERCENT_PACED: 99.5 %
MDC_IDC_STAT_EPISODE_RECENT_COUNT: 0
MDC_IDC_STAT_EPISODE_RECENT_COUNT: 0
MDC_IDC_STAT_EPISODE_RECENT_COUNT: 1
MDC_IDC_STAT_EPISODE_RECENT_COUNT_DTM_END: NORMAL
MDC_IDC_STAT_EPISODE_RECENT_COUNT_DTM_START: NORMAL
MDC_IDC_STAT_EPISODE_TOTAL_COUNT: 1
MDC_IDC_STAT_EPISODE_TOTAL_COUNT: 29
MDC_IDC_STAT_EPISODE_TOTAL_COUNT: 46
MDC_IDC_STAT_EPISODE_TOTAL_COUNT_DTM_END: NORMAL
MDC_IDC_STAT_EPISODE_TOTAL_COUNT_DTM_START: NORMAL
MDC_IDC_STAT_EPISODE_TYPE: NORMAL

## 2020-11-13 ENCOUNTER — CARE COORDINATION (OUTPATIENT)
Dept: CARDIOLOGY | Facility: CLINIC | Age: 85
End: 2020-11-13

## 2020-11-13 DIAGNOSIS — I42.0 DILATED CARDIOMYOPATHY (H): ICD-10-CM

## 2020-11-13 DIAGNOSIS — I50.22 CHRONIC SYSTOLIC CHF (CONGESTIVE HEART FAILURE) (H): ICD-10-CM

## 2020-11-13 DIAGNOSIS — I35.1 SEVERE AORTIC INSUFFICIENCY: ICD-10-CM

## 2020-11-13 LAB
ANION GAP SERPL CALCULATED.3IONS-SCNC: 11.3 MMOL/L (ref 6–17)
BUN SERPL-MCNC: 41 MG/DL (ref 7–30)
CALCIUM SERPL-MCNC: 9.9 MG/DL (ref 8.5–10.5)
CHLORIDE SERPL-SCNC: 104 MMOL/L (ref 98–107)
CO2 SERPL-SCNC: 31 MMOL/L (ref 23–29)
CREAT SERPL-MCNC: 1.94 MG/DL (ref 0.7–1.3)
GFR SERPL CREATININE-BSD FRML MDRD: 33 ML/MIN/{1.73_M2}
GLUCOSE SERPL-MCNC: 104 MG/DL (ref 70–105)
HGB BLD-MCNC: 12.8 G/DL (ref 13.3–17.7)
NT-PROBNP SERPL-MCNC: ABNORMAL PG/ML (ref 0–450)
POTASSIUM SERPL-SCNC: 5.3 MMOL/L (ref 3.5–5.1)
SODIUM SERPL-SCNC: 141 MMOL/L (ref 136–145)
TSH SERPL DL<=0.005 MIU/L-ACNC: 1.43 MU/L (ref 0.4–4)

## 2020-11-13 PROCEDURE — 36415 COLL VENOUS BLD VENIPUNCTURE: CPT | Performed by: PHYSICIAN ASSISTANT

## 2020-11-13 PROCEDURE — 84443 ASSAY THYROID STIM HORMONE: CPT | Performed by: PHYSICIAN ASSISTANT

## 2020-11-13 PROCEDURE — 85018 HEMOGLOBIN: CPT | Performed by: PHYSICIAN ASSISTANT

## 2020-11-13 PROCEDURE — 83880 ASSAY OF NATRIURETIC PEPTIDE: CPT | Performed by: PHYSICIAN ASSISTANT

## 2020-11-13 PROCEDURE — 80048 BASIC METABOLIC PNL TOTAL CA: CPT | Performed by: PHYSICIAN ASSISTANT

## 2020-11-13 RX ORDER — FUROSEMIDE 20 MG
20 TABLET ORAL DAILY
Qty: 90 TABLET | Refills: 3 | COMMUNITY
Start: 2020-11-13 | End: 2021-04-19

## 2020-11-13 RX ORDER — LOSARTAN POTASSIUM 100 MG/1
50 TABLET ORAL DAILY
Qty: 90 TABLET | Refills: 3 | COMMUNITY
Start: 2020-11-13 | End: 2021-06-30

## 2020-11-13 RX ORDER — CARVEDILOL 25 MG/1
25 TABLET ORAL 2 TIMES DAILY WITH MEALS
Qty: 180 TABLET | Refills: 0 | Status: SHIPPED | OUTPATIENT
Start: 2020-11-13 | End: 2021-01-04

## 2020-11-13 NOTE — PROGRESS NOTES
"[facility]    [place] [facility] Heart Core Nurse             Consistent with VADIM and hyperkalemia. I note that Losartan is back on his med list. Not sure if this was added last visit with anticipation that he may be switched off Entresto? Also unclear if he is taking 20 or 40 of furosemide. Please confirm what patient is taking. May call my cell with that info so we can make changes prior to the weekend. Thanks.      Called Will. He confirms he is taking losartan 100 mg daily and NOT Entresto. He also confirms he is taking Lasix 20 mg daily (was on 40 mg \"a while ago\" temporarily for weight gain).     Med list updated. Spoke with [place]. She recommends HOLD Lasix and Losartan tomorrow, restart Lasix at 20 mg daily on Sunday, restart Losartan at 50 mg daily on Sunday.     Called Will to discuss. Gave [place]'s recs as above. He verbalized understanding and repeated back instructions correctly. He is comfortable cutting losartan tabs in half.     Confirmed follow-up with [place] on Monday.     Stephanie Mcnally, BSN, RN, PHN, HNB-BC   11/13/2020 at 3:40 PM         "

## 2020-11-16 ENCOUNTER — CARE COORDINATION (OUTPATIENT)
Dept: CARDIOLOGY | Facility: CLINIC | Age: 85
End: 2020-11-16

## 2020-11-16 NOTE — PROGRESS NOTES
[facility]CORE [facility]  CHIME call from scheduling. Pt has appt this afternoon with SAL Rosales. He missed Echo appointment on Friday. He was waiting in the wrong location. Pt does want to reschedule visit after he is able to get Echo done. He is also afraid to come to clinic with rise in COVID cases and is not comfortable coming in today. Pt wondering if he can wait to do Echo in a week or two and then do Video visit with SAL Rosales. Pt confirmed that he held Lasix and Losartan as instructed on Saturday and resumed as instructed at 20mg of Lasix o and 50mg of Losartan on Sunday per SAL Rosales. Pt reports he checks his BP every day and writes it down and can give this info to SAL Rosales during a video visit. Reviewed with pt I will cancel his appt this afternoon per his request. Reviewed I will send FYI to SAL Rosales and then call back to confirm plan, as she may want to recheck labs again, since he needs to come back to Echo anyway's. Pt stated understanding.     Tiffani Reyes, DYLANN, RN, CHFN  11/16/20 at 12:48 PM

## 2020-11-16 NOTE — PROGRESS NOTES
Sure, that's okay. Would favor repeat labs in one week, but can reschedule the virtual visit with me in 2 weeks if he needs. Thanks.

## 2020-11-17 NOTE — PROGRESS NOTES
[facility]CORE [facility]  Called pt, no answer. Left detailed message letting him know SAL Rosales would prefer labs and Echo next week and then ok for virtual visit with her in 2 weeks. Told pt I would have scheduling call him to arrange. Left my call back number for any questions.     Messaged scheduling to all pt to arrange.     Tiffani Reyes, DYLANN, RN, CHFN  11/17/20 at 8:57 AM

## 2020-11-17 NOTE — PROGRESS NOTES
[facility] Heart-CORE [facility]  Received message from scheduling, next available echo not until 12/7, so pt scheduled for Echo and lab 12/7, and follow up virtual visit with SAL Rosales on 12/16. Pt also LVM asking if he should continue on half dose of Losartan until follow up in December.     Returned call to pt, no answer. Left detailed VM and instructed him to continue on half dose Losartan until follow up. Told him I would also update SAL Rosales that he was unable to get Echo until 3 week, as that was next available. If she has other recommendations will call back, otherwise continue as is and follow up as scheduled. Left call back number for questions.     Future Appointments   Date Time Provider Department Center   12/7/2020  1:30 PM GON[facility]ALEZ LAB SULAB [facility] PSA CLIN   12/7/2020  1:45 PM SHCVECHR2 SHCVCV CVIMG   12/16/2020  2:10 PM [place] [facility]   2/17/2021 12:00 AM GONZALEZ TECH1 [facility] PSA CL[facility]        DYLAN WelchN, RN, CHFN  11/17/20 at 3:51 PM

## 2020-11-18 NOTE — PROGRESS NOTES
Ok. That's alright. Let's just call him next week for an update on weight and BP. If he doesn't have an Omron cuff, he should get one. Thanks!

## 2020-11-18 NOTE — PROGRESS NOTES
[facility]CORE Clinic    Reminder to RN board to call Mr. Avilez 11/24 for update on weight and BP.     Will ask him at that time if he has an Omron BP cuff.    Future Appointments   Date Time Provider Department Center   12/7/2020  1:30 PM GONZALEZ LAB DAJA [facility] PSA CLIN   12/7/2020  1:45 PM SHCVECHR2 SHCVCV CVIMG   12/16/2020  2:10 PM [place] Chawla PA-C SUUM[facility] PSA CLIN   2/17/2021 12:00 [facility]     Ariella Ornelas RN BSN   12:18 PM 11/18/20

## 2020-11-24 NOTE — PROGRESS NOTES
[facility] - CORE Clinic    Called patient to assess weights/BP's.     Recent home weights:   168 - 170#  Patient stated he weighs himself every day. I asked for his record of daily weights, he declined. He stated that his weight has been consistent in the above range.     He stated that he is feeling well. He denied any SOB, LE swelling, abdominal bloating.    Current diuretic regimen:   Lasix 20mgqd     Recent BP measurements:   Ave 120/85  I asked for these specific readings as well, he declined stating he did not have those with him currently. He estimated his daily average as above. He also denied any dizziness/lightheadedness. He confirmed he is taking losartan 50mg/d down from 100mg.     I confirmed the following appts:  Future Appointments   Date Time Provider Department Center   12/7/2020  1:30 PM GON[facility]ALEZ LAB DAJA [facility] PSA CLIN   12/7/2020  1:45 PM SHCVECHR2 SHCVCV CVIMG   12/16/2020  2:10 PM [place] [facility]   [facility]2021 12:[facility]     Patient stated he was leaving to go to the gym, he had no questions/concerns.  Oly Martinez RN on 11/24/2020 at 12:46 PM

## 2020-12-03 DIAGNOSIS — I48.20 CHRONIC ATRIAL FIBRILLATION (H): Primary | ICD-10-CM

## 2020-12-07 ENCOUNTER — HOSPITAL ENCOUNTER (OUTPATIENT)
Dept: CARDIOLOGY | Facility: CLINIC | Age: 85
Discharge: HOME | End: 2020-12-07
Attending: INTERNAL MEDICINE | Admitting: INTERNAL MEDICINE
Payer: MEDICARE

## 2020-12-07 DIAGNOSIS — I42.0 DILATED CARDIOMYOPATHY (H): ICD-10-CM

## 2020-12-07 DIAGNOSIS — I50.22 CHRONIC SYSTOLIC CHF (CONGESTIVE HEART FAILURE) (H): ICD-10-CM

## 2020-12-07 DIAGNOSIS — Z95.0 CARDIAC PACEMAKER IN SITU: ICD-10-CM

## 2020-12-07 DIAGNOSIS — I48.20 CHRONIC ATRIAL FIBRILLATION (H): ICD-10-CM

## 2020-12-07 LAB
ALBUMIN SERPL-MCNC: 3.4 G/DL (ref 3.4–5)
ALP SERPL-CCNC: 113 U/L (ref 40–150)
ALT SERPL W P-5'-P-CCNC: 21 U/L (ref 0–70)
ANION GAP SERPL CALCULATED.3IONS-SCNC: 6 MMOL/L (ref 3–14)
AST SERPL W P-5'-P-CCNC: 24 U/L (ref 0–45)
BILIRUB DIRECT SERPL-MCNC: 0.2 MG/DL (ref 0–0.2)
BILIRUB SERPL-MCNC: 0.7 MG/DL (ref 0.2–1.3)
BUN SERPL-MCNC: 36 MG/DL (ref 7–30)
CALCIUM SERPL-MCNC: 9.4 MG/DL (ref 8.5–10.1)
CHLORIDE SERPL-SCNC: 106 MMOL/L (ref 94–109)
CO2 SERPL-SCNC: 28 MMOL/L (ref 20–32)
CREAT SERPL-MCNC: 1.72 MG/DL (ref 0.66–1.25)
GFR SERPL CREATININE-BSD FRML MDRD: 35 ML/MIN/{1.73_M2}
GLUCOSE SERPL-MCNC: 114 MG/DL (ref 70–99)
POTASSIUM SERPL-SCNC: 4.4 MMOL/L (ref 3.4–5.3)
PROT SERPL-MCNC: 7.9 G/DL (ref 6.8–8.8)
SODIUM SERPL-SCNC: 140 MMOL/L (ref 133–144)
TSH SERPL DL<=0.005 MIU/L-ACNC: 0.62 MU/L (ref 0.4–4)

## 2020-12-07 PROCEDURE — 80048 BASIC METABOLIC PNL TOTAL CA: CPT | Performed by: PHYSICIAN ASSISTANT

## 2020-12-07 PROCEDURE — 93000 ELECTROCARDIOGRAM COMPLETE: CPT

## 2020-12-07 PROCEDURE — 93306 TTE W/DOPPLER COMPLETE: CPT

## 2020-12-07 PROCEDURE — 84443 ASSAY THYROID STIM HORMONE: CPT | Performed by: PHYSICIAN ASSISTANT

## 2020-12-07 PROCEDURE — 93306 TTE W/DOPPLER COMPLETE: CPT | Mod: 26 | Performed by: INTERNAL MEDICINE

## 2020-12-07 PROCEDURE — 80076 HEPATIC FUNCTION PANEL: CPT | Performed by: PHYSICIAN ASSISTANT

## 2020-12-07 PROCEDURE — 36415 COLL VENOUS BLD VENIPUNCTURE: CPT | Performed by: PHYSICIAN ASSISTANT

## 2020-12-08 ENCOUNTER — CARE COORDINATION (OUTPATIENT)
Dept: CARDIOLOGY | Facility: CLINIC | Age: 85
End: 2020-12-08

## 2020-12-08 NOTE — PROGRESS NOTES
Call out to patient to get an update on his symptoms and to see how he's been feeling, as EF has decreased per most recent Echo yesterday. Patient reports he's been taking his meds as prescribed. However, Dr. De Leon's last note in August states he was on Entresto, and due to cost, they would consider stopping that and going back to losartan after his next Echo if the EF improved. However, patient states he has been off Entresto x 6 months, he was not on Entresto in August. Patient also reports he recently decreased losartan from 100 mg to 50 mg per [place] Chawla PA-C for hyperkalemia/VADIM - many notes in the chart.     Patient states that Entresto was more than he preferred to pay. Patient states he reviewed this with his sons, who spoke w/Cardiologists at [facility] in [place]. Apparently these Cardiologists told his sons that Entresto might be worth the cost in a younger patient, but said they typically don't recommend it for older patients. Patient states this was his deciding factor to change back to losartan, which the patient states he did in June. Patient said he would like to hear Dr. De Leon's opinion on the data for Entresto in regards to his LVEF.     Patient reports his weight is 168lb today, which has been stable for awhile. Patient also reports he just returned from the gym, and notes he continues to do his normal level of activity without any noticeable change in fatigue or dyspnea. Patient has an appt with [place] SILVA PA-C, on 12/16/20. Labs were also completed which are stable (Hepatic, BMP, TSH). Routed to Dr. De Leon for his review/recommendations.          Procedure  Complete Echo Adult.     _____________________________________________________________________________  __        Interpretation Summary     1. Normal left ventricular size with severely decreased systolic function.  2. Biplane LVEF 26% with severe global hypokinesis of the left ventricle.  3. Normal right ventricular size and systolic function.  4.  Pacemaker leads visualized in the right atrium and right ventricle.  5. Trace tricuspid mitral valve regurgitation.  6. Mild aortic valve stenosis with trace regurgitation.  7. Dilated aortic root (4.4 cm) and ascending aorta (4.7 cm).     Compared to the previous study dated 9/19/2019, LVEF has decreased from 34% to  26%, right ventricular size is normalized.          December 17, 2020  Addendum    Patient showed for appt and discussion of Entresto was addressed at appt w/[place] Chawla PA-C on 12/16/20. Encounter now closed.     Ray De Leon MD  [facility] Heart Team 7 6 days ago        Entresto and Jardiance are recommended. [facility] would prefer that their sick patients not live so long because they are expensive. Most of the data on these meds are from patients in older age groups and the patients benefitted significantly. Both morbidity and mortality were improved. EE         Documentation       You  Ray De Leon MD 9 days ago     Please see update and send thoughts/recs for [place]'s visit on 12/16/20.     Thanks,   Maria Luisa Schmid comment

## 2020-12-11 NOTE — TELEPHONE ENCOUNTER
Entresto and Jardiance are recommended. [facility] would prefer that their sick patients not live so long because they are expensive. Most of the data on these meds are from patients in older age groups and the patients benefitted significantly. Both morbidity and mortality were improved. EE

## 2020-12-15 ENCOUNTER — DOCUMENTATION ONLY (OUTPATIENT)
Dept: CARDIOLOGY | Facility: CLINIC | Age: 85
End: 2020-12-15

## 2020-12-15 NOTE — PROGRESS NOTES
"[facility]    Incoming document from Will addressed to [place] for review prior to visit 12/16.          Updated BP records (date/SBP/DBP/pulse/comments):      Updated weights (date/weight/comments):       He also sent his medication list (updated 11/19/20). I compared to our records. All appear to be correct except that I am unclear whether he is on both levothyroxine 100 mcg and 125 mcg daily OR just 125 mcg daily. I also see that he's started a \"Super C\" immune complex as of 11/15/20, which I don't see on his med list yet.     Will send this to [place] Chawla PA-C for review.    Stephanie Mcnally, BSN, RN, PHN, HNB-BC   12/15/2020 at 3:51 PM    '      "

## 2020-12-16 ENCOUNTER — VIRTUAL VISIT (OUTPATIENT)
Dept: CARDIOLOGY | Facility: CLINIC | Age: 85
End: 2020-12-16
Attending: INTERNAL MEDICINE
Payer: MEDICARE

## 2020-12-16 DIAGNOSIS — I50.22 CHRONIC SYSTOLIC CHF (CONGESTIVE HEART FAILURE) (H): ICD-10-CM

## 2020-12-16 PROCEDURE — 99214 OFFICE O/P EST MOD 30 MIN: CPT | Mod: 95 | Performed by: PHYSICIAN ASSISTANT

## 2020-12-16 NOTE — PROGRESS NOTES
"Lico Woo is a 87 year old male who is being evaluated via a billable video visit.      The patient has been notified of following:     \"This video visit will be conducted via a call between you and your physician/provider. We have found that certain health care needs can be provided without the need for an in-person physical exam.  This service lets us provide the care you need with a video conversation.  If a prescription is necessary we can send it directly to your pharmacy.  If lab work is needed we can place an order for that and you can then stop by our lab to have the test done at a later time.    Video visits are billed at different rates depending on your insurance coverage.  Please reach out to your insurance provider with any questions.    If during the course of the call the physician/provider feels a video visit is not appropriate, you will not be charged for this service.\"    Patient has given verbal consent for Video visit? Yes  How would you like to obtain your AVS? Mail a copy  If you are dropped from the video visit, the video invite should be resent to: Text to cell phone: [phone]  Will anyone else be joining your video visit? No        Video-Visit Details    Type of service:  Video Visit    Video Start Time: 2:24 PM  Video End Time: 3:15 PM    Originating Location (pt. Location): Home    Distant Location (provider location):  [facility] HEART [facility]     Platform used for Video Visit: Dillan DAVIDSON for CORE follow-ups:  - Any difficulty breathing, including when trying to lie in bed at night? No  - Any leg swelling (?compression or wraps), abdominal bloating, reduction in appetite? No  - Any chest pain or palpitations? No  - Weight trend over the past week? Steady  - O2 or CPAP use? No  - Do they have a pulse oximeter they can use to check O2 sat/HR? No  Today's Home Vitals:  107/68 P: 76 Weight: 165 lbs    Pt wanted to inform you he did mail out some forms with " records for you, with his b/p readings, med list, and questions he had for you on today's visit.     I have reviewed and updated the patient's Past Medical History, Social History, Family History and Medication List.    ALLERGIES  Patient has no known allergies.    MEDICATIONS  Current Outpatient Medications   Medication Sig Dispense Refill     acetaminophen (TYLENOL) 500 MG tablet Take 500-1,000 mg by mouth every 6 hours as needed for mild pain       allopurinol (ZYLOPRIM) 300 MG tablet TAKE 1 TABLET BY MOUTH  DAILY 90 tablet 3     alpha-lipoic acid 600 MG capsule Take by mouth daily        amiodarone (PACERONE) 200 MG tablet Take 0.5 tablets (100 mg) by mouth daily 45 tablet 3     aspirin (ASA) 81 MG EC tablet Take 1 tablet (81 mg) by mouth daily       carvedilol (COREG) 25 MG tablet Take 1 tablet (25 mg) by mouth 2 times daily (with meals) 180 tablet 0     cholecalciferol (VITAMIN D3) 5000 units TABS tablet Take 2,000 Units by mouth daily       Cyanocobalamin (VITAMIN B 12 PO) Take 2,500 mcg by mouth       furosemide (LASIX) 20 MG tablet Take 1 tablet (20 mg) by mouth daily 90 tablet 3     levothyroxine (SYNTHROID/LEVOTHROID) 125 MCG tablet Take 1 tablet (125 mcg) by mouth daily 90 tablet 3     losartan (COZAAR) 100 MG tablet Take 0.5 tablets (50 mg) by mouth daily 90 tablet 3     multivitamin (CENTRUM SILVER) tablet Take 1 tablet by mouth daily       Niacin (VITAMIN B-3 OR) Take 500 mg by mouth       potassium chloride ER (KLOR-CON M) 10 MEQ CR tablet TAKE 1 TABLET BY MOUTH TWO  TIMES DAILY 180 tablet 1     Pyridoxine HCl (VITAMIN B6 PO) Take 150 mg by mouth        amoxicillin (AMOXIL) 500 MG capsule Take 4 pills one hour prior to dental work (Patient not taking: Reported on 8/19/2020) 4 capsule 3     levothyroxine (SYNTHROID/LEVOTHROID) 100 MCG tablet Take 100 mcg by mouth daily         HPI: (Please see Dr. De Leon's note from 2/2020 for the patient's detailed history)    In brief, Mr. Lico Woo is an  86-year-old gentleman with nonischemic cardiomyopathy, presumed to be due to rapid atrial fibrillation.  He had a nuclear stress test for evaluation of coronary disease and it was reportedly normal.  He has a biventricular pacemaker in place and was cardioverted back to sinus rhythm and has been maintaining sinus rhythm according to his pacemaker checks.  He is completely BiV paced per his device check in April. He remains on low-dose amiodarone 100 mg a day for maintenance of normal rhythm.      In February, Dr. De Leon switched Will from Losartan to Entresto to optimize his CMP regimen. Of concern, he forgot to stop taking his Losartan and at his last visit with me on 5/15, we made sure he discontinued that. I had him get labs that day which showed (not surprisingly) VADIM with a creatinine of 1.9 (up from prior of 1.7). I also checked his TSH which was very high around 90. I increased his levothyroxine and referred him to his PCP who increased this further. He's now on 100 mcg daily and his PCP will continue to follow this.     Unfortunately after that, due to cost, he went back to Losartan. Due to hyperkalemia and VADIM, this was reduced from 100 to 50 mg daily. Of note, he was taken off Eliquis due to falls and no recurrent AF on his device checks.     He had a repeat ECHO performed in December which showed:  1. Normal left ventricular size with severely decreased systolic function.  2. Biplane LVEF 26% with severe global hypokinesis of the left ventricle.  3. Normal right ventricular size and systolic function.  4. Pacemaker leads visualized in the right atrium and right ventricle.  5. Trace tricuspid mitral valve regurgitation.  6. Mild aortic valve stenosis with trace regurgitation.  7. Dilated aortic root (4.4 cm) and ascending aorta (4.7 cm).     Compared to the previous study dated 9/19/2019, LVEF has decreased from 34% to  26%, right ventricular size is normalized.    Recent device check:  AP: 99.7%    BiVP:  99.5 %    Mode: DDDR   Underlying Rhythm: SB    Heart Rate: Stable with adequate variability, patient denies activity intolerance   Sensing: WNL  Pacing Threshold: Stable     Impedance: Stable     Battery Status: 8.1 years     Device Site: Well healed     Atrial Arrhythmia: 0    Ventricular Arrhythmia: 0    Setting Change: A capture high threshold alert changed from off to on per clinic protocol     Today, Will continues to feel really well. He exercises routinely at the gym for 1 hour doing cardiovascular exercises without problem. He walks the halls of his building routinely and walks his dog during the day. He has a history of peripheral edema and some falls in the past around the time his thyroid function got out of control, that is now well-controlled. He's been working on improving his balance with leg strengthening exercises at his residence gym. Still has issues with this, however. No lightheadedness, dizziness, palpitations, chest pain. His BP is well-controlled, actually can run pretty low at times. His weight has come down 20 lbs over the past 6 months. He states this wasn't necessarily intentional, but he has been focusing on a healthy lifestyle. Stress study in 2016 showed no ischemia.     Recent labs:   Component      Latest Ref Rng & Units 7/20/2020 11/13/2020 12/7/2020   Sodium      133 - 144 mmol/L 141 141 140   Potassium      3.4 - 5.3 mmol/L 4.5 5.3 (H) 4.4   Chloride      94 - 109 mmol/L 108 104 106   Carbon Dioxide      20 - 32 mmol/L 25 31 (H) 28   Anion Gap      3 - 14 mmol/L 8 11.3 6   Glucose      70 - 99 mg/dL 119 (H) 104 114 (H)   Urea Nitrogen      7 - 30 mg/dL 37 (H) 41 (H) 36 (H)   Creatinine      0.66 - 1.25 mg/dL 1.61 (H) 1.94 (H) 1.72 (H)   GFR Estimate      >60 mL/min/1.73:m2 38 (L) 33 (L) 35 (L)   GFR Estimate If Black      >60 mL/min/1.73:m2 44 (L) 40 (L) 40 (L)   Calcium      8.5 - 10.1 mg/dL 8.6 9.9 9.4   Bilirubin Direct      0.0 - 0.2 mg/dL   0.2   Bilirubin Total       0.2 - 1.3 mg/dL   0.7   Albumin      3.4 - 5.0 g/dL   3.4   Protein Total      6.8 - 8.8 g/dL   7.9   Alkaline Phosphatase      40 - 150 U/L   113   ALT      0 - 70 U/L   21   AST      0 - 45 U/L   24   TSH      0.40 - 4.00 mU/L   0.62       Assessment/Plan:  1. Chronic HFrEF - further decline in EF, to ~25%. Stable, FC II sxs. Not interested in going back on Entresto. BP intermittently low.    - Discussed ischemic workup. He's agreeable to a stress nuclear study. Will review with Dr. De Leon.   2. PAF, now off Eliquis due to fall risk + no recurrence on device checks. Denies TIA sxs.   3. Thyroid disorder, now well-controlled.   4. VADIM/CKD - creatinine improved following Losartan reduction, down to 1.7 (near baseline).    Follow-up:  - Dr. De Leon in 3 months + labs.     [place] Chawla PA-C  [facility]  Pager: [phone]  Text Page  (7:30am - 4pm M-F)

## 2020-12-16 NOTE — LETTER
"12/16/2020    Mumtaz Hernandez MD  [address]  [facility] [facility] [place] [address]    RE: Lico Woo       Dear Colleague,    I had the pleasure of seeing Lico Woo in the [facility].    Lico Woo is a 87 year old male who is being evaluated via a billable video visit.      The patient has been notified of following:     \"This video visit will be conducted via a call between you and your physician/provider. We have found that certain health care needs can be provided without the need for an in-person physical exam.  This service lets us provide the care you need with a video conversation.  If a prescription is necessary we can send it directly to your pharmacy.  If lab work is needed we can place an order for that and you can then stop by our lab to have the test done at a later time.    Video visits are billed at different rates depending on your insurance coverage.  Please reach out to your insurance provider with any questions.    If during the course of the call the physician/provider feels a video visit is not appropriate, you will not be charged for this service.\"    Patient has given verbal consent for Video visit? Yes  How would you like to obtain your AVS? Mail a copy  If you are dropped from the video visit, the video invite should be resent to: Text to cell phone: [phone]  Will anyone else be joining your video visit? No        Video-Visit Details    Type of service:  Video Visit    Video Start Time: 2:24 PM  Video End Time: 3:15 PM    Originating Location (pt. Location): Home    Distant Location (provider location):  [facility] HEART [facility]     Platform used for Video Visit: Dillan DAVIDSON for CORE follow-ups:  - Any difficulty breathing, including when trying to lie in bed at night? No  - Any leg swelling (?compression or wraps), abdominal bloating, reduction in appetite? No  - Any chest pain or palpitations? No  - Weight trend over the past " week? Steady  - O2 or CPAP use? No  - Do they have a pulse oximeter they can use to check O2 sat/HR? No  Today's Home Vitals:  107/68 P: 76 Weight: 165 lbs    Pt wanted to inform you he did mail out some forms with records for you, with his b/p readings, med list, and questions he had for you on today's visit.     I have reviewed and updated the patient's Past Medical History, Social History, Family History and Medication List.    ALLERGIES  Patient has no known allergies.    MEDICATIONS  Current Outpatient Medications   Medication Sig Dispense Refill     acetaminophen (TYLENOL) 500 MG tablet Take 500-1,000 mg by mouth every 6 hours as needed for mild pain       allopurinol (ZYLOPRIM) 300 MG tablet TAKE 1 TABLET BY MOUTH  DAILY 90 tablet 3     alpha-lipoic acid 600 MG capsule Take by mouth daily        amiodarone (PACERONE) 200 MG tablet Take 0.5 tablets (100 mg) by mouth daily 45 tablet 3     aspirin (ASA) 81 MG EC tablet Take 1 tablet (81 mg) by mouth daily       carvedilol (COREG) 25 MG tablet Take 1 tablet (25 mg) by mouth 2 times daily (with meals) 180 tablet 0     cholecalciferol (VITAMIN D3) 5000 units TABS tablet Take 2,000 Units by mouth daily       Cyanocobalamin (VITAMIN B 12 PO) Take 2,500 mcg by mouth       furosemide (LASIX) 20 MG tablet Take 1 tablet (20 mg) by mouth daily 90 tablet 3     levothyroxine (SYNTHROID/LEVOTHROID) 125 MCG tablet Take 1 tablet (125 mcg) by mouth daily 90 tablet 3     losartan (COZAAR) 100 MG tablet Take 0.5 tablets (50 mg) by mouth daily 90 tablet 3     multivitamin (CENTRUM SILVER) tablet Take 1 tablet by mouth daily       Niacin (VITAMIN B-3 OR) Take 500 mg by mouth       potassium chloride ER (KLOR-CON M) 10 MEQ CR tablet TAKE 1 TABLET BY MOUTH TWO  TIMES DAILY 180 tablet 1     Pyridoxine HCl (VITAMIN B6 PO) Take 150 mg by mouth        amoxicillin (AMOXIL) 500 MG capsule Take 4 pills one hour prior to dental work (Patient not taking: Reported on 8/19/2020) 4 capsule 3      levothyroxine (SYNTHROID/LEVOTHROID) 100 MCG tablet Take 100 mcg by mouth daily         HPI: (Please see Dr. De Leon's note from 2/2020 for the patient's detailed history)    In brief, Mr. Lico Woo is an 86-year-old gentleman with nonischemic cardiomyopathy, presumed to be due to rapid atrial fibrillation.  He had a nuclear stress test for evaluation of coronary disease and it was reportedly normal.  He has a biventricular pacemaker in place and was cardioverted back to sinus rhythm and has been maintaining sinus rhythm according to his pacemaker checks.  He is completely BiV paced per his device check in April. He remains on low-dose amiodarone 100 mg a day for maintenance of normal rhythm.      In February, Dr. De Leon switched Will from Losartan to Entresto to optimize his CMP regimen. Of concern, he forgot to stop taking his Losartan and at his last visit with me on 5/15, we made sure he discontinued that. I had him get labs that day which showed (not surprisingly) VADIM with a creatinine of 1.9 (up from prior of 1.7). I also checked his TSH which was very high around 90. I increased his levothyroxine and referred him to his PCP who increased this further. He's now on 100 mcg daily and his PCP will continue to follow this.     Unfortunately after that, due to cost, he went back to Losartan. Due to hyperkalemia and VADIM, this was reduced from 100 to 50 mg daily. Of note, he was taken off Eliquis due to falls and no recurrent AF on his device checks.     He had a repeat ECHO performed in December which showed:  1. Normal left ventricular size with severely decreased systolic function.  2. Biplane LVEF 26% with severe global hypokinesis of the left ventricle.  3. Normal right ventricular size and systolic function.  4. Pacemaker leads visualized in the right atrium and right ventricle.  5. Trace tricuspid mitral valve regurgitation.  6. Mild aortic valve stenosis with trace regurgitation.  7. Dilated aortic root (4.4  cm) and ascending aorta (4.7 cm).     Compared to the previous study dated 9/19/2019, LVEF has decreased from 34% to  26%, right ventricular size is normalized.    Recent device check:  AP: 99.7%    BiVP: 99.5 %    Mode: DDDR   Underlying Rhythm: SB    Heart Rate: Stable with adequate variability, patient denies activity intolerance   Sensing: WNL  Pacing Threshold: Stable     Impedance: Stable     Battery Status: 8.1 years     Device Site: Well healed     Atrial Arrhythmia: 0    Ventricular Arrhythmia: 0    Setting Change: A capture high threshold alert changed from off to on per clinic protocol     Today, Will continues to feel really well. He exercises routinely at the gym for 1 hour doing cardiovascular exercises without problem. He walks the halls of his building routinely and walks his dog during the day. He has a history of peripheral edema and some falls in the past around the time his thyroid function got out of control, that is now well-controlled. He's been working on improving his balance with leg strengthening exercises at his residence gym. Still has issues with this, however. No lightheadedness, dizziness, palpitations, chest pain. His BP is well-controlled, actually can run pretty low at times. His weight has come down 20 lbs over the past 6 months. He states this wasn't necessarily intentional, but he has been focusing on a healthy lifestyle. Stress study in 2016 showed no ischemia.     Recent labs:   Component      Latest Ref Rng & Units 7/20/2020 11/13/2020 12/7/2020   Sodium      133 - 144 mmol/L 141 141 140   Potassium      3.4 - 5.3 mmol/L 4.5 5.3 (H) 4.4   Chloride      94 - 109 mmol/L 108 104 106   Carbon Dioxide      20 - 32 mmol/L 25 31 (H) 28   Anion Gap      3 - 14 mmol/L 8 11.3 6   Glucose      70 - 99 mg/dL 119 (H) 104 114 (H)   Urea Nitrogen      7 - 30 mg/dL 37 (H) 41 (H) 36 (H)   Creatinine      0.66 - 1.25 mg/dL 1.61 (H) 1.94 (H) 1.72 (H)   GFR Estimate      >60 mL/min/1.73:m2  38 (L) 33 (L) 35 (L)   GFR Estimate If Black      >60 mL/min/1.73:m2 44 (L) 40 (L) 40 (L)   Calcium      8.5 - 10.1 mg/dL 8.6 9.9 9.4   Bilirubin Direct      0.0 - 0.2 mg/dL   0.2   Bilirubin Total      0.2 - 1.3 mg/dL   0.7   Albumin      3.4 - 5.0 g/dL   3.4   Protein Total      6.8 - 8.8 g/dL   7.9   Alkaline Phosphatase      40 - 150 U/L   113   ALT      0 - 70 U/L   21   AST      0 - 45 U/L   24   TSH      0.40 - 4.00 mU/L   0.62       Assessment/Plan:  1. Chronic HFrEF - further decline in EF, to ~25%. Stable, FC II sxs. Not interested in going back on Entresto. BP intermittently low.    - Discussed ischemic workup. He's agreeable to a stress nuclear study. Will review with Dr. De Leon.   2. PAF, now off Eliquis due to fall risk + no recurrence on device checks. Denies TIA sxs.   3. Thyroid disorder, now well-controlled.   4. VADIM/CKD - creatinine improved following Losartan reduction, down to 1.7 (near baseline).    Follow-up:  - Dr. De Leon in 3 months + labs.     [place] Chawla PA-C  [facility]  Pager: [phone]  Text Page  (7:30am - 4pm M-F)       Thank you for allowing me to participate in the care of your patient.    Sincerely,     [place] Chawla PA-C     [facility]

## 2020-12-31 DIAGNOSIS — I42.9 CARDIOMYOPATHY, UNSPECIFIED TYPE (H): ICD-10-CM

## 2020-12-31 RX ORDER — POTASSIUM CHLORIDE 750 MG/1
TABLET, EXTENDED RELEASE ORAL
Qty: 180 TABLET | Refills: 1 | Status: SHIPPED | OUTPATIENT
Start: 2020-12-31 | End: 2021-06-04

## 2021-01-04 DIAGNOSIS — I42.0 DILATED CARDIOMYOPATHY (H): ICD-10-CM

## 2021-01-04 RX ORDER — CARVEDILOL 25 MG/1
25 TABLET ORAL 2 TIMES DAILY WITH MEALS
Qty: 180 TABLET | Refills: 1 | Status: SHIPPED | OUTPATIENT
Start: 2021-01-04 | End: 2021-06-04

## 2021-01-12 ENCOUNTER — TELEPHONE (OUTPATIENT)
Dept: CARDIOLOGY | Facility: CLINIC | Age: 86
End: 2021-01-12

## 2021-01-12 DIAGNOSIS — I50.22 CHRONIC SYSTOLIC CHF (CONGESTIVE HEART FAILURE) (H): Primary | ICD-10-CM

## 2021-01-12 NOTE — TELEPHONE ENCOUNTER
----- Message from [place] Chawla PA-C sent at 1/12/2021 11:44 AM CST -----  Fine to keep on. Thanks!  ----- Message -----  From: Vera Johnson RN  Sent: 1/11/2021   5:09 PM CST  To: [place] Chawla PA-C    Should test be done on or off beta blockers?  Thanks!  ----- Message -----  From: [place] Chawla PA-C  Sent: 1/11/2021   3:50 PM CST  To: Vera Johnson RN    Hi, can you let Will know that we can get a Lexiscan MPI at his convenience? Thanks!  ----- Message -----  From: Ray De Leon MD  Sent: 1/11/2021  11:58 AM CST  To: [place] Chawla PA-C    I looked at his echo. I am not sure that it is any worse, but certainly not better. I could not find an ischemic work up. I inherited him from [place] last year but their records do not show a stress test or cath. I think a nuclear study is reasonable with his CKD. We would have to negotiate about cath if needed. EE  ----- Message -----  From: [place] Chawla PA-C  Sent: 1/3/2021   4:52 PM CST  To: MD Aki Valladares Dr.! Had a question about Will. His LVEF has dropped on most recent echo. He's completely asymptomatic but I'm wondering if a stress nuclear is indicated. Thanks!

## 2021-01-13 NOTE — TELEPHONE ENCOUNTER
Pt returned call, discussed recommendations for nuclear stress test per Dr. De Leon pt stated understanding. He states he did have one in October 2018 in [place] prior to his defibrillator. We do not have that report. Will send request when in clinic next for those records to have on file. Pt interested in stress test as well, as he is curious why EF has not improved as well.     Tiffani Reyes RN, BSN, CHFN  01/13/21 at 4:02 PM

## 2021-01-13 NOTE — TELEPHONE ENCOUNTER
Called pt, no answer. LVM for pt letting him know Dr. De Leon recommendation for nuclear stress test at his convenience. Left call back for questions. Order placed.     Tiffani Reyes RN, BSN, CHFN  01/13/21 at 1:09 PM

## 2021-01-19 NOTE — TELEPHONE ENCOUNTER
Sent records request to [facility] for stress test from 2018.     Tiffani Reyes RN, BSN, CHFN  01/19/21 at 10:02 AM

## 2021-01-19 NOTE — TELEPHONE ENCOUNTER
Lexiscan nuclear stress test dated 10/30/2018 received from [employer] [facility]. Sent to be scanned.     Tiffani Reyes RN, BSN, CHFN  01/19/21 at 10:43 AM

## 2021-02-16 ENCOUNTER — ANCILLARY PROCEDURE (OUTPATIENT)
Dept: CARDIOLOGY | Facility: CLINIC | Age: 86
End: 2021-02-16
Attending: INTERNAL MEDICINE
Payer: MEDICARE

## 2021-02-16 DIAGNOSIS — I42.0 DILATED CARDIOMYOPATHY (H): ICD-10-CM

## 2021-02-16 DIAGNOSIS — Z95.0 CARDIAC PACEMAKER IN SITU: ICD-10-CM

## 2021-02-16 PROCEDURE — 93296 REM INTERROG EVL PM/IDS: CPT | Performed by: INTERNAL MEDICINE

## 2021-02-16 PROCEDURE — 93294 REM INTERROG EVL PM/LDLS PM: CPT | Performed by: INTERNAL MEDICINE

## 2021-02-17 ENCOUNTER — TELEPHONE (OUTPATIENT)
Dept: CARDIOLOGY | Facility: CLINIC | Age: 86
End: 2021-02-17

## 2021-02-17 NOTE — TELEPHONE ENCOUNTER
Dr. De Leno,    FYI: Your patient had an episode of NSVT on today's device check. This is the first documented episode since implant. Episode lasted 11 beats, rates 135-170bpm. EF 26% (2020). No associated symptoms. Episode occurred 11/7/20.         Medtronic Percepta (CRT-P) Remote PPM Device Check  AP: >99%  BiVP: >99%   Mode: DDDR  Presenting Rhythm: AP/BiVP  Heart Rate: Adequate heart rates per histogram. OptiVol level below baseline  Sensing: stable   Pacing Threshold: stable   Impedance: stable   Battery Status: 7.7 years   Atrial Arrhythmia: none   Ventricular Arrhythmia: 1 V. sensing episode. EGM shows Vs>As lasting 11 beats, rates 135-170bpm. EF 26% (2020). Episode occurred 11/7/20. Will notify Dr. De Leon with findings.

## 2021-02-22 LAB
MDC_IDC_EPISODE_DTM: NORMAL
MDC_IDC_EPISODE_DURATION: 5 S
MDC_IDC_EPISODE_ID: 5010
MDC_IDC_EPISODE_TYPE: NORMAL
MDC_IDC_LEAD_IMPLANT_DT: NORMAL
MDC_IDC_LEAD_LOCATION: NORMAL
MDC_IDC_LEAD_LOCATION_DETAIL_1: NORMAL
MDC_IDC_LEAD_MFG: NORMAL
MDC_IDC_LEAD_MODEL: NORMAL
MDC_IDC_LEAD_POLARITY_TYPE: NORMAL
MDC_IDC_LEAD_SERIAL: NORMAL
MDC_IDC_MSMT_BATTERY_DTM: NORMAL
MDC_IDC_MSMT_BATTERY_REMAINING_LONGEVITY: 93 MO
MDC_IDC_MSMT_BATTERY_RRT_TRIGGER: 2.6
MDC_IDC_MSMT_BATTERY_STATUS: NORMAL
MDC_IDC_MSMT_BATTERY_VOLTAGE: 2.98 V
MDC_IDC_MSMT_LEADCHNL_LV_IMPEDANCE_VALUE: 266 OHM
MDC_IDC_MSMT_LEADCHNL_LV_IMPEDANCE_VALUE: 361 OHM
MDC_IDC_MSMT_LEADCHNL_LV_IMPEDANCE_VALUE: 437 OHM
MDC_IDC_MSMT_LEADCHNL_LV_IMPEDANCE_VALUE: 475 OHM
MDC_IDC_MSMT_LEADCHNL_LV_IMPEDANCE_VALUE: 532 OHM
MDC_IDC_MSMT_LEADCHNL_LV_IMPEDANCE_VALUE: 589 OHM
MDC_IDC_MSMT_LEADCHNL_LV_IMPEDANCE_VALUE: 589 OHM
MDC_IDC_MSMT_LEADCHNL_LV_IMPEDANCE_VALUE: 627 OHM
MDC_IDC_MSMT_LEADCHNL_LV_IMPEDANCE_VALUE: 741 OHM
MDC_IDC_MSMT_LEADCHNL_LV_IMPEDANCE_VALUE: 798 OHM
MDC_IDC_MSMT_LEADCHNL_LV_PACING_THRESHOLD_AMPLITUDE: 0.88 V
MDC_IDC_MSMT_LEADCHNL_LV_PACING_THRESHOLD_PULSEWIDTH: 1 MS
MDC_IDC_MSMT_LEADCHNL_RA_IMPEDANCE_VALUE: 323 OHM
MDC_IDC_MSMT_LEADCHNL_RA_IMPEDANCE_VALUE: 437 OHM
MDC_IDC_MSMT_LEADCHNL_RA_PACING_THRESHOLD_AMPLITUDE: 0.88 V
MDC_IDC_MSMT_LEADCHNL_RA_PACING_THRESHOLD_PULSEWIDTH: 0.4 MS
MDC_IDC_MSMT_LEADCHNL_RA_SENSING_INTR_AMPL: 1.5 MV
MDC_IDC_MSMT_LEADCHNL_RA_SENSING_INTR_AMPL: 1.5 MV
MDC_IDC_MSMT_LEADCHNL_RV_IMPEDANCE_VALUE: 342 OHM
MDC_IDC_MSMT_LEADCHNL_RV_IMPEDANCE_VALUE: 551 OHM
MDC_IDC_MSMT_LEADCHNL_RV_PACING_THRESHOLD_AMPLITUDE: 0.75 V
MDC_IDC_MSMT_LEADCHNL_RV_PACING_THRESHOLD_PULSEWIDTH: 0.4 MS
MDC_IDC_MSMT_LEADCHNL_RV_SENSING_INTR_AMPL: 5.75 MV
MDC_IDC_MSMT_LEADCHNL_RV_SENSING_INTR_AMPL: 5.75 MV
MDC_IDC_PG_IMPLANT_DTM: NORMAL
MDC_IDC_PG_MFG: NORMAL
MDC_IDC_PG_MODEL: NORMAL
MDC_IDC_PG_SERIAL: NORMAL
MDC_IDC_PG_TYPE: NORMAL
MDC_IDC_SESS_CLINIC_NAME: NORMAL
MDC_IDC_SESS_DTM: NORMAL
MDC_IDC_SESS_TYPE: NORMAL
MDC_IDC_SET_BRADY_AT_MODE_SWITCH_RATE: 171 {BEATS}/MIN
MDC_IDC_SET_BRADY_LOWRATE: 70 {BEATS}/MIN
MDC_IDC_SET_BRADY_MAX_SENSOR_RATE: 115 {BEATS}/MIN
MDC_IDC_SET_BRADY_MAX_TRACKING_RATE: 115 {BEATS}/MIN
MDC_IDC_SET_BRADY_MODE: NORMAL
MDC_IDC_SET_BRADY_PAV_DELAY_HIGH: 100 MS
MDC_IDC_SET_BRADY_PAV_DELAY_LOW: 130 MS
MDC_IDC_SET_BRADY_SAV_DELAY_HIGH: 70 MS
MDC_IDC_SET_BRADY_SAV_DELAY_LOW: 100 MS
MDC_IDC_SET_CRT_LVRV_DELAY: 20 MS
MDC_IDC_SET_CRT_PACED_CHAMBERS: NORMAL
MDC_IDC_SET_LEADCHNL_LV_PACING_AMPLITUDE: 1.5 V
MDC_IDC_SET_LEADCHNL_LV_PACING_ANODE_ELECTRODE_1: NORMAL
MDC_IDC_SET_LEADCHNL_LV_PACING_ANODE_LOCATION_1: NORMAL
MDC_IDC_SET_LEADCHNL_LV_PACING_CAPTURE_MODE: NORMAL
MDC_IDC_SET_LEADCHNL_LV_PACING_CATHODE_ELECTRODE_1: NORMAL
MDC_IDC_SET_LEADCHNL_LV_PACING_CATHODE_LOCATION_1: NORMAL
MDC_IDC_SET_LEADCHNL_LV_PACING_POLARITY: NORMAL
MDC_IDC_SET_LEADCHNL_LV_PACING_PULSEWIDTH: 1 MS
MDC_IDC_SET_LEADCHNL_RA_PACING_AMPLITUDE: 1.75 V
MDC_IDC_SET_LEADCHNL_RA_PACING_ANODE_ELECTRODE_1: NORMAL
MDC_IDC_SET_LEADCHNL_RA_PACING_ANODE_LOCATION_1: NORMAL
MDC_IDC_SET_LEADCHNL_RA_PACING_CAPTURE_MODE: NORMAL
MDC_IDC_SET_LEADCHNL_RA_PACING_CATHODE_ELECTRODE_1: NORMAL
MDC_IDC_SET_LEADCHNL_RA_PACING_CATHODE_LOCATION_1: NORMAL
MDC_IDC_SET_LEADCHNL_RA_PACING_POLARITY: NORMAL
MDC_IDC_SET_LEADCHNL_RA_PACING_PULSEWIDTH: 0.4 MS
MDC_IDC_SET_LEADCHNL_RA_SENSING_ANODE_ELECTRODE_1: NORMAL
MDC_IDC_SET_LEADCHNL_RA_SENSING_ANODE_LOCATION_1: NORMAL
MDC_IDC_SET_LEADCHNL_RA_SENSING_CATHODE_ELECTRODE_1: NORMAL
MDC_IDC_SET_LEADCHNL_RA_SENSING_CATHODE_LOCATION_1: NORMAL
MDC_IDC_SET_LEADCHNL_RA_SENSING_POLARITY: NORMAL
MDC_IDC_SET_LEADCHNL_RA_SENSING_SENSITIVITY: 0.6 MV
MDC_IDC_SET_LEADCHNL_RV_PACING_AMPLITUDE: 2 V
MDC_IDC_SET_LEADCHNL_RV_PACING_ANODE_ELECTRODE_1: NORMAL
MDC_IDC_SET_LEADCHNL_RV_PACING_ANODE_LOCATION_1: NORMAL
MDC_IDC_SET_LEADCHNL_RV_PACING_CAPTURE_MODE: NORMAL
MDC_IDC_SET_LEADCHNL_RV_PACING_CATHODE_ELECTRODE_1: NORMAL
MDC_IDC_SET_LEADCHNL_RV_PACING_CATHODE_LOCATION_1: NORMAL
MDC_IDC_SET_LEADCHNL_RV_PACING_POLARITY: NORMAL
MDC_IDC_SET_LEADCHNL_RV_PACING_PULSEWIDTH: 0.4 MS
MDC_IDC_SET_LEADCHNL_RV_SENSING_ANODE_ELECTRODE_1: NORMAL
MDC_IDC_SET_LEADCHNL_RV_SENSING_ANODE_LOCATION_1: NORMAL
MDC_IDC_SET_LEADCHNL_RV_SENSING_CATHODE_ELECTRODE_1: NORMAL
MDC_IDC_SET_LEADCHNL_RV_SENSING_CATHODE_LOCATION_1: NORMAL
MDC_IDC_SET_LEADCHNL_RV_SENSING_POLARITY: NORMAL
MDC_IDC_SET_LEADCHNL_RV_SENSING_SENSITIVITY: 0.9 MV
MDC_IDC_SET_ZONE_DETECTION_INTERVAL: 350 MS
MDC_IDC_SET_ZONE_DETECTION_INTERVAL: 400 MS
MDC_IDC_SET_ZONE_TYPE: NORMAL
MDC_IDC_STAT_BRADY_AP_VP_PERCENT: 98.85 %
MDC_IDC_STAT_BRADY_AP_VS_PERCENT: 0.12 %
MDC_IDC_STAT_BRADY_AS_VP_PERCENT: 0.43 %
MDC_IDC_STAT_BRADY_AS_VS_PERCENT: 0.6 %
MDC_IDC_STAT_BRADY_DTM_END: NORMAL
MDC_IDC_STAT_BRADY_DTM_START: NORMAL
MDC_IDC_STAT_BRADY_RA_PERCENT_PACED: 99.51 %
MDC_IDC_STAT_BRADY_RV_PERCENT_PACED: 99.28 %
MDC_IDC_STAT_CRT_DTM_END: NORMAL
MDC_IDC_STAT_CRT_DTM_START: NORMAL
MDC_IDC_STAT_CRT_LV_PERCENT_PACED: 99.25 %
MDC_IDC_STAT_CRT_PERCENT_PACED: 99.25 %
MDC_IDC_STAT_EPISODE_RECENT_COUNT: 0
MDC_IDC_STAT_EPISODE_RECENT_COUNT_DTM_END: NORMAL
MDC_IDC_STAT_EPISODE_RECENT_COUNT_DTM_START: NORMAL
MDC_IDC_STAT_EPISODE_TOTAL_COUNT: 1
MDC_IDC_STAT_EPISODE_TOTAL_COUNT: 29
MDC_IDC_STAT_EPISODE_TOTAL_COUNT: 46
MDC_IDC_STAT_EPISODE_TOTAL_COUNT_DTM_END: NORMAL
MDC_IDC_STAT_EPISODE_TOTAL_COUNT_DTM_START: NORMAL
MDC_IDC_STAT_EPISODE_TYPE: NORMAL

## 2021-03-05 DIAGNOSIS — I50.22 CHRONIC SYSTOLIC CHF (CONGESTIVE HEART FAILURE) (H): ICD-10-CM

## 2021-03-05 LAB
ANION GAP SERPL CALCULATED.3IONS-SCNC: 6 MMOL/L (ref 3–14)
BUN SERPL-MCNC: 35 MG/DL (ref 7–30)
CALCIUM SERPL-MCNC: 9 MG/DL (ref 8.5–10.1)
CHLORIDE SERPL-SCNC: 109 MMOL/L (ref 94–109)
CO2 SERPL-SCNC: 27 MMOL/L (ref 20–32)
CREAT SERPL-MCNC: 1.79 MG/DL (ref 0.66–1.25)
GFR SERPL CREATININE-BSD FRML MDRD: 33 ML/MIN/{1.73_M2}
GLUCOSE SERPL-MCNC: 119 MG/DL (ref 70–99)
HGB BLD-MCNC: 11.8 G/DL (ref 13.3–17.7)
NT-PROBNP SERPL-MCNC: ABNORMAL PG/ML (ref 0–450)
POTASSIUM SERPL-SCNC: 4.4 MMOL/L (ref 3.4–5.3)
SODIUM SERPL-SCNC: 142 MMOL/L (ref 133–144)
T4 FREE SERPL-MCNC: 1.55 NG/DL (ref 0.76–1.46)
TSH SERPL DL<=0.005 MIU/L-ACNC: 0.14 MU/L (ref 0.4–4)

## 2021-03-05 PROCEDURE — 84443 ASSAY THYROID STIM HORMONE: CPT | Performed by: PHYSICIAN ASSISTANT

## 2021-03-05 PROCEDURE — 36415 COLL VENOUS BLD VENIPUNCTURE: CPT | Performed by: PHYSICIAN ASSISTANT

## 2021-03-05 PROCEDURE — 85018 HEMOGLOBIN: CPT | Performed by: PHYSICIAN ASSISTANT

## 2021-03-05 PROCEDURE — 83880 ASSAY OF NATRIURETIC PEPTIDE: CPT | Performed by: PHYSICIAN ASSISTANT

## 2021-03-05 PROCEDURE — 84439 ASSAY OF FREE THYROXINE: CPT | Performed by: PHYSICIAN ASSISTANT

## 2021-03-05 PROCEDURE — 80048 BASIC METABOLIC PNL TOTAL CA: CPT | Performed by: PHYSICIAN ASSISTANT

## 2021-03-08 ENCOUNTER — TELEPHONE (OUTPATIENT)
Dept: FAMILY MEDICINE | Facility: CLINIC | Age: 86
End: 2021-03-08

## 2021-03-08 DIAGNOSIS — E03.9 HYPOTHYROIDISM, UNSPECIFIED TYPE: Primary | ICD-10-CM

## 2021-03-08 RX ORDER — LEVOTHYROXINE SODIUM 112 UG/1
112 TABLET ORAL DAILY
Qty: 90 TABLET | Refills: 3 | Status: SHIPPED | OUTPATIENT
Start: 2021-03-08 | End: 2021-12-06

## 2021-03-08 NOTE — TELEPHONE ENCOUNTER
Left voice message asking pt to call triage back. On call back, please verify what Synthroid dose pt is taking.

## 2021-03-08 NOTE — TELEPHONE ENCOUNTER
Please call the patient.  His thyroid test is a bit off and I want to adjust the dose.  Please confirm the dose that he is actually taking of his Synthroid in the pharmacy and let me know.    Thank you    Mumtaz Hernandez M.D.

## 2021-03-08 NOTE — TELEPHONE ENCOUNTER
Pt reports he is taking levothyroxine (SYNTHROID/LEVOTHROID) 125 MCG tablet daily. Please advice on dose adjustment. New dose can be approved to [facility] please.

## 2021-03-09 NOTE — TELEPHONE ENCOUNTER
Pt was called with Levothyroxine dose change. He will discuss dose at appointment today. No further action needed at this time.

## 2021-03-10 ENCOUNTER — OFFICE VISIT (OUTPATIENT)
Dept: CARDIOLOGY | Facility: CLINIC | Age: 86
End: 2021-03-10
Payer: MEDICARE

## 2021-03-10 VITALS
SYSTOLIC BLOOD PRESSURE: 123 MMHG | WEIGHT: 167 LBS | HEIGHT: 67 IN | BODY MASS INDEX: 26.21 KG/M2 | DIASTOLIC BLOOD PRESSURE: 74 MMHG | HEART RATE: 73 BPM

## 2021-03-10 DIAGNOSIS — I77.810 AORTIC ROOT DILATATION (H): ICD-10-CM

## 2021-03-10 DIAGNOSIS — Z95.0 CARDIAC PACEMAKER IN SITU: Primary | ICD-10-CM

## 2021-03-10 DIAGNOSIS — I10 ESSENTIAL HYPERTENSION: ICD-10-CM

## 2021-03-10 DIAGNOSIS — I42.0 DILATED CARDIOMYOPATHY (H): ICD-10-CM

## 2021-03-10 DIAGNOSIS — I48.0 PAROXYSMAL ATRIAL FIBRILLATION (H): ICD-10-CM

## 2021-03-10 DIAGNOSIS — I50.22 CHRONIC SYSTOLIC CHF (CONGESTIVE HEART FAILURE) (H): ICD-10-CM

## 2021-03-10 PROCEDURE — 99214 OFFICE O/P EST MOD 30 MIN: CPT | Performed by: INTERNAL MEDICINE

## 2021-03-10 ASSESSMENT — MIFFLIN-ST. JEOR: SCORE: 1391.14

## 2021-03-10 NOTE — LETTER
3/10/2021    Mumtaz Hernandez MD  [address]  [place] [address]    RE: Lico Woo       Dear Colleague,    I had the pleasure of seeing Lico Woo in the [facility].    HPI and Plan:   See dictation    Orders Placed This Encounter   Procedures     Basic metabolic panel     Hemoglobin     Follow-Up with [facility] - Return MD visit     Echocardiogram Complete       No orders of the defined types were placed in this encounter.      There are no discontinued medications.      Encounter Diagnoses   Name Primary?     Chronic systolic CHF (congestive heart failure) (H)      Cardiac pacemaker in situ Yes     Dilated cardiomyopathy (H)      Paroxysmal atrial fibrillation (H)      Essential hypertension      Aortic root dilatation (H)        CURRENT MEDICATIONS:  Current Outpatient Medications   Medication Sig Dispense Refill     acetaminophen (TYLENOL) 500 MG tablet Take 500-1,000 mg by mouth every 6 hours as needed for mild pain       allopurinol (ZYLOPRIM) 300 MG tablet TAKE 1 TABLET BY MOUTH  DAILY 90 tablet 3     alpha-lipoic acid 600 MG capsule Take by mouth daily        amiodarone (PACERONE) 200 MG tablet Take 0.5 tablets (100 mg) by mouth daily 45 tablet 3     aspirin (ASA) 81 MG EC tablet Take 1 tablet (81 mg) by mouth daily       carvedilol (COREG) 25 MG tablet Take 1 tablet (25 mg) by mouth 2 times daily (with meals) 180 tablet 1     cholecalciferol (VITAMIN D3) 5000 units TABS tablet Take 2,000 Units by mouth daily       Cyanocobalamin (VITAMIN B 12 PO) Take 2,500 mcg by mouth       furosemide (LASIX) 20 MG tablet Take 1 tablet (20 mg) by mouth daily 90 tablet 3     levothyroxine (SYNTHROID/LEVOTHROID) 112 MCG tablet Take 1 tablet (112 mcg) by mouth daily 90 tablet 3     losartan (COZAAR) 100 MG tablet Take 0.5 tablets (50 mg) by mouth daily 90 tablet 3     multivitamin (CENTRUM SILVER) tablet Take 1 tablet by mouth daily       Niacin  (VITAMIN B-3 OR) Take 500 mg by mouth       potassium chloride ER (KLOR-CON M) 10 MEQ CR tablet TAKE 1 TABLET BY MOUTH  TWICE DAILY 180 tablet 1     Pyridoxine HCl (VITAMIN B6 PO) Take 150 mg by mouth        amoxicillin (AMOXIL) 500 MG capsule Take 4 pills one hour prior to dental work (Patient not taking: Reported on 8/19/2020) 4 capsule 3     levothyroxine (SYNTHROID/LEVOTHROID) 100 MCG tablet Take 100 mcg by mouth daily       levothyroxine (SYNTHROID/LEVOTHROID) 125 MCG tablet Take 1 tablet (125 mcg) by mouth daily (Patient not taking: Reported on 3/10/2021) 90 tablet 3       ALLERGIES   No Known Allergies    PAST MEDICAL HISTORY:  Past Medical History:   Diagnosis Date     Aortic regurgitation     mild-moderate per 10/2018 Echo     Aortic stenosis     mild per 10/2018 Echo     Balance problem     few years     Cardiomyopathy (H) 10/2018    [place]; Echo 10/2018, EF 25%, down from 50% 12/2017, per notes, pt declines further work up to find etiology of drop in EF     Chronic atrial fibrillation (H) 2018    cardioversion 4/19     Chronic systolic CHF (congestive heart failure) (H)      CKD (chronic kidney disease) stage 3, GFR 30-59 ml/min      Essential hypertension      Frequent falls      Gross hematuria 04/2019    cysto, ct neg     History of gout     none for years     Hypothyroidism      MGUS (monoclonal gammopathy of unknown significance) 07/2020    found during eval of falls and neuropathy.  Per curbside consult from [facility] to repeat labs every 6 months: CBC, BMP, SPEP, IgG and kappa-lambda free light chain     Mitral regurgitation     moderate per 10/2018 Echo     Neuropathy 1990    both lower legs     Pacemaker 2018     Prostate cancer (H) 2008    xrt, fine since     Tricuspid regurgitation     mioderate per 10/2018 Echo       PAST SURGICAL HISTORY:  Past Surgical History:   Procedure Laterality Date     bilateral hip replacement  2017    done 6 months apart     IMPLANT PACEMAKER  11/12/2018    CRT-P      lip surgery for skin cancer         FAMILY HISTORY:  Family History   Problem Relation Age of Onset     Myocardial Infarction Mother      Cerebrovascular Disease Father        SOCIAL HISTORY:  Social History     Socioeconomic History     Marital status:      Spouse name: None     Number of children: 3     Years of education: None     Highest education level: None   Occupational History     Occupation: owned marketing services company in [place]   Social Needs     Financial resource strain: None     Food insecurity     Worry: None     Inability: None     Transportation needs     Medical: None     Non-medical: None   Tobacco Use     Smoking status: Never Smoker     Smokeless tobacco: Never Used   Substance and Sexual Activity     Alcohol use: Yes     Comment: 1 drink week     Drug use: Never     Sexual activity: Not Currently   Lifestyle     Physical activity     Days per week: None     Minutes per session: None     Stress: None   Relationships     Social connections     Talks on phone: None     Gets together: None     Attends Jew service: None     Active member of club or organization: None     Attends meetings of clubs or organizations: None     Relationship status: None     Intimate partner violence     Fear of current or ex partner: None     Emotionally abused: None     Physically abused: None     Forced sexual activity: None   Other Topics Concern     Parent/sibling w/ CABG, MI or angioplasty before 65F 55M? Not Asked   Social History Narrative     None       Review of Systems:  Skin:  Negative       Eyes:  Negative      ENT:  Negative      Respiratory:  Negative       Cardiovascular:  syncope or near-syncope;edema;chest pain;palpitations;Negative for;cyanosis;fatigue;lightheadedness;dizziness;exercise intolerance      Gastroenterology: Negative      Genitourinary:  Negative      Musculoskeletal:  Negative      Neurologic:  Positive for incoordination balance  Psychiatric:  Negative     "  Heme/Lymph/Imm:  Negative      Endocrine:  Positive for thyroid disorder      Physical Exam:  Vitals: /74   Pulse 73   Ht 1.702 m (5' 7\")   Wt 75.8 kg (167 lb)   BMI 26.16 kg/m      Constitutional:           Skin:             Head:           Eyes:           Lymph:      ENT:           Neck:           Respiratory:            Cardiac:                                                           GI:           Extremities and Muscular Skeletal:                 Neurological:           Psych:           Thank you for allowing me to participate in the care of your patient.      Sincerely,     ALESHA STRANGE MD     [facility]    cc:   No referring provider defined for this encounter.        "

## 2021-03-10 NOTE — PROGRESS NOTES
HPI and Plan:   See dictation    Orders Placed This Encounter   Procedures     Basic metabolic panel     Hemoglobin     [facility] with [facility] - Return MD visit     Echocardiogram Complete       No orders of the defined types were placed in this encounter.      There are no discontinued medications.      Encounter Diagnoses   Name Primary?     Chronic systolic CHF (congestive heart failure) (H)      Cardiac pacemaker in situ Yes     Dilated cardiomyopathy (H)      Paroxysmal atrial fibrillation (H)      Essential hypertension      Aortic root dilatation (H)        CURRENT MEDICATIONS:  Current Outpatient Medications   Medication Sig Dispense Refill     acetaminophen (TYLENOL) 500 MG tablet Take 500-1,000 mg by mouth every 6 hours as needed for mild pain       allopurinol (ZYLOPRIM) 300 MG tablet TAKE 1 TABLET BY MOUTH  DAILY 90 tablet 3     alpha-lipoic acid 600 MG capsule Take by mouth daily        amiodarone (PACERONE) 200 MG tablet Take 0.5 tablets (100 mg) by mouth daily 45 tablet 3     aspirin (ASA) 81 MG EC tablet Take 1 tablet (81 mg) by mouth daily       carvedilol (COREG) 25 MG tablet Take 1 tablet (25 mg) by mouth 2 times daily (with meals) 180 tablet 1     cholecalciferol (VITAMIN D3) 5000 units TABS tablet Take 2,000 Units by mouth daily       Cyanocobalamin (VITAMIN B 12 PO) Take 2,500 mcg by mouth       furosemide (LASIX) 20 MG tablet Take 1 tablet (20 mg) by mouth daily 90 tablet 3     levothyroxine (SYNTHROID/LEVOTHROID) 112 MCG tablet Take 1 tablet (112 mcg) by mouth daily 90 tablet 3     losartan (COZAAR) 100 MG tablet Take 0.5 tablets (50 mg) by mouth daily 90 tablet 3     multivitamin (CENTRUM SILVER) tablet Take 1 tablet by mouth daily       Niacin (VITAMIN B-3 OR) Take 500 mg by mouth       potassium chloride ER (KLOR-CON M) 10 MEQ CR tablet TAKE 1 TABLET BY MOUTH  TWICE DAILY 180 tablet 1     Pyridoxine HCl (VITAMIN B6 PO) Take 150 mg by mouth        amoxicillin (AMOXIL) 500 MG capsule  Take 4 pills one hour prior to dental work (Patient not taking: Reported on 8/19/2020) 4 capsule 3     levothyroxine (SYNTHROID/LEVOTHROID) 100 MCG tablet Take 100 mcg by mouth daily       levothyroxine (SYNTHROID/LEVOTHROID) 125 MCG tablet Take 1 tablet (125 mcg) by mouth daily (Patient not taking: Reported on 3/10/2021) 90 tablet 3       ALLERGIES   No Known Allergies    PAST MEDICAL HISTORY:  Past Medical History:   Diagnosis Date     Aortic regurgitation     mild-moderate per 10/2018 Echo     Aortic stenosis     mild per 10/2018 Echo     Balance problem     few years     Cardiomyopathy (H) 10/2018    [place]; Echo 10/2018, EF 25%, down from 50% 12/2017, per notes, pt declines further work up to find etiology of drop in EF     Chronic atrial fibrillation (H) 2018    cardioversion 4/19     Chronic systolic CHF (congestive heart failure) (H)      CKD (chronic kidney disease) stage 3, GFR 30-59 ml/min      Essential hypertension      Frequent falls      Gross hematuria 04/2019    cysto, ct neg     History of gout     none for years     Hypothyroidism      MGUS (monoclonal gammopathy of unknown significance) 07/2020    found during eval of falls and neuropathy.  Per curbside consult from [facility] to repeat labs every 6 months: CBC, BMP, SPEP, IgG and kappa-lambda free light chain     Mitral regurgitation     moderate per 10/2018 Echo     Neuropathy 1990    both lower legs     Pacemaker 2018     Prostate cancer (H) 2008    xrt, fine since     Tricuspid regurgitation     mioderate per 10/2018 Echo       PAST SURGICAL HISTORY:  Past Surgical History:   Procedure Laterality Date     bilateral hip replacement  2017    done 6 months apart     IMPLANT PACEMAKER  11/12/2018    CRT-P     lip surgery for skin cancer         FAMILY HISTORY:  Family History   Problem Relation Age of Onset     Myocardial Infarction Mother      Cerebrovascular Disease Father        SOCIAL HISTORY:  Social History     Socioeconomic History     Marital  "status:      Spouse name: None     Number of children: 3     Years of education: None     Highest education level: None   Occupational History     Occupation: owned marketing services company in [place]   Social Needs     Financial resource strain: None     Food insecurity     Worry: None     Inability: None     Transportation needs     Medical: None     Non-medical: None   Tobacco Use     Smoking status: Never Smoker     Smokeless tobacco: Never Used   Substance and Sexual Activity     Alcohol use: Yes     Comment: 1 drink week     Drug use: Never     Sexual activity: Not Currently   Lifestyle     Physical activity     Days per week: None     Minutes per session: None     Stress: None   Relationships     Social connections     Talks on phone: None     Gets together: None     Attends Rastafari service: None     Active member of club or organization: None     Attends meetings of clubs or organizations: None     Relationship status: None     Intimate partner violence     Fear of current or ex partner: None     Emotionally abused: None     Physically abused: None     Forced sexual activity: None   Other Topics Concern     Parent/sibling w/ CABG, MI or angioplasty before 65F 55M? Not Asked   Social History Narrative     None       Review of Systems:  Skin:  Negative       Eyes:  Negative      ENT:  Negative      Respiratory:  Negative       Cardiovascular:  syncope or near-syncope;edema;chest pain;palpitations;Negative for;cyanosis;fatigue;lightheadedness;dizziness;exercise intolerance      Gastroenterology: Negative      Genitourinary:  Negative      Musculoskeletal:  Negative      Neurologic:  Positive for incoordination balance  Psychiatric:  Negative      Heme/Lymph/Imm:  Negative      Endocrine:  Positive for thyroid disorder      Physical Exam:  Vitals: /74   Pulse 73   Ht 1.702 m (5' 7\")   Wt 75.8 kg (167 lb)   BMI 26.16 kg/m      Constitutional:           Skin:             Head:           Eyes:   "         Lymph:      ENT:           Neck:           Respiratory:            Cardiac:                                                           GI:           Extremities and Muscular Skeletal:                 Neurological:           Psych:           CC  No referring provider defined for this encounter.

## 2021-03-10 NOTE — PROGRESS NOTES
Service Date: 03/10/2021      PRIMARY CARE PHYSICIAN:  Mumtaz Hernandez MD      HISTORY OF PRESENT ILLNESS:  I had the opportunity to see Mr. 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  He has been back in [place] after living in [place] for about a year and a half now.  While he was in [place], he developed rapid atrial fibrillation, which apparently became prolonged and resulted in development of a cardiomyopathy.  However, with cardioversion, initiation of amiodarone for maintenance of sinus rhythm, and implantation of a biventricular pacemaker, his left ventricular function failed to normalize.  In fact, his most recent echocardiogram in 12/2020 showed further decline in left ventricular function.  His ejection fraction decreased from about 35% in 09/2019 to 25%-30% in 12/2020.      Fortunately, he has been feeling quite well.  He has not been experiencing any concerning congestive heart failure symptoms.  He has no shortness of breath, edema, chest discomfort, lightheadedness or syncope.  We have been monitoring his pacemaker closely for signs of recurrent atrial fibrillation and he has had none.  We discontinued his Eliquis last summer because of frequent falling episodes and danger of potential intracranial bleeding from head injury.  We have not restarted Eliquis or any other blood thinner at this time because we have not seen any sign of atrial fibrillation coming back.  He continues to use his biventricular pacemaker 100% of the time.  He had 1 brief episode of nonsustained VT lasting 11 beats.      He has chronic kidney disease with a creatinine of 1.6 to 1.8.  That also looked stable.  He has not had a coronary angiogram to my knowledge to look for potential ischemic etiology of his cardiomyopathy, probably in part due to his kidney dysfunction.      He elected to discontinue  Entresto and use losartan instead because of the cost issue around that medication.  He did not seem to feel any better on Entresto.      PHYSICAL EXAMINATION:  Today, his blood pressure is 123/74, heart rate 73 and weight 167 pounds.  His lungs are clear.  His heart rhythm is regular.  He has no significant cardiac murmurs, no carotid bruits and minimal edema.      IMPRESSIONS:  Mr. Lei Woo an 87-year-old gentleman with chronic systolic heart failure and history of atrial fibrillation which has not recurred after cardioversion and initiation of amiodarone over a year ago.  He is now off anticoagulation because of frequent falling episodes last summer.  We have not seen any recurrent atrial fibrillation and, therefore, have not needed to restart his blood thinner.  However, he seems to be more stable now and has not had further falling episodes.  I think we could probably restart the blood thinner again if we see any recurrence of AFib on his pacemaker.      He is doing well with his heart failure therapy.  He has no heart failure symptoms at this point.  I have not approached him about adding an SGLT2 inhibitor because he was against the higher cost of Entresto and chose to go back to losartan.  If he has any concerning changes in his heart failure symptoms, we could certainly add those medications into his program to help improve the situation.      I also talked to him today about potentially upgrading his CRT-P device to a CRT-D device.  Because his ejection fraction has remained below 35%, he is at higher risk of cardiac arrest and sudden cardiac death.  He understands that and I gave him a brochure about CRT-D devices and explained the rationale.  He is not sure that he wants that and would like to defer that decision for now and talk to his family members.  He has a couple of sons who are physicians.  I will plan to see him back again in a couple of months, repeat his echocardiogram and revisit that  discussion.      In the meantime, I will not make any medication changes.      I did review his laboratory results with him, as well as his echocardiogram and device check.      I spent 40 minutes today reviewing records and completing that discussion and documentation with him.      Alesha De Leon MD      cc:   Mumtaz Hernandez MD   [facility]   [address]   [place] [address]         ALESHA DE LEON MD, [facility]             D: 03/10/2021   T: 03/10/2021   MT: al      Name:     KYLER THOMAS   MRN:      [id number]        Account:      [id number]   :      1933           Service Date: 03/10/2021      Document: [id number]

## 2021-04-14 DIAGNOSIS — I48.0 PAROXYSMAL ATRIAL FIBRILLATION (H): ICD-10-CM

## 2021-04-14 RX ORDER — AMIODARONE HYDROCHLORIDE 200 MG/1
100 TABLET ORAL DAILY
Qty: 45 TABLET | Refills: 2 | Status: SHIPPED | OUTPATIENT
Start: 2021-04-14 | End: 2022-01-01

## 2021-04-18 DIAGNOSIS — I35.1 SEVERE AORTIC INSUFFICIENCY: ICD-10-CM

## 2021-04-19 RX ORDER — FUROSEMIDE 20 MG
TABLET ORAL
Qty: 90 TABLET | Refills: 3 | Status: SHIPPED | OUTPATIENT
Start: 2021-04-19 | End: 2022-01-01

## 2021-04-19 NOTE — TELEPHONE ENCOUNTER
Routing refill request to provider for review/approval because:  Labs out of range:  Creatinine    Please review and authorize if appropriate.    Thank you,   Manuel FUNEZ RN

## 2021-05-25 ENCOUNTER — HOSPITAL ENCOUNTER (OUTPATIENT)
Dept: CARDIOLOGY | Facility: CLINIC | Age: 86
Discharge: HOME | End: 2021-05-25
Attending: INTERNAL MEDICINE | Admitting: INTERNAL MEDICINE
Payer: MEDICARE

## 2021-05-25 DIAGNOSIS — I50.22 CHRONIC SYSTOLIC CHF (CONGESTIVE HEART FAILURE) (H): ICD-10-CM

## 2021-05-25 DIAGNOSIS — E03.9 HYPOTHYROIDISM, UNSPECIFIED TYPE: ICD-10-CM

## 2021-05-25 LAB
ANION GAP SERPL CALCULATED.3IONS-SCNC: 6 MMOL/L (ref 3–14)
BUN SERPL-MCNC: 34 MG/DL (ref 7–30)
CALCIUM SERPL-MCNC: 9.1 MG/DL (ref 8.5–10.1)
CHLORIDE SERPL-SCNC: 108 MMOL/L (ref 94–109)
CO2 SERPL-SCNC: 28 MMOL/L (ref 20–32)
CREAT SERPL-MCNC: 1.79 MG/DL (ref 0.66–1.25)
GFR SERPL CREATININE-BSD FRML MDRD: 33 ML/MIN/{1.73_M2}
GLUCOSE SERPL-MCNC: 93 MG/DL (ref 70–99)
HGB BLD-MCNC: 11.8 G/DL (ref 13.3–17.7)
POTASSIUM SERPL-SCNC: 4.9 MMOL/L (ref 3.4–5.3)
SODIUM SERPL-SCNC: 142 MMOL/L (ref 133–144)
TSH SERPL DL<=0.005 MIU/L-ACNC: 1.1 MU/L (ref 0.4–4)

## 2021-05-25 PROCEDURE — 36415 COLL VENOUS BLD VENIPUNCTURE: CPT | Performed by: INTERNAL MEDICINE

## 2021-05-25 PROCEDURE — 93306 TTE W/DOPPLER COMPLETE: CPT | Mod: 26 | Performed by: INTERNAL MEDICINE

## 2021-05-25 PROCEDURE — 93306 TTE W/DOPPLER COMPLETE: CPT

## 2021-05-25 PROCEDURE — 84443 ASSAY THYROID STIM HORMONE: CPT | Performed by: INTERNAL MEDICINE

## 2021-05-25 PROCEDURE — 85018 HEMOGLOBIN: CPT | Performed by: INTERNAL MEDICINE

## 2021-05-25 PROCEDURE — 80048 BASIC METABOLIC PNL TOTAL CA: CPT | Performed by: INTERNAL MEDICINE

## 2021-05-27 ENCOUNTER — OFFICE VISIT (OUTPATIENT)
Dept: CARDIOLOGY | Facility: CLINIC | Age: 86
End: 2021-05-27
Attending: INTERNAL MEDICINE
Payer: MEDICARE

## 2021-05-27 VITALS
BODY MASS INDEX: 24.96 KG/M2 | WEIGHT: 159 LBS | HEART RATE: 74 BPM | SYSTOLIC BLOOD PRESSURE: 122 MMHG | HEIGHT: 67 IN | DIASTOLIC BLOOD PRESSURE: 71 MMHG

## 2021-05-27 DIAGNOSIS — I50.22 CHRONIC SYSTOLIC CHF (CONGESTIVE HEART FAILURE) (H): ICD-10-CM

## 2021-05-27 DIAGNOSIS — I42.0 DILATED CARDIOMYOPATHY (H): ICD-10-CM

## 2021-05-27 DIAGNOSIS — Z95.0 CARDIAC PACEMAKER IN SITU: ICD-10-CM

## 2021-05-27 DIAGNOSIS — I48.0 PAROXYSMAL ATRIAL FIBRILLATION (H): Primary | ICD-10-CM

## 2021-05-27 DIAGNOSIS — I10 ESSENTIAL HYPERTENSION: ICD-10-CM

## 2021-05-27 DIAGNOSIS — I77.810 AORTIC ROOT DILATATION (H): ICD-10-CM

## 2021-05-27 PROCEDURE — 99214 OFFICE O/P EST MOD 30 MIN: CPT | Performed by: INTERNAL MEDICINE

## 2021-05-27 ASSESSMENT — MIFFLIN-ST. JEOR: SCORE: 1349.85

## 2021-05-27 NOTE — LETTER
5/27/2021    Mumtaz Hernandez MD  [address]  [place] [address]    RE: Lico Woo       Dear Colleague,    I had the pleasure of seeing Lico Woo in the [facility].    HPI and Plan:   See dictation    Orders Placed This Encounter   Procedures     Basic metabolic panel     TSH with free T4 reflex     Hemoglobin     [facility] with [facility] - Return MD visit       Orders Placed This Encounter   Medications     apixaban ANTICOAGULANT (ELIQUIS) 2.5 MG tablet     Sig: Take 1 tablet (2.5 mg) by mouth 2 times daily     Dispense:  180 tablet     Refill:  3       Medications Discontinued During This Encounter   Medication Reason     levothyroxine (SYNTHROID/LEVOTHROID) 125 MCG tablet      aspirin (ASA) 81 MG EC tablet          Encounter Diagnoses   Name Primary?     Chronic systolic CHF (congestive heart failure) (H)      Paroxysmal atrial fibrillation (H) Yes     Cardiac pacemaker in situ      Dilated cardiomyopathy (H)      Essential hypertension      Aortic root dilatation (H)        CURRENT MEDICATIONS:  Current Outpatient Medications   Medication Sig Dispense Refill     acetaminophen (TYLENOL) 500 MG tablet Take 500-1,000 mg by mouth every 6 hours as needed for mild pain       allopurinol (ZYLOPRIM) 300 MG tablet TAKE 1 TABLET BY MOUTH  DAILY 90 tablet 3     alpha-lipoic acid 600 MG capsule Take by mouth daily        amiodarone (PACERONE) 200 MG tablet Take 0.5 tablets (100 mg) by mouth daily 45 tablet 2     apixaban ANTICOAGULANT (ELIQUIS) 2.5 MG tablet Take 1 tablet (2.5 mg) by mouth 2 times daily 180 tablet 3     carvedilol (COREG) 25 MG tablet Take 1 tablet (25 mg) by mouth 2 times daily (with meals) 180 tablet 1     cholecalciferol (VITAMIN D3) 5000 units TABS tablet Take 2,000 Units by mouth daily       Cyanocobalamin (VITAMIN B 12 PO) Take 2,500 mcg by mouth       furosemide (LASIX) 20 MG tablet TAKE 1 TABLET BY MOUTH  DAILY 90 tablet  3     levothyroxine (SYNTHROID/LEVOTHROID) 112 MCG tablet Take 1 tablet (112 mcg) by mouth daily 90 tablet 3     losartan (COZAAR) 100 MG tablet Take 0.5 tablets (50 mg) by mouth daily 90 tablet 3     multivitamin (CENTRUM SILVER) tablet Take 1 tablet by mouth daily       Niacin (VITAMIN B-3 OR) Take 500 mg by mouth       potassium chloride ER (KLOR-CON M) 10 MEQ CR tablet TAKE 1 TABLET BY MOUTH  TWICE DAILY 180 tablet 1     Pyridoxine HCl (VITAMIN B6 PO) Take 150 mg by mouth        amoxicillin (AMOXIL) 500 MG capsule Take 4 pills one hour prior to dental work (Patient not taking: Reported on 8/19/2020) 4 capsule 3     levothyroxine (SYNTHROID/LEVOTHROID) 100 MCG tablet Take 100 mcg by mouth daily         ALLERGIES   No Known Allergies    PAST MEDICAL HISTORY:  Past Medical History:   Diagnosis Date     Aortic regurgitation     mild-moderate per 10/2018 Echo     Aortic stenosis     mild per 10/2018 Echo     Balance problem     few years     Cardiomyopathy (H) 10/2018    [place]; Echo 10/2018, EF 25%, down from 50% 12/2017, per notes, pt declines further work up to find etiology of drop in EF     Chronic atrial fibrillation (H) 2018    cardioversion 4/19     Chronic systolic CHF (congestive heart failure) (H)      CKD (chronic kidney disease) stage 3, GFR 30-59 ml/min      Essential hypertension      Frequent falls      Gross hematuria 04/2019    cysto, ct neg     History of gout     none for years     Hypothyroidism      MGUS (monoclonal gammopathy of unknown significance) 07/2020    found during eval of falls and neuropathy.  Per curbside consult from [facility] to repeat labs every 6 months: CBC, BMP, SPEP, IgG and kappa-lambda free light chain     Mitral regurgitation     moderate per 10/2018 Echo     Neuropathy 1990    both lower legs     Pacemaker 2018     Prostate cancer (H) 2008    xrt, fine since     Tricuspid regurgitation     mioderate per 10/2018 Echo       PAST SURGICAL HISTORY:  Past Surgical History:    Procedure Laterality Date     bilateral hip replacement  2017    done 6 months apart     IMPLANT PACEMAKER  11/12/2018    CRT-P     lip surgery for skin cancer         FAMILY HISTORY:  Family History   Problem Relation Age of Onset     Myocardial Infarction Mother      Cerebrovascular Disease Father        SOCIAL HISTORY:  Social History     Socioeconomic History     Marital status:      Spouse name: None     Number of children: 3     Years of education: None     Highest education level: None   Occupational History     Occupation: owned marketing services company in [place]   Social Needs     Financial resource strain: None     Food insecurity     Worry: None     Inability: None     Transportation needs     Medical: None     Non-medical: None   Tobacco Use     Smoking status: Never Smoker     Smokeless tobacco: Never Used   Substance and Sexual Activity     Alcohol use: Yes     Comment: 1 drink week     Drug use: Never     Sexual activity: Not Currently   Lifestyle     Physical activity     Days per week: None     Minutes per session: None     Stress: None   Relationships     Social connections     Talks on phone: None     Gets together: None     Attends Zoroastrian service: None     Active member of club or organization: None     Attends meetings of clubs or organizations: None     Relationship status: None     Intimate partner violence     Fear of current or ex partner: None     Emotionally abused: None     Physically abused: None     Forced sexual activity: None   Other Topics Concern     Parent/sibling w/ CABG, MI or angioplasty before 65F 55M? Not Asked   Social History Narrative     None       Review of Systems:  Skin:  Negative       Eyes:  Positive for glasses    ENT:  Negative      Respiratory:  Negative       Cardiovascular:    Positive for    Gastroenterology: Negative      Genitourinary:  Negative      Musculoskeletal:  Negative      Neurologic:  Negative      Psychiatric:  Negative     "  Heme/Lymph/Imm:  Negative      Endocrine:  Positive for thyroid disorder      Physical Exam:  Vitals: /71 (BP Location: Right arm, Cuff Size: Adult Regular)   Pulse 74   Ht 1.702 m (5' 7\")   Wt 72.1 kg (159 lb)   BMI 24.90 kg/m      Constitutional:  cooperative, alert and oriented, well developed, well nourished, in no acute distress        Skin:  warm and dry to the touch, no apparent skin lesions or masses noted   pacemaker incision in the left infraclavicular area was well-healed      Head:  normocephalic, no masses or lesions        Eyes:  pupils equal and round, conjunctivae and lids unremarkable, sclera white, no xanthalasma, EOMS intact, no nystagmus        Lymph:No Cervical lymphadenopathy present     ENT:  no pallor or cyanosis, dentition good        Neck:  carotid pulses are full and equal bilaterally, JVP normal, no carotid bruit        Respiratory:  normal breath sounds, clear to auscultation, normal A-P diameter, normal symmetry, normal respiratory excursion, no use of accessory muscles         Cardiac: regular rhythm, normal S1/S2, no S3 or S4, apical impulse not displaced, no murmurs, gallops or rubs   distant heart sounds no presence of murmur          pulses full and equal, no bruits auscultated                                        GI:  abdomen soft, non-tender, BS normoactive, no mass, no HSM, no bruits        Extremities and Muscular Skeletal:  no deformities, clubbing, cyanosis, erythema observed stasis pigmentation bilateral LE edema;trace          Neurological:  no gross motor deficits;affect appropriate        Psych:  Alert and Oriented x 3      Thank you for allowing me to participate in the care of your patient.      Sincerely,     RAY DE LEON MD     [facility]  cc:   Ray De Leon MD  [address]  [place] [address]        "

## 2021-05-27 NOTE — PROGRESS NOTES
Service Date: 05/27/2021    HISTORY OF PRESENT ILLNESS:  I had the opportunity to see Mr. Lico Woo in Cardiology Clinic today at [facility] in [place] for reevaluation of chronic systolic heart failure and paroxysmal atrial fibrillation.  Mr. Woo is an 88-year-old gentleman who looks and feels much younger.  He returned from [place] nearly 2 years ago to live closer to family here in [place].  While he was there, he had developed rapid atrial fibrillation, which was likely prolonged and resulted in development of a severe cardiomyopathy.  He was cardioverted and started on amiodarone and has maintained sinus rhythm consistently since that time.  We reviewed his rhythm regularly through his biventricular pacemaker.  Unfortunately, his left ventricular function has failed to normalize.  It has improved somewhat and now appears stable with an ejection fraction of 30%-35%.  He had an echocardiogram again here recently on 05/25/2021 which shows some slight improvement since his 12/2020 echocardiogram, which had suggested a slight dip in his ejection fraction.  He has fairly severe pulmonary hypertension and mild mitral regurgitation with mild-to-moderate tricuspid regurgitation.    He exercises 6 days a week and says he feels great while exercising.  He uses a NuStep type recumbent bike and is able to exercise vigorously for 30 minutes without any chest pain, shortness of breath, palpitations, lightheadedness or syncope issues.    We have seen a few episodes of brief nonsustained ventricular tachycardia on his pacemaker checks, lasting no more than 11 beats.  We have not seen any recurrent atrial fibrillation and no more prolonged episodes of ventricular arrhythmia.    He was falling quite a bit last year because of balance difficulties and we ended up stopping his Eliquis because of concern about bleeding related to injury.  Since 08/20/2020, he has not had any further falling episodes.    He  brings a list of blood pressures with him today from home which are all excellent, consistent with what we are seeing here in clinic.    PHYSICAL EXAMINATION:  Today his blood pressure is 122/71, heart rate 74 and weight 159 pounds.  His lungs are clear.  His heart rhythm is regular.  He has no significant cardiac murmurs or carotid bruits.  No edema.    IMPRESSIONS:  Mr. Lei Woo is an 88-year-old gentleman with chronic systolic heart failure due to cardiomyopathy of unclear etiology.  We have not proceeded with coronary angiography since he has moderate chronic kidney disease.  Fortunately, that issue is stable, currently with a creatinine of 1.79, but I do not think the risk of contrast dye is justified at this point since he seems to be doing quite well without any anginal symptoms.  We will continue to treat his cardiomyopathy with medical therapy and cardiac resynchronization pacemaker therapy.  Since he has not fallen for more than 6 months and has a history of atrial fibrillation, I can no longer justify withholding anticoagulation.  I recommended that he restart Eliquis 2.5 mg p.o. b.i.d., at the lower dose due to his age and kidney function.  He is agreeable to that and I recommended that he stop his aspirin to avoid GI bleeding problems.    We briefly discussed the option of upgrading his CRT-P device to a CRT-D device, but he is not interested in that right now.  He does not think the low risk of a significant cardiac event would justify the risks of the procedure.  I tend to agree with him.  For now, we will continue his medical management and I will plan to see him back again in 6 months.  We will keep an eye on his rhythms on his pacemaker in the meantime, and if we see more significant ventricular arrhythmias, we can reconsider the defibrillator issue.    cc:  Mumtaz Hernandez MD  [facility]  [address]  [place]  [address]    Ray De Leon MD, [facility]        D: 05/27/2021   T:  2021   MT: aye    Name:     KYLER THOMAS  MRN:      [id number]        Account:      [id number]   :      1933           Service Date: 2021       Document: [id number]

## 2021-05-27 NOTE — PROGRESS NOTES
HPI and Plan:   See dictation    Orders Placed This Encounter   Procedures     Basic metabolic panel     TSH with free T4 reflex     Hemoglobin     [facility] with [facility] - Return MD visit       Orders Placed This Encounter   Medications     apixaban ANTICOAGULANT (ELIQUIS) 2.5 MG tablet     Sig: Take 1 tablet (2.5 mg) by mouth 2 times daily     Dispense:  180 tablet     Refill:  3       Medications Discontinued During This Encounter   Medication Reason     levothyroxine (SYNTHROID/LEVOTHROID) 125 MCG tablet      aspirin (ASA) 81 MG EC tablet          Encounter Diagnoses   Name Primary?     Chronic systolic CHF (congestive heart failure) (H)      Paroxysmal atrial fibrillation (H) Yes     Cardiac pacemaker in situ      Dilated cardiomyopathy (H)      Essential hypertension      Aortic root dilatation (H)        CURRENT MEDICATIONS:  Current Outpatient Medications   Medication Sig Dispense Refill     acetaminophen (TYLENOL) 500 MG tablet Take 500-1,000 mg by mouth every 6 hours as needed for mild pain       allopurinol (ZYLOPRIM) 300 MG tablet TAKE 1 TABLET BY MOUTH  DAILY 90 tablet 3     alpha-lipoic acid 600 MG capsule Take by mouth daily        amiodarone (PACERONE) 200 MG tablet Take 0.5 tablets (100 mg) by mouth daily 45 tablet 2     apixaban ANTICOAGULANT (ELIQUIS) 2.5 MG tablet Take 1 tablet (2.5 mg) by mouth 2 times daily 180 tablet 3     carvedilol (COREG) 25 MG tablet Take 1 tablet (25 mg) by mouth 2 times daily (with meals) 180 tablet 1     cholecalciferol (VITAMIN D3) 5000 units TABS tablet Take 2,000 Units by mouth daily       Cyanocobalamin (VITAMIN B 12 PO) Take 2,500 mcg by mouth       furosemide (LASIX) 20 MG tablet TAKE 1 TABLET BY MOUTH  DAILY 90 tablet 3     levothyroxine (SYNTHROID/LEVOTHROID) 112 MCG tablet Take 1 tablet (112 mcg) by mouth daily 90 tablet 3     losartan (COZAAR) 100 MG tablet Take 0.5 tablets (50 mg) by mouth daily 90 tablet 3     multivitamin (CENTRUM SILVER) tablet Take 1  tablet by mouth daily       Niacin (VITAMIN B-3 OR) Take 500 mg by mouth       potassium chloride ER (KLOR-CON M) 10 MEQ CR tablet TAKE 1 TABLET BY MOUTH  TWICE DAILY 180 tablet 1     Pyridoxine HCl (VITAMIN B6 PO) Take 150 mg by mouth        amoxicillin (AMOXIL) 500 MG capsule Take 4 pills one hour prior to dental work (Patient not taking: Reported on 8/19/2020) 4 capsule 3     levothyroxine (SYNTHROID/LEVOTHROID) 100 MCG tablet Take 100 mcg by mouth daily         ALLERGIES   No Known Allergies    PAST MEDICAL HISTORY:  Past Medical History:   Diagnosis Date     Aortic regurgitation     mild-moderate per 10/2018 Echo     Aortic stenosis     mild per 10/2018 Echo     Balance problem     few years     Cardiomyopathy (H) 10/2018    [place]; Echo 10/2018, EF 25%, down from 50% 12/2017, per notes, pt declines further work up to find etiology of drop in EF     Chronic atrial fibrillation (H) 2018    cardioversion 4/19     Chronic systolic CHF (congestive heart failure) (H)      CKD (chronic kidney disease) stage 3, GFR 30-59 ml/min      Essential hypertension      Frequent falls      Gross hematuria 04/2019    cysto, ct neg     History of gout     none for years     Hypothyroidism      MGUS (monoclonal gammopathy of unknown significance) 07/2020    found during eval of falls and neuropathy.  Per curbside consult from [facility] to repeat labs every 6 months: CBC, BMP, SPEP, IgG and kappa-lambda free light chain     Mitral regurgitation     moderate per 10/2018 Echo     Neuropathy 1990    both lower legs     Pacemaker 2018     Prostate cancer (H) 2008    xrt, fine since     Tricuspid regurgitation     mioderate per 10/2018 Echo       PAST SURGICAL HISTORY:  Past Surgical History:   Procedure Laterality Date     bilateral hip replacement  2017    done 6 months apart     IMPLANT PACEMAKER  11/12/2018    CRT-P     lip surgery for skin cancer         FAMILY HISTORY:  Family History   Problem Relation Age of Onset     Myocardial  "Infarction Mother      Cerebrovascular Disease Father        SOCIAL HISTORY:  Social History     Socioeconomic History     Marital status:      Spouse name: None     Number of children: 3     Years of education: None     Highest education level: None   Occupational History     Occupation: owned marketing services company in [place]   Social Needs     Financial resource strain: None     Food insecurity     Worry: None     Inability: None     Transportation needs     Medical: None     Non-medical: None   Tobacco Use     Smoking status: Never Smoker     Smokeless tobacco: Never Used   Substance and Sexual Activity     Alcohol use: Yes     Comment: 1 drink week     Drug use: Never     Sexual activity: Not Currently   Lifestyle     Physical activity     Days per week: None     Minutes per session: None     Stress: None   Relationships     Social connections     Talks on phone: None     Gets together: None     Attends Mu-ism service: None     Active member of club or organization: None     Attends meetings of clubs or organizations: None     Relationship status: None     Intimate partner violence     Fear of current or ex partner: None     Emotionally abused: None     Physically abused: None     Forced sexual activity: None   Other Topics Concern     Parent/sibling w/ CABG, MI or angioplasty before 65F 55M? Not Asked   Social History Narrative     None       Review of Systems:  Skin:  Negative       Eyes:  Positive for glasses    ENT:  Negative      Respiratory:  Negative       Cardiovascular:    Positive for    Gastroenterology: Negative      Genitourinary:  Negative      Musculoskeletal:  Negative      Neurologic:  Negative      Psychiatric:  Negative      Heme/Lymph/Imm:  Negative      Endocrine:  Positive for thyroid disorder      Physical Exam:  Vitals: /71 (BP Location: Right arm, Cuff Size: Adult Regular)   Pulse 74   Ht 1.702 m (5' 7\")   Wt 72.1 kg (159 lb)   BMI 24.90 kg/m      Constitutional:  " cooperative, alert and oriented, well developed, well nourished, in no acute distress        Skin:  warm and dry to the touch, no apparent skin lesions or masses noted   pacemaker incision in the left infraclavicular area was well-healed      Head:  normocephalic, no masses or lesions        Eyes:  pupils equal and round, conjunctivae and lids unremarkable, sclera white, no xanthalasma, EOMS intact, no nystagmus        Lymph:No Cervical lymphadenopathy present     ENT:  no pallor or cyanosis, dentition good        Neck:  carotid pulses are full and equal bilaterally, JVP normal, no carotid bruit        Respiratory:  normal breath sounds, clear to auscultation, normal A-P diameter, normal symmetry, normal respiratory excursion, no use of accessory muscles         Cardiac: regular rhythm, normal S1/S2, no S3 or S4, apical impulse not displaced, no murmurs, gallops or rubs   distant heart sounds no presence of murmur          pulses full and equal, no bruits auscultated                                        GI:  abdomen soft, non-tender, BS normoactive, no mass, no HSM, no bruits        Extremities and Muscular Skeletal:  no deformities, clubbing, cyanosis, erythema observed stasis pigmentation bilateral LE edema;trace          Neurological:  no gross motor deficits;affect appropriate        Psych:  Alert and Oriented x 3        CC  Ray De Leon MD  [address]  [place] [address]

## 2021-06-03 ENCOUNTER — ANCILLARY PROCEDURE (OUTPATIENT)
Dept: CARDIOLOGY | Facility: CLINIC | Age: 86
End: 2021-06-03
Attending: INTERNAL MEDICINE
Payer: MEDICARE

## 2021-06-03 DIAGNOSIS — Z95.0 CARDIAC PACEMAKER IN SITU: ICD-10-CM

## 2021-06-03 PROCEDURE — 93294 REM INTERROG EVL PM/LDLS PM: CPT | Performed by: INTERNAL MEDICINE

## 2021-06-03 PROCEDURE — 93296 REM INTERROG EVL PM/IDS: CPT | Performed by: INTERNAL MEDICINE

## 2021-06-04 ENCOUNTER — CARE COORDINATION (OUTPATIENT)
Dept: CARDIOLOGY | Facility: CLINIC | Age: 86
End: 2021-06-04

## 2021-06-04 DIAGNOSIS — I42.0 DILATED CARDIOMYOPATHY (H): ICD-10-CM

## 2021-06-04 RX ORDER — CARVEDILOL 25 MG/1
25 TABLET ORAL 2 TIMES DAILY WITH MEALS
Qty: 180 TABLET | Refills: 3 | Status: SHIPPED | OUTPATIENT
Start: 2021-06-04 | End: 2022-01-01

## 2021-06-04 NOTE — PROGRESS NOTES
Pt left message stating he is scheduled for oral surgery on 6/8 and needs to hold his eliquis for 3 days prior. He wants to make sure  is ok with him holding it. Will update . Dee MONROE June 4, 2021, 3:15 PM

## 2021-06-04 NOTE — PROGRESS NOTES
I called pt and let him know  is ok with him hold eliquis for oral surgery as needed per his surgeon. Pt should resume eliquis when bleeding risk is low. Dee MONROE June 4, 2021, 4:04 PM

## 2021-06-04 NOTE — TELEPHONE ENCOUNTER
OK to hold anticoagulation for oal surgery as needed by his surgeon. Restart when bleeding risk is low. EE

## 2021-06-07 LAB
MDC_IDC_EPISODE_DTM: NORMAL
MDC_IDC_EPISODE_DURATION: 5 S
MDC_IDC_EPISODE_ID: 5011
MDC_IDC_EPISODE_TYPE: NORMAL
MDC_IDC_LEAD_IMPLANT_DT: NORMAL
MDC_IDC_LEAD_LOCATION: NORMAL
MDC_IDC_LEAD_LOCATION_DETAIL_1: NORMAL
MDC_IDC_LEAD_MFG: NORMAL
MDC_IDC_LEAD_MODEL: NORMAL
MDC_IDC_LEAD_POLARITY_TYPE: NORMAL
MDC_IDC_LEAD_SERIAL: NORMAL
MDC_IDC_MSMT_BATTERY_DTM: NORMAL
MDC_IDC_MSMT_BATTERY_REMAINING_LONGEVITY: 88 MO
MDC_IDC_MSMT_BATTERY_RRT_TRIGGER: 2.6
MDC_IDC_MSMT_BATTERY_STATUS: NORMAL
MDC_IDC_MSMT_BATTERY_VOLTAGE: 2.98 V
MDC_IDC_MSMT_LEADCHNL_LV_IMPEDANCE_VALUE: 266 OHM
MDC_IDC_MSMT_LEADCHNL_LV_IMPEDANCE_VALUE: 418 OHM
MDC_IDC_MSMT_LEADCHNL_LV_IMPEDANCE_VALUE: 418 OHM
MDC_IDC_MSMT_LEADCHNL_LV_IMPEDANCE_VALUE: 437 OHM
MDC_IDC_MSMT_LEADCHNL_LV_IMPEDANCE_VALUE: 589 OHM
MDC_IDC_MSMT_LEADCHNL_LV_IMPEDANCE_VALUE: 589 OHM
MDC_IDC_MSMT_LEADCHNL_LV_IMPEDANCE_VALUE: 608 OHM
MDC_IDC_MSMT_LEADCHNL_LV_IMPEDANCE_VALUE: 665 OHM
MDC_IDC_MSMT_LEADCHNL_LV_IMPEDANCE_VALUE: 760 OHM
MDC_IDC_MSMT_LEADCHNL_LV_IMPEDANCE_VALUE: 779 OHM
MDC_IDC_MSMT_LEADCHNL_LV_PACING_THRESHOLD_AMPLITUDE: 0.88 V
MDC_IDC_MSMT_LEADCHNL_LV_PACING_THRESHOLD_PULSEWIDTH: 1 MS
MDC_IDC_MSMT_LEADCHNL_RA_IMPEDANCE_VALUE: 285 OHM
MDC_IDC_MSMT_LEADCHNL_RA_IMPEDANCE_VALUE: 399 OHM
MDC_IDC_MSMT_LEADCHNL_RA_PACING_THRESHOLD_AMPLITUDE: 0.75 V
MDC_IDC_MSMT_LEADCHNL_RA_PACING_THRESHOLD_PULSEWIDTH: 0.4 MS
MDC_IDC_MSMT_LEADCHNL_RA_SENSING_INTR_AMPL: 1.5 MV
MDC_IDC_MSMT_LEADCHNL_RA_SENSING_INTR_AMPL: 1.5 MV
MDC_IDC_MSMT_LEADCHNL_RV_IMPEDANCE_VALUE: 304 OHM
MDC_IDC_MSMT_LEADCHNL_RV_IMPEDANCE_VALUE: 532 OHM
MDC_IDC_MSMT_LEADCHNL_RV_PACING_THRESHOLD_AMPLITUDE: 0.75 V
MDC_IDC_MSMT_LEADCHNL_RV_PACING_THRESHOLD_PULSEWIDTH: 0.4 MS
MDC_IDC_MSMT_LEADCHNL_RV_SENSING_INTR_AMPL: 4.38 MV
MDC_IDC_MSMT_LEADCHNL_RV_SENSING_INTR_AMPL: 4.38 MV
MDC_IDC_PG_IMPLANT_DTM: NORMAL
MDC_IDC_PG_MFG: NORMAL
MDC_IDC_PG_MODEL: NORMAL
MDC_IDC_PG_SERIAL: NORMAL
MDC_IDC_PG_TYPE: NORMAL
MDC_IDC_SESS_CLINIC_NAME: NORMAL
MDC_IDC_SESS_DTM: NORMAL
MDC_IDC_SESS_TYPE: NORMAL
MDC_IDC_SET_BRADY_AT_MODE_SWITCH_RATE: 171 {BEATS}/MIN
MDC_IDC_SET_BRADY_LOWRATE: 70 {BEATS}/MIN
MDC_IDC_SET_BRADY_MAX_SENSOR_RATE: 115 {BEATS}/MIN
MDC_IDC_SET_BRADY_MAX_TRACKING_RATE: 115 {BEATS}/MIN
MDC_IDC_SET_BRADY_MODE: NORMAL
MDC_IDC_SET_BRADY_PAV_DELAY_HIGH: 100 MS
MDC_IDC_SET_BRADY_PAV_DELAY_LOW: 130 MS
MDC_IDC_SET_BRADY_SAV_DELAY_HIGH: 70 MS
MDC_IDC_SET_BRADY_SAV_DELAY_LOW: 100 MS
MDC_IDC_SET_CRT_LVRV_DELAY: 20 MS
MDC_IDC_SET_CRT_PACED_CHAMBERS: NORMAL
MDC_IDC_SET_LEADCHNL_LV_PACING_AMPLITUDE: 1.5 V
MDC_IDC_SET_LEADCHNL_LV_PACING_ANODE_ELECTRODE_1: NORMAL
MDC_IDC_SET_LEADCHNL_LV_PACING_ANODE_LOCATION_1: NORMAL
MDC_IDC_SET_LEADCHNL_LV_PACING_CAPTURE_MODE: NORMAL
MDC_IDC_SET_LEADCHNL_LV_PACING_CATHODE_ELECTRODE_1: NORMAL
MDC_IDC_SET_LEADCHNL_LV_PACING_CATHODE_LOCATION_1: NORMAL
MDC_IDC_SET_LEADCHNL_LV_PACING_POLARITY: NORMAL
MDC_IDC_SET_LEADCHNL_LV_PACING_PULSEWIDTH: 1 MS
MDC_IDC_SET_LEADCHNL_RA_PACING_AMPLITUDE: 1.75 V
MDC_IDC_SET_LEADCHNL_RA_PACING_ANODE_ELECTRODE_1: NORMAL
MDC_IDC_SET_LEADCHNL_RA_PACING_ANODE_LOCATION_1: NORMAL
MDC_IDC_SET_LEADCHNL_RA_PACING_CAPTURE_MODE: NORMAL
MDC_IDC_SET_LEADCHNL_RA_PACING_CATHODE_ELECTRODE_1: NORMAL
MDC_IDC_SET_LEADCHNL_RA_PACING_CATHODE_LOCATION_1: NORMAL
MDC_IDC_SET_LEADCHNL_RA_PACING_POLARITY: NORMAL
MDC_IDC_SET_LEADCHNL_RA_PACING_PULSEWIDTH: 0.4 MS
MDC_IDC_SET_LEADCHNL_RA_SENSING_ANODE_ELECTRODE_1: NORMAL
MDC_IDC_SET_LEADCHNL_RA_SENSING_ANODE_LOCATION_1: NORMAL
MDC_IDC_SET_LEADCHNL_RA_SENSING_CATHODE_ELECTRODE_1: NORMAL
MDC_IDC_SET_LEADCHNL_RA_SENSING_CATHODE_LOCATION_1: NORMAL
MDC_IDC_SET_LEADCHNL_RA_SENSING_POLARITY: NORMAL
MDC_IDC_SET_LEADCHNL_RA_SENSING_SENSITIVITY: 0.6 MV
MDC_IDC_SET_LEADCHNL_RV_PACING_AMPLITUDE: 2 V
MDC_IDC_SET_LEADCHNL_RV_PACING_ANODE_ELECTRODE_1: NORMAL
MDC_IDC_SET_LEADCHNL_RV_PACING_ANODE_LOCATION_1: NORMAL
MDC_IDC_SET_LEADCHNL_RV_PACING_CAPTURE_MODE: NORMAL
MDC_IDC_SET_LEADCHNL_RV_PACING_CATHODE_ELECTRODE_1: NORMAL
MDC_IDC_SET_LEADCHNL_RV_PACING_CATHODE_LOCATION_1: NORMAL
MDC_IDC_SET_LEADCHNL_RV_PACING_POLARITY: NORMAL
MDC_IDC_SET_LEADCHNL_RV_PACING_PULSEWIDTH: 0.4 MS
MDC_IDC_SET_LEADCHNL_RV_SENSING_ANODE_ELECTRODE_1: NORMAL
MDC_IDC_SET_LEADCHNL_RV_SENSING_ANODE_LOCATION_1: NORMAL
MDC_IDC_SET_LEADCHNL_RV_SENSING_CATHODE_ELECTRODE_1: NORMAL
MDC_IDC_SET_LEADCHNL_RV_SENSING_CATHODE_LOCATION_1: NORMAL
MDC_IDC_SET_LEADCHNL_RV_SENSING_POLARITY: NORMAL
MDC_IDC_SET_LEADCHNL_RV_SENSING_SENSITIVITY: 0.9 MV
MDC_IDC_SET_ZONE_DETECTION_INTERVAL: 350 MS
MDC_IDC_SET_ZONE_DETECTION_INTERVAL: 400 MS
MDC_IDC_SET_ZONE_TYPE: NORMAL
MDC_IDC_STAT_BRADY_AP_VP_PERCENT: 99.56 %
MDC_IDC_STAT_BRADY_AP_VS_PERCENT: 0.1 %
MDC_IDC_STAT_BRADY_AS_VP_PERCENT: 0.17 %
MDC_IDC_STAT_BRADY_AS_VS_PERCENT: 0.17 %
MDC_IDC_STAT_BRADY_DTM_END: NORMAL
MDC_IDC_STAT_BRADY_DTM_START: NORMAL
MDC_IDC_STAT_BRADY_RA_PERCENT_PACED: 99.82 %
MDC_IDC_STAT_BRADY_RV_PERCENT_PACED: 99.72 %
MDC_IDC_STAT_CRT_DTM_END: NORMAL
MDC_IDC_STAT_CRT_DTM_START: NORMAL
MDC_IDC_STAT_CRT_LV_PERCENT_PACED: 99.69 %
MDC_IDC_STAT_CRT_PERCENT_PACED: 99.69 %
MDC_IDC_STAT_EPISODE_RECENT_COUNT: 0
MDC_IDC_STAT_EPISODE_RECENT_COUNT_DTM_END: NORMAL
MDC_IDC_STAT_EPISODE_RECENT_COUNT_DTM_START: NORMAL
MDC_IDC_STAT_EPISODE_TOTAL_COUNT: 1
MDC_IDC_STAT_EPISODE_TOTAL_COUNT: 29
MDC_IDC_STAT_EPISODE_TOTAL_COUNT: 46
MDC_IDC_STAT_EPISODE_TOTAL_COUNT_DTM_END: NORMAL
MDC_IDC_STAT_EPISODE_TOTAL_COUNT_DTM_START: NORMAL
MDC_IDC_STAT_EPISODE_TYPE: NORMAL

## 2021-06-30 DIAGNOSIS — I50.22 CHRONIC SYSTOLIC CHF (CONGESTIVE HEART FAILURE) (H): ICD-10-CM

## 2021-06-30 RX ORDER — LOSARTAN POTASSIUM 100 MG/1
50 TABLET ORAL DAILY
Qty: 45 TABLET | Refills: 0 | Status: SHIPPED | OUTPATIENT
Start: 2021-06-30 | End: 2021-09-22

## 2021-07-29 DIAGNOSIS — Z96.60 HISTORY OF JOINT REPLACEMENT, UNSPECIFIED JOINT: ICD-10-CM

## 2021-07-29 RX ORDER — AMOXICILLIN 500 MG/1
CAPSULE ORAL
Qty: 4 CAPSULE | Refills: 3 | Status: SHIPPED | OUTPATIENT
Start: 2021-07-29 | End: 2022-01-19

## 2021-07-29 NOTE — TELEPHONE ENCOUNTER
Patient calling to request new Rx Amoxicillin prior to dental work.     Hx: both hips replaced. Also mentions has a pacemaker    Rx and pharmacy pended.     Patient aware that PCP might determine Rx abx no longer advised prior to dental work and that we will follow up with patient if no longer needed. .      [phone]  May leave detailed msg/orders on voice mail.     Radha CANO, RN      July 29, 2021  4:01 PM

## 2021-09-09 ENCOUNTER — ANCILLARY PROCEDURE (OUTPATIENT)
Dept: CARDIOLOGY | Facility: CLINIC | Age: 86
End: 2021-09-09
Attending: INTERNAL MEDICINE
Payer: MEDICARE

## 2021-09-09 DIAGNOSIS — Z95.0 CARDIAC PACEMAKER IN SITU: ICD-10-CM

## 2021-09-09 PROCEDURE — 93294 REM INTERROG EVL PM/LDLS PM: CPT | Performed by: INTERNAL MEDICINE

## 2021-09-09 PROCEDURE — 93296 REM INTERROG EVL PM/IDS: CPT | Performed by: INTERNAL MEDICINE

## 2021-09-14 LAB
MDC_IDC_EPISODE_DTM: NORMAL
MDC_IDC_EPISODE_DURATION: 0 S
MDC_IDC_EPISODE_DURATION: 7 S
MDC_IDC_EPISODE_DURATION: 8 S
MDC_IDC_EPISODE_ID: 5012
MDC_IDC_EPISODE_ID: 5013
MDC_IDC_EPISODE_ID: 78
MDC_IDC_EPISODE_TYPE: NORMAL
MDC_IDC_LEAD_IMPLANT_DT: NORMAL
MDC_IDC_LEAD_LOCATION: NORMAL
MDC_IDC_LEAD_LOCATION_DETAIL_1: NORMAL
MDC_IDC_LEAD_MFG: NORMAL
MDC_IDC_LEAD_MODEL: NORMAL
MDC_IDC_LEAD_POLARITY_TYPE: NORMAL
MDC_IDC_LEAD_SERIAL: NORMAL
MDC_IDC_MSMT_BATTERY_DTM: NORMAL
MDC_IDC_MSMT_BATTERY_REMAINING_LONGEVITY: 84 MO
MDC_IDC_MSMT_BATTERY_RRT_TRIGGER: 2.6
MDC_IDC_MSMT_BATTERY_STATUS: NORMAL
MDC_IDC_MSMT_BATTERY_VOLTAGE: 2.98 V
MDC_IDC_MSMT_LEADCHNL_LV_IMPEDANCE_VALUE: 266 OHM
MDC_IDC_MSMT_LEADCHNL_LV_IMPEDANCE_VALUE: 418 OHM
MDC_IDC_MSMT_LEADCHNL_LV_IMPEDANCE_VALUE: 437 OHM
MDC_IDC_MSMT_LEADCHNL_LV_IMPEDANCE_VALUE: 437 OHM
MDC_IDC_MSMT_LEADCHNL_LV_IMPEDANCE_VALUE: 570 OHM
MDC_IDC_MSMT_LEADCHNL_LV_IMPEDANCE_VALUE: 608 OHM
MDC_IDC_MSMT_LEADCHNL_LV_IMPEDANCE_VALUE: 627 OHM
MDC_IDC_MSMT_LEADCHNL_LV_IMPEDANCE_VALUE: 646 OHM
MDC_IDC_MSMT_LEADCHNL_LV_IMPEDANCE_VALUE: 779 OHM
MDC_IDC_MSMT_LEADCHNL_LV_IMPEDANCE_VALUE: 779 OHM
MDC_IDC_MSMT_LEADCHNL_LV_PACING_THRESHOLD_AMPLITUDE: 0.88 V
MDC_IDC_MSMT_LEADCHNL_LV_PACING_THRESHOLD_PULSEWIDTH: 1 MS
MDC_IDC_MSMT_LEADCHNL_RA_IMPEDANCE_VALUE: 285 OHM
MDC_IDC_MSMT_LEADCHNL_RA_IMPEDANCE_VALUE: 399 OHM
MDC_IDC_MSMT_LEADCHNL_RA_PACING_THRESHOLD_AMPLITUDE: 0.88 V
MDC_IDC_MSMT_LEADCHNL_RA_PACING_THRESHOLD_PULSEWIDTH: 0.4 MS
MDC_IDC_MSMT_LEADCHNL_RA_SENSING_INTR_AMPL: 1.88 MV
MDC_IDC_MSMT_LEADCHNL_RA_SENSING_INTR_AMPL: 1.88 MV
MDC_IDC_MSMT_LEADCHNL_RV_IMPEDANCE_VALUE: 304 OHM
MDC_IDC_MSMT_LEADCHNL_RV_IMPEDANCE_VALUE: 532 OHM
MDC_IDC_MSMT_LEADCHNL_RV_PACING_THRESHOLD_AMPLITUDE: 0.75 V
MDC_IDC_MSMT_LEADCHNL_RV_PACING_THRESHOLD_PULSEWIDTH: 0.4 MS
MDC_IDC_MSMT_LEADCHNL_RV_SENSING_INTR_AMPL: 4.38 MV
MDC_IDC_MSMT_LEADCHNL_RV_SENSING_INTR_AMPL: 4.38 MV
MDC_IDC_PG_IMPLANT_DTM: NORMAL
MDC_IDC_PG_MFG: NORMAL
MDC_IDC_PG_MODEL: NORMAL
MDC_IDC_PG_SERIAL: NORMAL
MDC_IDC_PG_TYPE: NORMAL
MDC_IDC_SESS_CLINIC_NAME: NORMAL
MDC_IDC_SESS_DTM: NORMAL
MDC_IDC_SESS_TYPE: NORMAL
MDC_IDC_SET_BRADY_AT_MODE_SWITCH_RATE: 171 {BEATS}/MIN
MDC_IDC_SET_BRADY_LOWRATE: 70 {BEATS}/MIN
MDC_IDC_SET_BRADY_MAX_SENSOR_RATE: 115 {BEATS}/MIN
MDC_IDC_SET_BRADY_MAX_TRACKING_RATE: 115 {BEATS}/MIN
MDC_IDC_SET_BRADY_MODE: NORMAL
MDC_IDC_SET_BRADY_PAV_DELAY_HIGH: 100 MS
MDC_IDC_SET_BRADY_PAV_DELAY_LOW: 130 MS
MDC_IDC_SET_BRADY_SAV_DELAY_HIGH: 70 MS
MDC_IDC_SET_BRADY_SAV_DELAY_LOW: 100 MS
MDC_IDC_SET_CRT_LVRV_DELAY: 20 MS
MDC_IDC_SET_CRT_PACED_CHAMBERS: NORMAL
MDC_IDC_SET_LEADCHNL_LV_PACING_AMPLITUDE: 1.5 V
MDC_IDC_SET_LEADCHNL_LV_PACING_ANODE_ELECTRODE_1: NORMAL
MDC_IDC_SET_LEADCHNL_LV_PACING_ANODE_LOCATION_1: NORMAL
MDC_IDC_SET_LEADCHNL_LV_PACING_CAPTURE_MODE: NORMAL
MDC_IDC_SET_LEADCHNL_LV_PACING_CATHODE_ELECTRODE_1: NORMAL
MDC_IDC_SET_LEADCHNL_LV_PACING_CATHODE_LOCATION_1: NORMAL
MDC_IDC_SET_LEADCHNL_LV_PACING_POLARITY: NORMAL
MDC_IDC_SET_LEADCHNL_LV_PACING_PULSEWIDTH: 1 MS
MDC_IDC_SET_LEADCHNL_RA_PACING_AMPLITUDE: 1.75 V
MDC_IDC_SET_LEADCHNL_RA_PACING_ANODE_ELECTRODE_1: NORMAL
MDC_IDC_SET_LEADCHNL_RA_PACING_ANODE_LOCATION_1: NORMAL
MDC_IDC_SET_LEADCHNL_RA_PACING_CAPTURE_MODE: NORMAL
MDC_IDC_SET_LEADCHNL_RA_PACING_CATHODE_ELECTRODE_1: NORMAL
MDC_IDC_SET_LEADCHNL_RA_PACING_CATHODE_LOCATION_1: NORMAL
MDC_IDC_SET_LEADCHNL_RA_PACING_POLARITY: NORMAL
MDC_IDC_SET_LEADCHNL_RA_PACING_PULSEWIDTH: 0.4 MS
MDC_IDC_SET_LEADCHNL_RA_SENSING_ANODE_ELECTRODE_1: NORMAL
MDC_IDC_SET_LEADCHNL_RA_SENSING_ANODE_LOCATION_1: NORMAL
MDC_IDC_SET_LEADCHNL_RA_SENSING_CATHODE_ELECTRODE_1: NORMAL
MDC_IDC_SET_LEADCHNL_RA_SENSING_CATHODE_LOCATION_1: NORMAL
MDC_IDC_SET_LEADCHNL_RA_SENSING_POLARITY: NORMAL
MDC_IDC_SET_LEADCHNL_RA_SENSING_SENSITIVITY: 0.6 MV
MDC_IDC_SET_LEADCHNL_RV_PACING_AMPLITUDE: 2 V
MDC_IDC_SET_LEADCHNL_RV_PACING_ANODE_ELECTRODE_1: NORMAL
MDC_IDC_SET_LEADCHNL_RV_PACING_ANODE_LOCATION_1: NORMAL
MDC_IDC_SET_LEADCHNL_RV_PACING_CAPTURE_MODE: NORMAL
MDC_IDC_SET_LEADCHNL_RV_PACING_CATHODE_ELECTRODE_1: NORMAL
MDC_IDC_SET_LEADCHNL_RV_PACING_CATHODE_LOCATION_1: NORMAL
MDC_IDC_SET_LEADCHNL_RV_PACING_POLARITY: NORMAL
MDC_IDC_SET_LEADCHNL_RV_PACING_PULSEWIDTH: 0.4 MS
MDC_IDC_SET_LEADCHNL_RV_SENSING_ANODE_ELECTRODE_1: NORMAL
MDC_IDC_SET_LEADCHNL_RV_SENSING_ANODE_LOCATION_1: NORMAL
MDC_IDC_SET_LEADCHNL_RV_SENSING_CATHODE_ELECTRODE_1: NORMAL
MDC_IDC_SET_LEADCHNL_RV_SENSING_CATHODE_LOCATION_1: NORMAL
MDC_IDC_SET_LEADCHNL_RV_SENSING_POLARITY: NORMAL
MDC_IDC_SET_LEADCHNL_RV_SENSING_SENSITIVITY: 0.9 MV
MDC_IDC_SET_ZONE_DETECTION_INTERVAL: 350 MS
MDC_IDC_SET_ZONE_DETECTION_INTERVAL: 400 MS
MDC_IDC_SET_ZONE_TYPE: NORMAL
MDC_IDC_STAT_AT_BURDEN_PERCENT: 0 %
MDC_IDC_STAT_AT_DTM_END: NORMAL
MDC_IDC_STAT_AT_DTM_START: NORMAL
MDC_IDC_STAT_BRADY_AP_VP_PERCENT: 99.38 %
MDC_IDC_STAT_BRADY_AP_VS_PERCENT: 0.15 %
MDC_IDC_STAT_BRADY_AS_VP_PERCENT: 0.07 %
MDC_IDC_STAT_BRADY_AS_VS_PERCENT: 0.4 %
MDC_IDC_STAT_BRADY_DTM_END: NORMAL
MDC_IDC_STAT_BRADY_DTM_START: NORMAL
MDC_IDC_STAT_BRADY_RA_PERCENT_PACED: 99.92 %
MDC_IDC_STAT_BRADY_RV_PERCENT_PACED: 99.44 %
MDC_IDC_STAT_CRT_DTM_END: NORMAL
MDC_IDC_STAT_CRT_DTM_START: NORMAL
MDC_IDC_STAT_CRT_LV_PERCENT_PACED: 99.42 %
MDC_IDC_STAT_CRT_PERCENT_PACED: 99.42 %
MDC_IDC_STAT_EPISODE_RECENT_COUNT: 0
MDC_IDC_STAT_EPISODE_RECENT_COUNT: 1
MDC_IDC_STAT_EPISODE_RECENT_COUNT_DTM_END: NORMAL
MDC_IDC_STAT_EPISODE_RECENT_COUNT_DTM_START: NORMAL
MDC_IDC_STAT_EPISODE_TOTAL_COUNT: 0
MDC_IDC_STAT_EPISODE_TOTAL_COUNT: 1
MDC_IDC_STAT_EPISODE_TOTAL_COUNT: 1
MDC_IDC_STAT_EPISODE_TOTAL_COUNT: 29
MDC_IDC_STAT_EPISODE_TOTAL_COUNT: 47
MDC_IDC_STAT_EPISODE_TOTAL_COUNT_DTM_END: NORMAL
MDC_IDC_STAT_EPISODE_TOTAL_COUNT_DTM_START: NORMAL
MDC_IDC_STAT_EPISODE_TYPE: NORMAL

## 2021-09-21 DIAGNOSIS — Z87.39 HISTORY OF GOUT: ICD-10-CM

## 2021-09-22 DIAGNOSIS — I50.22 CHRONIC SYSTOLIC CHF (CONGESTIVE HEART FAILURE) (H): ICD-10-CM

## 2021-09-22 RX ORDER — LOSARTAN POTASSIUM 100 MG/1
50 TABLET ORAL DAILY
Qty: 45 TABLET | Refills: 0 | Status: SHIPPED | OUTPATIENT
Start: 2021-09-22 | End: 2021-12-06

## 2021-09-23 NOTE — TELEPHONE ENCOUNTER
Last appointment with Dr. Hernandez 7/24/20.    Needs appointment.    Please call patient to schedule appointment.    Then send refill request to provider.    Thank you,    Tanya Phoenix RN  [facility]

## 2021-09-27 RX ORDER — ALLOPURINOL 300 MG/1
TABLET ORAL
Qty: 90 TABLET | Refills: 0 | Status: SHIPPED | OUTPATIENT
Start: 2021-09-27 | End: 2021-12-06

## 2021-11-09 ENCOUNTER — VIRTUAL VISIT (OUTPATIENT)
Dept: FAMILY MEDICINE | Facility: CLINIC | Age: 86
End: 2021-11-09
Payer: MEDICARE

## 2021-11-09 DIAGNOSIS — R05.9 COUGH: Primary | ICD-10-CM

## 2021-11-09 DIAGNOSIS — C61 PROSTATE CANCER (H): ICD-10-CM

## 2021-11-09 DIAGNOSIS — N18.32 STAGE 3B CHRONIC KIDNEY DISEASE (H): ICD-10-CM

## 2021-11-09 PROCEDURE — 99213 OFFICE O/P EST LOW 20 MIN: CPT | Mod: 95 | Performed by: INTERNAL MEDICINE

## 2021-11-09 NOTE — PROGRESS NOTES
"Will is a 88 year old who is being evaluated via a billable telephone visit.      What phone number would you like to be contacted at? [phone]  How would you like to obtain your AVS? [facility]    Assessment & Plan   Problem List Items Addressed This Visit     None      Visit Diagnoses     Cough    -  Primary    Relevant Orders    Symptomatic COVID-19 Virus (Coronavirus) by PCR Nasopharyngeal (Completed)         No impairment of breathing whatsoever,     Advised to take precautions and preventive measures    Keep well hydrated, anti tussive med prn     advised if any worsening of cough or shortness of breath to seek medical attention in UC or ER.           See Patient Instructions    No follow-ups on file.    Rene Mathews MD  [facility]    Subjective   Will is a 88 year old who presents for the following health issues   HPI   URI, cough,   Possible Covid    Wondering if cold or COVID, Friday fany started w cough all fany and Saturday, wife suggested taking cough drops, cut cough substantially, still coughing today, cough lot of phlegm clear or white,     ?sinus drainage since sleeps on back,   Has \"no reason to believe from lungs,\"       Has some upper body chills, normally has some of those, \"little of runny nose, has regularly such\",     Cough is an alert    [place], 400 people old folks, been extra careful, first time with such symptoms      Review of Systems   Constitutional, HEENT, cardiovascular, pulmonary, gi and gu systems are negative, except as otherwise noted.      Objective           Vitals:  No vitals were obtained today due to virtual visit.    Physical Exam   healthy, alert and no distress  PSYCH: Alert and oriented times 3; coherent speech, normal   rate and volume, able to articulate logical thoughts, able   to abstract reason, no tangential thoughts, no hallucinations   or delusions  His affect is normal  RESP: No cough, no audible wheezing, able to talk " in full sentences  Remainder of exam unable to be completed due to telephone visits    Ancillary Procedure on 09/09/2021   Component Date Value Ref Range Status     Date Time Interrogation Session 09/09/2021 [id number]   Final     Implantable Pulse Generator Manufa* 09/09/2021 Medtronic   Final     Implantable Pulse Generator Model 09/09/2021 [id number] Percepta Quad CRT-P   Final     Implantable Pulse Generator Serial* 09/09/2021 [id number]   Final     Type Interrogation Session 09/09/2021 Remote    Final     Clinic Name 09/09/2021 [facility]   Final     Implantable Pulse Generator Type 09/09/2021 Cardiac Resynchronization Therapy - Pacemaker   Final     Implantable Pulse Generator Implan* 09/09/2021 [id number]   Final     Implantable Lead  09/09/2021 Medtronic   Final     Implantable Lead Model 09/09/2021 4298 Attain Performa MRI SureScan   Final     Implantable Lead Serial Number 09/09/2021 [id number]   Final     Implantable Lead Implant Date 09/09/2021 [id number]   Final     Implantable Lead Polarity Type 09/09/2021 Quadripolar Lead   Final     Implantable Lead Location Detail 1 09/09/2021 UNKNOWN   Final     Implantable Lead Location 09/09/2021 Left Ventricle   Final     Implantable Lead  09/09/2021 Medtronic   Final     Implantable Lead Model 09/09/2021 5076 CapSureFix Novus MRI SureScan   Final     Implantable Lead Serial Number 09/09/2021 [id number]   Final     Implantable Lead Implant Date 09/09/2021 [id number]   Final     Implantable Lead Polarity Type 09/09/2021 Bipolar Lead   Final     Implantable Lead Location Detail 1 09/09/2021 UNKNOWN   Final     Implantable Lead Location 09/09/2021 Right Ventricle   Final     Implantable Lead  09/09/2021 Medtronic   Final     Implantable Lead Model 09/09/2021 5076 CapSureFix Novus MRI SureScan   Final     Implantable Lead Serial Number 09/09/2021 [id number]   Final     Implantable Lead Implant Date 09/09/2021 [id number]   Final      Implantable Lead Polarity Type 09/09/2021 Bipolar Lead   Final     Implantable Lead Location Detail 1 09/09/2021 UNKNOWN   Final     Implantable Lead Location 09/09/2021 Right Atrium   Final     Neo Setting Mode (NBG Code) 09/09/2021 DDDR   Final     Neo Setting Lower Rate Limit 09/09/2021 70  [beats]/min Final     Neo Setting Maximum Tracking Rate 09/09/2021 115  [beats]/min Final     Neo Setting Maximum Sensor Rate 09/09/2021 115  [beats]/min Final     Neo Setting HUGO Delay Low 09/09/2021 100  ms Final     Neo Setting PAV Delay Low 09/09/2021 130  ms Final     Neo Setting PAV Delay High 09/09/2021 100  ms Final     Neo Setting HUGO Delay High 09/09/2021 70  ms Final     Neo Setting AT Mode Switch Rate 09/09/2021 171  [beats]/min Final     Lead Channel Setting Sensing Polar* 09/09/2021 Bipolar   Final     Lead Channel Setting Sensing Anode* 09/09/2021 Right Atrium   Final     Lead Channel Setting Sensing Anode* 09/09/2021 Ring   Final     Lead Channel Setting Sensing Catho* 09/09/2021 Right Atrium   Final     Lead Channel Setting Sensing Catho* 09/09/2021 Tip   Final     Lead Channel Setting Sensing Sensi* 09/09/2021 0.6  mV Final     Lead Channel Setting Sensing Polar* 09/09/2021 Bipolar   Final     Lead Channel Setting Sensing Anode* 09/09/2021 Right Ventricle   Final     Lead Channel Setting Sensing Anode* 09/09/2021 Ring   Final     Lead Channel Setting Sensing Catho* 09/09/2021 Right Ventricle   Final     Lead Channel Setting Sensing Catho* 09/09/2021 Tip   Final     Lead Channel Setting Sensing Sensi* 09/09/2021 0.9  mV Final     Ventricular chambers paced during * 09/09/2021 BiV   Final     CRT LV-RV Delay 09/09/2021 20  ms Final     Lead Channel Setting Pacing Polari* 09/09/2021 Bipolar   Final     Lead Channel Setting Pacing Anode * 09/09/2021 Right Atrium   Final     Lead Channel Setting Pacing Anode * 09/09/2021 Ring   Final     Lead Channel Setting Sensing Catho* 09/09/2021 Right Atrium    Final     Lead Channel Setting Sensing Catho* 09/09/2021 Tip   Final     Lead Channel Setting Pacing Pulse * 09/09/2021 0.4  ms Final     Lead Channel Setting Pacing Amplit* 09/09/2021 1.75  V Final     Lead Channel Setting Pacing Captur* 09/09/2021 Adaptive   Final     Lead Channel Setting Pacing Polari* 09/09/2021 Bipolar   Final     Lead Channel Setting Pacing Anode * 09/09/2021 Right Ventricle   Final     Lead Channel Setting Pacing Anode * 09/09/2021 Ring   Final     Lead Channel Setting Sensing Catho* 09/09/2021 Right Ventricle   Final     Lead Channel Setting Sensing Catho* 09/09/2021 Tip   Final     Lead Channel Setting Pacing Pulse * 09/09/2021 0.4  ms Final     Lead Channel Setting Pacing Amplit* 09/09/2021 2  V Final     Lead Channel Setting Pacing Captur* 09/09/2021 Adaptive   Final     Lead Channel Setting Pacing Polari* 09/09/2021 Bipolar   Final     Lead Channel Setting Pacing Anode * 09/09/2021 Left Ventricle   Final     Lead Channel Setting Pacing Anode * 09/09/2021 Ring2   Final     Lead Channel Setting Sensing Catho* 09/09/2021 Left Ventricle   Final     Lead Channel Setting Sensing Catho* 09/09/2021 Tip   Final     Lead Channel Setting Pacing Pulse * 09/09/2021 1  ms Final     Lead Channel Setting Pacing Amplit* 09/09/2021 1.5  V Final     Lead Channel Setting Pacing Captur* 09/09/2021 Adaptive   Final     Zone Setting Type Category 09/09/2021 VF   Final     Zone Setting Type Category 09/09/2021 VT   Final     Zone Setting Type Category 09/09/2021 VT   Final     Zone Setting Type Category 09/09/2021 VT   Final     Zone Setting Detection Interval 09/09/2021 400  ms Final     Zone Setting Type Category 09/09/2021 ATRIAL_FIBRILLATION   Final     Zone Setting Type Category 09/09/2021 AT/AF   Final     Zone Setting Detection Interval 09/09/2021 350  ms Final     Lead Channel Impedance Value 09/09/2021 399  ohm Final     Lead Channel Impedance Value 09/09/2021 285  ohm Final     Lead Channel Sensing  Intrinsic Amp* 09/09/2021 1.875  mV Final     Lead Channel Sensing Intrinsic Amp* 09/09/2021 1.875  mV Final     Lead Channel Pacing Threshold Ampl* 09/09/2021 0.875  V Final     Lead Channel Pacing Threshold Puls* 09/09/2021 0.4  ms Final     Lead Channel Impedance Value 09/09/2021 532  ohm Final     Lead Channel Impedance Value 09/09/2021 304  ohm Final     Lead Channel Sensing Intrinsic Amp* 09/09/2021 4.375  mV Final     Lead Channel Sensing Intrinsic Amp* 09/09/2021 4.375  mV Final     Lead Channel Pacing Threshold Ampl* 09/09/2021 0.75  V Final     Lead Channel Pacing Threshold Puls* 09/09/2021 0.4  ms Final     Lead Channel Impedance Value 09/09/2021 437  ohm Final     Lead Channel Impedance Value 09/09/2021 418  ohm Final     Lead Channel Impedance Value 09/09/2021 437  ohm Final     Lead Channel Impedance Value 09/09/2021 266  ohm Final     Lead Channel Impedance Value 09/09/2021 779  ohm Final     Lead Channel Impedance Value 09/09/2021 779  ohm Final     Lead Channel Impedance Value 09/09/2021 627  ohm Final     Lead Channel Impedance Value 09/09/2021 646  ohm Final     Lead Channel Impedance Value 09/09/2021 570  ohm Final     Lead Channel Impedance Value 09/09/2021 608  ohm Final     Lead Channel Pacing Threshold Ampl* 09/09/2021 0.875  V Final     Lead Channel Pacing Threshold Puls* 09/09/2021 1  ms Final     Battery Date Time of Measurements 09/09/2021 20210908221502   Final     Battery Status 09/09/2021 OK   Final     Battery RRT Trigger 09/09/2021 2.595   Final     Battery Remaining Longevity 09/09/2021 84  mo Final     Battery Voltage 09/09/2021 2.98  V Final     Neo Statistic Date Time Start 09/09/2021 20210602234111   Final     Neo Statistic Date Time End 09/09/2021 20210908233613   Final     Neo Statistic RA Percent Paced 09/09/2021 99.92  % Final     Neo Statistic RV Percent Paced 09/09/2021 99.44  % Final     CRT Statistic LV Percent Paced 09/09/2021 99.42  % Final     Neo Statistic  AP  Percent 09/09/2021 99.38  % Final     Neo Statistic AS  Percent 09/09/2021 0.07  % Final     Neo Statistic AP VS Percent 09/09/2021 0.15  % Final     Neo Statistic AS VS Percent 09/09/2021 0.4  % Final     CRT Statistic Date Time Start 09/09/2021 94277516170664   Final     CRT Statistic Date Time End 09/09/2021 24992605175541   Final     CRT Statistic CRT Percent Paced 09/09/2021 99.42  % Final     Atrial Tachy Statistic Date Time S* 09/09/2021 37919495680357   Final     Atrial Tachy Statistic Date Time E* 09/09/2021 64282564776200   Final     Atrial Tachy Statistic AT/AF Burde* 09/09/2021 0  % Final     Episode Statistic Recent Count 09/09/2021 0   Final     Episode Statistic Type Category 09/09/2021 AT/AF   Final     Episode Statistic Recent Count 09/09/2021 0   Final     Episode Statistic Type Category 09/09/2021 Patient Activated   Final     Episode Statistic Recent Count 09/09/2021 0   Final     Episode Statistic Type Category 09/09/2021 SVT   Final     Episode Statistic Recent Count 09/09/2021 1   Final     Episode Statistic Type Category 09/09/2021 VT   Final     Episode Statistic Recent Count 09/09/2021 0   Final     Episode Statistic Type Category 09/09/2021 VT   Final     Episode Statistic Recent Date Time* 09/09/2021 26300032106761   Final     Episode Statistic Recent Date Time* 09/09/2021 23082059548464   Final     Episode Statistic Recent Date Time* 09/09/2021 78816879912265   Final     Episode Statistic Recent Date Time* 09/09/2021 75147931989909   Final     Episode Statistic Recent Date Time* 09/09/2021 25894655347592   Final     Episode Statistic Recent Date Time* 09/09/2021 49768393546292   Final     Episode Statistic Recent Date Time* 09/09/2021 46682099753227   Final     Episode Statistic Recent Date Time* 09/09/2021 37382528989797   Final     Episode Statistic Recent Date Time* 09/09/2021 91744242850719   Final     Episode Statistic Recent Date Time* 09/09/2021 98502766676382    Final     Episode Statistic Total Count 09/09/2021 29   Final     Episode Statistic Type Category 09/09/2021 AT/AF   Final     Episode Statistic Total Count 09/09/2021 0   Final     Episode Statistic Type Category 09/09/2021 Patient Activated   Final     Episode Statistic Total Count 09/09/2021 1   Final     Episode Statistic Type Category 09/09/2021 SVT   Final     Episode Statistic Total Count 09/09/2021 47   Final     Episode Statistic Type Category 09/09/2021 VT   Final     Episode Statistic Total Count 09/09/2021 1   Final     Episode Statistic Type Category 09/09/2021 VT   Final     Episode Statistic Total Date Time * 09/09/2021 19846634274145   Final     Episode Statistic Total Date Time * 09/09/2021 98909693948879   Final     Episode Statistic Total Date Time * 09/09/2021 92493348441582   Final     Episode Statistic Total Date Time * 09/09/2021 70495198239751   Final     Episode Statistic Total Date Time * 09/09/2021 63431127330352   Final     Episode Statistic Total Date Time * 09/09/2021 14746968014521   Final     Episode Statistic Total Date Time * 09/09/2021 63854449128133   Final     Episode Statistic Total Date Time * 09/09/2021 53355261945089   Final     Episode Statistic Total Date Time * 09/09/2021 59251237270615   Final     Episode Statistic Total Date Time * 09/09/2021 11147406415077   Final     Episode Identifier 09/09/2021 78   Final     Episode Type Category 09/09/2021 VT   Final     Episode Date Time 09/09/2021 14093832297876   Final     Episode Duration 09/09/2021 0  s Final     Episode Identifier 09/09/2021 5,013   Final     Episode Type Category 09/09/2021 VSE   Final     Episode Date Time 09/09/2021 30971067872594   Final     Episode Duration 09/09/2021 8  s Final     Episode Identifier 09/09/2021 5,012   Final     Episode Type Category 09/09/2021 VSE   Final     Episode Date Time 09/09/2021 74950673538056   Final     Episode Duration 09/09/2021 7  s Final               Phone call  duration: 6 minutes

## 2021-11-10 ENCOUNTER — LAB (OUTPATIENT)
Dept: URGENT CARE | Facility: URGENT CARE | Age: 86
End: 2021-11-10
Attending: INTERNAL MEDICINE
Payer: MEDICARE

## 2021-11-10 DIAGNOSIS — R05.9 COUGH: ICD-10-CM

## 2021-11-10 PROCEDURE — U0003 INFECTIOUS AGENT DETECTION BY NUCLEIC ACID (DNA OR RNA); SEVERE ACUTE RESPIRATORY SYNDROME CORONAVIRUS 2 (SARS-COV-2) (CORONAVIRUS DISEASE [COVID-19]), AMPLIFIED PROBE TECHNIQUE, MAKING USE OF HIGH THROUGHPUT TECHNOLOGIES AS DESCRIBED BY CMS-2020-01-R: HCPCS

## 2021-11-10 PROCEDURE — U0005 INFEC AGEN DETEC AMPLI PROBE: HCPCS

## 2021-11-11 ENCOUNTER — NURSE TRIAGE (OUTPATIENT)
Dept: NURSING | Facility: CLINIC | Age: 86
End: 2021-11-11
Payer: MEDICARE

## 2021-11-11 LAB — SARS-COV-2 RNA RESP QL NAA+PROBE: POSITIVE

## 2021-11-11 NOTE — TELEPHONE ENCOUNTER
"-Coronavirus (COVID-19) Notification    Caller Name (Patient, parent, daughter/son, grandparent, etc)  Patient: Lico Woo    Reason for call  Notify of Positive Coronavirus (COVID-19) lab results, assess symptoms,  review  [facility] recommendations    Lab Result    Lab test:  2019-nCoV rRt-PCR or SARS-CoV-2 PCR    Oropharyngeal AND/OR nasopharyngeal swabs is POSITIVE for 2019-nCoV RNA/SARS-COV-2 PCR (COVID-19 virus)    RN Recommendations/Instructions per [facility] Coronavirus COVID-19 recommendations    Brief introduction script  Introduce self then review script:  \"I am calling on behalf of [facility].  We were notified that your Coronavirus test (COVID-19) for was POSITIVE for the virus.  I have some information to relay to you but first I wanted to mention that the [place] Dept of Health will be contacting you shortly [it's possible MD already called Patient] to talk to you more about how you are feeling and other people you have had contact with who might now also have the virus.  Also,  [facility] is Partnering with the [facility] for Covid-19 research, you may be contacted directly by research staff.\"    Assessment (Inquire about Patient's current symptoms)   Assessment   Current Symptoms at time of phone call: (if no symptoms, document No symptoms] Cough and chills.    Symptoms onset (if applicable) 11/5/2021     If at time of call, Patients symptoms hare worsened, the Patient should contact 911 or have someone drive them to Emergency Dept promptly:      If Patient calling 911, inform 911 personal that you have tested positive for the Coronavirus (COVID-19).  Place mask on and await 911 to arrive.    If Emergency Dept, If possible, please have another adult drive you to the Emergency Dept but you need to wear mask when in contact with other people.      Monoclonal Antibody Administration    You may be eligible to receive a new treatment with a monoclonal antibody for " "preventing hospitalization in patients at high risk for complications from COVID-19.   This medication is still experimental and available on a limited basis; it is given through an IV and must be given at an infusion center. Please note that not all people who are eligible will receive the medication since it is in limited supply.     Are you interested in being considered for this medication?  Yes.   Is the patient symptomatic?  Yes. Is the patient 18 years of age or older? Yes.  Is the patient newly (within the last week) on supplemental oxygen or requiring more oxygen than usual?  No. Patient criteria for selection: Is the patients weight equal to or greater than 40 kg (88 lbs)? Yes.  Is the patient's age 65 years or older?  Yes.  Patient qualifies, refer patient to [facility].   Does the patient fit the criteria: Yes: Patient referred to [place] website, patient or family/friend will complete application.    If patient qualifies based on above criteria:  \"You will be contacted if you are selected to receive this treatment in the next 1-2 business days.   This is time sensitive and if you are not selected in the next 1-2 business days, you will not receive the medication.  If you do not receive a call to schedule, you have not been selected.\"      Review information with Patient    Your result was positive. This means you have COVID-19 (coronavirus).  We have sent you a letter that reviews the information that I'll be reviewing with you now.    How can I protect others?    If you have symptoms: stay home and away from others (self-isolate) until:    You've had no fever--and no medicine that reduces fever--for 1 full day (24 hours). And       Your other symptoms have gotten better. For example, your cough or breathing has improved. And     At least 10 days have passed since your symptoms started. (If you've been told by a doctor that you have a weak immune system, wait 20 days.)     If you don't have symptoms: Stay home " and away from others (self-isolate) until at least 10 days have passed since your first positive COVID-19 test. (Date test collected)    During this time:    Stay in your own room, including for meals. Use your own bathroom if you can.    Stay away from others in your home. No hugging, kissing or shaking hands. No visitors.     Don't go to work, school or anywhere else.     Clean  high touch  surfaces often (doorknobs, counters, handles, etc.). Use a household cleaning spray or wipes. You'll find a full list on the EPA website at www.epa.gov/pesticide-registration/list-n-disinfectants-use-against-sars-cov-2.     Cover your mouth and nose with a mask, tissue or other face covering to avoid spreading germs.    Wash your hands and face often with soap and water.    Make a list of people you have been in close contact with recently, even if either of you wore a face covering.   ; Start your list from 2 days before you became ill or had a positive test.  ; Include anyone that was within 6 feet of you for a cumulative total of 15 minutes or more in 24 hours. (Example: if you sat next to Troy for 5 minutes in the morning and 10 minutes in the afternoon, then you were in close contact for 15 minutes total that day. Troy would be added to your list.)    A public health worker will call or text you. It is important that you answer. They will ask you questions about possible exposures to COVID-19, such as people you have been in direct contact with and places you have visited.    Tell the people on your list that you have COVID-19; they should stay away from others for 14 days starting from the last time they were in contact with you (unless you are told something different from a public health worker).     Caregivers in these groups are at risk for severe illness due to COVID-19:  o People 65 years and older  o People who live in a nursing home or long-term care facility  o People with chronic disease (lung, heart, cancer,  diabetes, kidney, liver, immunologic)  o People who have a weakened immune system, including those who:  - Are in cancer treatment  - Take medicine that weakens the immune system, such as corticosteroids  - Had a bone marrow or organ transplant  - Have an immune deficiency  - Have poorly controlled HIV or AIDS  - Are obese (body mass index of 40 or higher)  - Smoke regularly    Caregivers should wear gloves while washing dishes, handling laundry and cleaning bedrooms and bathrooms.    Wash and dry laundry with special caution. Don't shake dirty laundry, and use the warmest water setting you can.    If you have a weakened immune system, ask your doctor about other actions you should take.    For more tips, go to www.cdc.gov/coronavirus/2019-ncov/downloads/10Things.pdf.    You should not go back to work until you meet the guidelines above for ending your home isolation. You don't need to be retested for COVID-19 before going back to work--studies show that you won't spread the virus if it's been at least 10 days since your symptoms started (or 20 days, if you have a weak immune system).    Employers: This document serves as formal notice of your employee's medical guidelines for going back to work. They must meet the above guidelines before going back to work in person.    How can I take care of myself?    1. Get lots of rest. Drink extra fluids (unless a doctor has told you not to).    2. Take Tylenol (acetaminophen) for fever or pain. If you have liver or kidney problems, ask your family doctor if it's okay to take Tylenol.     Take either:     650 mg (two 325 mg pills) every 4 to 6 hours, or     1,000 mg (two 500 mg pills) every 8 hours as needed.     Note: Don't take more than 3,000 mg in one day. Acetaminophen is found in many medicines (both prescribed and over-the-counter medicines). Read all labels to be sure you don't take too much.    For children, check the Tylenol bottle for the right dose (based on their  age or weight).    3. If you have other health problems (like cancer, heart failure, an organ transplant or severe kidney disease): Call your specialty clinic if you don't feel better in the next 2 days.    4. Know when to call 911: Emergency warning signs include:    Trouble breathing or shortness of breath    Pain or pressure in the chest that doesn't go away    Feeling confused like you haven't felt before, or not being able to wake up    Bluish-colored lips or face    5. Sign up for [employer]. We know it's scary to hear that you have COVID-19. We want to track your symptoms to make sure you're okay over the next 2 weeks. Please look for an email from [employer]--this is a free, online program that we'll use to keep in touch. To sign up, follow the link in the email. Learn more at [email or web address].    Where can I get more information?    [facility]: [email or web address]    Coronavirus Basics: [email or web address]/[email or web address]/basics.html    What to Do If You're Sick: www.cdc.gov/coronavirus/2019-ncov/about/steps-when-sick.html    Ending Home Isolation: www.cdc.gov/coronavirus/2019-ncov/hcp/disposition-in-home-patients.html     Caring for Someone with COVID-19: www.cdc.gov/coronavirus/2019-ncov/if-you-are-sick/care-for-someone.html     [facility] clinical trials (COVID-19 research studies): clinicalaffairs.[facility]-clinical-trials     A Positive COVID-19 letter will be sent via [employer] or the mail. (Exception, no letters sent to Presurgerical/Preprocedure Patients)    Paul Rust RN

## 2021-11-12 ENCOUNTER — TELEPHONE (OUTPATIENT)
Dept: FAMILY MEDICINE | Facility: CLINIC | Age: 86
End: 2021-11-12
Payer: MEDICARE

## 2021-11-12 NOTE — RESULT ENCOUNTER NOTE
Will, your Covid PCR test is positive.  Please continue on isolation quarantine for 10 days, continue treatment for respiratory symptoms, please schedule follow-up or go to urgent care or ER if any worsening of symptoms including but not limited to worsening of cough, shortness of breath or any difficulty breathing.  Any further questions please let us knowDr. Mathews

## 2021-11-12 NOTE — TELEPHONE ENCOUNTER
Writer called patient reviewed lab results below. Patient states that they tried to do questionnaire online but were unable to complete d/t     Writer huddled with PCP/Dr Mathews who advised patient call [facility]. Writer called patient back, advised that patient call [facility] at [phone] to see if they can get monoclonal antibody treatment.    Callback: [phone]- okay to leave detailed VM.    Santiago Salazar RN  [facility]

## 2021-11-12 NOTE — TELEPHONE ENCOUNTER
----- Message from Rene Mathews MD sent at 11/11/2021  7:23 PM CST -----  Will, your Covid PCR test is positive.  Please continue on isolation quarantine for 10 days, continue treatment for respiratory symptoms, please schedule follow-up or go to urgent care or ER if any worsening of symptoms including but not limited to worsening of cough, shortness of breath or any difficulty breathing.  Any further questions please let us knowDr. Mathews

## 2021-11-13 ENCOUNTER — TELEPHONE (OUTPATIENT)
Dept: URGENT CARE | Facility: URGENT CARE | Age: 86
End: 2021-11-13
Payer: MEDICARE

## 2021-11-14 NOTE — TELEPHONE ENCOUNTER
"Pt spouse recently tested positive and mentioned pt is approved for mAb on Monday in [place]. Pt and  had numerous questions about transportation from their apt to [place] per pt stating \"nothing available in [facility] area.\" I mentioned to her to contact the # of that was given to Will when they contacted him initially about getting mAb. They stated they have the # and will contact them.  "

## 2021-12-06 ENCOUNTER — OFFICE VISIT (OUTPATIENT)
Dept: FAMILY MEDICINE | Facility: CLINIC | Age: 86
End: 2021-12-06
Payer: MEDICARE

## 2021-12-06 VITALS
HEART RATE: 73 BPM | HEIGHT: 67 IN | BODY MASS INDEX: 24.96 KG/M2 | DIASTOLIC BLOOD PRESSURE: 64 MMHG | OXYGEN SATURATION: 99 % | TEMPERATURE: 98 F | SYSTOLIC BLOOD PRESSURE: 95 MMHG | WEIGHT: 159 LBS | RESPIRATION RATE: 16 BRPM

## 2021-12-06 DIAGNOSIS — N18.30 STAGE 3 CHRONIC KIDNEY DISEASE, UNSPECIFIED WHETHER STAGE 3A OR 3B CKD (H): ICD-10-CM

## 2021-12-06 DIAGNOSIS — I10 ESSENTIAL HYPERTENSION: ICD-10-CM

## 2021-12-06 DIAGNOSIS — Z00.00 MEDICARE ANNUAL WELLNESS VISIT, SUBSEQUENT: Primary | ICD-10-CM

## 2021-12-06 DIAGNOSIS — Z87.39 HISTORY OF GOUT: ICD-10-CM

## 2021-12-06 DIAGNOSIS — I42.0 DILATED CARDIOMYOPATHY (H): ICD-10-CM

## 2021-12-06 DIAGNOSIS — D47.2 MGUS (MONOCLONAL GAMMOPATHY OF UNKNOWN SIGNIFICANCE): ICD-10-CM

## 2021-12-06 DIAGNOSIS — I50.22 CHRONIC SYSTOLIC CHF (CONGESTIVE HEART FAILURE) (H): ICD-10-CM

## 2021-12-06 DIAGNOSIS — E03.9 HYPOTHYROIDISM, UNSPECIFIED TYPE: ICD-10-CM

## 2021-12-06 DIAGNOSIS — C61 PROSTATE CANCER (H): ICD-10-CM

## 2021-12-06 DIAGNOSIS — Z95.0 PACEMAKER: ICD-10-CM

## 2021-12-06 DIAGNOSIS — I48.20 CHRONIC ATRIAL FIBRILLATION (H): ICD-10-CM

## 2021-12-06 DIAGNOSIS — I27.20 PULMONARY HYPERTENSION (H): ICD-10-CM

## 2021-12-06 DIAGNOSIS — I34.0 NONRHEUMATIC MITRAL VALVE REGURGITATION: ICD-10-CM

## 2021-12-06 LAB
BASOPHILS # BLD AUTO: 0 10E3/UL (ref 0–0.2)
BASOPHILS NFR BLD AUTO: 0 %
EOSINOPHIL # BLD AUTO: 0.2 10E3/UL (ref 0–0.7)
EOSINOPHIL NFR BLD AUTO: 3 %
ERYTHROCYTE [DISTWIDTH] IN BLOOD BY AUTOMATED COUNT: 15.2 % (ref 10–15)
HCT VFR BLD AUTO: 38.1 % (ref 40–53)
HGB BLD-MCNC: 12.6 G/DL (ref 13.3–17.7)
LYMPHOCYTES # BLD AUTO: 1.2 10E3/UL (ref 0.8–5.3)
LYMPHOCYTES NFR BLD AUTO: 19 %
MCH RBC QN AUTO: 34.8 PG (ref 26.5–33)
MCHC RBC AUTO-ENTMCNC: 33.1 G/DL (ref 31.5–36.5)
MCV RBC AUTO: 105 FL (ref 78–100)
MONOCYTES # BLD AUTO: 0.4 10E3/UL (ref 0–1.3)
MONOCYTES NFR BLD AUTO: 7 %
NEUTROPHILS # BLD AUTO: 4.5 10E3/UL (ref 1.6–8.3)
NEUTROPHILS NFR BLD AUTO: 72 %
PLATELET # BLD AUTO: 173 10E3/UL (ref 150–450)
RBC # BLD AUTO: 3.62 10E6/UL (ref 4.4–5.9)
TOTAL PROTEIN SERUM FOR ELP: 7.5 G/DL (ref 6.8–8.8)
WBC # BLD AUTO: 6.3 10E3/UL (ref 4–11)

## 2021-12-06 PROCEDURE — 80048 BASIC METABOLIC PNL TOTAL CA: CPT | Performed by: INTERNAL MEDICINE

## 2021-12-06 PROCEDURE — 86334 IMMUNOFIX E-PHORESIS SERUM: CPT | Performed by: PATHOLOGY

## 2021-12-06 PROCEDURE — 85025 COMPLETE CBC W/AUTO DIFF WBC: CPT | Performed by: INTERNAL MEDICINE

## 2021-12-06 PROCEDURE — G0439 PPPS, SUBSEQ VISIT: HCPCS | Performed by: INTERNAL MEDICINE

## 2021-12-06 PROCEDURE — 36415 COLL VENOUS BLD VENIPUNCTURE: CPT | Performed by: INTERNAL MEDICINE

## 2021-12-06 PROCEDURE — 83883 ASSAY NEPHELOMETRY NOT SPEC: CPT | Mod: 59 | Performed by: INTERNAL MEDICINE

## 2021-12-06 PROCEDURE — 84155 ASSAY OF PROTEIN SERUM: CPT | Performed by: INTERNAL MEDICINE

## 2021-12-06 PROCEDURE — 82784 ASSAY IGA/IGD/IGG/IGM EACH: CPT | Performed by: INTERNAL MEDICINE

## 2021-12-06 PROCEDURE — 84165 PROTEIN E-PHORESIS SERUM: CPT | Performed by: PATHOLOGY

## 2021-12-06 RX ORDER — ALLOPURINOL 300 MG/1
1 TABLET ORAL DAILY
Qty: 90 TABLET | Refills: 3 | Status: SHIPPED | OUTPATIENT
Start: 2021-12-06

## 2021-12-06 RX ORDER — LEVOTHYROXINE SODIUM 112 UG/1
112 TABLET ORAL DAILY
Qty: 90 TABLET | Refills: 3 | Status: SHIPPED | OUTPATIENT
Start: 2021-12-06 | End: 2022-01-01

## 2021-12-06 RX ORDER — LOSARTAN POTASSIUM 100 MG/1
50 TABLET ORAL DAILY
Qty: 45 TABLET | Refills: 3 | Status: SHIPPED | OUTPATIENT
Start: 2021-12-06

## 2021-12-06 ASSESSMENT — MIFFLIN-ST. JEOR: SCORE: 1349.85

## 2021-12-06 ASSESSMENT — ACTIVITIES OF DAILY LIVING (ADL): CURRENT_FUNCTION: NO ASSISTANCE NEEDED

## 2021-12-06 NOTE — PROGRESS NOTES
SUBJECTIVE:   Lico Woo is a 88 year old male who presents for Preventive Visit.    The patient recently  had Covid but is doing much better and mostly recovered from that.  During Covid he did have a fall which was his first 1 in a long time.  He hurt the left side of his chest and shoulder but that is improving.  He is otherwise doing well and felt as if he was doing great prior to Covid.  He has multiple medical problems as noted.  He has had regular cardiology follow-up which I reviewed and that has been very stable.  He has no other complaints on review of systems.                 Past Medical History:      Past Medical History:   Diagnosis Date     Aortic regurgitation     mild-moderate per 10/2018 Echo     Aortic stenosis     mild per 10/2018 Echo     Balance problem     few years     Cardiomyopathy (H) 10/2018    [place]; Echo 10/2018, EF 25%, down from 50% 12/2017, per notes, pt declines further work up to find etiology of drop in EF     Chronic atrial fibrillation (H) 2018    cardioversion 4/19     Chronic systolic CHF (congestive heart failure) (H)      CKD (chronic kidney disease) stage 3, GFR 30-59 ml/min (H)      Essential hypertension      Frequent falls      Gross hematuria 04/2019    cysto, ct neg     History of gout     none for years     Hypothyroidism      MGUS (monoclonal gammopathy of unknown significance) 07/2020    found during eval of falls and neuropathy.  Per curbside consult from [facility] to repeat labs every 6 months: CBC, BMP, SPEP, IgG and kappa-lambda free light chain     Mitral regurgitation     moderate per 10/2018 Echo     Neuropathy 1990    both lower legs     Pacemaker 2018     Prostate cancer (H) 2008    xrt, fine since     Pulmonary hypertension (H)      Tricuspid regurgitation     mioderate per 10/2018 Echo             Past Surgical History:      Past Surgical History:   Procedure Laterality Date     bilateral hip replacement  2017    done 6 months apart     IMPLANT  PACEMAKER  11/12/2018    CRT-P     lip surgery for skin cancer               Social History:     Social History     Socioeconomic History     Marital status:      Spouse name: Not on file     Number of children: 3     Years of education: Not on file     Highest education level: Not on file   Occupational History     Occupation: owned marketing services company in [place]   Tobacco Use     Smoking status: Never Smoker     Smokeless tobacco: Never Used   Substance and Sexual Activity     Alcohol use: Yes     Comment: 1 drink week     Drug use: Never     Sexual activity: Not Currently   Other Topics Concern     Parent/sibling w/ CABG, MI or angioplasty before 65F 55M? Not Asked   Social History Narrative     Not on file     Social Determinants of Health     Financial Resource Strain: Not on file   Food Insecurity: Not on file   Transportation Needs: Not on file   Physical Activity: Not on file   Stress: Not on file   Social Connections: Not on file   Intimate Partner Violence: Not on file   Housing Stability: Not on file             Family History:   reviewed         Allergies:   No Known Allergies          Medications:     Current Outpatient Medications   Medication Sig Dispense Refill     acetaminophen (TYLENOL) 500 MG tablet Take 500-1,000 mg by mouth every 6 hours as needed for mild pain       allopurinol (ZYLOPRIM) 300 MG tablet Take 1 tablet (300 mg) by mouth daily 90 tablet 3     alpha-lipoic acid 600 MG capsule Take by mouth daily        amiodarone (PACERONE) 200 MG tablet Take 0.5 tablets (100 mg) by mouth daily 45 tablet 2     amoxicillin (AMOXIL) 500 MG capsule Take 4 pills one hour prior to dental work 4 capsule 3     apixaban ANTICOAGULANT (ELIQUIS) 2.5 MG tablet Take 1 tablet (2.5 mg) by mouth 2 times daily 180 tablet 3     carvedilol (COREG) 25 MG tablet Take 1 tablet (25 mg) by mouth 2 times daily (with meals) 180 tablet 3     cholecalciferol (VITAMIN D3) 5000 units TABS tablet Take 2,000 Units by  "mouth daily       Cyanocobalamin (VITAMIN B 12 PO) Take 2,500 mcg by mouth       furosemide (LASIX) 20 MG tablet TAKE 1 TABLET BY MOUTH  DAILY 90 tablet 3     levothyroxine (SYNTHROID/LEVOTHROID) 112 MCG tablet Take 1 tablet (112 mcg) by mouth daily 90 tablet 3     losartan (COZAAR) 100 MG tablet Take 0.5 tablets (50 mg) by mouth daily Please call for an appointment for further refills. [phone] 45 tablet 3     multivitamin (CENTRUM SILVER) tablet Take 1 tablet by mouth daily       Niacin (VITAMIN B-3 OR) Take 500 mg by mouth       potassium chloride ER (KLOR-CON M) 10 MEQ CR tablet TAKE 1 TABLET BY MOUTH  TWICE DAILY 180 tablet 2     Pyridoxine HCl (VITAMIN B6 PO) Take 150 mg by mouth                  Review of Systems:   The 10 point Review of Systems is negative other than noted in the HPI           Physical Exam:   Blood pressure 95/64, pulse 73, temperature 98  F (36.7  C), temperature source Tympanic, resp. rate 16, height 1.702 m (5' 7\"), weight 72.1 kg (159 lb), SpO2 99 %.    Exam:  Constitutional: healthy appearing, alert and in no distress  Heent: Normocephalic. Head without obvious masses or lesions. PERRLDC, EOMI. Mouth exam within normal limits: tongue, mucous membranes, posterior pharynx all normal, no lesions or abnormalities seen.  Tm's and canals within normal limits bilaterally. Neck supple, no nuchal rigidity or masses. No supraclavicular, or cervical adenopathy. Thyroid symmetric, no masses.  Cardiovascular: Regular rate and rhythm, 2/6 sm across chest, no rub or gallops.  JVP not elevated, trace bilat edema.  Carotids within normal limits bilaterally, no bruits.  Venous stasis skin changes in legs bilat  Respiratory: Normal respiratory effort.  Lungs clear, normal flow, no wheezing or crackles.  Breasts: Normal bilaterally.  No masses or lesions.  Nipples within normal limites.  No axillary lesions or nodes.  Gastrointestinal: Normal active bowel sounds.   Soft, not tender, no masses, " "guarding or rebound.  No hepatosplenomegaly.   Genitourinary: Rectal not done  Musculoskeletal: extremities normal, no gross deformities noted.  Skin: no suspicious lesions or rashes but has large bruise left chest area.  Neurologic: Mental status within normal limits.  Speech fluent.  No gross motor abnormalities and gait intact.  Psychiatric: mentation appears normal and affect normal.         Data:   Labs sent        Assessment:   1. Normal complete physical exam  2. Covid, resolving  3. Chronic afib, on eliquis  4. pulm hypertension, dil cmyop, pulm hypertension, valve dz, pacer, stable, follow up cards  5. Cap, fran  6. Hypertension, controlled  7. Hypothyroidism, follow up tsh  8. Ckd, follow up labs  9. Mgus, follow up labs  10. Prior gout, no recurrence  11. hcm         Plan:   Up to date immunizations  Labs and letter  Follow up cards  Call if problems      Mumtaz Hernandez M.D.            Patient has been advised of split billing requirements and indicates understanding: Yes   Are you in the first 12 months of your Medicare coverage?  No    Healthy Habits:    In general, how would you rate your overall health?  Good    Frequency of exercise:  4-5 days/week    Duration of exercise:  30-45 minutes    Do you usually eat at least 4 servings of fruit and vegetables a day, include whole grains    & fiber and avoid regularly eating high fat or \"junk\" foods?  Yes    Taking medications regularly:  Yes    Barriers to taking medications:  None    Medication side effects:  None    Ability to successfully perform activities of daily living:  No assistance needed    Home Safety:  No safety concerns identified    Hearing Impairment:  No hearing concerns    In the past 6 months, have you been bothered by leaking of urine?  No    In general, how would you rate your overall mental or emotional health?  Excellent      PHQ-2 Total Score:    Additional concerns today:  No    Do you feel safe in your environment? Yes    Have you " ever done Advance Care Planning? (For example, a Health Directive, POLST, or a discussion with a medical provider or your loved ones about your wishes): No, advance care planning information given to patient to review.  Patient plans to discuss their wishes with loved ones or provider.         Fall risk  Fallen 2 or more times in the past year?: No  Any fall with injury in the past year?: No    Cognitive Screening   1) Repeat 3 items (Leader, Season, Table)    2) Clock draw: NORMAL  3) 3 item recall: Recalls 3 objects  Results: 3 items recalled: COGNITIVE IMPAIRMENT LESS LIKELY    Mini-CogTM Copyright S Mindy. Licensed by the author for use in [facility]; reprinted with permission (soob@[facility]). All rights reserved.      Do you have sleep apnea, excessive snoring or daytime drowsiness?: no    Reviewed and updated as needed this visit by clinical staff                Reviewed and updated as needed this visit by Provider               Social History     Tobacco Use     Smoking status: Never Smoker     Smokeless tobacco: Never Used   Substance Use Topics     Alcohol use: Yes     Comment: 1 drink week     If you drink alcohol do you typically have >3 drinks per day or >7 drinks per week? No    No flowsheet data found.            Current providers sharing in care for this patient include:   Patient Care Team:  Mumtaz Hernandez MD as PCP - General (Internal Medicine)  Ray De Leon MD as Assigned Heart and Vascular Provider  Mario Ramsay MD as Assigned Neuroscience Provider  Rene Mathews MD as Assigned PCP    The following health maintenance items are reviewed in Epic and correct as of today:  Health Maintenance Due   Topic Date Due     HF ACTION PLAN  Never done     LIPID  Never done     MICROALBUMIN  Never done     ANNUAL REVIEW OF HM ORDERS  Never done     ADVANCE CARE PLANNING  Never done     URINALYSIS  Never done     DTAP/TDAP/TD IMMUNIZATION (1 - Tdap) Never done  "    ZOSTER IMMUNIZATION (1 of 2) Never done     MEDICARE ANNUAL WELLNESS VISIT  Never done     Pneumococcal Vaccine: 65+ Years (1 of 1 - PPSV23) Never done     FALL RISK ASSESSMENT  02/20/2021     CBC  07/24/2021     COVID-19 Vaccine (3 - Booster for Moderna series) 09/03/2021     BMP  11/25/2021     ALT  12/07/2021               Review of Systems      OBJECTIVE:   There were no vitals taken for this visit. Estimated body mass index is 24.9 kg/m  as calculated from the following:    Height as of 5/27/21: 1.702 m (5' 7\").    Weight as of 5/27/21: 72.1 kg (159 lb).  Physical Exam          ASSESSMENT / PLAN:       Patient has been advised of split billing requirements and indicates understanding: per ma  COUNSELING:  Reviewed preventive health counseling, as reflected in patient instructions       Regular exercise       Healthy diet/nutrition    Estimated body mass index is 24.9 kg/m  as calculated from the following:    Height as of 5/27/21: 1.702 m (5' 7\").    Weight as of 5/27/21: 72.1 kg (159 lb).        He reports that he has never smoked. He has never used smokeless tobacco.      Appropriate preventive services were discussed with this patient, including applicable screening as appropriate for cardiovascular disease, diabetes, osteopenia/osteoporosis, and glaucoma.  As appropriate for age/gender, discussed screening for colorectal cancer, prostate cancer, breast cancer, and cervical cancer. Checklist reviewing preventive services available has been given to the patient.    Reviewed patients plan of care and provided an AVS. The Basic Care Plan (routine screening as documented in Health Maintenance) for Lico meets the Care Plan requirement. This Care Plan has been established and reviewed with the Patient.    Counseling Resources:  ATP IV Guidelines  Pooled Cohorts Equation Calculator  Breast Cancer Risk Calculator  Breast Cancer: Medication to Reduce Risk  FRAX Risk Assessment  ICSI Preventive " Guidelines  Dietary Guidelines for Americans, 2010  USDA's MyPlate  ASA Prophylaxis  Lung CA Screening    Mumtaz Hernandez MD  [facility]    Identified Health Risks:

## 2021-12-07 LAB
ALBUMIN SERPL ELPH-MCNC: 3.7 G/DL (ref 3.7–5.1)
ALPHA1 GLOB SERPL ELPH-MCNC: 0.4 G/DL (ref 0.2–0.4)
ALPHA2 GLOB SERPL ELPH-MCNC: 1 G/DL (ref 0.5–0.9)
B-GLOBULIN SERPL ELPH-MCNC: 0.7 G/DL (ref 0.6–1)
GAMMA GLOB SERPL ELPH-MCNC: 1.7 G/DL (ref 0.7–1.6)
IGG SERPL-MCNC: 1894 MG/DL (ref 610–1616)
KAPPA LC FREE SER-MCNC: 4.87 MG/DL (ref 0.33–1.94)
KAPPA LC FREE/LAMBDA FREE SER NEPH: 0.56 {RATIO} (ref 0.26–1.65)
LAMBDA LC FREE SERPL-MCNC: 8.72 MG/DL (ref 0.57–2.63)
M PROTEIN SERPL ELPH-MCNC: 0.8 G/DL
PROT PATTERN SERPL ELPH-IMP: ABNORMAL
PROT PATTERN SERPL IFE-IMP: NORMAL

## 2021-12-08 ENCOUNTER — APPOINTMENT (OUTPATIENT)
Dept: LAB | Facility: CLINIC | Age: 86
End: 2021-12-08
Payer: MEDICARE

## 2021-12-08 LAB
ANION GAP SERPL CALCULATED.3IONS-SCNC: 4 MMOL/L (ref 3–14)
BUN SERPL-MCNC: 36 MG/DL (ref 7–30)
CALCIUM SERPL-MCNC: 9 MG/DL (ref 8.5–10.1)
CHLORIDE BLD-SCNC: 107 MMOL/L (ref 94–109)
CO2 SERPL-SCNC: 29 MMOL/L (ref 20–32)
CREAT SERPL-MCNC: 1.69 MG/DL (ref 0.66–1.25)
GFR SERPL CREATININE-BSD FRML MDRD: 35 ML/MIN/1.73M2
GLUCOSE BLD-MCNC: 107 MG/DL (ref 70–99)
POTASSIUM BLD-SCNC: 4.8 MMOL/L (ref 3.4–5.3)
SODIUM SERPL-SCNC: 140 MMOL/L (ref 133–144)

## 2021-12-08 PROCEDURE — 86335 IMMUNFIX E-PHORSIS/URINE/CSF: CPT | Performed by: PATHOLOGY

## 2021-12-09 ENCOUNTER — TELEPHONE (OUTPATIENT)
Dept: FAMILY MEDICINE | Facility: CLINIC | Age: 86
End: 2021-12-09

## 2021-12-09 ENCOUNTER — E-CONSULT (OUTPATIENT)
Dept: ONCOLOGY | Facility: CLINIC | Age: 86
End: 2021-12-09
Payer: MEDICARE

## 2021-12-09 DIAGNOSIS — D47.2 MGUS (MONOCLONAL GAMMOPATHY OF UNKNOWN SIGNIFICANCE): Primary | ICD-10-CM

## 2021-12-09 LAB — PROT ELPH PNL UR ELPH: NORMAL

## 2021-12-09 PROCEDURE — 99451 NTRPROF PH1/NTRNET/EHR 5/>: CPT | Performed by: INTERNAL MEDICINE

## 2021-12-09 PROCEDURE — 99207 E-CONSULT TO HEMATOLOGY (ADULT OUTPT PROVIDER TO SPECIALIST WRITTEN QUESTION & RESPONSE): CPT | Performed by: INTERNAL MEDICINE

## 2021-12-09 NOTE — TELEPHONE ENCOUNTER
Left message for pt to call back to triage.     Lani Gotti, RN  [facility] RN Triage Team

## 2021-12-09 NOTE — TELEPHONE ENCOUNTER
Dr. Aguirre , patient is okay for you to proceed with the e-consult. He understands the cost if not covered by insurance.     Cyndi Springer RN  [facility]

## 2021-12-09 NOTE — TELEPHONE ENCOUNTER
Please call the patient.  I would like to do an E consult with hematology regarding his monoclonal gammopathy.  He should know about this.  The advantage of the E consult is that he can simply review his labs and chart notes and he will not have to go into see them.  We will get the results of their opinion within 3 days.  Really the only reason that we need to contact him is if insurance does not pay for it might cost him up to $115.  I want to make sure he is okay with that.  Please let me know.    I am not worried regarding the blood and it really has not changed but I just want to be safe.    Thank you

## 2021-12-10 NOTE — TELEPHONE ENCOUNTER
Please call the patient and let him know that the doctor from hematology responded to the E visit.  That person feels that there is nothing to worry about and that we really do not need to check this anymore.  If he has any questions let me know.    Thank you    Mumtaz Hernandez M.D.

## 2021-12-10 NOTE — TELEPHONE ENCOUNTER
Left voice message asking pt to call triage back. On call back, please review providers message with patient.

## 2022-01-01 ENCOUNTER — HOSPITAL ENCOUNTER (INPATIENT)
Facility: CLINIC | Age: 87
LOS: 1 days | Discharge: HOME | DRG: 175 | End: 2022-11-07
Attending: EMERGENCY MEDICINE | Admitting: STUDENT IN AN ORGANIZED HEALTH CARE EDUCATION/TRAINING PROGRAM
Payer: MEDICARE

## 2022-01-01 ENCOUNTER — APPOINTMENT (OUTPATIENT)
Dept: CT IMAGING | Facility: CLINIC | Age: 87
DRG: 175 | End: 2022-01-01
Attending: EMERGENCY MEDICINE
Payer: MEDICARE

## 2022-01-01 ENCOUNTER — OFFICE VISIT (OUTPATIENT)
Dept: PODIATRY | Facility: CLINIC | Age: 87
End: 2022-01-01
Payer: MEDICARE

## 2022-01-01 ENCOUNTER — ANCILLARY PROCEDURE (OUTPATIENT)
Dept: CARDIOLOGY | Facility: CLINIC | Age: 87
End: 2022-01-01
Payer: MEDICARE

## 2022-01-01 ENCOUNTER — TELEPHONE (OUTPATIENT)
Dept: FAMILY MEDICINE | Facility: CLINIC | Age: 87
End: 2022-01-01

## 2022-01-01 ENCOUNTER — HOSPITAL ENCOUNTER (OUTPATIENT)
Dept: CARDIAC REHAB | Facility: CLINIC | Age: 87
Setting detail: THERAPIES SERIES
Discharge: HOME | End: 2022-06-15
Attending: INTERNAL MEDICINE
Payer: MEDICARE

## 2022-01-01 ENCOUNTER — NURSE TRIAGE (OUTPATIENT)
Dept: FAMILY MEDICINE | Facility: CLINIC | Age: 87
End: 2022-01-01
Payer: MEDICARE

## 2022-01-01 ENCOUNTER — PATIENT OUTREACH (OUTPATIENT)
Dept: CARE COORDINATION | Facility: CLINIC | Age: 87
End: 2022-01-01

## 2022-01-01 ENCOUNTER — OFFICE VISIT (OUTPATIENT)
Dept: CARDIOLOGY | Facility: CLINIC | Age: 87
End: 2022-01-01
Payer: MEDICARE

## 2022-01-01 ENCOUNTER — APPOINTMENT (OUTPATIENT)
Dept: CARDIOLOGY | Facility: CLINIC | Age: 87
DRG: 175 | End: 2022-01-01
Attending: STUDENT IN AN ORGANIZED HEALTH CARE EDUCATION/TRAINING PROGRAM
Payer: MEDICARE

## 2022-01-01 ENCOUNTER — HOSPITAL ENCOUNTER (OUTPATIENT)
Dept: CARDIOLOGY | Facility: CLINIC | Age: 87
Discharge: HOME | End: 2022-04-15
Attending: INTERNAL MEDICINE | Admitting: INTERNAL MEDICINE
Payer: MEDICARE

## 2022-01-01 ENCOUNTER — OFFICE VISIT (OUTPATIENT)
Dept: FAMILY MEDICINE | Facility: CLINIC | Age: 87
End: 2022-01-01
Payer: MEDICARE

## 2022-01-01 ENCOUNTER — TELEPHONE (OUTPATIENT)
Dept: PULMONOLOGY | Facility: CLINIC | Age: 87
End: 2022-01-01

## 2022-01-01 ENCOUNTER — LAB (OUTPATIENT)
Dept: LAB | Facility: CLINIC | Age: 87
End: 2022-01-01
Payer: MEDICARE

## 2022-01-01 ENCOUNTER — HOSPITAL ENCOUNTER (OUTPATIENT)
Dept: CT IMAGING | Facility: CLINIC | Age: 87
Discharge: HOME | End: 2022-06-24
Attending: INTERNAL MEDICINE | Admitting: INTERNAL MEDICINE
Payer: MEDICARE

## 2022-01-01 ENCOUNTER — TELEPHONE (OUTPATIENT)
Dept: CARDIOLOGY | Facility: CLINIC | Age: 87
End: 2022-01-01

## 2022-01-01 ENCOUNTER — ANCILLARY PROCEDURE (OUTPATIENT)
Dept: CARDIOLOGY | Facility: CLINIC | Age: 87
End: 2022-01-01
Attending: INTERNAL MEDICINE
Payer: MEDICARE

## 2022-01-01 ENCOUNTER — CARE COORDINATION (OUTPATIENT)
Dept: CARDIOLOGY | Facility: CLINIC | Age: 87
End: 2022-01-01

## 2022-01-01 ENCOUNTER — APPOINTMENT (OUTPATIENT)
Dept: PHYSICAL THERAPY | Facility: CLINIC | Age: 87
DRG: 175 | End: 2022-01-01
Attending: STUDENT IN AN ORGANIZED HEALTH CARE EDUCATION/TRAINING PROGRAM
Payer: MEDICARE

## 2022-01-01 ENCOUNTER — APPOINTMENT (OUTPATIENT)
Dept: ULTRASOUND IMAGING | Facility: CLINIC | Age: 87
DRG: 175 | End: 2022-01-01
Attending: STUDENT IN AN ORGANIZED HEALTH CARE EDUCATION/TRAINING PROGRAM
Payer: MEDICARE

## 2022-01-01 VITALS
HEIGHT: 67 IN | SYSTOLIC BLOOD PRESSURE: 114 MMHG | WEIGHT: 155 LBS | BODY MASS INDEX: 24.33 KG/M2 | DIASTOLIC BLOOD PRESSURE: 64 MMHG

## 2022-01-01 VITALS
DIASTOLIC BLOOD PRESSURE: 82 MMHG | HEART RATE: 81 BPM | WEIGHT: 150 LBS | RESPIRATION RATE: 16 BRPM | SYSTOLIC BLOOD PRESSURE: 123 MMHG | HEIGHT: 66 IN | TEMPERATURE: 97.3 F | BODY MASS INDEX: 24.11 KG/M2 | OXYGEN SATURATION: 94 %

## 2022-01-01 VITALS
HEIGHT: 67 IN | DIASTOLIC BLOOD PRESSURE: 62 MMHG | OXYGEN SATURATION: 98 % | BODY MASS INDEX: 23.39 KG/M2 | SYSTOLIC BLOOD PRESSURE: 103 MMHG | WEIGHT: 149 LBS | TEMPERATURE: 97 F | HEART RATE: 70 BPM | RESPIRATION RATE: 16 BRPM

## 2022-01-01 VITALS
BODY MASS INDEX: 23.54 KG/M2 | HEIGHT: 67 IN | OXYGEN SATURATION: 94 % | DIASTOLIC BLOOD PRESSURE: 72 MMHG | WEIGHT: 150 LBS | HEART RATE: 72 BPM | SYSTOLIC BLOOD PRESSURE: 123 MMHG

## 2022-01-01 VITALS
DIASTOLIC BLOOD PRESSURE: 50 MMHG | HEIGHT: 67 IN | WEIGHT: 155 LBS | BODY MASS INDEX: 24.33 KG/M2 | SYSTOLIC BLOOD PRESSURE: 112 MMHG

## 2022-01-01 VITALS
DIASTOLIC BLOOD PRESSURE: 74 MMHG | TEMPERATURE: 97.7 F | OXYGEN SATURATION: 93 % | SYSTOLIC BLOOD PRESSURE: 118 MMHG | WEIGHT: 155 LBS | RESPIRATION RATE: 16 BRPM | HEART RATE: 83 BPM | BODY MASS INDEX: 24.33 KG/M2 | HEIGHT: 67 IN

## 2022-01-01 VITALS
BODY MASS INDEX: 23.07 KG/M2 | WEIGHT: 147 LBS | SYSTOLIC BLOOD PRESSURE: 108 MMHG | HEIGHT: 67 IN | DIASTOLIC BLOOD PRESSURE: 60 MMHG

## 2022-01-01 VITALS
HEIGHT: 67 IN | OXYGEN SATURATION: 98 % | DIASTOLIC BLOOD PRESSURE: 63 MMHG | SYSTOLIC BLOOD PRESSURE: 100 MMHG | BODY MASS INDEX: 23.07 KG/M2 | HEART RATE: 65 BPM | WEIGHT: 147 LBS | TEMPERATURE: 97.3 F

## 2022-01-01 DIAGNOSIS — I50.22 CHRONIC SYSTOLIC CHF (CONGESTIVE HEART FAILURE) (H): ICD-10-CM

## 2022-01-01 DIAGNOSIS — R60.0 BILATERAL LEG EDEMA: ICD-10-CM

## 2022-01-01 DIAGNOSIS — L97.509 ULCER OF TOE, UNSPECIFIED LATERALITY, UNSPECIFIED ULCER STAGE (H): ICD-10-CM

## 2022-01-01 DIAGNOSIS — Z79.899 LONG TERM CURRENT USE OF AMIODARONE: ICD-10-CM

## 2022-01-01 DIAGNOSIS — Z96.60 HISTORY OF JOINT REPLACEMENT, UNSPECIFIED JOINT: ICD-10-CM

## 2022-01-01 DIAGNOSIS — I27.20 PULMONARY HYPERTENSION (H): ICD-10-CM

## 2022-01-01 DIAGNOSIS — I50.22 CHRONIC SYSTOLIC CHF (CONGESTIVE HEART FAILURE) (H): Primary | ICD-10-CM

## 2022-01-01 DIAGNOSIS — N18.30 STAGE 3 CHRONIC KIDNEY DISEASE, UNSPECIFIED WHETHER STAGE 3A OR 3B CKD (H): ICD-10-CM

## 2022-01-01 DIAGNOSIS — I48.0 PAROXYSMAL ATRIAL FIBRILLATION (H): ICD-10-CM

## 2022-01-01 DIAGNOSIS — M20.41 HAMMER TOES OF BOTH FEET: ICD-10-CM

## 2022-01-01 DIAGNOSIS — I48.20 CHRONIC ATRIAL FIBRILLATION (H): ICD-10-CM

## 2022-01-01 DIAGNOSIS — J84.9 ILD (INTERSTITIAL LUNG DISEASE) (H): Primary | ICD-10-CM

## 2022-01-01 DIAGNOSIS — E03.9 HYPOTHYROIDISM, UNSPECIFIED TYPE: ICD-10-CM

## 2022-01-01 DIAGNOSIS — I42.0 DILATED CARDIOMYOPATHY (H): Primary | ICD-10-CM

## 2022-01-01 DIAGNOSIS — J84.112 IPF (IDIOPATHIC PULMONARY FIBROSIS) (H): ICD-10-CM

## 2022-01-01 DIAGNOSIS — G62.9 NEUROPATHY: ICD-10-CM

## 2022-01-01 DIAGNOSIS — I42.0 DILATED CARDIOMYOPATHY (H): ICD-10-CM

## 2022-01-01 DIAGNOSIS — I35.1 SEVERE AORTIC INSUFFICIENCY: ICD-10-CM

## 2022-01-01 DIAGNOSIS — M20.42 HAMMER TOES OF BOTH FEET: ICD-10-CM

## 2022-01-01 DIAGNOSIS — D47.2 MGUS (MONOCLONAL GAMMOPATHY OF UNKNOWN SIGNIFICANCE): ICD-10-CM

## 2022-01-01 DIAGNOSIS — Z95.0 CARDIAC PACEMAKER IN SITU: ICD-10-CM

## 2022-01-01 DIAGNOSIS — L97.509 ULCER OF TOE, UNSPECIFIED LATERALITY, UNSPECIFIED ULCER STAGE (H): Primary | ICD-10-CM

## 2022-01-01 DIAGNOSIS — C61 PROSTATE CANCER (H): ICD-10-CM

## 2022-01-01 DIAGNOSIS — I50.9 CONGESTIVE HEART FAILURE, UNSPECIFIED HF CHRONICITY, UNSPECIFIED HEART FAILURE TYPE (H): ICD-10-CM

## 2022-01-01 DIAGNOSIS — I77.810 AORTIC ROOT DILATATION (H): ICD-10-CM

## 2022-01-01 DIAGNOSIS — Z23 HIGH PRIORITY FOR 2019-NCOV VACCINE: Primary | ICD-10-CM

## 2022-01-01 DIAGNOSIS — L60.8 CHANGE IN NAIL APPEARANCE: ICD-10-CM

## 2022-01-01 DIAGNOSIS — I48.20 CHRONIC ATRIAL FIBRILLATION (H): Primary | ICD-10-CM

## 2022-01-01 DIAGNOSIS — I10 ESSENTIAL HYPERTENSION: ICD-10-CM

## 2022-01-01 DIAGNOSIS — I26.99 ACUTE PULMONARY EMBOLISM, UNSPECIFIED PULMONARY EMBOLISM TYPE, UNSPECIFIED WHETHER ACUTE COR PULMONALE PRESENT (H): ICD-10-CM

## 2022-01-01 DIAGNOSIS — I42.9 CARDIOMYOPATHY, UNSPECIFIED TYPE (H): ICD-10-CM

## 2022-01-01 DIAGNOSIS — J84.112 IPF (IDIOPATHIC PULMONARY FIBROSIS) (H): Primary | ICD-10-CM

## 2022-01-01 DIAGNOSIS — I48.0 PAROXYSMAL ATRIAL FIBRILLATION (H): Primary | ICD-10-CM

## 2022-01-01 DIAGNOSIS — R26.89 BALANCE PROBLEM: ICD-10-CM

## 2022-01-01 LAB
ALBUMIN SERPL-MCNC: 2.8 G/DL (ref 3.4–5)
ALBUMIN SERPL-MCNC: 3.1 G/DL (ref 3.4–5)
ALBUMIN SERPL-MCNC: 3.5 G/DL (ref 3.4–5)
ALBUMIN UR-MCNC: NEGATIVE MG/DL
ALBUMIN UR-MCNC: NEGATIVE MG/DL
ALP SERPL-CCNC: 135 U/L (ref 40–150)
ALP SERPL-CCNC: 145 U/L (ref 40–150)
ALP SERPL-CCNC: 190 U/L (ref 40–150)
ALT SERPL W P-5'-P-CCNC: 27 U/L (ref 0–70)
ALT SERPL W P-5'-P-CCNC: 28 U/L (ref 0–70)
ALT SERPL W P-5'-P-CCNC: 31 U/L (ref 0–70)
ANION GAP SERPL CALCULATED.3IONS-SCNC: 11 MMOL/L (ref 3–14)
ANION GAP SERPL CALCULATED.3IONS-SCNC: 2 MMOL/L (ref 3–14)
ANION GAP SERPL CALCULATED.3IONS-SCNC: 3 MMOL/L (ref 3–14)
ANION GAP SERPL CALCULATED.3IONS-SCNC: 3 MMOL/L (ref 3–14)
ANION GAP SERPL CALCULATED.3IONS-SCNC: 6 MMOL/L (ref 3–14)
ANION GAP SERPL CALCULATED.3IONS-SCNC: 7 MMOL/L (ref 3–14)
APPEARANCE UR: CLEAR
APPEARANCE UR: CLEAR
AST SERPL W P-5'-P-CCNC: 29 U/L (ref 0–45)
AST SERPL W P-5'-P-CCNC: 32 U/L (ref 0–45)
AST SERPL W P-5'-P-CCNC: 35 U/L (ref 0–45)
ATRIAL RATE - MUSE: 69 BPM
BASOPHILS # BLD AUTO: 0 10E3/UL (ref 0–0.2)
BASOPHILS # BLD AUTO: 0 10E3/UL (ref 0–0.2)
BASOPHILS NFR BLD AUTO: 0 %
BASOPHILS NFR BLD AUTO: 0 %
BILIRUB DIRECT SERPL-MCNC: 0.3 MG/DL (ref 0–0.2)
BILIRUB SERPL-MCNC: 0.6 MG/DL (ref 0.2–1.3)
BILIRUB SERPL-MCNC: 0.8 MG/DL (ref 0.2–1.3)
BILIRUB SERPL-MCNC: 0.9 MG/DL (ref 0.2–1.3)
BILIRUB UR QL STRIP: NEGATIVE
BILIRUB UR QL STRIP: NEGATIVE
BUN SERPL-MCNC: 45 MG/DL (ref 7–30)
BUN SERPL-MCNC: 46 MG/DL (ref 7–30)
BUN SERPL-MCNC: 51 MG/DL (ref 7–30)
BUN SERPL-MCNC: 53 MG/DL (ref 7–30)
BUN SERPL-MCNC: 56 MG/DL (ref 7–30)
BUN SERPL-MCNC: 57 MG/DL (ref 7–30)
CALCIUM SERPL-MCNC: 8.8 MG/DL (ref 8.5–10.1)
CALCIUM SERPL-MCNC: 9 MG/DL (ref 8.5–10.1)
CALCIUM SERPL-MCNC: 9.2 MG/DL (ref 8.5–10.1)
CALCIUM SERPL-MCNC: 9.4 MG/DL (ref 8.5–10.1)
CHLORIDE BLD-SCNC: 106 MMOL/L (ref 94–109)
CHLORIDE BLD-SCNC: 106 MMOL/L (ref 94–109)
CHLORIDE BLD-SCNC: 107 MMOL/L (ref 94–109)
CHLORIDE BLD-SCNC: 108 MMOL/L (ref 94–109)
CHLORIDE BLD-SCNC: 110 MMOL/L (ref 94–109)
CHLORIDE BLD-SCNC: 111 MMOL/L (ref 94–109)
CO2 SERPL-SCNC: 20 MMOL/L (ref 20–32)
CO2 SERPL-SCNC: 24 MMOL/L (ref 20–32)
CO2 SERPL-SCNC: 25 MMOL/L (ref 20–32)
CO2 SERPL-SCNC: 25 MMOL/L (ref 20–32)
CO2 SERPL-SCNC: 28 MMOL/L (ref 20–32)
CO2 SERPL-SCNC: 31 MMOL/L (ref 20–32)
COLOR UR AUTO: NORMAL
COLOR UR AUTO: YELLOW
CREAT SERPL-MCNC: 1.48 MG/DL (ref 0.66–1.25)
CREAT SERPL-MCNC: 1.71 MG/DL (ref 0.66–1.25)
CREAT SERPL-MCNC: 1.73 MG/DL (ref 0.66–1.25)
CREAT SERPL-MCNC: 1.88 MG/DL (ref 0.66–1.25)
CREAT SERPL-MCNC: 1.97 MG/DL (ref 0.66–1.25)
CREAT SERPL-MCNC: 2.19 MG/DL (ref 0.66–1.25)
D DIMER PPP FEU-MCNC: 1.23 UG/ML FEU (ref 0–0.5)
DIASTOLIC BLOOD PRESSURE - MUSE: NORMAL MMHG
DLCOCOR-%PRED-PRE: 67 %
DLCOCOR-PRE: 13.1 ML/MIN/MMHG
DLCOUNC-%PRED-PRE: 61 %
DLCOUNC-PRE: 11.79 ML/MIN/MMHG
DLCOUNC-PRED: 19.27 ML/MIN/MMHG
EOSINOPHIL # BLD AUTO: 0.1 10E3/UL (ref 0–0.7)
EOSINOPHIL # BLD AUTO: 0.2 10E3/UL (ref 0–0.7)
EOSINOPHIL NFR BLD AUTO: 3 %
EOSINOPHIL NFR BLD AUTO: 3 %
ERV-%PRED-PRE: 130 %
ERV-PRE: 0.72 L
ERV-PRED: 0.55 L
ERYTHROCYTE [DISTWIDTH] IN BLOOD BY AUTOMATED COUNT: 14.1 % (ref 10–15)
ERYTHROCYTE [DISTWIDTH] IN BLOOD BY AUTOMATED COUNT: 15.5 % (ref 10–15)
ERYTHROCYTE [DISTWIDTH] IN BLOOD BY AUTOMATED COUNT: 15.5 % (ref 10–15)
ERYTHROCYTE [DISTWIDTH] IN BLOOD BY AUTOMATED COUNT: 15.8 % (ref 10–15)
EXPTIME-PRE: 6.63 SEC
FEF2575-%PRED-PRE: 118 %
FEF2575-PRE: 1.79 L/SEC
FEF2575-PRED: 1.5 L/SEC
FEFMAX-%PRED-PRE: 98 %
FEFMAX-PRE: 4.69 L/SEC
FEFMAX-PRED: 4.75 L/SEC
FEV1-%PRED-PRE: 81 %
FEV1-PRE: 1.76 L
FEV1FEV6-PRE: 81 %
FEV1FEV6-PRED: 75 %
FEV1FVC-PRE: 81 %
FEV1FVC-PRED: 75 %
FEV1SVC-PRE: 80 %
FEV1SVC-PRED: 66 %
FIFMAX-PRE: 4.65 L/SEC
FVC-%PRED-PRE: 74 %
FVC-PRE: 2.18 L
FVC-PRED: 2.94 L
GFR SERPL CREATININE-BSD FRML MDRD: 28 ML/MIN/1.73M2
GFR SERPL CREATININE-BSD FRML MDRD: 32 ML/MIN/1.73M2
GFR SERPL CREATININE-BSD FRML MDRD: 34 ML/MIN/1.73M2
GFR SERPL CREATININE-BSD FRML MDRD: 37 ML/MIN/1.73M2
GFR SERPL CREATININE-BSD FRML MDRD: 38 ML/MIN/1.73M2
GFR SERPL CREATININE-BSD FRML MDRD: 45 ML/MIN/1.73M2
GLUCOSE BLD-MCNC: 101 MG/DL (ref 70–99)
GLUCOSE BLD-MCNC: 123 MG/DL (ref 70–99)
GLUCOSE BLD-MCNC: 131 MG/DL (ref 70–99)
GLUCOSE BLD-MCNC: 90 MG/DL (ref 70–99)
GLUCOSE BLD-MCNC: 95 MG/DL (ref 70–99)
GLUCOSE BLD-MCNC: 98 MG/DL (ref 70–99)
GLUCOSE UR STRIP-MCNC: NEGATIVE MG/DL
GLUCOSE UR STRIP-MCNC: NEGATIVE MG/DL
HCT VFR BLD AUTO: 35.8 % (ref 40–53)
HCT VFR BLD AUTO: 37.3 % (ref 40–53)
HCT VFR BLD AUTO: 37.6 % (ref 40–53)
HCT VFR BLD AUTO: 39.6 % (ref 40–53)
HGB BLD-MCNC: 11.5 G/DL (ref 13.3–17.7)
HGB BLD-MCNC: 11.7 G/DL (ref 13.3–17.7)
HGB BLD-MCNC: 12 G/DL (ref 13.3–17.7)
HGB BLD-MCNC: 12.3 G/DL (ref 13.3–17.7)
HGB BLD-MCNC: 12.6 G/DL (ref 13.3–17.7)
HGB UR QL STRIP: NEGATIVE
HGB UR QL STRIP: NEGATIVE
HOLD SPECIMEN: NORMAL
IC-%PRED-PRE: 51 %
IC-PRE: 1.43 L
IC-PRED: 2.76 L
IMM GRANULOCYTES # BLD: 0 10E3/UL
IMM GRANULOCYTES NFR BLD: 0 %
INR PPP: 1.21 (ref 0.85–1.15)
INTERPRETATION ECG - MUSE: NORMAL
KETONES UR STRIP-MCNC: NEGATIVE MG/DL
KETONES UR STRIP-MCNC: NEGATIVE MG/DL
LEUKOCYTE ESTERASE UR QL STRIP: NEGATIVE
LEUKOCYTE ESTERASE UR QL STRIP: NEGATIVE
LVEF ECHO: NORMAL
LVEF ECHO: NORMAL
LYMPHOCYTES # BLD AUTO: 0.8 10E3/UL (ref 0.8–5.3)
LYMPHOCYTES # BLD AUTO: 1.2 10E3/UL (ref 0.8–5.3)
LYMPHOCYTES NFR BLD AUTO: 17 %
LYMPHOCYTES NFR BLD AUTO: 23 %
MCH RBC QN AUTO: 33.9 PG (ref 26.5–33)
MCH RBC QN AUTO: 34.3 PG (ref 26.5–33)
MCH RBC QN AUTO: 34.3 PG (ref 26.5–33)
MCH RBC QN AUTO: 35.2 PG (ref 26.5–33)
MCHC RBC AUTO-ENTMCNC: 31.4 G/DL (ref 31.5–36.5)
MCHC RBC AUTO-ENTMCNC: 31.8 G/DL (ref 31.5–36.5)
MCHC RBC AUTO-ENTMCNC: 31.9 G/DL (ref 31.5–36.5)
MCHC RBC AUTO-ENTMCNC: 32.1 G/DL (ref 31.5–36.5)
MCV RBC AUTO: 107 FL (ref 78–100)
MCV RBC AUTO: 108 FL (ref 78–100)
MCV RBC AUTO: 108 FL (ref 78–100)
MCV RBC AUTO: 110 FL (ref 78–100)
MDC_IDC_EPISODE_DTM: NORMAL
MDC_IDC_EPISODE_DURATION: 0 S
MDC_IDC_EPISODE_DURATION: 0 S
MDC_IDC_EPISODE_DURATION: 1 S
MDC_IDC_EPISODE_DURATION: 1 S
MDC_IDC_EPISODE_DURATION: 10 S
MDC_IDC_EPISODE_DURATION: 19 S
MDC_IDC_EPISODE_DURATION: 2 S
MDC_IDC_EPISODE_DURATION: 4 S
MDC_IDC_EPISODE_DURATION: 5 S
MDC_IDC_EPISODE_DURATION: 7 S
MDC_IDC_EPISODE_DURATION: 8 S
MDC_IDC_EPISODE_DURATION: 8 S
MDC_IDC_EPISODE_ID: 5014
MDC_IDC_EPISODE_ID: 5015
MDC_IDC_EPISODE_ID: 5016
MDC_IDC_EPISODE_ID: 5017
MDC_IDC_EPISODE_ID: 5018
MDC_IDC_EPISODE_ID: 5019
MDC_IDC_EPISODE_ID: 5020
MDC_IDC_EPISODE_ID: 5021
MDC_IDC_EPISODE_ID: 5022
MDC_IDC_EPISODE_ID: 5023
MDC_IDC_EPISODE_ID: 5024
MDC_IDC_EPISODE_ID: 79
MDC_IDC_EPISODE_ID: 80
MDC_IDC_EPISODE_ID: 81
MDC_IDC_EPISODE_ID: 82
MDC_IDC_EPISODE_ID: 83
MDC_IDC_EPISODE_TYPE: NORMAL
MDC_IDC_LEAD_IMPLANT_DT: NORMAL
MDC_IDC_LEAD_LOCATION: NORMAL
MDC_IDC_LEAD_LOCATION_DETAIL_1: NORMAL
MDC_IDC_LEAD_MFG: NORMAL
MDC_IDC_LEAD_MODEL: NORMAL
MDC_IDC_LEAD_POLARITY_TYPE: NORMAL
MDC_IDC_LEAD_SERIAL: NORMAL
MDC_IDC_MSMT_BATTERY_DTM: NORMAL
MDC_IDC_MSMT_BATTERY_REMAINING_LONGEVITY: 68 MO
MDC_IDC_MSMT_BATTERY_REMAINING_LONGEVITY: 74 MO
MDC_IDC_MSMT_BATTERY_REMAINING_LONGEVITY: 77 MO
MDC_IDC_MSMT_BATTERY_RRT_TRIGGER: 2.6
MDC_IDC_MSMT_BATTERY_STATUS: NORMAL
MDC_IDC_MSMT_BATTERY_VOLTAGE: 2.96 V
MDC_IDC_MSMT_BATTERY_VOLTAGE: 2.96 V
MDC_IDC_MSMT_BATTERY_VOLTAGE: 2.97 V
MDC_IDC_MSMT_LEADCHNL_LV_IMPEDANCE_VALUE: 266 OHM
MDC_IDC_MSMT_LEADCHNL_LV_IMPEDANCE_VALUE: 342 OHM
MDC_IDC_MSMT_LEADCHNL_LV_IMPEDANCE_VALUE: 342 OHM
MDC_IDC_MSMT_LEADCHNL_LV_IMPEDANCE_VALUE: 418 OHM
MDC_IDC_MSMT_LEADCHNL_LV_IMPEDANCE_VALUE: 437 OHM
MDC_IDC_MSMT_LEADCHNL_LV_IMPEDANCE_VALUE: 437 OHM
MDC_IDC_MSMT_LEADCHNL_LV_IMPEDANCE_VALUE: 456 OHM
MDC_IDC_MSMT_LEADCHNL_LV_IMPEDANCE_VALUE: 532 OHM
MDC_IDC_MSMT_LEADCHNL_LV_IMPEDANCE_VALUE: 532 OHM
MDC_IDC_MSMT_LEADCHNL_LV_IMPEDANCE_VALUE: 608 OHM
MDC_IDC_MSMT_LEADCHNL_LV_IMPEDANCE_VALUE: 627 OHM
MDC_IDC_MSMT_LEADCHNL_LV_IMPEDANCE_VALUE: 627 OHM
MDC_IDC_MSMT_LEADCHNL_LV_IMPEDANCE_VALUE: 665 OHM
MDC_IDC_MSMT_LEADCHNL_LV_IMPEDANCE_VALUE: 665 OHM
MDC_IDC_MSMT_LEADCHNL_LV_IMPEDANCE_VALUE: 684 OHM
MDC_IDC_MSMT_LEADCHNL_LV_IMPEDANCE_VALUE: 684 OHM
MDC_IDC_MSMT_LEADCHNL_LV_IMPEDANCE_VALUE: 760 OHM
MDC_IDC_MSMT_LEADCHNL_LV_IMPEDANCE_VALUE: 779 OHM
MDC_IDC_MSMT_LEADCHNL_LV_IMPEDANCE_VALUE: 779 OHM
MDC_IDC_MSMT_LEADCHNL_LV_IMPEDANCE_VALUE: 798 OHM
MDC_IDC_MSMT_LEADCHNL_LV_PACING_THRESHOLD_AMPLITUDE: 0.75 V
MDC_IDC_MSMT_LEADCHNL_LV_PACING_THRESHOLD_AMPLITUDE: 0.75 V
MDC_IDC_MSMT_LEADCHNL_LV_PACING_THRESHOLD_AMPLITUDE: 1 V
MDC_IDC_MSMT_LEADCHNL_LV_PACING_THRESHOLD_PULSEWIDTH: 1 MS
MDC_IDC_MSMT_LEADCHNL_RA_IMPEDANCE_VALUE: 266 OHM
MDC_IDC_MSMT_LEADCHNL_RA_IMPEDANCE_VALUE: 266 OHM
MDC_IDC_MSMT_LEADCHNL_RA_IMPEDANCE_VALUE: 304 OHM
MDC_IDC_MSMT_LEADCHNL_RA_IMPEDANCE_VALUE: 418 OHM
MDC_IDC_MSMT_LEADCHNL_RA_IMPEDANCE_VALUE: 456 OHM
MDC_IDC_MSMT_LEADCHNL_RA_IMPEDANCE_VALUE: 456 OHM
MDC_IDC_MSMT_LEADCHNL_RA_PACING_THRESHOLD_AMPLITUDE: 0.75 V
MDC_IDC_MSMT_LEADCHNL_RA_PACING_THRESHOLD_AMPLITUDE: 0.88 V
MDC_IDC_MSMT_LEADCHNL_RA_PACING_THRESHOLD_AMPLITUDE: 0.88 V
MDC_IDC_MSMT_LEADCHNL_RA_PACING_THRESHOLD_PULSEWIDTH: 0.4 MS
MDC_IDC_MSMT_LEADCHNL_RA_SENSING_INTR_AMPL: 0.88 MV
MDC_IDC_MSMT_LEADCHNL_RA_SENSING_INTR_AMPL: 1.25 MV
MDC_IDC_MSMT_LEADCHNL_RA_SENSING_INTR_AMPL: 1.62 MV
MDC_IDC_MSMT_LEADCHNL_RV_IMPEDANCE_VALUE: 285 OHM
MDC_IDC_MSMT_LEADCHNL_RV_IMPEDANCE_VALUE: 304 OHM
MDC_IDC_MSMT_LEADCHNL_RV_IMPEDANCE_VALUE: 304 OHM
MDC_IDC_MSMT_LEADCHNL_RV_IMPEDANCE_VALUE: 494 OHM
MDC_IDC_MSMT_LEADCHNL_RV_IMPEDANCE_VALUE: 513 OHM
MDC_IDC_MSMT_LEADCHNL_RV_IMPEDANCE_VALUE: 513 OHM
MDC_IDC_MSMT_LEADCHNL_RV_PACING_THRESHOLD_AMPLITUDE: 0.62 V
MDC_IDC_MSMT_LEADCHNL_RV_PACING_THRESHOLD_AMPLITUDE: 0.75 V
MDC_IDC_MSMT_LEADCHNL_RV_PACING_THRESHOLD_AMPLITUDE: 0.75 V
MDC_IDC_MSMT_LEADCHNL_RV_PACING_THRESHOLD_PULSEWIDTH: 0.4 MS
MDC_IDC_MSMT_LEADCHNL_RV_SENSING_INTR_AMPL: 4.25 MV
MDC_IDC_MSMT_LEADCHNL_RV_SENSING_INTR_AMPL: 4.38 MV
MDC_IDC_MSMT_LEADCHNL_RV_SENSING_INTR_AMPL: 4.5 MV
MDC_IDC_MSMT_LEADCHNL_RV_SENSING_INTR_AMPL: 4.5 MV
MDC_IDC_PG_IMPLANT_DTM: NORMAL
MDC_IDC_PG_MFG: NORMAL
MDC_IDC_PG_MODEL: NORMAL
MDC_IDC_PG_SERIAL: NORMAL
MDC_IDC_PG_TYPE: NORMAL
MDC_IDC_SESS_CLINIC_NAME: NORMAL
MDC_IDC_SESS_DTM: NORMAL
MDC_IDC_SESS_TYPE: NORMAL
MDC_IDC_SET_BRADY_AT_MODE_SWITCH_RATE: 171 {BEATS}/MIN
MDC_IDC_SET_BRADY_LOWRATE: 70 {BEATS}/MIN
MDC_IDC_SET_BRADY_MAX_SENSOR_RATE: 115 {BEATS}/MIN
MDC_IDC_SET_BRADY_MAX_TRACKING_RATE: 115 {BEATS}/MIN
MDC_IDC_SET_BRADY_MODE: NORMAL
MDC_IDC_SET_BRADY_PAV_DELAY_HIGH: 100 MS
MDC_IDC_SET_BRADY_PAV_DELAY_LOW: 130 MS
MDC_IDC_SET_BRADY_SAV_DELAY_HIGH: 70 MS
MDC_IDC_SET_BRADY_SAV_DELAY_LOW: 100 MS
MDC_IDC_SET_CRT_LVRV_DELAY: 20 MS
MDC_IDC_SET_CRT_PACED_CHAMBERS: NORMAL
MDC_IDC_SET_LEADCHNL_LV_PACING_AMPLITUDE: 1.25 V
MDC_IDC_SET_LEADCHNL_LV_PACING_AMPLITUDE: 1.25 V
MDC_IDC_SET_LEADCHNL_LV_PACING_AMPLITUDE: 1.5 V
MDC_IDC_SET_LEADCHNL_LV_PACING_ANODE_ELECTRODE_1: NORMAL
MDC_IDC_SET_LEADCHNL_LV_PACING_ANODE_LOCATION_1: NORMAL
MDC_IDC_SET_LEADCHNL_LV_PACING_CAPTURE_MODE: NORMAL
MDC_IDC_SET_LEADCHNL_LV_PACING_CATHODE_ELECTRODE_1: NORMAL
MDC_IDC_SET_LEADCHNL_LV_PACING_CATHODE_LOCATION_1: NORMAL
MDC_IDC_SET_LEADCHNL_LV_PACING_POLARITY: NORMAL
MDC_IDC_SET_LEADCHNL_LV_PACING_PULSEWIDTH: 1 MS
MDC_IDC_SET_LEADCHNL_RA_PACING_AMPLITUDE: 1.5 V
MDC_IDC_SET_LEADCHNL_RA_PACING_AMPLITUDE: 1.5 V
MDC_IDC_SET_LEADCHNL_RA_PACING_AMPLITUDE: 1.75 V
MDC_IDC_SET_LEADCHNL_RA_PACING_ANODE_ELECTRODE_1: NORMAL
MDC_IDC_SET_LEADCHNL_RA_PACING_ANODE_LOCATION_1: NORMAL
MDC_IDC_SET_LEADCHNL_RA_PACING_CAPTURE_MODE: NORMAL
MDC_IDC_SET_LEADCHNL_RA_PACING_CATHODE_ELECTRODE_1: NORMAL
MDC_IDC_SET_LEADCHNL_RA_PACING_CATHODE_LOCATION_1: NORMAL
MDC_IDC_SET_LEADCHNL_RA_PACING_POLARITY: NORMAL
MDC_IDC_SET_LEADCHNL_RA_PACING_PULSEWIDTH: 0.4 MS
MDC_IDC_SET_LEADCHNL_RA_SENSING_ANODE_ELECTRODE_1: NORMAL
MDC_IDC_SET_LEADCHNL_RA_SENSING_ANODE_LOCATION_1: NORMAL
MDC_IDC_SET_LEADCHNL_RA_SENSING_CATHODE_ELECTRODE_1: NORMAL
MDC_IDC_SET_LEADCHNL_RA_SENSING_CATHODE_LOCATION_1: NORMAL
MDC_IDC_SET_LEADCHNL_RA_SENSING_POLARITY: NORMAL
MDC_IDC_SET_LEADCHNL_RA_SENSING_SENSITIVITY: 0.6 MV
MDC_IDC_SET_LEADCHNL_RV_PACING_AMPLITUDE: 2 V
MDC_IDC_SET_LEADCHNL_RV_PACING_ANODE_ELECTRODE_1: NORMAL
MDC_IDC_SET_LEADCHNL_RV_PACING_ANODE_LOCATION_1: NORMAL
MDC_IDC_SET_LEADCHNL_RV_PACING_CAPTURE_MODE: NORMAL
MDC_IDC_SET_LEADCHNL_RV_PACING_CATHODE_ELECTRODE_1: NORMAL
MDC_IDC_SET_LEADCHNL_RV_PACING_CATHODE_LOCATION_1: NORMAL
MDC_IDC_SET_LEADCHNL_RV_PACING_POLARITY: NORMAL
MDC_IDC_SET_LEADCHNL_RV_PACING_PULSEWIDTH: 0.4 MS
MDC_IDC_SET_LEADCHNL_RV_SENSING_ANODE_ELECTRODE_1: NORMAL
MDC_IDC_SET_LEADCHNL_RV_SENSING_ANODE_LOCATION_1: NORMAL
MDC_IDC_SET_LEADCHNL_RV_SENSING_CATHODE_ELECTRODE_1: NORMAL
MDC_IDC_SET_LEADCHNL_RV_SENSING_CATHODE_LOCATION_1: NORMAL
MDC_IDC_SET_LEADCHNL_RV_SENSING_POLARITY: NORMAL
MDC_IDC_SET_LEADCHNL_RV_SENSING_SENSITIVITY: 0.9 MV
MDC_IDC_SET_ZONE_DETECTION_INTERVAL: 350 MS
MDC_IDC_SET_ZONE_DETECTION_INTERVAL: 400 MS
MDC_IDC_SET_ZONE_TYPE: NORMAL
MDC_IDC_STAT_AT_BURDEN_PERCENT: 0 %
MDC_IDC_STAT_AT_DTM_END: NORMAL
MDC_IDC_STAT_AT_DTM_START: NORMAL
MDC_IDC_STAT_BRADY_AP_VP_PERCENT: 99.15 %
MDC_IDC_STAT_BRADY_AP_VP_PERCENT: 99.29 %
MDC_IDC_STAT_BRADY_AP_VP_PERCENT: 99.71 %
MDC_IDC_STAT_BRADY_AP_VS_PERCENT: 0.05 %
MDC_IDC_STAT_BRADY_AP_VS_PERCENT: 0.06 %
MDC_IDC_STAT_BRADY_AP_VS_PERCENT: 0.11 %
MDC_IDC_STAT_BRADY_AS_VP_PERCENT: 0.12 %
MDC_IDC_STAT_BRADY_AS_VP_PERCENT: 0.28 %
MDC_IDC_STAT_BRADY_AS_VP_PERCENT: 0.59 %
MDC_IDC_STAT_BRADY_AS_VS_PERCENT: 0.13 %
MDC_IDC_STAT_BRADY_AS_VS_PERCENT: 0.19 %
MDC_IDC_STAT_BRADY_AS_VS_PERCENT: 0.32 %
MDC_IDC_STAT_BRADY_DTM_END: NORMAL
MDC_IDC_STAT_BRADY_DTM_START: NORMAL
MDC_IDC_STAT_BRADY_RA_PERCENT_PACED: 99.37 %
MDC_IDC_STAT_BRADY_RA_PERCENT_PACED: 99.7 %
MDC_IDC_STAT_BRADY_RA_PERCENT_PACED: 99.87 %
MDC_IDC_STAT_BRADY_RV_PERCENT_PACED: 99.57 %
MDC_IDC_STAT_BRADY_RV_PERCENT_PACED: 99.74 %
MDC_IDC_STAT_BRADY_RV_PERCENT_PACED: 99.82 %
MDC_IDC_STAT_CRT_DTM_END: NORMAL
MDC_IDC_STAT_CRT_DTM_START: NORMAL
MDC_IDC_STAT_CRT_LV_PERCENT_PACED: 99.54 %
MDC_IDC_STAT_CRT_LV_PERCENT_PACED: 99.72 %
MDC_IDC_STAT_CRT_LV_PERCENT_PACED: 99.8 %
MDC_IDC_STAT_CRT_PERCENT_PACED: 99.54 %
MDC_IDC_STAT_CRT_PERCENT_PACED: 99.72 %
MDC_IDC_STAT_CRT_PERCENT_PACED: 99.8 %
MDC_IDC_STAT_EPISODE_RECENT_COUNT: 0
MDC_IDC_STAT_EPISODE_RECENT_COUNT: 3
MDC_IDC_STAT_EPISODE_RECENT_COUNT: 3
MDC_IDC_STAT_EPISODE_RECENT_COUNT_DTM_END: NORMAL
MDC_IDC_STAT_EPISODE_RECENT_COUNT_DTM_START: NORMAL
MDC_IDC_STAT_EPISODE_TOTAL_COUNT: 0
MDC_IDC_STAT_EPISODE_TOTAL_COUNT: 0
MDC_IDC_STAT_EPISODE_TOTAL_COUNT: 1
MDC_IDC_STAT_EPISODE_TOTAL_COUNT: 29
MDC_IDC_STAT_EPISODE_TOTAL_COUNT: 49
MDC_IDC_STAT_EPISODE_TOTAL_COUNT: 49
MDC_IDC_STAT_EPISODE_TOTAL_COUNT: 52
MDC_IDC_STAT_EPISODE_TOTAL_COUNT_DTM_END: NORMAL
MDC_IDC_STAT_EPISODE_TOTAL_COUNT_DTM_START: NORMAL
MDC_IDC_STAT_EPISODE_TYPE: NORMAL
MONOCYTES # BLD AUTO: 0.3 10E3/UL (ref 0–1.3)
MONOCYTES # BLD AUTO: 0.4 10E3/UL (ref 0–1.3)
MONOCYTES NFR BLD AUTO: 6 %
MONOCYTES NFR BLD AUTO: 9 %
NEUTROPHILS # BLD AUTO: 3.2 10E3/UL (ref 1.6–8.3)
NEUTROPHILS # BLD AUTO: 3.3 10E3/UL (ref 1.6–8.3)
NEUTROPHILS NFR BLD AUTO: 65 %
NEUTROPHILS NFR BLD AUTO: 74 %
NITRATE UR QL: NEGATIVE
NITRATE UR QL: NEGATIVE
NRBC # BLD AUTO: 0 10E3/UL
NRBC BLD AUTO-RTO: 0 /100
NT-PROBNP SERPL-MCNC: ABNORMAL PG/ML (ref 0–1800)
NT-PROBNP SERPL-MCNC: ABNORMAL PG/ML (ref 0–1800)
P AXIS - MUSE: NORMAL DEGREES
PH UR STRIP: 5 [PH] (ref 5–7)
PH UR STRIP: 6.5 [PH] (ref 5–7)
PLATELET # BLD AUTO: 106 10E3/UL (ref 150–450)
PLATELET # BLD AUTO: 119 10E3/UL (ref 150–450)
PLATELET # BLD AUTO: 123 10E3/UL (ref 150–450)
PLATELET # BLD AUTO: 174 10E3/UL (ref 150–450)
POTASSIUM BLD-SCNC: 3.8 MMOL/L (ref 3.4–5.3)
POTASSIUM BLD-SCNC: 4.2 MMOL/L (ref 3.4–5.3)
POTASSIUM BLD-SCNC: 4.3 MMOL/L (ref 3.4–5.3)
POTASSIUM BLD-SCNC: 4.5 MMOL/L (ref 3.4–5.3)
POTASSIUM BLD-SCNC: 4.8 MMOL/L (ref 3.4–5.3)
POTASSIUM BLD-SCNC: 4.8 MMOL/L (ref 3.4–5.3)
PR INTERVAL - MUSE: 128 MS
PROT SERPL-MCNC: 7.5 G/DL (ref 6.8–8.8)
PROT SERPL-MCNC: 7.5 G/DL (ref 6.8–8.8)
PROT SERPL-MCNC: 7.6 G/DL (ref 6.8–8.8)
QRS DURATION - MUSE: 174 MS
QT - MUSE: 514 MS
QTC - MUSE: 550 MS
R AXIS - MUSE: 263 DEGREES
RBC # BLD AUTO: 3.27 10E6/UL (ref 4.4–5.9)
RBC # BLD AUTO: 3.45 10E6/UL (ref 4.4–5.9)
RBC # BLD AUTO: 3.5 10E6/UL (ref 4.4–5.9)
RBC # BLD AUTO: 3.67 10E6/UL (ref 4.4–5.9)
RBC URINE: <1 /HPF
SARS-COV-2 RNA RESP QL NAA+PROBE: NEGATIVE
SODIUM SERPL-SCNC: 137 MMOL/L (ref 133–144)
SODIUM SERPL-SCNC: 138 MMOL/L (ref 133–144)
SODIUM SERPL-SCNC: 138 MMOL/L (ref 133–144)
SODIUM SERPL-SCNC: 139 MMOL/L (ref 133–144)
SODIUM SERPL-SCNC: 140 MMOL/L (ref 133–144)
SODIUM SERPL-SCNC: 141 MMOL/L (ref 133–144)
SP GR UR STRIP: 1.01 (ref 1–1.03)
SP GR UR STRIP: 1.02 (ref 1–1.03)
SYSTOLIC BLOOD PRESSURE - MUSE: NORMAL MMHG
T AXIS - MUSE: 78 DEGREES
TROPONIN I SERPL HS-MCNC: 23 NG/L
TSH SERPL DL<=0.005 MIU/L-ACNC: 1.12 MU/L (ref 0.4–4)
TSH SERPL DL<=0.005 MIU/L-ACNC: 2.3 MU/L (ref 0.4–4)
UFH PPP CHRO-ACNC: 0.56 IU/ML
UFH PPP CHRO-ACNC: 0.66 IU/ML
UROBILINOGEN UR STRIP-ACNC: 0.2 E.U./DL
UROBILINOGEN UR STRIP-MCNC: NORMAL MG/DL
VA-%PRED-PRE: 74 %
VA-PRE: 3.73 L
VC-%PRED-PRE: 66 %
VC-PRE: 2.19 L
VC-PRED: 3.31 L
VENTRICULAR RATE- MUSE: 69 BPM
WBC # BLD AUTO: 4.5 10E3/UL (ref 4–11)
WBC # BLD AUTO: 4.6 10E3/UL (ref 4–11)
WBC # BLD AUTO: 5 10E3/UL (ref 4–11)
WBC # BLD AUTO: 5.1 10E3/UL (ref 4–11)
WBC URINE: <1 /HPF

## 2022-01-01 PROCEDURE — 99214 OFFICE O/P EST MOD 30 MIN: CPT | Performed by: INTERNAL MEDICINE

## 2022-01-01 PROCEDURE — 36415 COLL VENOUS BLD VENIPUNCTURE: CPT | Performed by: INTERNAL MEDICINE

## 2022-01-01 PROCEDURE — 250N000013 HC RX MED GY IP 250 OP 250 PS 637: Performed by: PHYSICIAN ASSISTANT

## 2022-01-01 PROCEDURE — 36415 COLL VENOUS BLD VENIPUNCTURE: CPT | Performed by: PHYSICIAN ASSISTANT

## 2022-01-01 PROCEDURE — 93296 REM INTERROG EVL PM/IDS: CPT | Performed by: INTERNAL MEDICINE

## 2022-01-01 PROCEDURE — 97161 PT EVAL LOW COMPLEX 20 MIN: CPT | Mod: GP | Performed by: PHYSICAL THERAPIST

## 2022-01-01 PROCEDURE — 97116 GAIT TRAINING THERAPY: CPT | Mod: GP | Performed by: PHYSICAL THERAPIST

## 2022-01-01 PROCEDURE — 258N000003 HC RX IP 258 OP 636: Performed by: EMERGENCY MEDICINE

## 2022-01-01 PROCEDURE — 99215 OFFICE O/P EST HI 40 MIN: CPT | Performed by: INTERNAL MEDICINE

## 2022-01-01 PROCEDURE — 99207 PR NO BILLABLE SERVICE THIS VISIT: CPT | Performed by: INTERNAL MEDICINE

## 2022-01-01 PROCEDURE — 96361 HYDRATE IV INFUSION ADD-ON: CPT

## 2022-01-01 PROCEDURE — 82248 BILIRUBIN DIRECT: CPT | Performed by: INTERNAL MEDICINE

## 2022-01-01 PROCEDURE — 999N000208 ECHOCARDIOGRAM COMPLETE

## 2022-01-01 PROCEDURE — 99213 OFFICE O/P EST LOW 20 MIN: CPT | Mod: 25 | Performed by: PODIATRIST

## 2022-01-01 PROCEDURE — 97597 DBRDMT OPN WND 1ST 20 CM/<: CPT | Performed by: PODIATRIST

## 2022-01-01 PROCEDURE — 94729 DIFFUSING CAPACITY: CPT

## 2022-01-01 PROCEDURE — 74176 CT ABD & PELVIS W/O CONTRAST: CPT | Mod: MA

## 2022-01-01 PROCEDURE — 71250 CT THORAX DX C-: CPT

## 2022-01-01 PROCEDURE — 93970 EXTREMITY STUDY: CPT

## 2022-01-01 PROCEDURE — 120N000001 HC R&B MED SURG/OB

## 2022-01-01 PROCEDURE — G0378 HOSPITAL OBSERVATION PER HR: HCPCS

## 2022-01-01 PROCEDURE — 84484 ASSAY OF TROPONIN QUANT: CPT | Performed by: EMERGENCY MEDICINE

## 2022-01-01 PROCEDURE — 250N000011 HC RX IP 250 OP 636: Performed by: EMERGENCY MEDICINE

## 2022-01-01 PROCEDURE — 80048 BASIC METABOLIC PNL TOTAL CA: CPT | Performed by: INTERNAL MEDICINE

## 2022-01-01 PROCEDURE — 250N000009 HC RX 250: Performed by: EMERGENCY MEDICINE

## 2022-01-01 PROCEDURE — 94375 RESPIRATORY FLOW VOLUME LOOP: CPT

## 2022-01-01 PROCEDURE — 85025 COMPLETE CBC W/AUTO DIFF WBC: CPT | Performed by: INTERNAL MEDICINE

## 2022-01-01 PROCEDURE — 255N000002 HC RX 255 OP 636: Performed by: INTERNAL MEDICINE

## 2022-01-01 PROCEDURE — 83880 ASSAY OF NATRIURETIC PEPTIDE: CPT | Performed by: INTERNAL MEDICINE

## 2022-01-01 PROCEDURE — 80053 COMPREHEN METABOLIC PANEL: CPT | Performed by: STUDENT IN AN ORGANIZED HEALTH CARE EDUCATION/TRAINING PROGRAM

## 2022-01-01 PROCEDURE — 85520 HEPARIN ASSAY: CPT | Performed by: EMERGENCY MEDICINE

## 2022-01-01 PROCEDURE — 85027 COMPLETE CBC AUTOMATED: CPT | Performed by: STUDENT IN AN ORGANIZED HEALTH CARE EDUCATION/TRAINING PROGRAM

## 2022-01-01 PROCEDURE — 85520 HEPARIN ASSAY: CPT | Performed by: PHYSICIAN ASSISTANT

## 2022-01-01 PROCEDURE — 91313 COVID-19,PF,MODERNA BIVALENT: CPT | Performed by: INTERNAL MEDICINE

## 2022-01-01 PROCEDURE — 250N000011 HC RX IP 250 OP 636: Performed by: PHYSICIAN ASSISTANT

## 2022-01-01 PROCEDURE — 99223 1ST HOSP IP/OBS HIGH 75: CPT | Performed by: INTERNAL MEDICINE

## 2022-01-01 PROCEDURE — 71275 CT ANGIOGRAPHY CHEST: CPT | Mod: MA

## 2022-01-01 PROCEDURE — 0134A COVID-19,PF,MODERNA BIVALENT: CPT | Performed by: INTERNAL MEDICINE

## 2022-01-01 PROCEDURE — 93294 REM INTERROG EVL PM/LDLS PM: CPT | Performed by: INTERNAL MEDICINE

## 2022-01-01 PROCEDURE — 99220 PR INITIAL OBSERVATION CARE,LEVEL III: CPT | Performed by: STUDENT IN AN ORGANIZED HEALTH CARE EDUCATION/TRAINING PROGRAM

## 2022-01-01 PROCEDURE — 96376 TX/PRO/DX INJ SAME DRUG ADON: CPT

## 2022-01-01 PROCEDURE — 99239 HOSP IP/OBS DSCHRG MGMT >30: CPT | Performed by: INTERNAL MEDICINE

## 2022-01-01 PROCEDURE — 81003 URINALYSIS AUTO W/O SCOPE: CPT | Performed by: INTERNAL MEDICINE

## 2022-01-01 PROCEDURE — 80053 COMPREHEN METABOLIC PANEL: CPT | Performed by: INTERNAL MEDICINE

## 2022-01-01 PROCEDURE — 84443 ASSAY THYROID STIM HORMONE: CPT | Performed by: INTERNAL MEDICINE

## 2022-01-01 PROCEDURE — 250N000011 HC RX IP 250 OP 636: Performed by: STUDENT IN AN ORGANIZED HEALTH CARE EDUCATION/TRAINING PROGRAM

## 2022-01-01 PROCEDURE — 99285 EMERGENCY DEPT VISIT HI MDM: CPT | Mod: 25

## 2022-01-01 PROCEDURE — 96375 TX/PRO/DX INJ NEW DRUG ADDON: CPT

## 2022-01-01 PROCEDURE — 85379 FIBRIN DEGRADATION QUANT: CPT | Performed by: EMERGENCY MEDICINE

## 2022-01-01 PROCEDURE — U0003 INFECTIOUS AGENT DETECTION BY NUCLEIC ACID (DNA OR RNA); SEVERE ACUTE RESPIRATORY SYNDROME CORONAVIRUS 2 (SARS-COV-2) (CORONAVIRUS DISEASE [COVID-19]), AMPLIFIED PROBE TECHNIQUE, MAKING USE OF HIGH THROUGHPUT TECHNOLOGIES AS DESCRIBED BY CMS-2020-01-R: HCPCS | Performed by: EMERGENCY MEDICINE

## 2022-01-01 PROCEDURE — 36415 COLL VENOUS BLD VENIPUNCTURE: CPT | Performed by: STUDENT IN AN ORGANIZED HEALTH CARE EDUCATION/TRAINING PROGRAM

## 2022-01-01 PROCEDURE — 85025 COMPLETE CBC W/AUTO DIFF WBC: CPT | Performed by: EMERGENCY MEDICINE

## 2022-01-01 PROCEDURE — 93306 TTE W/DOPPLER COMPLETE: CPT | Mod: 26 | Performed by: INTERNAL MEDICINE

## 2022-01-01 PROCEDURE — 81001 URINALYSIS AUTO W/SCOPE: CPT | Performed by: EMERGENCY MEDICINE

## 2022-01-01 PROCEDURE — 36415 COLL VENOUS BLD VENIPUNCTURE: CPT | Performed by: EMERGENCY MEDICINE

## 2022-01-01 PROCEDURE — 82040 ASSAY OF SERUM ALBUMIN: CPT | Performed by: STUDENT IN AN ORGANIZED HEALTH CARE EDUCATION/TRAINING PROGRAM

## 2022-01-01 PROCEDURE — 85610 PROTHROMBIN TIME: CPT | Performed by: PHYSICIAN ASSISTANT

## 2022-01-01 PROCEDURE — 80048 BASIC METABOLIC PNL TOTAL CA: CPT | Performed by: PHYSICIAN ASSISTANT

## 2022-01-01 PROCEDURE — C9803 HOPD COVID-19 SPEC COLLECT: HCPCS

## 2022-01-01 PROCEDURE — 96360 HYDRATION IV INFUSION INIT: CPT

## 2022-01-01 PROCEDURE — 96365 THER/PROPH/DIAG IV INF INIT: CPT

## 2022-01-01 PROCEDURE — 83880 ASSAY OF NATRIURETIC PEPTIDE: CPT | Performed by: EMERGENCY MEDICINE

## 2022-01-01 PROCEDURE — 97112 NEUROMUSCULAR REEDUCATION: CPT | Mod: GP | Performed by: PHYSICAL THERAPIST

## 2022-01-01 PROCEDURE — 255N000002 HC RX 255 OP 636: Performed by: STUDENT IN AN ORGANIZED HEALTH CARE EDUCATION/TRAINING PROGRAM

## 2022-01-01 PROCEDURE — 93281 PM DEVICE PROGR EVAL MULTI: CPT | Performed by: INTERNAL MEDICINE

## 2022-01-01 PROCEDURE — 99203 OFFICE O/P NEW LOW 30 MIN: CPT | Mod: 25 | Performed by: PODIATRIST

## 2022-01-01 PROCEDURE — 85018 HEMOGLOBIN: CPT | Performed by: INTERNAL MEDICINE

## 2022-01-01 PROCEDURE — 85027 COMPLETE CBC AUTOMATED: CPT | Performed by: INTERNAL MEDICINE

## 2022-01-01 PROCEDURE — 96366 THER/PROPH/DIAG IV INF ADDON: CPT

## 2022-01-01 PROCEDURE — 80048 BASIC METABOLIC PNL TOTAL CA: CPT | Performed by: EMERGENCY MEDICINE

## 2022-01-01 RX ORDER — LOSARTAN POTASSIUM 50 MG/1
50 TABLET ORAL DAILY
Status: DISCONTINUED | OUTPATIENT
Start: 2022-01-01 | End: 2022-01-01 | Stop reason: HOSPADM

## 2022-01-01 RX ORDER — FUROSEMIDE 10 MG/ML
40 INJECTION INTRAMUSCULAR; INTRAVENOUS ONCE
Status: DISCONTINUED | OUTPATIENT
Start: 2022-01-01 | End: 2022-01-01

## 2022-01-01 RX ORDER — ONDANSETRON 2 MG/ML
4 INJECTION INTRAMUSCULAR; INTRAVENOUS EVERY 6 HOURS PRN
Status: DISCONTINUED | OUTPATIENT
Start: 2022-01-01 | End: 2022-01-01 | Stop reason: HOSPADM

## 2022-01-01 RX ORDER — CARVEDILOL 25 MG/1
25 TABLET ORAL 2 TIMES DAILY WITH MEALS
Qty: 180 TABLET | Refills: 2 | Status: SHIPPED | OUTPATIENT
Start: 2022-01-01

## 2022-01-01 RX ORDER — FUROSEMIDE 10 MG/ML
20 INJECTION INTRAMUSCULAR; INTRAVENOUS ONCE
Status: COMPLETED | OUTPATIENT
Start: 2022-01-01 | End: 2022-01-01

## 2022-01-01 RX ORDER — POTASSIUM CHLORIDE 750 MG/1
TABLET, EXTENDED RELEASE ORAL
Qty: 180 TABLET | Refills: 2 | Status: SHIPPED | OUTPATIENT
Start: 2022-01-01

## 2022-01-01 RX ORDER — HEPARIN SODIUM 10000 [USP'U]/100ML
0-5000 INJECTION, SOLUTION INTRAVENOUS CONTINUOUS
Status: DISPENSED | OUTPATIENT
Start: 2022-01-01 | End: 2022-01-01

## 2022-01-01 RX ORDER — NAPROXEN SODIUM 220 MG
220 TABLET ORAL DAILY
COMMUNITY
End: 2023-01-01

## 2022-01-01 RX ORDER — FUROSEMIDE 10 MG/ML
40 INJECTION INTRAMUSCULAR; INTRAVENOUS ONCE
Status: COMPLETED | OUTPATIENT
Start: 2022-01-01 | End: 2022-01-01

## 2022-01-01 RX ORDER — FUROSEMIDE 20 MG
TABLET ORAL
Qty: 90 TABLET | Refills: 3 | Status: SHIPPED | OUTPATIENT
Start: 2022-01-01 | End: 2022-01-01

## 2022-01-01 RX ORDER — IOPAMIDOL 755 MG/ML
59 INJECTION, SOLUTION INTRAVASCULAR ONCE
Status: COMPLETED | OUTPATIENT
Start: 2022-01-01 | End: 2022-01-01

## 2022-01-01 RX ORDER — CYANOCOBALAMIN (VITAMIN B-12) 2500 MCG
2500 TABLET, SUBLINGUAL SUBLINGUAL DAILY
COMMUNITY

## 2022-01-01 RX ORDER — ONDANSETRON 4 MG/1
4 TABLET, ORALLY DISINTEGRATING ORAL EVERY 6 HOURS PRN
Status: DISCONTINUED | OUTPATIENT
Start: 2022-01-01 | End: 2022-01-01 | Stop reason: HOSPADM

## 2022-01-01 RX ORDER — LEVOTHYROXINE SODIUM 112 UG/1
112 TABLET ORAL DAILY
Status: DISCONTINUED | OUTPATIENT
Start: 2022-01-01 | End: 2022-01-01 | Stop reason: HOSPADM

## 2022-01-01 RX ORDER — ALLOPURINOL 300 MG/1
300 TABLET ORAL DAILY
Status: DISCONTINUED | OUTPATIENT
Start: 2022-01-01 | End: 2022-01-01 | Stop reason: HOSPADM

## 2022-01-01 RX ORDER — HEPARIN SODIUM 10000 [USP'U]/100ML
0-5000 INJECTION, SOLUTION INTRAVENOUS CONTINUOUS
Status: DISCONTINUED | OUTPATIENT
Start: 2022-01-01 | End: 2022-01-01

## 2022-01-01 RX ORDER — AMIODARONE HYDROCHLORIDE 200 MG/1
100 TABLET ORAL DAILY
Qty: 45 TABLET | Refills: 0 | Status: SHIPPED | OUTPATIENT
Start: 2022-01-01 | End: 2022-01-01

## 2022-01-01 RX ORDER — AMIODARONE HYDROCHLORIDE 200 MG/1
100 TABLET ORAL DAILY
Qty: 45 TABLET | Refills: 2 | Status: SHIPPED | OUTPATIENT
Start: 2022-01-01 | End: 2022-01-01

## 2022-01-01 RX ORDER — AMOXICILLIN 500 MG/1
CAPSULE ORAL
Qty: 4 CAPSULE | Refills: 3 | Status: SHIPPED | OUTPATIENT
Start: 2022-01-01

## 2022-01-01 RX ORDER — CARVEDILOL 25 MG/1
25 TABLET ORAL 2 TIMES DAILY WITH MEALS
Status: DISCONTINUED | OUTPATIENT
Start: 2022-01-01 | End: 2022-01-01 | Stop reason: HOSPADM

## 2022-01-01 RX ORDER — ACETAMINOPHEN 325 MG/1
975 TABLET ORAL EVERY 6 HOURS PRN
Status: DISCONTINUED | OUTPATIENT
Start: 2022-01-01 | End: 2022-01-01 | Stop reason: HOSPADM

## 2022-01-01 RX ORDER — FUROSEMIDE 20 MG
TABLET ORAL
Qty: 90 TABLET | Refills: 11 | Status: SHIPPED | OUTPATIENT
Start: 2022-01-01

## 2022-01-01 RX ORDER — CHOLECALCIFEROL (VITAMIN D3) 50 MCG
50 TABLET ORAL DAILY
COMMUNITY

## 2022-01-01 RX ADMIN — FUROSEMIDE 20 MG: 10 INJECTION, SOLUTION INTRAMUSCULAR; INTRAVENOUS at 16:28

## 2022-01-01 RX ADMIN — ALLOPURINOL 300 MG: 300 TABLET ORAL at 12:01

## 2022-01-01 RX ADMIN — SODIUM CHLORIDE 1000 ML: 9 INJECTION, SOLUTION INTRAVENOUS at 23:02

## 2022-01-01 RX ADMIN — LEVOTHYROXINE SODIUM 112 MCG: 112 TABLET ORAL at 09:11

## 2022-01-01 RX ADMIN — CARVEDILOL 25 MG: 25 TABLET, FILM COATED ORAL at 18:20

## 2022-01-01 RX ADMIN — IOPAMIDOL 59 ML: 755 INJECTION, SOLUTION INTRAVENOUS at 22:49

## 2022-01-01 RX ADMIN — FUROSEMIDE 40 MG: 10 INJECTION, SOLUTION INTRAMUSCULAR; INTRAVENOUS at 07:43

## 2022-01-01 RX ADMIN — HEPARIN SODIUM 1200 UNITS/HR: 10000 INJECTION, SOLUTION INTRAVENOUS at 16:27

## 2022-01-01 RX ADMIN — ALLOPURINOL 300 MG: 300 TABLET ORAL at 09:11

## 2022-01-01 RX ADMIN — HUMAN ALBUMIN MICROSPHERES AND PERFLUTREN 9 ML: 10; .22 INJECTION, SOLUTION INTRAVENOUS at 13:44

## 2022-01-01 RX ADMIN — APIXABAN 5 MG: 5 TABLET, FILM COATED ORAL at 23:09

## 2022-01-01 RX ADMIN — HUMAN ALBUMIN MICROSPHERES AND PERFLUTREN 9 ML: 10; .22 INJECTION, SOLUTION INTRAVENOUS at 13:42

## 2022-01-01 RX ADMIN — HEPARIN SODIUM 1200 UNITS/HR: 10000 INJECTION, SOLUTION INTRAVENOUS at 00:35

## 2022-01-01 RX ADMIN — SODIUM CHLORIDE 86 ML: 900 INJECTION INTRAVENOUS at 22:49

## 2022-01-01 RX ADMIN — CARVEDILOL 25 MG: 25 TABLET, FILM COATED ORAL at 09:11

## 2022-01-01 RX ADMIN — CARVEDILOL 25 MG: 25 TABLET, FILM COATED ORAL at 12:01

## 2022-01-01 RX ADMIN — APIXABAN 5 MG: 5 TABLET, FILM COATED ORAL at 09:10

## 2022-01-01 ASSESSMENT — ACTIVITIES OF DAILY LIVING (ADL)
DRESSING/BATHING_DIFFICULTY: NO
ADLS_ACUITY_SCORE: 34
TOILETING_ISSUES: NO
ADLS_ACUITY_SCORE: 36
ADLS_ACUITY_SCORE: 36
ADLS_ACUITY_SCORE: 38
ADLS_ACUITY_SCORE: 36
ADLS_ACUITY_SCORE: 36
ADLS_ACUITY_SCORE: 35
ADLS_ACUITY_SCORE: 37
ADLS_ACUITY_SCORE: 34
ADLS_ACUITY_SCORE: 37
ADLS_ACUITY_SCORE: 38
CONCENTRATING,_REMEMBERING_OR_MAKING_DECISIONS_DIFFICULTY: NO
ADLS_ACUITY_SCORE: 34
WALKING_OR_CLIMBING_STAIRS_DIFFICULTY: NO
CHANGE_IN_FUNCTIONAL_STATUS_SINCE_ONSET_OF_CURRENT_ILLNESS/INJURY: NO
ADLS_ACUITY_SCORE: 22
ADLS_ACUITY_SCORE: 34
ADLS_ACUITY_SCORE: 36
DIFFICULTY_EATING/SWALLOWING: NO
ADLS_ACUITY_SCORE: 37
WEAR_GLASSES_OR_BLIND: NO
ADLS_ACUITY_SCORE: 35
DOING_ERRANDS_INDEPENDENTLY_DIFFICULTY: NO
ADLS_ACUITY_SCORE: 38
ADLS_ACUITY_SCORE: 36

## 2022-01-01 ASSESSMENT — ENCOUNTER SYMPTOMS
HEMATURIA: 0
FLANK PAIN: 1
DYSURIA: 0
COUGH: 0
SHORTNESS OF BREATH: 1

## 2022-01-01 ASSESSMENT — PAIN SCALES - GENERAL
PAINLEVEL: NO PAIN (0)

## 2022-01-18 DIAGNOSIS — Z96.60 HISTORY OF JOINT REPLACEMENT, UNSPECIFIED JOINT: ICD-10-CM

## 2022-01-19 RX ORDER — AMOXICILLIN 500 MG/1
CAPSULE ORAL
Qty: 4 CAPSULE | Refills: 3 | Status: SHIPPED | OUTPATIENT
Start: 2022-01-19 | End: 2022-01-01

## 2022-01-19 NOTE — TELEPHONE ENCOUNTER
Routing refill request to provider for review/approval because:  Failed protocol: Confirmation of diagnosis is required.   Danni LOPEZ RN  [facility]

## 2022-03-22 NOTE — TELEPHONE ENCOUNTER
.[facility] Cardiology Refill Guideline reviewed.  Medication does not meet criteria for refill due to overdue for EKG and follow up.  Messaged to providers team for further review.

## 2022-03-24 NOTE — TELEPHONE ENCOUNTER
Routing refill request to provider for review/approval because:  Labs not current:  creatinine

## 2022-04-01 NOTE — PATIENT INSTRUCTIONS
It was a pleasure seeing you today and thank you for allowing me to be a part of your health care team.  Should you have any questions regarding your visit or future needs please feel free to reach out to my care team for assistance.      Thank you, Dr. Ray De Leon        **Nursing: [phone]       **Scheduling: [phone]

## 2022-04-01 NOTE — PROGRESS NOTES
Service Date: 04/01/2022    CARDIOLOGY OFFICE PROGRESS NOTE    PRIMARY CARE PHYSICIAN:  Mumtaz Hernandez MD     HISTORY OF PRESENT ILLNESS:  I had the opportunity to see Mr. Lei Woo in Cardiology Clinic today at [facility] in [place] for reevaluation of chronic systolic heart failure and paroxysmal atrial fibrillation.    Mr. Woo is an 88-year-old gentleman who developed rapid atrial fibrillation while living in [place].  He was treated there with pacemaker implantation using a biventricular pacemaker due to the presence of a severe cardiomyopathy.  He was cardioverted and started on amiodarone and has maintained sinus rhythm consistently since then.  We have monitored his rhythm through his pacemaker, and we have not seen any recurrence of atrial fibrillation.  He seems to be doing well with low-dose Eliquis 2.5 mg p.o. b.i.d. without bleeding complications.    His left ventricular function failed to improve and has remained stable at 30%-35% ejection fraction.  He has some issues with lower extremity edema and does develop shortness of breath with prolonged walking.  He denies lightheadedness and chest pain.  He has had a few falling episodes due to his neuropathy.    He has had nonsustained ventricular tachycardia recorded on his pacemaker as well, less than 10 beats and quite rare.  We have talked about upgrading his pacemaker to an ICD, but he has chosen not to pursue that.    Recently, he has had some upper respiratory infections, including an episode of COVID in November, which resulted in prolonged illness and weight loss.  Fortunately, he recovered from that, but recently has had a cold for the last week or so associated with some ankle swelling.  He has increased his Lasix from 20-40 mg a day for several days to treat that.  He denies any progressive shortness-of-breath issues.  He has not had lightheadedness or chest pain.    PHYSICAL EXAMINATION:  On examination here today, his blood  pressure is 123/72, heart rate 72 and weight 150 pounds.  His weight has come down several pounds since he started the increased Lasix dose.  His legs remain swollen.  He has 3+ lower extremity edema up to the knee.    His laboratory studies today show worsening kidney function with creatinine rising from 1.7-2.2 and BUN up from 36-45.  His potassium is 4.3.  His hemoglobin is 12.3.    CONCLUSIONS:  Mr. 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  He has been dealing with some upper respiratory tract infections recently, but overall seems to be doing well.  He has some lower extremity edema, and I had increased his Lasix recently.  I suspect this is why his kidney function looks a bit worse.  I suggested he go back to his usual dose of Lasix, use compression stockings and elevate his legs.    His lungs seem to have fibrotic crackles.  I am concerned about the amiodarone causing pulmonary fibrosis.  I will get a pulmonary function test in about a month after he has recovered from his upper respiratory tract infections.    We will get a device check and an echo as well, and I will follow up with him again next year if everything looks stable.    cc:  [place] [address]    Ray De Leon MD, [facility]        D: 2022   T: 2022   MT: [email or web address]    Name:     LICO THOMAS  MRN:      [id number]        Account:      [id number]   :      1933           Service Date: 2022       Document: [id number]

## 2022-04-01 NOTE — LETTER
4/1/2022    Mumtaz Hernandez MD  [address]  [place] [address]    RE: Lico Woo       Dear Colleague,     I had the pleasure of seeing Lico [facility]monster in the [facility].  HPI and Plan:   See dictation    Today's clinic visit entailed:  Review of the result(s) of each unique test - bmp, hgb, PM checks  Ordering of each unique test  Prescription drug management  30 minutes spent on the date of the encounter doing chart review, review of test results, interpretation of tests, patient visit and documentation   Provider  Link to [facility] Help Grid     The level of medical decision making during this visit was of high complexity.      Orders Placed This Encounter   Procedures     Basic metabolic panel     CBC with platelets     Follow-Up with Cardiology     Echocardiogram Complete     General PFT Lab (Please always keep checked)     Pulmonary Function Test       No orders of the defined types were placed in this encounter.      There are no discontinued medications.      Encounter Diagnoses   Name Primary?     Chronic systolic CHF (congestive heart failure) (H) Yes     Paroxysmal atrial fibrillation (H)      Cardiac pacemaker in situ      Dilated cardiomyopathy (H)      Essential hypertension        CURRENT MEDICATIONS:  Current Outpatient Medications   Medication Sig Dispense Refill     acetaminophen (TYLENOL) 500 MG tablet Take 500-1,000 mg by mouth every 6 hours as needed for mild pain       allopurinol (ZYLOPRIM) 300 MG tablet Take 1 tablet (300 mg) by mouth daily 90 tablet 3     alpha-lipoic acid 600 MG capsule Take by mouth daily        amiodarone (PACERONE) 200 MG tablet Take 0.5 tablets (100 mg) by mouth daily 45 tablet 0     amoxicillin (AMOXIL) 500 MG capsule TAKE 4 CAPSULES BY MOUTH 1  HOUR PRIOR TO DENTAL WORK 4 capsule 3     apixaban ANTICOAGULANT (ELIQUIS) 2.5 MG tablet Take 1 tablet (2.5 mg) by mouth 2 times daily 180 tablet 3     carvedilol (COREG) 25 MG tablet Take 1 tablet  (25 mg) by mouth 2 times daily (with meals) 180 tablet 3     cholecalciferol (VITAMIN D3) 5000 units TABS tablet Take 2,000 Units by mouth daily       Cyanocobalamin (VITAMIN B 12 PO) Take 2,500 mcg by mouth       furosemide (LASIX) 20 MG tablet TAKE 1 TABLET BY MOUTH  DAILY 90 tablet 3     levothyroxine (SYNTHROID/LEVOTHROID) 112 MCG tablet Take 1 tablet (112 mcg) by mouth daily 90 tablet 3     losartan (COZAAR) 100 MG tablet Take 0.5 tablets (50 mg) by mouth daily Please call for an appointment for further refills. [phone] 45 tablet 3     multivitamin (CENTRUM SILVER) tablet Take 1 tablet by mouth daily       Niacin (VITAMIN B-3 OR) Take 500 mg by mouth       potassium chloride ER (KLOR-CON M) 10 MEQ CR tablet TAKE 1 TABLET BY MOUTH  TWICE DAILY 180 tablet 2     Pyridoxine HCl (VITAMIN B6 PO) Take 150 mg by mouth          ALLERGIES   No Known Allergies    PAST MEDICAL HISTORY:  Past Medical History:   Diagnosis Date     Aortic regurgitation     mild-moderate per 10/2018 Echo     Aortic stenosis     mild per 10/2018 Echo     Balance problem     few years     Cardiomyopathy (H) 10/2018    [place]; Echo 10/2018, EF 25%, down from 50% 12/2017, per notes, pt declines further work up to find etiology of drop in EF     Chronic atrial fibrillation (H) 2018    cardioversion 4/19     Chronic systolic CHF (congestive heart failure) (H)      CKD (chronic kidney disease) stage 3, GFR 30-59 ml/min (H)      Essential hypertension      Frequent falls      Gross hematuria 04/2019    cysto, ct neg     History of gout     none for years     Hypothyroidism      MGUS (monoclonal gammopathy of unknown significance) 07/2020    found during eval of falls and neuropathy.  Per ck consult from [facility] to repeat labs every 6 months: CBC, BMP, SPEP, IgG and kappa-lambda free light chain     Mitral regurgitation     moderate per 10/2018 Echo     Neuropathy 1990    both lower legs     Pacemaker 2018     Prostate cancer (H) 2008    xrt,  fine since     Pulmonary hypertension (H)      Tricuspid regurgitation     mioderate per 10/2018 Echo       PAST SURGICAL HISTORY:  Past Surgical History:   Procedure Laterality Date     bilateral hip replacement  2017    done 6 months apart     IMPLANT PACEMAKER  11/12/2018    CRT-P     lip surgery for skin cancer         FAMILY HISTORY:  Family History   Problem Relation Age of Onset     Myocardial Infarction Mother      Cerebrovascular Disease Father        SOCIAL HISTORY:  Social History     Socioeconomic History     Marital status:      Spouse name: None     Number of children: 3     Years of education: None     Highest education level: None   Occupational History     Occupation: owned marketing services company in [place]   Tobacco Use     Smoking status: Never Smoker     Smokeless tobacco: Never Used   Substance and Sexual Activity     Alcohol use: Yes     Comment: 1 drink week     Drug use: Never     Sexual activity: Not Currently   Other Topics Concern     Parent/sibling w/ CABG, MI or angioplasty before 65F 55M? Not Asked   Social History Narrative     None     Social Determinants of Health     Financial Resource Strain: Not on file   Food Insecurity: Not on file   Transportation Needs: Not on file   Physical Activity: Not on file   Stress: Not on file   Social Connections: Not on file   Intimate Partner Violence: Not on file   Housing Stability: Not on file       Review of Systems:  Skin:  Negative       Eyes:  Positive for glasses    ENT:  Negative      Respiratory:  Positive for dyspnea on exertion;cough last 10 days had SOB and cough patient feels due to a cold   Cardiovascular:  chest pain;Negative;palpitations;dizziness;lightheadedness Positive for;edema;fatigue    Gastroenterology: not assessed      Genitourinary:  not assessed      Musculoskeletal:  Negative      Neurologic:  Positive for numbness or tingling of feet    Psychiatric:  Negative      Heme/Lymph/Imm:  Negative allergies   "  Endocrine:  Positive for thyroid disorder      Physical Exam:  Vitals: /72 (BP Location: Right arm, Patient Position: Sitting)   Pulse 72   Ht 1.702 m (5' 7\")   Wt 68 kg (150 lb)   SpO2 94%   BMI 23.49 kg/m      Constitutional:           Skin:             Head:           Eyes:           Lymph:      ENT:           Neck:           Respiratory:            Cardiac:                                                           GI:           Extremities and Muscular Skeletal:                 Neurological:           Psych:           CC  Jovana Morin MD  [address] [facility] [address]  [place]  [place] [address]    Thank you for allowing me to participate in the care of your patient.    Sincerely,   ALESHA STRANGE MD   [facility]  "

## 2022-04-01 NOTE — PROGRESS NOTES
HPI and Plan:   See dictation    Today's clinic visit entailed:  Review of the result(s) of each unique test - bmp, hgb, PM checks  Ordering of each unique test  Prescription drug management  30 minutes spent on the date of the encounter doing chart review, review of test results, interpretation of tests, patient visit and documentation   Provider  Link to [facility] Help Grid     The level of medical decision making during this visit was of high complexity.      Orders Placed This Encounter   Procedures     Basic metabolic panel     CBC with platelets     Follow-Up with Cardiology     Echocardiogram Complete     General PFT Lab (Please always keep checked)     Pulmonary Function Test       No orders of the defined types were placed in this encounter.      There are no discontinued medications.      Encounter Diagnoses   Name Primary?     Chronic systolic CHF (congestive heart failure) (H) Yes     Paroxysmal atrial fibrillation (H)      Cardiac pacemaker in situ      Dilated cardiomyopathy (H)      Essential hypertension        CURRENT MEDICATIONS:  Current Outpatient Medications   Medication Sig Dispense Refill     acetaminophen (TYLENOL) 500 MG tablet Take 500-1,000 mg by mouth every 6 hours as needed for mild pain       allopurinol (ZYLOPRIM) 300 MG tablet Take 1 tablet (300 mg) by mouth daily 90 tablet 3     alpha-lipoic acid 600 MG capsule Take by mouth daily        amiodarone (PACERONE) 200 MG tablet Take 0.5 tablets (100 mg) by mouth daily 45 tablet 0     amoxicillin (AMOXIL) 500 MG capsule TAKE 4 CAPSULES BY MOUTH 1  HOUR PRIOR TO DENTAL WORK 4 capsule 3     apixaban ANTICOAGULANT (ELIQUIS) 2.5 MG tablet Take 1 tablet (2.5 mg) by mouth 2 times daily 180 tablet 3     carvedilol (COREG) 25 MG tablet Take 1 tablet (25 mg) by mouth 2 times daily (with meals) 180 tablet 3     cholecalciferol (VITAMIN D3) 5000 units TABS tablet Take 2,000 Units by mouth daily       Cyanocobalamin (VITAMIN B 12 PO) Take 2,500 mcg by  mouth       furosemide (LASIX) 20 MG tablet TAKE 1 TABLET BY MOUTH  DAILY 90 tablet 3     levothyroxine (SYNTHROID/LEVOTHROID) 112 MCG tablet Take 1 tablet (112 mcg) by mouth daily 90 tablet 3     losartan (COZAAR) 100 MG tablet Take 0.5 tablets (50 mg) by mouth daily Please call for an appointment for further refills. [phone] 45 tablet 3     multivitamin (CENTRUM SILVER) tablet Take 1 tablet by mouth daily       Niacin (VITAMIN B-3 OR) Take 500 mg by mouth       potassium chloride ER (KLOR-CON M) 10 MEQ CR tablet TAKE 1 TABLET BY MOUTH  TWICE DAILY 180 tablet 2     Pyridoxine HCl (VITAMIN B6 PO) Take 150 mg by mouth          ALLERGIES   No Known Allergies    PAST MEDICAL HISTORY:  Past Medical History:   Diagnosis Date     Aortic regurgitation     mild-moderate per 10/2018 Echo     Aortic stenosis     mild per 10/2018 Echo     Balance problem     few years     Cardiomyopathy (H) 10/2018    [place]; Echo 10/2018, EF 25%, down from 50% 12/2017, per notes, pt declines further work up to find etiology of drop in EF     Chronic atrial fibrillation (H) 2018    cardioversion 4/19     Chronic systolic CHF (congestive heart failure) (H)      CKD (chronic kidney disease) stage 3, GFR 30-59 ml/min (H)      Essential hypertension      Frequent falls      Gross hematuria 04/2019    cysto, ct neg     History of gout     none for years     Hypothyroidism      MGUS (monoclonal gammopathy of unknown significance) 07/2020    found during eval of falls and neuropathy.  Per curbside consult from [facility] to repeat labs every 6 months: CBC, BMP, SPEP, IgG and kappa-lambda free light chain     Mitral regurgitation     moderate per 10/2018 Echo     Neuropathy 1990    both lower legs     Pacemaker 2018     Prostate cancer (H) 2008    xrt, fine since     Pulmonary hypertension (H)      Tricuspid regurgitation     mioderate per 10/2018 Echo       PAST SURGICAL HISTORY:  Past Surgical History:   Procedure Laterality Date     bilateral hip  "replacement  2017    done 6 months apart     IMPLANT PACEMAKER  11/12/2018    CRT-P     lip surgery for skin cancer         FAMILY HISTORY:  Family History   Problem Relation Age of Onset     Myocardial Infarction Mother      Cerebrovascular Disease Father        SOCIAL HISTORY:  Social History     Socioeconomic History     Marital status:      Spouse name: None     Number of children: 3     Years of education: None     Highest education level: None   Occupational History     Occupation: owned marketing services company in [place]   Tobacco Use     Smoking status: Never Smoker     Smokeless tobacco: Never Used   Substance and Sexual Activity     Alcohol use: Yes     Comment: 1 drink week     Drug use: Never     Sexual activity: Not Currently   Other Topics Concern     Parent/sibling w/ CABG, MI or angioplasty before 65F 55M? Not Asked   Social History Narrative     None     Social Determinants of Health     Financial Resource Strain: Not on file   Food Insecurity: Not on file   Transportation Needs: Not on file   Physical Activity: Not on file   Stress: Not on file   Social Connections: Not on file   Intimate Partner Violence: Not on file   Housing Stability: Not on file       Review of Systems:  Skin:  Negative       Eyes:  Positive for glasses    ENT:  Negative      Respiratory:  Positive for dyspnea on exertion;cough last 10 days had SOB and cough patient feels due to a cold   Cardiovascular:  chest pain;Negative;palpitations;dizziness;lightheadedness Positive for;edema;fatigue    Gastroenterology: not assessed      Genitourinary:  not assessed      Musculoskeletal:  Negative      Neurologic:  Positive for numbness or tingling of feet    Psychiatric:  Negative      Heme/Lymph/Imm:  Negative allergies    Endocrine:  Positive for thyroid disorder      Physical Exam:  Vitals: /72 (BP Location: Right arm, Patient Position: Sitting)   Pulse 72   Ht 1.702 m (5' 7\")   Wt 68 kg (150 lb)   SpO2 94%   BMI " 23.49 kg/m      Constitutional:           Skin:             Head:           Eyes:           Lymph:      ENT:           Neck:           Respiratory:            Cardiac:                                                           GI:           Extremities and Muscular Skeletal:                 Neurological:           Psych:           CC  Jovana Morin MD  [address]  [place] [address]

## 2022-04-15 NOTE — PROGRESS NOTES
Echo 4/15/22 completed after recent OV w/Dr. De Leon 4/1/22. Routed to Dr. De Leon to see if any changes are recommended. Maria Luisa Pina RN on 4/15/2022 at 4:02 PM        Procedure  Complete Echo Adult. Optison (NDC #4105-8035) given intravenously.     ______________________________________________________________________________  Interpretation Summary     The left ventricle is borderline dilated.  Left ventricular systolic function is moderate to severely reduced.  The visual ejection fraction is 25-30%.  There is mod-severe global hypokinesia of the left ventricle. Flattened septum  is consistent with RV pressure overload.  The right ventricle is moderately dilated. Severely decreased right  ventricular systolic function  There is severe biatrial enlargement.  There is mild (1+) mitral regurgitation.  There is mild to moderate (1-2+) tricuspid regurgitation.  Right ventricular systolic pressure is elevated, consistent with severe  pulmonary hypertension.  IVC diameter >2.1 cm collapsing <50% with sniff suggests a high RA pressure  estimated at 15 mmHg or greater.  Mild to moderate valvular aortic stenosis.  The ascending aorta is Moderately dilated.     Compared to the previous study, the RV function has decreased. No other  significant changes.

## 2022-04-25 NOTE — PROGRESS NOTES
Call out to pt w/results of Echo/recommendations per Dr. De Leon. It is pt's birthday today & he is [place], wished him a happy birthday. Advised pt call as needed prior to return visit if he has any changes or concerns. Maria Luisa Pina RN on 4/25/2022 at 11:35 AM      Ray De Leon MD  [facility] Team 7 3 days ago     EE       The echo looks stable. No changes. EE         Documentation

## 2022-05-02 NOTE — TELEPHONE ENCOUNTER
"Writer called patient, to review provider recommendation to go to ER patient states:  \"im not going to the emergency room, its not something I'm going to do\"  \"I didn't take care of it earlier because I was traveling\"  \"our son is an physician and he wants them to get checked out\"  \"it doesn't sound necessary\"- to go to ER.   Writer advised at least to be seen in [facility] to be evaluated. Patient states they have been living with this for 4 weeks, \"why is it all of a sudden an emergency?\".  Writer reviewed again with patient providers recommendation to go to ER based on symptoms today. Writer again reiterated recommendation to at least got to [facility] and have legs evaluated, patient states\"im not sure what I'm going to do it\". Triage was able to hear patient's wife in the background stating that patient should go to [facility] at [facility].  Patient expressed verbal understanding of recommendation but unsure if they will go to ER/[facility], patient plans to keep appointment on    Provider Department Center   5/5/2022 4:30 PM (Arrive by 4:10 PM) Hugo Barron MD [facility]     Santiago Salazar RN  [facility]    "

## 2022-05-02 NOTE — TELEPHONE ENCOUNTER
This is a long message, but some of the reported findings are concerning for decompensated CHF, if the reported symptoms of dyspnea are severe, then this would be best managed in the ER and possibly the inpatient setting

## 2022-05-02 NOTE — TELEPHONE ENCOUNTER
"  Patient was transferred from central scheduling     Nurse Triage SBAR    Is this a 2nd Level Triage? YES, LICENSED PRACTITIONER REVIEW IS REQUIRED-non emergent but per protocol should be seen today or within 3 days depending on protocol criteria.     Per patient 1st available was Thursday-please review if you feel he should be elevated  for heart failure sooner. Patient wants to keep Thursday \"I don't need to be seen sooner\"      Future Appointments 5/2/2022 - 10/29/2022      Date Visit Type Length Department Provider     5/5/2022  4:30 PM OFFICE VISIT 30 min CS [facility]/Hugo Lombardi MD              6/15/2022  2:30 PM PULMONARY FUNCTION TEST 60 min  CARDIAC REHAB 1,  Pulmonary Rehab    Location Instructions:     [address] [place] [address]              7/5/2022 12:00 AM CARDIAC DEVICE CHECK - REMOTE 30 min [facility] CV CARDIAC SERVICES [facility] TECH1    Location Instructions:     Our clinic is located at [address], [facility] [address]. For your convenience, [employer] parking is available, or you may park your vehicle at the parking ramp west of [facility] [address] across the street from [facility]. You will cross the skyway to access our clinic on the 2nd floor.                 Situation:   Patient experiencing above the knee swelling on both legs now, the last 4 weeks it was just below. noticed the above the knee swelling yesterday 5/1/22    [place] the [place]27th   Gained 9 labs in one week    Both Legs started swelling started a couple weeks ago-   Takes elequis   Takes lasix-did not miss any dosages   Neuropathy   Covid in November   Pacemaker - 4 years ago  4/1/22 creatinine 2.19  3/5/21 BNP 19,146  Dr. De Leon-cardiology     Assessment:   Patient stated the swelling that is still present is the same as before he left on the trip   Swelling in the left calf is down to ankle is  severe. " Right calf is  Moderate.   Both  Upper thighs are have mild swelling.   Skin is  Very stretched on both lower legs/calf area   No redness on either thigh,calf, ankle or foot  No fever   No chest pain   Denied calf pain, denied pain when calf is palpated  No shortness of breath-takes a brake walking down the [place] and back no increased SOB since increased leg swelling.   No pain in either leg or calf, baseline foot pain from neuropathy pain   Patient has noticed spots of blood on the carpet but has not looked at the bottom of his foot.   Has been using a compression device at home  Had patient take his blood pressure while on the phone 3:14pm 132/85 pulse 77  Protocol Recommended Disposition:   No disposition on file.    Recommendation:   continue to monitor blood pressure call if > 140/90 or pulse > 100    Severe  shortness of breath more than normal   Worsening swelling, color change in legs/calves, feet   Pain in legs,especaily the calves     Red flag symptoms reviewed UC/ED -SOB, chest pain , dizziness when walking or sitting, pain in calf/legs,warm or red area on calfs    Routing to PCP and Demetrio Barron to review and advise.     Does the patient meet one of the following criteria for ADS visit consideration? No     Reason for Disposition    MODERATE swelling of both ankles (e.g., swelling extends up to the knees) AND new onset or worsening    Additional Information    Negative: Chest pain    Negative: Small area of swelling and followed an insect bite to the area    Negative: Followed a knee injury    Negative: Ankle or foot injury    Negative: Pregnant with leg swelling or edema    Negative: Difficulty breathing at rest    Negative: Entire foot is cool or blue in comparison to other side    Negative: Thigh or calf pain and only 1 side and present > 1 hour    Negative: Thigh, calf, or ankle swelling in only one leg    Negative: Thigh, calf, or ankle swelling in both legs, but one side is definitely more swollen  (Exception: longstanding difference between legs)    Negative: Cast on leg or ankle and has increasing pain    Negative: Can't walk or can barely stand (new onset)    Negative: Fever and red area (or area very tender to touch)    Negative: Patient sounds very sick or weak to the triager    Negative: Swelling of face, arm or hands (Exception: slight puffiness of fingers during hot weather)    Negative: Pregnant > 20 weeks and sudden weight gain (i.e., > 2 lbs, 1 kg in one week)    Negative: Difficulty breathing with exertion AND worsening or new onset    Negative: Looks like a boil, infected sore, deep ulcer, or other infected rash (spreading redness, pus)    Negative: Patient wants to be seen     Has appointment on Thursday.    Commented on: SEVERE swelling (e.g., swelling extends above knee, entire leg is swollen, weeping fluid)     Stated minor swelling above knee, severe below knee denied weeping.    Protocols used: LEG SWELLING AND EDEMA-A-OH  Elaine SHELBY RN   [facility] Triage

## 2022-05-02 NOTE — TELEPHONE ENCOUNTER
If this is really not something that is new, and he has not having chest pain or shortness of breath, but I do not think he needs to be evaluated urgently tonight.  It would be fine to use a virtual spot with me for tomorrow.    Mumtaz Hernandez M.D.

## 2022-05-02 NOTE — TELEPHONE ENCOUNTER
Writer called patient, spoke with patients wife Brinda MOE, reviewed provider message below.   Writer scheduled OV:   5/3/2022 2:30 PM (Arrive by 2:10 PM) Mumtaz Hernandez MD [facility]       Santiago Salazar RN  [facility]

## 2022-05-03 NOTE — PROGRESS NOTES
This is a very pleasant 89-year-old here for bilat leg edema, he presents with his wife. The patient has a history of atrial fibrillation with pacemaker implantation and cardiomyopathy.  He was converted previously from A. fib and started on amiodarone and has maintained sinus rhythm.  He is on low-dose Eliquis.  His left ventricular function however failed to improve and the most recent cardiac echo which I reviewed is from April of this year showing an EF of 25 to 30% with RV pressure overload and dilatation with 1+ MR and 1-2+ TR with severe pulmonary hypertension and moderate aortic ascending dilatation.  He sees cardiology and is maintained on treatment for that.  He also has a history of nonsustained V. tach less than 10 beats and quite rare.    When the patient was seen on April 1 by cardiology they noted his lower extremity edema and cut back on his Lasix because of a rise in his creatinine.  The patient now notes for the last 3 weeks or so he has had worsening edema as well as weight gain.  He denies headaches, coughs or colds, chest pain, dizziness or blackouts.  He is not feeling short of breath with no PND or edema.  No fevers or night sweats.    Past Medical History:   Diagnosis Date     Aortic regurgitation     mild-moderate per 10/2018 Echo     Aortic stenosis     mild per 10/2018 Echo     Balance problem     few years     Cardiomyopathy (H) 10/2018    [place]; Echo 10/2018, EF 25%, down from 50% 12/2017, per notes, pt declines further work up to find etiology of drop in EF; echo 4/22 ef 25%, pulm htn, rv overload     Chronic atrial fibrillation (H) 2018    cardioversion 4/19     Chronic systolic CHF (congestive heart failure) (H)      CKD (chronic kidney disease) stage 3, GFR 30-59 ml/min (H)      Essential hypertension      Frequent falls      Gross hematuria 04/2019    cysto, ct neg     History of gout     none for years     Hypothyroidism      MGUS (monoclonal gammopathy of unknown significance)  07/2020    found during eval of falls and neuropathy.  Per curbside consult from heme to repeat labs every 6 months: CBC, BMP, SPEP, IgG and kappa-lambda free light chain     Mitral regurgitation     moderate per 10/2018 Echo     Neuropathy 1990    both lower legs     Pacemaker 2018     Prostate cancer (H) 2008    xrt, fine since     Pulmonary hypertension (H)      Tricuspid regurgitation     mioderate per 10/2018 Echo     Past Surgical History:   Procedure Laterality Date     bilateral hip replacement  2017    done 6 months apart     IMPLANT PACEMAKER  11/12/2018    CRT-P     lip surgery for skin cancer       Social History     Socioeconomic History     Marital status:      Spouse name: Not on file     Number of children: 3     Years of education: Not on file     Highest education level: Not on file   Occupational History     Occupation: owned marketing services company in [place]   Tobacco Use     Smoking status: Never Smoker     Smokeless tobacco: Never Used   Substance and Sexual Activity     Alcohol use: Yes     Comment: 1 drink week     Drug use: Never     Sexual activity: Not Currently   Other Topics Concern     Parent/sibling w/ CABG, MI or angioplasty before 65F 55M? Not Asked   Social History Narrative     Not on file     Social Determinants of Health     Financial Resource Strain: Not on file   Food Insecurity: Not on file   Transportation Needs: Not on file   Physical Activity: Not on file   Stress: Not on file   Social Connections: Not on file   Intimate Partner Violence: Not on file   Housing Stability: Not on file     Current Outpatient Medications   Medication Sig Dispense Refill     acetaminophen (TYLENOL) 500 MG tablet Take 500-1,000 mg by mouth every 6 hours as needed for mild pain       allopurinol (ZYLOPRIM) 300 MG tablet Take 1 tablet (300 mg) by mouth daily 90 tablet 3     alpha-lipoic acid 600 MG capsule Take by mouth daily        amiodarone (PACERONE) 200 MG tablet Take 0.5 tablets  "(100 mg) by mouth daily 45 tablet 0     amoxicillin (AMOXIL) 500 MG capsule TAKE 4 CAPSULES BY MOUTH 1  HOUR PRIOR TO DENTAL WORK 4 capsule 3     apixaban ANTICOAGULANT (ELIQUIS) 2.5 MG tablet Take 1 tablet (2.5 mg) by mouth 2 times daily 180 tablet 3     carvedilol (COREG) 25 MG tablet Take 1 tablet (25 mg) by mouth 2 times daily (with meals) 180 tablet 3     cholecalciferol (VITAMIN D3) 5000 units TABS tablet Take 2,000 Units by mouth daily       Cyanocobalamin (VITAMIN B 12 PO) Take 2,500 mcg by mouth       furosemide (LASIX) 20 MG tablet TAKE 1 TABLET BY MOUTH  DAILY 90 tablet 3     levothyroxine (SYNTHROID/LEVOTHROID) 112 MCG tablet Take 1 tablet (112 mcg) by mouth daily 90 tablet 3     losartan (COZAAR) 100 MG tablet Take 0.5 tablets (50 mg) by mouth daily Please call for an appointment for further refills. [phone] 45 tablet 3     multivitamin (CENTRUM SILVER) tablet Take 1 tablet by mouth daily       Niacin (VITAMIN B-3 OR) Take 500 mg by mouth       potassium chloride ER (KLOR-CON M) 10 MEQ CR tablet TAKE 1 TABLET BY MOUTH  TWICE DAILY 180 tablet 2     Pyridoxine HCl (VITAMIN B6 PO) Take 150 mg by mouth        No Known Allergies  FAMILY HISTORY NOTED AND REVIEWED    REVIEW OF SYSTEMS: above    PHYSICAL EXAM    /74 (BP Location: Right arm, Patient Position: Sitting, Cuff Size: Adult Regular)   Pulse 83   Temp 97.7  F (36.5  C) (Temporal)   Resp 16   Ht 1.702 m (5' 7\")   Wt 70.3 kg (155 lb)   SpO2 93%   BMI 24.28 kg/m      Patient appears non toxic  Eyes within normal limits  Neck within normal limits  No supraclavicular, cervical or axillary lymphadenopathy  Lungs fine bilat crackles up 1/3  cv reglar rate and rhythm 2/6 sm, no jvp, 2+ bilat lower leg edema, slightly more on left, no pitting above knees  Abdomen normal active bowel sounds, soft non-tender, no mgr, no hepatosplenomegaly  Feet within normal limits  His left great toe there is an ulceration that is not deep.  Similarly under " the right second second and third toes he does have some ulceration as well.  Distal pulses however are intact.    Labs sent    ASSESSMENT:  1. bilat lower leg edema, due to rv dysfxn with severe pulm hypertension.  I doubt dvt, he is on noac, doubt intra-abdomen process, doubt hepatic or renal  2. pulm hypertension  3. rv dysfxn  4. bilat fine crackles, need to r/o ipf  5. bilat toe ulceration, needs immediate eval  6. Afib, on noac  7. Ckd, needs follow up    PLAN:  See avs:  You have too much fluid that we need to get rid of.  I believe this is due to your heart issues, but we need to adjust the medications.  Also, try and limit your sodium intake.    I want you to change your lasix dose to 40mg in the morning and then 20mg about 6 hours later.    Try to keep your legs elevated as much as possible    Please weigh yourself every day and write the weights down.  If your weight goes up let me know.    You have a few sores on the bottom of your toes that need to be evaluated.  I want to have one of our podiatrists see you for this.    You also have crackles in your lungs that I want to look into so I will have radiology call you to do a ct scan of your lungs.    I want to see you back in one week.  If you get shortness of breath or worsening swelling let me know.      Mumtaz Hernandez M.D.

## 2022-05-03 NOTE — PATIENT INSTRUCTIONS
You have too much fluid that we need to get rid of.  I believe this is due to your heart issues, but we need to adjust the medications.  Also, try and limit your sodium intake.    I want you to change your lasix dose to 40mg in the morning and then 20mg about 6 hours later.    Try to keep your legs elevated as much as possible    Please weigh yourself every day and write the weights down.  If your weight goes up let me know.    You have a few sores on the bottom of your toes that need to be evaluated.  I want to have one of our podiatrists see you for this.    You also have crackles in your lungs that I want to look into so I will have radiology call you to do a ct scan of your lungs.    I want to see you back in one week.  If you get shortness of breath or worsening swelling let me know.      Mumtaz Hernandez M.D.

## 2022-05-10 NOTE — PROGRESS NOTES
ASSESSMENT:  Encounter Diagnoses   Name Primary?     Ulcer of toe, unspecified laterality, unspecified ulcer stage (H) Yes     Neuropathy      Hammer toes of both feet      MEDICAL DECISION MAKING:  Toe ulcerations in the setting of digital contractures and peripheral neuropathy.  No clinical signs of infection.  Excisional debridement was performed, please see below.    Recommendations:  He is to cleanse the wounds daily, blot them dry, apply topical antibiotic and light dressing  He is avoid tight footwear  We discussed the use of a crest pad for the right second and third toes  Clinical signs of infection were included in his  after visit summary  Follow-up in 3 to 4 weeks.  If the ulcers are not healing, additional offloading might be needed.  If the wound is increased in size, imaging will be considered.    Using a #15 scalpel, excisional debridement was performed involving the left hallux, right second toe and right third toe.  Excisional debridement was taken down to the level of deeper skin.  This involves an area less than 20 cm .  Areas were cleansed with alcohol wipes, bacitracin and Band-Aids applied.  There was minimal bleeding.  Tissues after debridement appeared healthy and vascular.    Disclaimer: This note consists of symbols derived from keyboarding, dictation and/or voice recognition software. As a result, there may be errors in the script that have gone undetected. Please consider this when interpreting information found in this chart.    Kwaku Lane DPM, FACFAS, MS    [place] [facility] of Podiatry/Foot & Ankle Surgery      ____________________________________________________________________    HPI:       I was asked by Dr. Mumtaz Hernandez to evaluate Lico Woo in consultation for bilateral toe ulcers.    Lei presents today reporting recent and significant bilateral lower extremity edema.  He has not lost 5 pounds with an increased dosage of his diuretic.  He was recently evaluated by   Conor and total ulcerations were noted.  He was referred to podiatry for ongoing evaluation and treatment.    Will reports that he has peripheral neuropathy.  He experiences tingling and shooting in his lower extremities, more noticeable at night.  This is been going on for years.  *  Past Medical History:   Diagnosis Date     Aortic regurgitation     mild-moderate per 10/2018 Echo     Aortic stenosis     mild per 10/2018 Echo     Balance problem     few years     Cardiomyopathy (H) 10/2018    [place]; Echo 10/2018, EF 25%, down from 50% 12/2017, per notes, pt declines further work up to find etiology of drop in EF; echo 4/22 ef 25%, pulm htn, rv overload     Chronic atrial fibrillation (H) 2018    cardioversion 4/19     Chronic systolic CHF (congestive heart failure) (H)      CKD (chronic kidney disease) stage 3, GFR 30-59 ml/min (H)      Essential hypertension      Frequent falls      Gross hematuria 04/2019    cysto, ct neg     History of gout     none for years     Hypothyroidism      MGUS (monoclonal gammopathy of unknown significance) 07/2020    found during eval of falls and neuropathy.  Per [facility] consult from [facility] to repeat labs every 6 months: CBC, BMP, SPEP, IgG and kappa-lambda free light chain     Mitral regurgitation     moderate per 10/2018 Echo     Neuropathy 1990    both lower legs     Pacemaker 2018     Prostate cancer (H) 2008    xrt, fine since     Pulmonary hypertension (H)      Tricuspid regurgitation     mioderate per 10/2018 Echo   *  *  Past Surgical History:   Procedure Laterality Date     bilateral hip replacement  2017    done 6 months apart     IMPLANT PACEMAKER  11/12/2018    CRT-P     lip surgery for skin cancer     *  *  Current Outpatient Medications   Medication Sig Dispense Refill     acetaminophen (TYLENOL) 500 MG tablet Take 500-1,000 mg by mouth every 6 hours as needed for mild pain       allopurinol (ZYLOPRIM) 300 MG tablet Take 1 tablet (300 mg) by mouth daily 90 tablet 3  "    alpha-lipoic acid 600 MG capsule Take by mouth daily        amiodarone (PACERONE) 200 MG tablet Take 0.5 tablets (100 mg) by mouth daily 45 tablet 0     amoxicillin (AMOXIL) 500 MG capsule TAKE 4 CAPSULES BY MOUTH 1  HOUR PRIOR TO DENTAL WORK 4 capsule 3     apixaban ANTICOAGULANT (ELIQUIS) 2.5 MG tablet Take 1 tablet (2.5 mg) by mouth 2 times daily 180 tablet 3     carvedilol (COREG) 25 MG tablet Take 1 tablet (25 mg) by mouth 2 times daily (with meals) 180 tablet 3     cholecalciferol (VITAMIN D3) 5000 units TABS tablet Take 2,000 Units by mouth daily       Cyanocobalamin (VITAMIN B 12 PO) Take 2,500 mcg by mouth       furosemide (LASIX) 20 MG tablet TAKE 1 TABLET BY MOUTH  DAILY 90 tablet 3     levothyroxine (SYNTHROID/LEVOTHROID) 112 MCG tablet Take 1 tablet (112 mcg) by mouth daily 90 tablet 3     losartan (COZAAR) 100 MG tablet Take 0.5 tablets (50 mg) by mouth daily Please call for an appointment for further refills. [phone] 45 tablet 3     multivitamin (CENTRUM SILVER) tablet Take 1 tablet by mouth daily       Niacin (VITAMIN B-3 OR) Take 500 mg by mouth       potassium chloride ER (KLOR-CON M) 10 MEQ CR tablet TAKE 1 TABLET BY MOUTH  TWICE DAILY 180 tablet 2     Pyridoxine HCl (VITAMIN B6 PO) Take 150 mg by mouth            EXAM:    Vitals: /64   Ht 1.702 m (5' 7\")   Wt 70.3 kg (155 lb)   BMI 24.28 kg/m    BMI: Body mass index is 24.28 kg/m .    Constitutional:  Lico Woo is in no apparent distress, appears well-nourished.  Cooperative with history and physical exam.    Vascular:  Pedal pulses are palpable for both the DP and PT arteries.  Generalized bilateral lower extremity edema.    Neuro: Light touch sensation is diminished, bilateral foot, to the L4, L5, S1 distributions  No evidence of weakness, spasticity, or contracture in the lower extremities.     Derm:   There is a 0.5 cm ulceration at the distal aspect of the left hallux.  This has surrounding hyperkeratotic eschar and " evidence of intraepidermal bleeding.    There are similar sized ulcerations at the distal aspect of the right second and right third toe.  These have left hyperkeratotic eschar.    There is no associated edema, erythema or purulence with these wounds.  Post excisional debridement all 3 wounds appear vascular.    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    Musculoskeletal:    Lower extremity muscle strength is normal.  Flatfoot structure with digital contractures.  These are more rigid on the left.

## 2022-05-10 NOTE — PATIENT INSTRUCTIONS
[facility] you for choosing [facility]!    DR. MENDOZA'S CLINIC LOCATIONS:     [facility] TRIAGE LINE: [phone]   [address] APPOINTMENTS: [phone]   [place] [address] RADIOLOGY: [phone]    SET UP SURGERY: [phone]    BILLING QUESTIONS: [phone]   [facility] FAX: [phone] [id number]    [place] [address]      Follow up: 3-4 weeks    Next steps: crest pad    [email or web address] or call [email or web address]  HAMMERTOE PADS / TOE SPLINTS          SIGNS OF INFECTION  expanding redness around the wound   yellow or greenish-colored pus or cloudy wound drainage   red streaking spreading from the wound   increased swelling, tenderness, or pain around the wound   fever  *If you notice any of these signs of infection, call us right away!        WOUND CARE INSTRUCTIONS    1)  Keep the wound covered by a bandage when bathing.    2)  Gently clean the wound with soap water, separate from bath/shower water.      3)  Each day, apply a topical antibiotic ointment to the wound (Neosporin, Triple antibiotic, Bacitracin).   Cover with large band-aid or gauze.      5)  Please seek immediate medical attention if any increasing redness, drainage, smell, or pain related to the wound.     6)  Please return to clinic in the period of time requested by Dr. Mendoza.      SIGNS OF INFECTION  expanding redness around the wound   yellow or greenish-colored pus or cloudy wound drainage   red streaking spreading from the wound   increased swelling, tenderness, or pain around the wound   fever  *If you notice any of these signs of infection, call us right away!

## 2022-05-10 NOTE — LETTER
5/10/2022         RE: Lico Woo  [address]  [facility] [address]        Dear Colleague,    Thank you for referring your patient, Lico Woo, to the [facility]. Please see a copy of my visit note below.    ASSESSMENT:  Encounter Diagnoses   Name Primary?     Ulcer of toe, unspecified laterality, unspecified ulcer stage (H) Yes     Neuropathy      Hammer toes of both feet      MEDICAL DECISION MAKING:  Toe ulcerations in the setting of digital contractures and peripheral neuropathy.  No clinical signs of infection.  Excisional debridement was performed, please see below.    Recommendations:  He is to cleanse the wounds daily, blot them dry, apply topical antibiotic and light dressing  He is avoid tight footwear  We discussed the use of a crest pad for the right second and third toes  Clinical signs of infection were included in his  after visit summary  Follow-up in 3 to 4 weeks.  If the ulcers are not healing, additional offloading might be needed.  If the wound is increased in size, imaging will be considered.    Using a #15 scalpel, excisional debridement was performed involving the left hallux, right second toe and right third toe.  Excisional debridement was taken down to the level of deeper skin.  This involves an area less than 20 cm .  Areas were cleansed with alcohol wipes, bacitracin and Band-Aids applied.  There was minimal bleeding.  Tissues after debridement appeared healthy and vascular.    Disclaimer: This note consists of symbols derived from keyboarding, dictation and/or voice recognition software. As a result, there may be errors in the script that have gone undetected. Please consider this when interpreting information found in this chart.    Kwaku Lane DPM, FACFAS, MS    [place] Department of Podiatry/Foot & Ankle Surgery      ____________________________________________________________________    HPI:       I was asked by   Mumtaz Hernandez to evaluate Lico Woo in consultation for bilateral toe ulcers.    Lei presents today reporting recent and significant bilateral lower extremity edema.  He has not lost 5 pounds with an increased dosage of his diuretic.  He was recently evaluated by Dr. Perdomo and total ulcerations were noted.  He was referred to podiatry for ongoing evaluation and treatment.    Lei reports that he has peripheral neuropathy.  He experiences tingling and shooting in his lower extremities, more noticeable at night.  This is been going on for years.  *  Past Medical History:   Diagnosis Date     Aortic regurgitation     mild-moderate per 10/2018 Echo     Aortic stenosis     mild per 10/2018 Echo     Balance problem     few years     Cardiomyopathy (H) 10/2018    [place]; Echo 10/2018, EF 25%, down from 50% 12/2017, per notes, pt declines further work up to find etiology of drop in EF; echo 4/22 ef 25%, pulm htn, rv overload     Chronic atrial fibrillation (H) 2018    cardioversion 4/19     Chronic systolic CHF (congestive heart failure) (H)      CKD (chronic kidney disease) stage 3, GFR 30-59 ml/min (H)      Essential hypertension      Frequent falls      Gross hematuria 04/2019    cysto, ct neg     History of gout     none for years     Hypothyroidism      MGUS (monoclonal gammopathy of unknown significance) 07/2020    found during eval of falls and neuropathy.  Per curbside consult from [facility] to repeat labs every 6 months: CBC, BMP, SPEP, IgG and kappa-lambda free light chain     Mitral regurgitation     moderate per 10/2018 Echo     Neuropathy 1990    both lower legs     Pacemaker 2018     Prostate cancer (H) 2008    xrt, fine since     Pulmonary hypertension (H)      Tricuspid regurgitation     mioderate per 10/2018 Echo   *  *  Past Surgical History:   Procedure Laterality Date     bilateral hip replacement  2017    done 6 months apart     IMPLANT PACEMAKER  11/12/2018    CRT-P     lip surgery for skin cancer  "    *  *  Current Outpatient Medications   Medication Sig Dispense Refill     acetaminophen (TYLENOL) 500 MG tablet Take 500-1,000 mg by mouth every 6 hours as needed for mild pain       allopurinol (ZYLOPRIM) 300 MG tablet Take 1 tablet (300 mg) by mouth daily 90 tablet 3     alpha-lipoic acid 600 MG capsule Take by mouth daily        amiodarone (PACERONE) 200 MG tablet Take 0.5 tablets (100 mg) by mouth daily 45 tablet 0     amoxicillin (AMOXIL) 500 MG capsule TAKE 4 CAPSULES BY MOUTH 1  HOUR PRIOR TO DENTAL WORK 4 capsule 3     apixaban ANTICOAGULANT (ELIQUIS) 2.5 MG tablet Take 1 tablet (2.5 mg) by mouth 2 times daily 180 tablet 3     carvedilol (COREG) 25 MG tablet Take 1 tablet (25 mg) by mouth 2 times daily (with meals) 180 tablet 3     cholecalciferol (VITAMIN D3) 5000 units TABS tablet Take 2,000 Units by mouth daily       Cyanocobalamin (VITAMIN B 12 PO) Take 2,500 mcg by mouth       furosemide (LASIX) 20 MG tablet TAKE 1 TABLET BY MOUTH  DAILY 90 tablet 3     levothyroxine (SYNTHROID/LEVOTHROID) 112 MCG tablet Take 1 tablet (112 mcg) by mouth daily 90 tablet 3     losartan (COZAAR) 100 MG tablet Take 0.5 tablets (50 mg) by mouth daily Please call for an appointment for further refills. [phone] 45 tablet 3     multivitamin (CENTRUM SILVER) tablet Take 1 tablet by mouth daily       Niacin (VITAMIN B-3 OR) Take 500 mg by mouth       potassium chloride ER (KLOR-CON M) 10 MEQ CR tablet TAKE 1 TABLET BY MOUTH  TWICE DAILY 180 tablet 2     Pyridoxine HCl (VITAMIN B6 PO) Take 150 mg by mouth            EXAM:    Vitals: /64   Ht 1.702 m (5' 7\")   Wt 70.3 kg (155 lb)   BMI 24.28 kg/m    BMI: Body mass index is 24.28 kg/m .    Constitutional:  Lico Woo is in no apparent distress, appears well-nourished.  Cooperative with history and physical exam.    Vascular:  Pedal pulses are palpable for both the DP and PT arteries.  Generalized bilateral lower extremity edema.    Neuro: Light touch sensation " is diminished, bilateral foot, to the L4, L5, S1 distributions  No evidence of weakness, spasticity, or contracture in the lower extremities.     Derm:   There is a 0.5 cm ulceration at the distal aspect of the left hallux.  This has surrounding hyperkeratotic eschar and evidence of intraepidermal bleeding.    There are similar sized ulcerations at the distal aspect of the right second and right third toe.  These have left hyperkeratotic eschar.    There is no associated edema, erythema or purulence with these wounds.  Post excisional debridement all 3 wounds appear vascular.    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    Musculoskeletal:    Lower extremity muscle strength is normal.  Flatfoot structure with digital contractures.  These are more rigid on the left.                Again, thank you for allowing me to participate in the care of your patient.        Sincerely,        Kwaku Lane, SHERIN

## 2022-05-13 NOTE — NURSING NOTE
Patient identified using two patient identifiers.  Ear exam showing wax occlusion completed by provider.  Solution: warm water was placed in the bilateral ear(s) via irrigation tool: elephant ear.  Yuni Rodriguez CMA

## 2022-05-13 NOTE — LETTER
May 16, 2022      Will A Amna  [address]  [facility] [address]        Dear ,    We are writing to inform you of your test results.    Overall they look good.  Your CBC is stable.  Your chemistry panel shows stable kidney function and normal liver test and electrolytes.  Your urine is clear.     The BNP test indicates extra fluid on board which is causing the swelling.  As you know it is a complicated situation given your heart history.  We do not want to overdo the diuretics.     If you have questions let me know.  I would encourage you to get the CT scan as well.       Resulted Orders   Comprehensive metabolic panel   Result Value Ref Range    Sodium 140 133 - 144 mmol/L    Potassium 4.2 3.4 - 5.3 mmol/L    Chloride 106 94 - 109 mmol/L    Carbon Dioxide (CO2) 31 20 - 32 mmol/L    Anion Gap 3 3 - 14 mmol/L    Urea Nitrogen 51 (H) 7 - 30 mg/dL    Creatinine 1.73 (H) 0.66 - 1.25 mg/dL    Calcium 9.0 8.5 - 10.1 mg/dL    Glucose 98 70 - 99 mg/dL    Alkaline Phosphatase 145 40 - 150 U/L    AST 32 0 - 45 U/L    ALT 28 0 - 70 U/L    Protein Total 7.5 6.8 - 8.8 g/dL    Albumin 3.1 (L) 3.4 - 5.0 g/dL    Bilirubin Total 0.8 0.2 - 1.3 mg/dL    GFR Estimate 37 (L) >60 mL/min/1.73m2      Comment:      Effective December 21, 2021 eGFRcr in adults is calculated using the 2021 CKD-EPI creatinine equation which includes age and gender (Charmaine et al., NEJM, DOI: 10.1056/PQYBuk5698574)   UA reflex to Microscopic and Culture   Result Value Ref Range    Color Urine Yellow Colorless, Straw, Light Yellow, Yellow    Appearance Urine Clear Clear    Glucose Urine Negative Negative mg/dL    Bilirubin Urine Negative Negative    Ketones Urine Negative Negative mg/dL    Specific Gravity Urine 1.020 1.003 - 1.035    Blood Urine Negative Negative    pH Urine 6.5 5.0 - 7.0    Protein Albumin Urine Negative Negative mg/dL    Urobilinogen Urine 0.2 0.2, 1.0 E.U./dL    Nitrite Urine Negative Negative    Leukocyte Esterase  Urine Negative Negative    Narrative    Microscopic not indicated   BNP-N terminal pro   Result Value Ref Range    N Terminal Pro BNP Outpatient 13,934 (H) 0 - 1,800 pg/mL      Comment:      Reference range shown and results flagged as abnormal are for the outpatient, non acute settings. Establishing a baseline value for each individual patient is useful for follow-up.    Suggested inpatient cut points for confirming diagnosis of CHF in an acute setting are:  >450 pg/mL (age 18 to less than 50)  >900 pg/mL (age 50 to less than 75)  >1800 pg/mL (75 yrs and older)    An inpatient or emergency department NT-proPBNP <300 pg/mL effectively rules out acute CHF, with 99% negative predictive value.       CBC with platelets and differential   Result Value Ref Range    WBC Count 5.0 4.0 - 11.0 10e3/uL    RBC Count 3.27 (L) 4.40 - 5.90 10e6/uL    Hemoglobin 11.5 (L) 13.3 - 17.7 g/dL    Hematocrit 35.8 (L) 40.0 - 53.0 %     (H) 78 - 100 fL    MCH 35.2 (H) 26.5 - 33.0 pg    MCHC 32.1 31.5 - 36.5 g/dL    RDW 15.8 (H) 10.0 - 15.0 %    Platelet Count 174 150 - 450 10e3/uL    % Neutrophils 65 %    % Lymphocytes 23 %    % Monocytes 9 %    % Eosinophils 3 %    % Basophils 0 %    Absolute Neutrophils 3.2 1.6 - 8.3 10e3/uL    Absolute Lymphocytes 1.2 0.8 - 5.3 10e3/uL    Absolute Monocytes 0.4 0.0 - 1.3 10e3/uL    Absolute Eosinophils 0.2 0.0 - 0.7 10e3/uL    Absolute Basophils 0.0 0.0 - 0.2 10e3/uL       If you have any questions or concerns, please call the clinic at the number listed above.       Sincerely,      Mumtaz Hernandez MD

## 2022-05-13 NOTE — PATIENT INSTRUCTIONS
I hear some fine crackles in your lungs on both lower lobes.  I am wondering if this could be due to pulmonary fibrosis.  You also have pulmonary hypertension which may also be due to this.  You are on amiodarone so this can cause this.  Given all of this I would suggest doing a ct of your lungs to see if this is the case.  I do not believe the radiation would be excessive and it might help us to understand your symptoms and issues better.  If that is ok please call Radiology at [phone] to schedule this.      I want you to continue to use the leg pumps, please use then as much as possible.    Keep an eye on your weight and if it goes up let me know.    I want to see you back in 3 to 4 weeks    Continue your current medications.    Mumtaz Hernandez M.D.

## 2022-05-13 NOTE — PROGRESS NOTES
This is a complicated 89-year-old who is here with his wife.  As noted and reviewed I saw him approximately 10 days ago for lower extremity edema.  He has a significant cardiovascular history as noted.  At that time It was my impression that his edema was multifactorial including due to RV dysfunction with severe pulmonary hypertension.  It was felt unlikely to be DVT as he is on anticoagulation.  I increased his Lasix which had been cut down before because of a rise in his creatinine.  With this his weight has gone down about 6 pounds.  His edema is improved.  He is not having dizziness.  No headaches.  No chest pain or shortness of breath.    The patient was also noted to have fine crackles with some dyspnea on exertion.  My concern is for pulmonary fibrosis.  He is on amiodarone.  Previously cardiology had ordered spirometry and I recommended a CTA but the patient spoke with his son who is orthopedics and recommended we not do it because of the radiation.    The patient was also found to have bilateral toe ulcerations and since then he has seen podiatry and has follow-up with them.  These were debrided.    He otherwise is feeling well.  He does have A. fib as well as CKD.  Pulmonary hypertension, as well as RV dysfunction.  He has valvular disease as well.    Past Medical History:   Diagnosis Date     Aortic regurgitation     mild-moderate per 10/2018 Echo     Aortic stenosis     mild per 10/2018 Echo     Balance problem     few years     Cardiomyopathy (H) 10/2018    [place]; Echo 10/2018, EF 25%, down from 50% 12/2017, per notes, pt declines further work up to find etiology of drop in EF; echo 4/22 ef 25%, pulm htn, rv overload     Chronic atrial fibrillation (H) 2018    cardioversion 4/19     Chronic systolic CHF (congestive heart failure) (H)      CKD (chronic kidney disease) stage 3, GFR 30-59 ml/min (H)      Essential hypertension      Frequent falls      Gross hematuria 04/2019    cysto, ct neg     History  of gout     none for years     Hypothyroidism      MGUS (monoclonal gammopathy of unknown significance) 07/2020    found during eval of falls and neuropathy.  Per curbside consult from heme to repeat labs every 6 months: CBC, BMP, SPEP, IgG and kappa-lambda free light chain     Mitral regurgitation     moderate per 10/2018 Echo     Neuropathy 1990    both lower legs     Pacemaker 2018     Prostate cancer (H) 2008    xrt, fine since     Pulmonary hypertension (H)      Tricuspid regurgitation     mioderate per 10/2018 Echo     Past Surgical History:   Procedure Laterality Date     bilateral hip replacement  2017    done 6 months apart     IMPLANT PACEMAKER  11/12/2018    CRT-P     lip surgery for skin cancer       Social History     Socioeconomic History     Marital status:      Spouse name: Not on file     Number of children: 3     Years of education: Not on file     Highest education level: Not on file   Occupational History     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 Not Asked   Social History Narrative     Not on file     Social Determinants of Health     Financial Resource Strain: Not on file   Food Insecurity: Not on file   Transportation Needs: Not on file   Physical Activity: Not on file   Stress: Not on file   Social Connections: Not on file   Intimate Partner Violence: Not on file   Housing Stability: Not on file     Current Outpatient Medications   Medication Sig Dispense Refill     furosemide (LASIX) 20 MG tablet Take 40mg am and 20mg six hours later. 90 tablet 3     acetaminophen (TYLENOL) 500 MG tablet Take 500-1,000 mg by mouth every 6 hours as needed for mild pain       allopurinol  (ZYLOPRIM) 300 MG tablet Take 1 tablet (300 mg) by mouth daily 90 tablet 3     alpha-lipoic acid 600 MG capsule Take by mouth daily        amiodarone (PACERONE) 200 MG tablet Take 0.5 tablets (100 mg) by mouth daily 45 tablet 0     amoxicillin (AMOXIL) 500 MG capsule TAKE 4 CAPSULES BY MOUTH 1  HOUR PRIOR TO DENTAL WORK 4 capsule 3     apixaban ANTICOAGULANT (ELIQUIS) 2.5 MG tablet Take 1 tablet (2.5 mg) by mouth 2 times daily 180 tablet 3     carvedilol (COREG) 25 MG tablet Take 1 tablet (25 mg) by mouth 2 times daily (with meals) 180 tablet 3     cholecalciferol (VITAMIN D3) 5000 units TABS tablet Take 2,000 Units by mouth daily       Cyanocobalamin (VITAMIN B 12 PO) Take 2,500 mcg by mouth       levothyroxine (SYNTHROID/LEVOTHROID) 112 MCG tablet Take 1 tablet (112 mcg) by mouth daily 90 tablet 3     losartan (COZAAR) 100 MG tablet Take 0.5 tablets (50 mg) by mouth daily Please call for an appointment for further refills. [phone] 45 tablet 3     multivitamin (CENTRUM SILVER) tablet Take 1 tablet by mouth daily       Niacin (VITAMIN B-3 OR) Take 500 mg by mouth       potassium chloride ER (KLOR-CON M) 10 MEQ CR tablet TAKE 1 TABLET BY MOUTH  TWICE DAILY 180 tablet 2     Pyridoxine HCl (VITAMIN B6 PO) Take 150 mg by mouth        No Known Allergies  FAMILY HISTORY NOTED AND REVIEWED    REVIEW OF SYSTEMS: above    PHYSICAL EXAM    There were no vitals taken for this visit.    Patient appears non toxic  Lungs fine bilat ll crackles  cv reglar rate and rhythm 2/6 sm, no jvp, 1+-2 bilat lower leg edema, left more than right, better than last office visit  Abdomen normal active bowel sounds, soft non-tender    Labs sent    ASSESSMENT:  1. bilat leg edema, suspect in part from pulm hypertension, rv dysfxn,also chf, age, valve dz and ckd, doubt hepatic, thyroid  2. Ckd, follow up labs  3. pulm hypertension  4. bilat crackles, I would rec doing the ct, it may affect therapy and I do not believe the radiation is a  significant harm to him especially at his age.  5. Mgus, follow up labs    PLAN:  Above  No change in meds  Monitor weight  Call if problems  Labs now  Follow up 3 to 4 weeks  Consider doing the ct  He is using leg pumps, continue those.  Ultimately with his cardiovascular disease it will be hard to completely correct the edema.

## 2022-05-16 NOTE — RESULT ENCOUNTER NOTE
It was very nice to see you.  Enclosed are your labs.    Overall they look good.  Your CBC is stable.  Your chemistry panel shows stable kidney function and normal liver test and electrolytes.  Your urine is clear.    The BNP test indicates extra fluid on board which is causing the swelling.  As you know it is a complicated situation given your heart history.  We do not want to overdo the diuretics.    If you have questions let me know.  I would encourage you to get the CT scan as well.

## 2022-06-06 NOTE — TELEPHONE ENCOUNTER
[facility] Cardiology Refill Guideline reviewed.  Medication meets criteria for refill.   Asiya Patrick RN on 6/6/2022 at 1:47 PM

## 2022-06-17 NOTE — LETTER
6/17/2022         RE: Lico Woo  [address]  [facility] [address]        Dear Colleague,    Thank you for referring your patient, Lico Woo, to the [facility]. Please see a copy of my visit note below.    ASSESSMENT:  Encounter Diagnoses   Name Primary?     Ulcer of toe, unspecified laterality, unspecified ulcer stage (H) Yes     Neuropathy      Hammer toes of both feet      MEDICAL DECISION MAKING:  All 3 ulcers are stable.  No clinical signs of infection.  No interval healing.    I reviewed his folder with pictures of various types of crest pads.  I recommend the softer cloth  Pedifix brand.  He will consider purchasing these.  Hopefully these will help offload the toes and heal the ulcerations.    My previous note said we might consider additional offloading via surgical shoes or boots.  Given his balance problems and falls, I am reluctant to do this.    Is to continue with the basic wound care is reviewed at previous visit.    He has upcoming appointment with Dr. Hernandez.  I encouraged him to discuss his balance concerns with his primary care physician.    I also explained that if the right foot wounds do not heal, flexor tenotomy's might help reduce the flexion deformity.  The left hallux is rigid and with likely need bone removal to correct the deformity.    Follow-up in 3 to 4 weeks.    Using a #15 scalpel, excisional debridement was performed involving the left hallux, right second toe and right third toe.  Excisional debridement was taken down to the level of deeper skin.  This involves an area less than 20 cm .  Areas were cleansed with alcohol wipes, bacitracin and Band-Aids applied.  There was minimal bleeding.  Tissues after debridement appeared healthy and vascular.     Disclaimer: This note consists of symbols derived from keyboarding, dictation and/or voice recognition software. As a result, there may be errors in the script that have  gone undetected. Please consider this when interpreting information found in this chart.    Kwaku Lane, SHERIN, FACFAS, MS    [facility] Department of Podiatry/Foot & Ankle Surgery      ____________________________________________________________________    HPI:       Will returns for ongoing monitoring and treatment of ulcerations involving his left hallux, right second toe and right third toe.  He did some online review of crest pads.  He has.  Pictures of multiple types and asked for my advice.  He has done the best to keep the ulcers clean and bandaged.    Reports having problems with balance and multiple falls.  He does some balance exercises as well as uses a recumbent bike, treadmill and leg press.  Peripheral neuropathy.  *  Past Medical History:   Diagnosis Date     Aortic regurgitation     mild-moderate per 10/2018 Echo     Aortic stenosis     mild per 10/2018 Echo     Balance problem     few years     Cardiomyopathy (H) 10/2018    [place]; Echo 10/2018, EF 25%, down from 50% 12/2017, per notes, pt declines further work up to find etiology of drop in EF; echo 4/22 ef 25%, pulm htn, rv overload     Chronic atrial fibrillation (H) 2018    cardioversion 4/19     Chronic systolic CHF (congestive heart failure) (H)      CKD (chronic kidney disease) stage 3, GFR 30-59 ml/min (H)      Essential hypertension      Frequent falls      Gross hematuria 04/2019    cysto, ct neg     History of gout     none for years     Hypothyroidism      MGUS (monoclonal gammopathy of unknown significance) 07/2020    found during eval of falls and neuropathy.  Per curbside consult from [facility] to repeat labs every 6 months: CBC, BMP, SPEP, IgG and kappa-lambda free light chain     Mitral regurgitation     moderate per 10/2018 Echo     Neuropathy 1990    both lower legs     Pacemaker 2018     Prostate cancer (H) 2008    xrt, fine since     Pulmonary hypertension (H)      Tricuspid regurgitation     mioderate per 10/2018 Echo   *  *  Past  Surgical History:   Procedure Laterality Date     bilateral hip replacement  2017    done 6 months apart     IMPLANT PACEMAKER  11/12/2018    CRT-P     lip surgery for skin cancer         EXAM:    Vitals: /50 (BP Location: Right arm, Patient Position: Chair, Cuff Size: Adult Regular)   BMI: There is no height or weight on file to calculate BMI.    Vascular:  Pedal pulses are palpable for both the DP and PT arteries.  Generalized bilateral lower extremity edema.     Neuro: Light touch sensation is diminished, bilateral foot, to the L4, L5, S1 distributions  No evidence of weakness, spasticity, or contracture in the lower extremities.      Derm:   There is a 0.5 cm ulceration at the distal aspect of the left hallux.  This has surrounding hyperkeratotic eschar     There are similar sized ulcerations at the distal aspect of the right second and right third toe. These have left hyperkeratotic eschar.     There is no associated edema, erythema or purulence with these wounds.  Post excisional debridement all 3 wounds appear vascular.     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     Musculoskeletal:    Lower extremity muscle strength is normal.  Flatfoot structure with digital contractures.  These are more rigid on the left.    The right second and third toes are somewhat flexible and can be reduced in the sagittal plane.      Again, thank you for allowing me to participate in the care of your patient.        Sincerely,        Kwaku Lane DPM

## 2022-06-17 NOTE — PATIENT INSTRUCTIONS
Thank you for choosing [facility] Podiatry / Foot & Ankle Surgery!    DR. MENDOZA'S CLINIC LOCATIONS:     [facility] TRIAGE LINE: [phone]   [address] APPOINTMENTS: [phone]   [place] [address] RADIOLOGY: [phone]    SET UP SURGERY: [phone]    BILLING QUESTIONS: [phone]   [facility] FAX: [phone] [id number]    [place] [address]      Follow up: 1 month    Next steps:     SIGNS OF INFECTION  expanding redness around the wound   yellow or greenish-colored pus or cloudy wound drainage   red streaking spreading from the wound   increased swelling, tenderness, or pain around the wound   fever  *If you notice any of these signs of infection, call us right away!    Wound Care Recommendations:    1)  Keep the wound covered by a bandage when bathing.    2)  Gently clean the wound with soap water, separate from bath/shower water.      3)  Each day, apply a topical antibiotic ointment to the wound (Neosporin, Triple antibiotic, Bacitracin).   Cover with large band-aid or gauze.      5)  Please seek immediate medical attention if any increasing redness, drainage, smell, or pain related to the wound.     6)  Please return to clinic in the period of time requested by Dr. Mendoza.

## 2022-06-17 NOTE — PROGRESS NOTES
"6/15/22 PFT results noted. Pt had 4/1/22 visit with . Per , \" His lungs seem to have fibrotic crackles.  I am concerned about the amiodarone causing pulmonary fibrosis.  I will get a pulmonary function test in about a month after he has recovered from his upper respiratory tract infections.\" Will route update to . Dee MONROE June 17, 2022, 1:56 PM          IMPRESSION:     Review results of DLCO testing with caution as ATS criteria were not met     Normal spirometry and diffusion capacity     Compared with testing from 12/10/2019 there has been a decrease in the FEV1 and FVC.     Huong Garcia MD   "

## 2022-06-17 NOTE — PROGRESS NOTES
ASSESSMENT:  Encounter Diagnoses   Name Primary?     Ulcer of toe, unspecified laterality, unspecified ulcer stage (H) Yes     Neuropathy      Hammer toes of both feet      MEDICAL DECISION MAKING:  All 3 ulcers are stable.  No clinical signs of infection.  No interval healing.    I reviewed his folder with pictures of various types of crest pads.  I recommend the softer cloth  Pedifix brand.  He will consider purchasing these.  Hopefully these will help offload the toes and heal the ulcerations.    My previous note said we might consider additional offloading via surgical shoes or boots.  Given his balance problems and falls, I am reluctant to do this.    Is to continue with the basic wound care is reviewed at previous visit.    He has upcoming appointment with Dr. Hernandez.  I encouraged him to discuss his balance concerns with his primary care physician.    I also explained that if the right foot wounds do not heal, flexor tenotomy's might help reduce the flexion deformity.  The left hallux is rigid and with likely need bone removal to correct the deformity.    Follow-up in 3 to 4 weeks.    Using a #15 scalpel, excisional debridement was performed involving the left hallux, right second toe and right third toe.  Excisional debridement was taken down to the level of deeper skin.  This involves an area less than 20 cm .  Areas were cleansed with alcohol wipes, bacitracin and Band-Aids applied.  There was minimal bleeding.  Tissues after debridement appeared healthy and vascular.     Disclaimer: This note consists of symbols derived from keyboarding, dictation and/or voice recognition software. As a result, there may be errors in the script that have gone undetected. Please consider this when interpreting information found in this chart.    Kwaku Lane DPM, FACFAS, MS    [facility] Department of Podiatry/Foot & Ankle Surgery      ____________________________________________________________________    HPI:       Will  returns for ongoing monitoring and treatment of ulcerations involving his left hallux, right second toe and right third toe.  He did some online review of crest pads.  He has.  Pictures of multiple types and asked for my advice.  He has done the best to keep the ulcers clean and bandaged.    Reports having problems with balance and multiple falls.  He does some balance exercises as well as uses a recumbent bike, treadmill and leg press.  Peripheral neuropathy.  *  Past Medical History:   Diagnosis Date     Aortic regurgitation     mild-moderate per 10/2018 Echo     Aortic stenosis     mild per 10/2018 Echo     Balance problem     few years     Cardiomyopathy (H) 10/2018    [place]; Echo 10/2018, EF 25%, down from 50% 12/2017, per notes, pt declines further work up to find etiology of drop in EF; echo 4/22 ef 25%, pulm htn, rv overload     Chronic atrial fibrillation (H) 2018    cardioversion 4/19     Chronic systolic CHF (congestive heart failure) (H)      CKD (chronic kidney disease) stage 3, GFR 30-59 ml/min (H)      Essential hypertension      Frequent falls      Gross hematuria 04/2019    cysto, ct neg     History of gout     none for years     Hypothyroidism      MGUS (monoclonal gammopathy of unknown significance) 07/2020    found during eval of falls and neuropathy.  Per curbside consult from [facility] to repeat labs every 6 months: CBC, BMP, SPEP, IgG and kappa-lambda free light chain     Mitral regurgitation     moderate per 10/2018 Echo     Neuropathy 1990    both lower legs     Pacemaker 2018     Prostate cancer (H) 2008    xrt, fine since     Pulmonary hypertension (H)      Tricuspid regurgitation     mioderate per 10/2018 Echo   *  *  Past Surgical History:   Procedure Laterality Date     bilateral hip replacement  2017    done 6 months apart     IMPLANT PACEMAKER  11/12/2018    CRT-P     lip surgery for skin cancer         EXAM:    Vitals: /50 (BP Location: Right arm, Patient Position: Chair, Cuff  Size: Adult Regular)   BMI: There is no height or weight on file to calculate BMI.    Vascular:  Pedal pulses are palpable for both the DP and PT arteries.  Generalized bilateral lower extremity edema.     Neuro: Light touch sensation is diminished, bilateral foot, to the L4, L5, S1 distributions  No evidence of weakness, spasticity, or contracture in the lower extremities.      Derm:   There is a 0.5 cm ulceration at the distal aspect of the left hallux.  This has surrounding hyperkeratotic eschar     There are similar sized ulcerations at the distal aspect of the right second and right third toe. These have left hyperkeratotic eschar.     There is no associated edema, erythema or purulence with these wounds.  Post excisional debridement all 3 wounds appear vascular.     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     Musculoskeletal:    Lower extremity muscle strength is normal.  Flatfoot structure with digital contractures.  These are more rigid on the left.    The right second and third toes are somewhat flexible and can be reduced in the sagittal plane.

## 2022-06-17 NOTE — LETTER
June 22, 2022       TO: Lico Woo  [address]  [address]  [facility] [address]       Dear Lico Woo,    Following our recent telephone discussion, I wanted to send you Dr. De Leon's follow up plan and our contact information.     He would like to see you back in a year from your last visit, April 2023. He has ordered labs, a chest x-ray and Echocardiogram to be done prior to that visit.    We recommend calling to schedule those appointments about 4 months prior, as Dr. De Leon's schedule fills that far in advance. Please call [phone] to schedule your appointment.    For questions about symptoms, medications, follow up plan or anything else related to your care you can reach Dr. De Leno's clinic RN's at [phone] directly. If you have any concerns throughout the year and think you need to be sooner you should also call.    Thank you for allowing [facility] to be a part of your health care team and we look forward to seeing you soon.    Thank you,    Maria Luisa MICHEL RN  [facility]

## 2022-06-20 NOTE — TELEPHONE ENCOUNTER
I guess the PFT's look ok, even though the numbers seem reduced from predicted. No suggestion of amiodarone toxicity based on the official interpretation, so we will continue the amiodarone. EE

## 2022-06-21 NOTE — PROGRESS NOTES
This is a very pleasant 89-year-old who presents with his wife for follow-up on multiple issues.    As noted and reviewed The patient has a significant past medical history including cardiomyopathy with a low EF with pulmonary hypertension and right ventricular overload as well as chronic A. fib and CKD as well as monoclonal gammopathy and pulmonary hypertension.  He has had issues with significant lower extremity edema recently.  When I saw him on May 3 it was noted that he was having lower extremity edema with weight gain.  It was my impression at the time that it was multifactorial related to his heart disease as well as CKD.  I increased his Lasix at that point and asked him to elevate his legs.  Additionally, he was noted to have bilateral toe ulcerations and has had and will have podiatry follow-up for this.  He has had bilateral lower lobe crackles and I was concerned and ordered a CT of his chest but has not had it yet but does have an upcoming.  Recently had spirometry and that was normal.    I saw him in return on May 13 and his labs were quite stable but his BNP was quite elevated.  At that time his weight went down about 6 pounds and his edema improved.  No changes in the meds were made and he follows up with me today.    At this time his weight is down further.  He is not having chest pain or shortness of breath.    His biggest issue is related to his chronic balance problem as well as neuropathy of his lower legs which has been longstanding.  His edema is somewhat improved.  He has had 3 falls due to losing balance since I saw him last.    Past Medical History:   Diagnosis Date     Aortic regurgitation     mild-moderate per 10/2018 Echo     Aortic stenosis     mild per 10/2018 Echo     Balance problem     few years     Cardiomyopathy (H) 10/2018    [place]; Echo 10/2018, EF 25%, down from 50% 12/2017, per notes, pt declines further work up to find etiology of drop in EF; echo 4/22 ef 25%, pulm htn, rv  overload     Chronic atrial fibrillation (H) 2018    cardioversion 4/19     Chronic systolic CHF (congestive heart failure) (H)      CKD (chronic kidney disease) stage 3, GFR 30-59 ml/min (H)      Essential hypertension      Frequent falls      Gross hematuria 04/2019    cysto, ct neg     History of gout     none for years     Hypothyroidism      MGUS (monoclonal gammopathy of unknown significance) 07/2020    found during eval of falls and neuropathy.  Per curbside consult from heme to repeat labs every 6 months: CBC, BMP, SPEP, IgG and kappa-lambda free light chain     Mitral regurgitation     moderate per 10/2018 Echo     Neuropathy 1990    both lower legs     Pacemaker 2018     Prostate cancer (H) 2008    xrt, fine since     Pulmonary hypertension (H)      Tricuspid regurgitation     mioderate per 10/2018 Echo     Past Surgical History:   Procedure Laterality Date     bilateral hip replacement  2017    done 6 months apart     IMPLANT PACEMAKER  11/12/2018    CRT-P     lip surgery for skin cancer       Social History     Socioeconomic History     Marital status:      Spouse name: Not on file     Number of children: 3     Years of education: Not on file     Highest education level: Not on file   Occupational History     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 Not Asked   Social History Narrative     Not on file     Social Determinants of Health     Financial Resource Strain: Not on file   Food Insecurity: Not on file   Transportation Needs: Not on file   Physical Activity: Not on file   Stress: Not on file   Social Connections: Not on file   Intimate Partner Violence: Not on file   Housing  "Stability: Not on file     Current Outpatient Medications   Medication Sig Dispense Refill     acetaminophen (TYLENOL) 500 MG tablet Take 500-1,000 mg by mouth every 6 hours as needed for mild pain       allopurinol (ZYLOPRIM) 300 MG tablet Take 1 tablet (300 mg) by mouth daily 90 tablet 3     alpha-lipoic acid 600 MG capsule Take by mouth daily        amiodarone (PACERONE) 200 MG tablet Take 0.5 tablets (100 mg) by mouth daily 45 tablet 2     amoxicillin (AMOXIL) 500 MG capsule TAKE 4 CAPSULES BY MOUTH 1  HOUR PRIOR TO DENTAL WORK 4 capsule 3     apixaban ANTICOAGULANT (ELIQUIS) 2.5 MG tablet Take 1 tablet (2.5 mg) by mouth 2 times daily 180 tablet 3     carvedilol (COREG) 25 MG tablet Take 1 tablet (25 mg) by mouth 2 times daily (with meals) 180 tablet 3     cholecalciferol (VITAMIN D3) 5000 units TABS tablet Take 2,000 Units by mouth daily       Cyanocobalamin (VITAMIN B 12 PO) Take 2,500 mcg by mouth       furosemide (LASIX) 20 MG tablet Take 40mg am and 20mg six hours later. 90 tablet 3     levothyroxine (SYNTHROID/LEVOTHROID) 112 MCG tablet Take 1 tablet (112 mcg) by mouth daily 90 tablet 3     losartan (COZAAR) 100 MG tablet Take 0.5 tablets (50 mg) by mouth daily Please call for an appointment for further refills. [phone] 45 tablet 3     multivitamin (CENTRUM SILVER) tablet Take 1 tablet by mouth daily       Niacin (VITAMIN B-3 OR) Take 500 mg by mouth       potassium chloride ER (KLOR-CON M) 10 MEQ CR tablet TAKE 1 TABLET BY MOUTH  TWICE DAILY 180 tablet 2     Pyridoxine HCl (VITAMIN B6 PO) Take 150 mg by mouth        No Known Allergies  FAMILY HISTORY NOTED AND REVIEWED    REVIEW OF SYSTEMS: above    PHYSICAL EXAM    /63 (BP Location: Left arm, Patient Position: Sitting, Cuff Size: Adult Regular)   Pulse 65   Temp 97.3  F (36.3  C) (Temporal)   Ht 1.702 m (5' 7\")   Wt 66.7 kg (147 lb)   SpO2 98%   BMI 23.02 kg/m      Patient appears non toxic  Lungs bilat ll fine crackles  cv reglar rate " and rhythm, no jvp, 1+ edema left more than right  Abdomen normal active bowel sounds, soft non-tender    Labs noted    ASSESSMENT:  1. bilat leg edema, due to cv dz, doubt hepatic, thyroid, etc  2. Balance issues, chronic  3. Cmyop, afib, med mgmt  4. Ckd, stable  5. Mgus, follow    PLAN:  Phill with the patient trying the lymphedema clinic but he would rather not.  I recommended a low-salt diet and trying to keep the legs elevated.  With respect to his balance I suggested therapy for that but he does not want it at this point.  He will call if things change and otherwise follow-up with me in 3 months.    Mumtaz Hernandez M.D.

## 2022-06-21 NOTE — PROGRESS NOTES
Reviewed Dr. De Leon's thoughts on the PFT w/pt. Pt said he struggled a little bit during the test but overall on a day to day basis he said he has no concerns with any sort of feelings of constricted airflow or shortness of breath. Advised pt call us as needed if any symptoms change or worsen. Otherwise he will need a CXR at his annual visit next year for amiodarone monitoring. Will mail pt a letter with our contact info as he doesn't have [employer] and he had no way to write down my direct number today. Maria Luisa Pina RN on 6/21/2022 at 10:22 AM        Ray De Leon MD  [facility] 7 18 hours ago (3:18 PM)     EE       I guess the PFT's look ok, even though the numbers seem reduced from predicted. No suggestion of amiodarone toxicity based on the official interpretation, so we will continue the amiodarone. EE         Documentation

## 2022-06-22 NOTE — PROGRESS NOTES
Mailed pt a letter with follow up plan per Dr. De Leon's orders in the chart and scheduling and RN phone number per pt request. Maria Luisa Pina RN on 6/22/2022 at 9:53 AM

## 2022-06-24 NOTE — RESULT ENCOUNTER NOTE
I'm covering for Dr. Hernandez:    Pls let patient know CT scan shows moderate pulmonary fibrosis.  Dr. Hernandez will see the results when he returns to the clinic and will let him know if he has any additional recommendations.    Thanks.

## 2022-06-27 PROBLEM — J84.112 IPF (IDIOPATHIC PULMONARY FIBROSIS) (H): Status: ACTIVE | Noted: 2022-01-01

## 2022-06-27 NOTE — TELEPHONE ENCOUNTER
[facility] Call Center    Phone Message    May a detailed message be left on voicemail: yes     Reason for Call: Appointment Intake      Referring Provider Name: Mumtaz Hernandez MD in  FAMILY [facility]    Diagnosis and/or Symptoms: IPF (idiopathic pulmonary fibrosis)    -New PULM patient  -Priority: 1-2 weeks   -Sending per guidelines, Dr. Phoenix listed for this dx - thank you!     Action Taken: Message routed to:  Other: [facility] PULM    Travel Screening: Not Applicable

## 2022-06-27 NOTE — TELEPHONE ENCOUNTER
I called the patient and discussed his CT scan showing pulmonary fibrosis.  I also sent a note to Dr. Ray De Leon because he is on amiodarone asking him about this.    I recommended a pulmonary evaluation and the patient agrees and I will order this.    Mumtaz Hernandez M.D.

## 2022-06-28 NOTE — TELEPHONE ENCOUNTER
----- Message from Gisella Beth PA-C sent at 6/24/2022  5:11 PM CDT -----  I'm covering for Dr. Hernandez:    Pls let patient know CT scan shows moderate pulmonary fibrosis.  Dr. Hernandez will see the results when he returns to the clinic and will let him know if he has any additional recommendations.    Thanks.

## 2022-06-28 NOTE — TELEPHONE ENCOUNTER
Patient Contact    Attempt # 1    Was call answered?  No.  Left message on voicemail with information to call back.    Jenny MONTIEL, Triage RN  [facility]

## 2022-06-28 NOTE — TELEPHONE ENCOUNTER
I called pt and reviewed 's and 's recommendation to stop the amiodarone due to the lung changes that were noted on his CT scan. Pt will call if he has any symptoms of recurrent afib such as palpitations, shortness of breath, lightheadedness, or increased fatigue. Pt has his next device check on 7/5/22. He will also be making a visit with pulmonology. Dee MONROE June 28, 2022, 5:00 PM

## 2022-06-28 NOTE — TELEPHONE ENCOUNTER
----- Message from Ray De Leon MD sent at 6/28/2022  4:11 PM CDT -----    Please have Mr. Woo stop the amiodarone and let us know if he notices any signs of recurrent afib. We will monitor this through his PM primarily. Thanks. GRIFFIN    ----- Message -----  From: Mumtaz Hernandez MD  Sent: 6/27/2022   3:48 PM CDT  To: MD Katie Valladares great Ray, I assume you will have your nurses call him.    Thanks much.    Mumtaz  ----- Message -----  From: Ray De Leon MD  Sent: 6/27/2022   1:42 PM CDT  To: MD Brent Barrera.    I was concerned about that issue when I saw him last. I am glad that you got the CT. I am not sure if the fibrosis is from amiodarone but we should stop it anyway. We don't want the amiodarone to make his lungs worse. We can watch for recurrent afib on his PM checks. So far, he has not had any recurrences.     Sylvia.     Ray  ----- Message -----  From: Mumtaz Hernandez MD  Sent: 6/27/2022  11:22 AM CDT  To: MD Zoila Valladares,    I did a ct scan on Mr. Woo given his fine crackles with pulmonary hypertension.  He does have pulmonary fibrosis so I am wondering if he should be off the amiodarone.  Please let me know your thoughts.    Mumtaz

## 2022-06-29 NOTE — TELEPHONE ENCOUNTER
ILD New Patient Referral: Pre visit communication    Patient: Lico Woo  Reason for Referral: ILD  Referring Physician: Dr Hernandez  Referring Clinic/Contact: Phone#:     Chest CT scan: YES. Date-6/24/2022. Location-[facility].  Biopsy: No  PFT's:YES. Date-6/15/22. Location-[facility].  Additional testing: Cardiac records, pacemaker    Current symptoms: No symptoms  Current related prescriptions: No    Supplemental oxygen? No    In review of apparent records and conversation with patient; recommend first visit with ILD provider be conducted :  In person visit  Testing needed: Full PFT's and walk only    Additional notes:   On Amiodarone since 2017, just stopped.

## 2022-06-29 NOTE — TELEPHONE ENCOUNTER
PCP see below - do you have any additional recommendations regarding CT before triage reaches out again to pt?     Jenny MONTIEL, Triage RN  [facility]

## 2022-08-03 NOTE — LETTER
8/3/2022         RE: Lico Woo  [address]  [facility] [address]        Dear Colleague,    Thank you for referring your patient, Lico Woo, to the [facility]. Please see a copy of my visit note below.    ASSESSMENT:  Encounter Diagnoses   Name Primary?     Ulcer of toe, unspecified laterality, unspecified ulcer stage (H) Yes     Neuropathy      Hammer toes of both feet      Change in nail appearance      MEDICAL DECISION MAKING:  The right third toe ulcer is healed.  The right second toe and left hallux ulcer is are stable.  No clinical signs of infection.  Is a #15 scalpel, excisional debridement to the depth of deeper skin was performed on the left hallux and right second toe.  There was minimal bleeding.  Bacitracin and a Band-Aid was applied to both.    He is asked to continue with the crest pads.  We reviewed daily wound cares.  He would like to follow-up on an as-needed basis at this point.  He said that his son, plastic surgeon, will assist with monitoring his feet.    We did discuss toenail changes.  I explained that changes in multiple nails would be consistent with a nail fungus.  Treatment options are limited.  We discussed over the counter topical options, prescription topicals.  Do not recommend any oral therapy.  I explained that these treatment options typically are not fully successful.    He is to keep his nails trimmed.    Disclaimer: This note consists of symbols derived from keyboarding, dictation and/or voice recognition software. As a result, there may be errors in the script that have gone undetected. Please consider this when interpreting information found in this chart.    Kwaku Lane, SHERIN, FACFAS, [facility] Department of Podiatry/Foot & Ankle Surgery      ____________________________________________________________________    HPI:       Will follows up for ulcerations, bilateral toes.  He reports interval improvement  since, using the crest pads.  He is using a new pair today.  He also asks about changes in his toenails and toenail fungus.  His son helps with debridement of the nails.    Past Medical History:   Diagnosis Date     Aortic regurgitation     mild-moderate per 10/2018 Echo     Aortic stenosis     mild per 10/2018 Echo     Balance problem     few years     Cardiomyopathy (H) 10/2018    [place]; Echo 10/2018, EF 25%, down from 50% 12/2017, per notes, pt declines further work up to find etiology of drop in EF; echo 4/22 ef 25%, pulm htn, rv overload     Chronic atrial fibrillation (H) 2018    cardioversion 4/19     Chronic systolic CHF (congestive heart failure) (H)      CKD (chronic kidney disease) stage 3, GFR 30-59 ml/min (H)      Essential hypertension      Frequent falls      Gross hematuria 04/2019    cysto, ct neg     History of gout     none for years     Hypothyroidism      IPF (idiopathic pulmonary fibrosis) (H) 06/2022    ct confirmed     MGUS (monoclonal gammopathy of unknown significance) 07/2020    found during eval of falls and neuropathy.  Per ck consult from [facility] to repeat labs every 6 months: CBC, BMP, SPEP, IgG and kappa-lambda free light chain     Mitral regurgitation     moderate per 10/2018 Echo     Neuropathy 1990    both lower legs     Pacemaker 2018     Prostate cancer (H) 2008    xrt, fine since     Pulmonary hypertension (H)      Tricuspid regurgitation     mioderate per 10/2018 Echo   *  *  Past Surgical History:   Procedure Laterality Date     bilateral hip replacement  2017    done 6 months apart     IMPLANT PACEMAKER  11/12/2018    CRT-P     lip surgery for skin cancer     *  *  Current Outpatient Medications   Medication Sig Dispense Refill     acetaminophen (TYLENOL) 500 MG tablet Take 500-1,000 mg by mouth every 6 hours as needed for mild pain       allopurinol (ZYLOPRIM) 300 MG tablet Take 1 tablet (300 mg) by mouth daily 90 tablet 3     alpha-lipoic acid 600 MG capsule Take by  "mouth daily        amoxicillin (AMOXIL) 500 MG capsule TAKE 4 CAPSULES BY MOUTH 1  HOUR PRIOR TO DENTAL WORK 4 capsule 3     apixaban ANTICOAGULANT (ELIQUIS) 2.5 MG tablet Take 1 tablet (2.5 mg) by mouth 2 times daily 180 tablet 3     carvedilol (COREG) 25 MG tablet Take 1 tablet (25 mg) by mouth 2 times daily (with meals) 180 tablet 3     cholecalciferol (VITAMIN D3) 5000 units TABS tablet Take 2,000 Units by mouth daily       Cyanocobalamin (VITAMIN B 12 PO) Take 2,500 mcg by mouth       furosemide (LASIX) 20 MG tablet Take 40mg am and 20mg six hours later. 90 tablet 3     levothyroxine (SYNTHROID/LEVOTHROID) 112 MCG tablet Take 1 tablet (112 mcg) by mouth daily 90 tablet 3     losartan (COZAAR) 100 MG tablet Take 0.5 tablets (50 mg) by mouth daily Please call for an appointment for further refills. [phone] 45 tablet 3     multivitamin (CENTRUM SILVER) tablet Take 1 tablet by mouth daily       Niacin (VITAMIN B-3 OR) Take 500 mg by mouth       potassium chloride ER (KLOR-CON M) 10 MEQ CR tablet TAKE 1 TABLET BY MOUTH  TWICE DAILY 180 tablet 2     Pyridoxine HCl (VITAMIN B6 PO) Take 150 mg by mouth            EXAM:    Vitals: Ht 1.702 m (5' 7\")   Wt 66.7 kg (147 lb)   BMI 23.02 kg/m    BMI: Body mass index is 23.02 kg/m .    Vascular:  Pedal pulses are palpable for both the DP and PT arteries.  Generalized bilateral lower extremity edema.     Neuro: Light touch sensation is diminished, bilateral foot, to the L4, L5, S1 distributions  No evidence of weakness, spasticity, or contracture in the lower extremities.      Derm:   There is a 0.5 cm ulceration at the distal aspect of the left hallux.  This has surrounding hyperkeratotic eschar     There are similar sized ulcerations at the distal aspect of the right second.  There is some surrounding hyperkeratotic eschar.    The right third toe lesion has healed.    There is no associated edema, erythema or purulence with these wounds.     Skin changes, bilateral " pretibium, consistent with dermopathy related to chronic edema.    Nails are thickened, dystrophic and discolored.     Musculoskeletal:    Lower extremity muscle strength is normal.  Flatfoot structure with digital contractures.  These are more rigid on the left.    The right second and third toes are somewhat flexible and can be reduced in the sagittal plane.          Again, thank you for allowing me to participate in the care of your patient.        Sincerely,        Kwaku Lane DPM

## 2022-08-03 NOTE — PROGRESS NOTES
ASSESSMENT:  Encounter Diagnoses   Name Primary?     Ulcer of toe, unspecified laterality, unspecified ulcer stage (H) Yes     Neuropathy      Hammer toes of both feet      Change in nail appearance      MEDICAL DECISION MAKING:  The right third toe ulcer is healed.  The right second toe and left hallux ulcer is are stable.  No clinical signs of infection.  Is a #15 scalpel, excisional debridement to the depth of deeper skin was performed on the left hallux and right second toe.  There was minimal bleeding.  Bacitracin and a Band-Aid was applied to both.    He is asked to continue with the crest pads.  We reviewed daily wound cares.  He would like to follow-up on an as-needed basis at this point.  He said that his son, plastic surgeon, will assist with monitoring his feet.    We did discuss toenail changes.  I explained that changes in multiple nails would be consistent with a nail fungus.  Treatment options are limited.  We discussed over the counter topical options, prescription topicals.  Do not recommend any oral therapy.  I explained that these treatment options typically are not fully successful.    He is to keep his nails trimmed.    Disclaimer: This note consists of symbols derived from keyboarding, dictation and/or voice recognition software. As a result, there may be errors in the script that have gone undetected. Please consider this when interpreting information found in this chart.    Kwaku Lane DPM, FACEMMA, MS    [place] [facility]      ____________________________________________________________________    HPI:       Will follows up for ulcerations, bilateral toes.  He reports interval improvement since, using the crest pads.  He is using a new pair today.  He also asks about changes in his toenails and toenail fungus.  His son helps with debridement of the nails.    Past Medical History:   Diagnosis Date     Aortic regurgitation     mild-moderate per 10/2018 Echo      Aortic stenosis     mild per 10/2018 Echo     Balance problem     few years     Cardiomyopathy (H) 10/2018    [place]; Echo 10/2018, EF 25%, down from 50% 12/2017, per notes, pt declines further work up to find etiology of drop in EF; echo 4/22 ef 25%, pulm htn, rv overload     Chronic atrial fibrillation (H) 2018    cardioversion 4/19     Chronic systolic CHF (congestive heart failure) (H)      CKD (chronic kidney disease) stage 3, GFR 30-59 ml/min (H)      Essential hypertension      Frequent falls      Gross hematuria 04/2019    cysto, ct neg     History of gout     none for years     Hypothyroidism      IPF (idiopathic pulmonary fibrosis) (H) 06/2022    ct confirmed     MGUS (monoclonal gammopathy of unknown significance) 07/2020    found during eval of falls and neuropathy.  Per ck consult from [facility] to repeat labs every 6 months: CBC, BMP, SPEP, IgG and kappa-lambda free light chain     Mitral regurgitation     moderate per 10/2018 Echo     Neuropathy 1990    both lower legs     Pacemaker 2018     Prostate cancer (H) 2008    xrt, fine since     Pulmonary hypertension (H)      Tricuspid regurgitation     mioderate per 10/2018 Echo   *  *  Past Surgical History:   Procedure Laterality Date     bilateral hip replacement  2017    done 6 months apart     IMPLANT PACEMAKER  11/12/2018    CRT-P     lip surgery for skin cancer     *  *  Current Outpatient Medications   Medication Sig Dispense Refill     acetaminophen (TYLENOL) 500 MG tablet Take 500-1,000 mg by mouth every 6 hours as needed for mild pain       allopurinol (ZYLOPRIM) 300 MG tablet Take 1 tablet (300 mg) by mouth daily 90 tablet 3     alpha-lipoic acid 600 MG capsule Take by mouth daily        amoxicillin (AMOXIL) 500 MG capsule TAKE 4 CAPSULES BY MOUTH 1  HOUR PRIOR TO DENTAL WORK 4 capsule 3     apixaban ANTICOAGULANT (ELIQUIS) 2.5 MG tablet Take 1 tablet (2.5 mg) by mouth 2 times daily 180 tablet 3     carvedilol (COREG) 25 MG tablet Take 1  "tablet (25 mg) by mouth 2 times daily (with meals) 180 tablet 3     cholecalciferol (VITAMIN D3) 5000 units TABS tablet Take 2,000 Units by mouth daily       Cyanocobalamin (VITAMIN B 12 PO) Take 2,500 mcg by mouth       furosemide (LASIX) 20 MG tablet Take 40mg am and 20mg six hours later. 90 tablet 3     levothyroxine (SYNTHROID/LEVOTHROID) 112 MCG tablet Take 1 tablet (112 mcg) by mouth daily 90 tablet 3     losartan (COZAAR) 100 MG tablet Take 0.5 tablets (50 mg) by mouth daily Please call for an appointment for further refills. [phone] 45 tablet 3     multivitamin (CENTRUM SILVER) tablet Take 1 tablet by mouth daily       Niacin (VITAMIN B-3 OR) Take 500 mg by mouth       potassium chloride ER (KLOR-CON M) 10 MEQ CR tablet TAKE 1 TABLET BY MOUTH  TWICE DAILY 180 tablet 2     Pyridoxine HCl (VITAMIN B6 PO) Take 150 mg by mouth            EXAM:    Vitals: Ht 1.702 m (5' 7\")   Wt 66.7 kg (147 lb)   BMI 23.02 kg/m    BMI: Body mass index is 23.02 kg/m .    Vascular:  Pedal pulses are palpable for both the DP and PT arteries.  Generalized bilateral lower extremity edema.     Neuro: Light touch sensation is diminished, bilateral foot, to the L4, L5, S1 distributions  No evidence of weakness, spasticity, or contracture in the lower extremities.      Derm:   There is a 0.5 cm ulceration at the distal aspect of the left hallux.  This has surrounding hyperkeratotic eschar     There are similar sized ulcerations at the distal aspect of the right second.  There is some surrounding hyperkeratotic eschar.    The right third toe lesion has healed.    There is no associated edema, erythema or purulence with these wounds.     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    Nails are thickened, dystrophic and discolored.     Musculoskeletal:    Lower extremity muscle strength is normal.  Flatfoot structure with digital contractures.  These are more rigid on the left.    The right " second and third toes are somewhat flexible and can be reduced in the sagittal plane.

## 2022-08-22 NOTE — TELEPHONE ENCOUNTER
Routing refill request to provider for review/approval because:  Creatinine   Date Value Ref Range Status   05/13/2022 1.73 (H) 0.66 - 1.25 mg/dL Final   04/01/2022 2.19 (H) 0.66 - 1.25 mg/dL Final   12/06/2021 1.69 (H) 0.66 - 1.25 mg/dL Final   05/25/2021 1.79 (H) 0.66 - 1.25 mg/dL Final   03/05/2021 1.79 (H) 0.66 - 1.25 mg/dL Final   12/07/2020 1.72 (H) 0.66 - 1.25 mg/dL Final   11/13/2020 1.94 (H) 0.70 - 1.30 mg/dL Final   07/20/2020 1.61 (H) 0.66 - 1.25 mg/dL Final   05/15/2020 1.93 (H) 0.66 - 1.25 mg/dL Final      Lani Gotti RN

## 2022-09-29 NOTE — PROGRESS NOTES
This is a very pleasant 89-year-old here with his wife for follow-up to multiple medical issues.  Additionally, his son Isaac texted me yesterday with concerns regarding his use of a blood thinner given his falling.    The patient has a significant cardiac history including congestive heart failure with a dilated cardiomyopathy and pulmonary hypertension with significant edema.  He also has CKD.  We have been treating him with diuretics to try and reduce the edema although we have never gotten rid of it as we do not want to over diurese him.  In this respect he has been stable with stable weights and fairly stable edema.  He is not having chest pain or shortness of breath.    He has had frequent falls although none for the last couple of weeks.  He does take a blood thinner as noted due to his history of chronic A. Fib. He was last seen by cardiology on April 1.  The patient has a pacemaker and was on amiodarone but due to concerns of pulmonary fibrosis we have stopped this.  The cardiologist noted that he has maintained sinus rhythm since by monitoring his rhythm through his pacemaker.    The patient's last echocardiogram was done on March 22 showing an EF of 25 to 30% as noted.  He had severely decreased RV systolic function with elevated RV systolic pressure consistent with severe pulmonary hypertension with mild to moderate aortic stenosis and ascending aortic dilatation.    I last saw the patient approximately 3 months ago.  Subsequent to this he did have a CT of his chest suggesting pulmonary fibrosis and he is going on December 1 to see pulmonary at the [facility].    The patient as above is not having chest pain or shortness of breath.  No abdominal pain or nausea or bowel changes.    With respect to his medication he has cut down on the Eliquis to just 1 a day.  In terms of the Lasix depending on what he is doing he will take anywhere from 0-3 a day    Past Medical History:   Diagnosis Date     Aortic  regurgitation     mild-moderate per 10/2018 Echo     Aortic stenosis     mild per 10/2018 Echo     Balance problem     few years     Cardiomyopathy (H) 10/2018    [place]; Echo 10/2018, EF 25%, down from 50% 12/2017, per notes, pt declines further work up to find etiology of drop in EF; echo 4/22 ef 25%, pulm htn, rv overload     Chronic atrial fibrillation (H) 2018    cardioversion 4/19     Chronic systolic CHF (congestive heart failure) (H)      CKD (chronic kidney disease) stage 3, GFR 30-59 ml/min (H)      Essential hypertension      Frequent falls      Gross hematuria 04/2019    cysto, ct neg     History of gout     none for years     Hypothyroidism      IPF (idiopathic pulmonary fibrosis) (H) 06/2022    ct confirmed     MGUS (monoclonal gammopathy of unknown significance) 07/2020    found during eval of falls and neuropathy.  Per curbside consult from heme to repeat labs every 6 months: CBC, BMP, SPEP, IgG and kappa-lambda free light chain     Mitral regurgitation     moderate per 10/2018 Echo     Neuropathy 1990    both lower legs     Pacemaker 2018     Prostate cancer (H) 2008    xrt, fine since     Pulmonary hypertension (H)      Tricuspid regurgitation     mioderate per 10/2018 Echo     Past Surgical History:   Procedure Laterality Date     bilateral hip replacement  2017    done 6 months apart     IMPLANT PACEMAKER  11/12/2018    CRT-P     lip surgery for skin cancer       Social History     Socioeconomic History     Marital status:      Spouse name: Not on file     Number of children: 3     Years of education: Not on file     Highest education level: Not on file   Occupational History     Occupation: owned marketing services company in [place]   Tobacco Use     Smoking status: Never Smoker     Smokeless tobacco: Never Used   Substance and Sexual Activity     Alcohol use: Yes     Comment: 1 drink week     Drug use: Never     Sexual activity: Not Currently   Other Topics Concern     Parent/sibling w/  CABG, MI or angioplasty before 65F 55M? Not Asked   Social History Narrative     Not on file     Social Determinants of Health     Financial Resource Strain: Not on file   Food Insecurity: Not on file   Transportation Needs: Not on file   Physical Activity: Not on file   Stress: Not on file   Social Connections: Not on file   Intimate Partner Violence: Not on file   Housing Stability: Not on file     Current Outpatient Medications   Medication Sig Dispense Refill     acetaminophen (TYLENOL) 500 MG tablet Take 500-1,000 mg by mouth every 6 hours as needed for mild pain       allopurinol (ZYLOPRIM) 300 MG tablet Take 1 tablet (300 mg) by mouth daily 90 tablet 3     alpha-lipoic acid 600 MG capsule Take by mouth daily        amoxicillin (AMOXIL) 500 MG capsule TAKE 4 CAPSULES BY MOUTH 1  HOUR PRIOR TO DENTAL WORK 4 capsule 3     aspirin (ASA) 81 MG EC tablet Take 1 tablet (81 mg) by mouth daily       carvedilol (COREG) 25 MG tablet Take 1 tablet (25 mg) by mouth 2 times daily (with meals) 180 tablet 2     cholecalciferol (VITAMIN D3) 5000 units TABS tablet Take 2,000 Units by mouth daily       Cyanocobalamin (VITAMIN B 12 PO) Take 2,500 mcg by mouth       furosemide (LASIX) 20 MG tablet TAKE 2 TABLETS BY MOUTH IN  THE MORNING AND 1 TABLET 6  HOURS LATER 90 tablet 11     levothyroxine (SYNTHROID/LEVOTHROID) 112 MCG tablet Take 1 tablet (112 mcg) by mouth daily 90 tablet 3     losartan (COZAAR) 100 MG tablet Take 0.5 tablets (50 mg) by mouth daily Please call for an appointment for further refills. [phone] 45 tablet 3     multivitamin (CENTRUM SILVER) tablet Take 1 tablet by mouth daily       Niacin (VITAMIN B-3 OR) Take 500 mg by mouth       potassium chloride ER (KLOR-CON M) 10 MEQ CR tablet TAKE 1 TABLET BY MOUTH  TWICE DAILY 180 tablet 2     Pyridoxine HCl (VITAMIN B6 PO) Take 150 mg by mouth        No Known Allergies  FAMILY HISTORY NOTED AND REVIEWED    REVIEW OF SYSTEMS: above    PHYSICAL EXAM    /62  "(BP Location: Left arm, Patient Position: Chair, Cuff Size: Adult Large)   Pulse 70   Temp 97  F (36.1  C) (Tympanic)   Resp 16   Ht 1.702 m (5' 7\")   Wt 67.6 kg (149 lb)   SpO2 98%   BMI 23.34 kg/m      Patient appears non toxic  Lungs basilar crackles bilat up 1/4  cv reglar rate and rhythm 2/6 sm, no jvp, 1+ bilat lower leg edema  Abdomen normal active bowel sounds, soft non-tender    Labs sent    ASSESSMENT:  1. bilat leg edema, suspect due to pulm hypertension, chf, ckd.  Doubt clot, hepatic or renal or thyroid  2. pulm hypertension, due to chf, vavle dz  3. Afib, unknown recurrence but now is off of the amiodarone so the risk is higher.  I am concerned regarding his frequent falls and the risk that portends with his use of blood thinners.  I discussed this with the patient and I think at this time the risk is greater than the benefit so I will have him stop it and use an aspirin a day.  4.  Dilated cardiomyopathy, continue medical management and follow-up with cardiology.  5.  Prostate cancer, no evidence of disease.  6.  Aortic root dilatation, follow-up.  7.  CKD, follow-up labs.  8.  Hypothyroidism, follow-up labs.    PLAN:  See pulm  Stop eliquis  Add 81mg asa  covid booster  Letter with labs  Follow up 4 months or prn  Watch salt intake    Mumtaz Hernandez M.D.              "

## 2022-09-30 PROBLEM — I77.810 AORTIC ROOT DILATATION (H): Status: ACTIVE | Noted: 2022-01-01

## 2022-09-30 NOTE — NURSING NOTE
Prior to immunization administration, verified patients identity using patient s name and date of birth. Please see Immunization Activity for additional information.     Screening Questionnaire for Adult Immunization    Are you sick today?   No   Do you have allergies to medications, food, a vaccine component or latex?   No   Have you ever had a serious reaction after receiving a vaccination?   No   Do you have a long-term health problem with heart, lung, kidney, or metabolic disease (e.g., diabetes), asthma, a blood disorder, no spleen, complement component deficiency, a cochlear implant, or a spinal fluid leak?  Are you on long-term aspirin therapy?   No   Do you have cancer, leukemia, HIV/AIDS, or any other immune system problem?   No   Do you have a parent, brother, or sister with an immune system problem?   No   In the past 3 months, have you taken medications that affect  your immune system, such as prednisone, other steroids, or anticancer drugs; drugs for the treatment of rheumatoid arthritis, Crohn s disease, or psoriasis; or have you had radiation treatments?   No   Have you had a seizure, or a brain or other nervous system problem?   No   During the past year, have you received a transfusion of blood or blood    products, or been given immune (gamma) globulin or antiviral drug?   No   For women: Are you pregnant or is there a chance you could become       pregnant during the next month?   No   Have you received any vaccinations in the past 4 weeks?   No     Immunization questionnaire answers were all negative.        Per orders of Dr. Hernandez, injection of Moderna given by Yuni Edwards CMA. Patient instructed to remain in clinic for 15 minutes afterwards, and to report any adverse reaction to me immediately.       Screening performed by Yuni Edwards CMA on 9/30/2022 at 2:08 PM.

## 2022-09-30 NOTE — PATIENT INSTRUCTIONS
Stop the eliquis  Start the baby aspirin  Watch your salt intake  See me in 4 months.      Mumtaz Hernandez M.D.

## 2022-09-30 NOTE — LETTER
October 3, 2022      Will A Amna  [id number]  [place] [address]        Dear ,    We are writing to inform you of your test results.    Your labs look very good.  The kidney test or creatinine is improved, your other chemistries, blood count and thyroid test are very good.       Resulted Orders   CBC with platelets   Result Value Ref Range    WBC Count 4.6 4.0 - 11.0 10e3/uL    RBC Count 3.50 (L) 4.40 - 5.90 10e6/uL    Hemoglobin 12.0 (L) 13.3 - 17.7 g/dL    Hematocrit 37.6 (L) 40.0 - 53.0 %     (H) 78 - 100 fL    MCH 34.3 (H) 26.5 - 33.0 pg    MCHC 31.9 31.5 - 36.5 g/dL    RDW 14.1 10.0 - 15.0 %    Platelet Count 123 (L) 150 - 450 10e3/uL   Basic metabolic panel   Result Value Ref Range    Sodium 137 133 - 144 mmol/L    Potassium 4.8 3.4 - 5.3 mmol/L    Chloride 106 94 - 109 mmol/L    Carbon Dioxide (CO2) 25 20 - 32 mmol/L    Anion Gap 6 3 - 14 mmol/L    Urea Nitrogen 46 (H) 7 - 30 mg/dL    Creatinine 1.48 (H) 0.66 - 1.25 mg/dL    Calcium 9.2 8.5 - 10.1 mg/dL    Glucose 95 70 - 99 mg/dL    GFR Estimate 45 (L) >60 mL/min/1.73m2      Comment:      Effective December 21, 2021 eGFRcr in adults is calculated using the 2021 CKD-EPI creatinine equation which includes age and gender (Charmaine et al., NEJ, DOI: 10.1056/BLANyt1844031)   TSH with free T4 reflex   Result Value Ref Range    TSH 1.12 0.40 - 4.00 mU/L       If you have any questions or concerns, please call the clinic at the number listed above.       Sincerely,      Mumtaz Hernandez MD

## 2022-10-02 NOTE — RESULT ENCOUNTER NOTE
It was a pleasure seeing you.  I wanted to get back to you with your test results.  I have enclosed a copy for your records.    Your labs look very good.  The kidney test or creatinine is improved, your other chemistries, blood count and thyroid test are very good.    If you have any questions please call me.    Mumtaz Hernandez M.D.

## 2022-11-05 NOTE — LETTER
[facility] EXTENDED RECOVERY AND SHORT STAY  [address] [address]  Phone: [phone]    November 7, 2022        Lico Woo  [address]  [place] [address]          To whom it may concern:    RE: Lico Woo was hospitalized at [facility] from 11/5/2022 through 11/7/2022 due to acute illness. He could not make his flight for 11/7/2022 due to hospitalization.     Please contact me for questions or concerns.      Sincerely,    Eula Cotto MD on 11/7/2022 at 11:14 AM     No name on file

## 2022-11-06 NOTE — ED NOTES
Rapid Assessment Note    History:   Lico Woo is a 89 year old male who presents with left-sided flank pain. At 9 am this morning he woke up with left-sided flank pain that has been intermittent and gradually worsening. The pain is now worsened and constant. His pain is a 5/10. He has shortness of breath which is not new. He also has some swelling in his feet and legs. He denies hematuria, dysuria, or a cough. About three weeks ago he switched from Eliquis to Aspirin. He was exercising yesterday as well.     Exam:   General:  Alert, interactive  Cardiovascular:  Well perfused  Lungs:  No respiratory distress, no accessory muscle use  Neuro:  Moving all 4 extremities  Skin:  Warm, dry  Psych:  Normal affect      Plan of Care:   I evaluated the patient and developed an initial plan of care. I discussed this plan and explained that I, or one of my partners, would be returning to complete the evaluation.       I, BENITO SIENNABENI, am serving as a scribe to document services personally performed by Gerardo Weller MD based on my observations and the provider's statements to me.    11/5/2022  [employer]    Portions of this medical record were completed by a scribe. UPON MY REVIEW AND AUTHENTICATION BY ELECTRONIC SIGNATURE, this confirms (a) I performed the applicable clinical services, and (b) the record is accurate.        Gerardo Weller MD  11/05/22 1917

## 2022-11-06 NOTE — H&P
[facility]    History and Physical - Hospitalist Service       Date of Admission:  11/5/2022    Assessment & Plan      Lico Woo is a 89 year old male with past medical history significant for chronic systolic heart failure (EF of 25 to 30%), severe pulmonary HTN, chronic atrial fibrillation (not recently on anticoagulation), s/p PPM, CKD stage III, HTN, hypothyroidism, idiopathic pulmonary fibrosis admitted on 11/5/2022 with left flank pain, found to have R sided PE.     Non-occlusive small Right lower lobe pulmonary emboli   PT has CT PE performed for evaluation of left sided flank pain. He was found to have right lower lobe PE. There is some strain apparent, but pt does have CHF with severe pulmonary HTN at baseline. He is hemodynamically stable and not hypoxic. Pt had previously been on Eliquis for A fib, but this was stopped in September due to frequent falls and risk of bleeding.   - Continuous oximetry  - Cardiac monitoring   - Heparin started in the ED, continue for now  - Likely switch back to Eliquis tomorrow   - Obtain b/l lower extremity ultrasounds     Left upper back/flank pain  Pts presenting concern is left upper back/flank pain. Pain woke him from sleep and became more severe throughout the day. He also notes preceding pins and needles sensation over the past few days. UA was unremarkable. CT abdomen/pelvis negative for renal stones or other obvious pathology. CTPE as noted above with Right sided PE, no significant left sided findings. Skin examined and there does not appear to be any rash. Trop wnl. It is unclear what is causing the patient's left sided pain at this time.   - Pain control PRN   - Monitor     Chronic systolic heart failure, possible mild exacerbation  Idiopathic cardiomyopathy   Severe Pulmonary Hyprtension  Pt has Chronic systolic heart failure with EF of 25-30% based on most recent Echo. He has chronic lower extremity edema and chronic dyspnea on  exertion. He does think his legs are slightly more swollen than normal. It doesn't seem like he is too far from his baseline but BNP is markedly elevated to 40,955 which is quite a bit higher than his baseline.   PTA: furosemide 40mg qam and 20mg qpm   - Intake/Output, daily weights   - Will Give 40mg IV lasix now, consider additional diuresis as needed   - TTE in AM     Hx of atrial fibrillation s/p bi-V pacemaker placement  Had been managed chronically with amiodarone and Eliquis. Due to his diagnosis of idiopathic pulmonary fibrosis the amiodarone was discontinued. Eliquis was also recently discontinued and he was started on ASA due to frequent falls and concern for bleeding complications.     CKD stage III  Creatinine is 1.97 on admission. Baseline creatinine has been highly variable over the past year  ranging from 1.3-2.2.   - Monitor renal function   - Avoid nephrotoxins     Hypertension  PTA medications include carvedilol and losartan 50mg  - Resume when verified     Frequent Falls   Pt reports multiple falls over the recent past.   - PT consult     Hypothyroidism  PTA: Levothyroxine   - Resume at discharge    Chronic Anemia  Hgb 11.7 on admission   - Stable, monitor     Thrombocytopenia   Platelets are 106 on admission. In September his platelets were 123 and back in May they were wnl at 174. Decline perhaps due to hepatic dysfunction there is noted hepatic fibrosis on CT scan?    - Monitor platelet count closely while on anticoagulation   - CMP in AM     Diet: Regular, 2L fluid restriction  DVT Prophylaxis: IV heparin  Restrepo Catheter: Not present  Central Lines: None  Cardiac Monitoring: None  Code Status: Full code       Disposition Plan   The patient's care was discussed with the Patient.    Rachana Bernard  Hospitalist Service  [facility]  Securely message with the Vocera Web Console (learn more here)  Text page via [employer] Paging/Directory          ______________________________________________________________________    Chief Complaint   Left back/flank pain     History is obtained from the patient    History of Present Illness   Lico Woo is a 89 year old male who presents to the ED with left sided upper back/flank pain. Pain woke him from sleep and became more severe throughout the day. He also notes preceding pins and needles sensation over the past few days. He denies any injuries, though he does work out in the gym regularly and did do a lot of upper body rowing exercises recently. He denies shortness of breath while at rest but does note that he gets dyspneic with exertion. He has chronic bilateral lower extremity swelling but has noted slight increased swelling recently. He denies chest pain.     Review of Systems    The 10 point Review of Systems is negative other than noted in the HPI or here.     Past Medical History    I have reviewed this patient's medical history and updated it with pertinent information if needed.   Past Medical History:   Diagnosis Date     Aortic regurgitation     mild-moderate per 10/2018 Echo     Aortic stenosis     mild per 10/2018 Echo     Balance problem     few years     Cardiomyopathy (H) 10/2018    [place]; Echo 10/2018, EF 25%, down from 50% 12/2017, per notes, pt declines further work up to find etiology of drop in EF; echo 4/22 ef 25%, pulm htn, rv overload     Chronic atrial fibrillation (H) 2018    cardioversion 4/19     Chronic systolic CHF (congestive heart failure) (H)      CKD (chronic kidney disease) stage 3, GFR 30-59 ml/min (H)      Essential hypertension      Frequent falls      Gross hematuria 04/2019    cysto, ct neg     History of gout     none for years     Hypothyroidism      IPF (idiopathic pulmonary fibrosis) (H) 06/2022    ct confirmed     MGUS (monoclonal gammopathy of unknown significance) 07/2020    found during eval of falls and neuropathy.  Per [facility] consult from [facility] to  repeat labs every 6 months: CBC, BMP, SPEP, IgG and kappa-lambda free light chain     Mitral regurgitation     moderate per 10/2018 Echo     Neuropathy 1990    both lower legs     Pacemaker 2018     Prostate cancer (H) 2008    xrt, fine since     Pulmonary hypertension (H)      Tricuspid regurgitation     mioderate per 10/2018 Echo       Past Surgical History   I have reviewed this patient's surgical history and updated it with pertinent information if needed.  Past Surgical History:   Procedure Laterality Date     bilateral hip replacement  2017    done 6 months apart     IMPLANT PACEMAKER  11/12/2018    CRT-P     lip surgery for skin cancer         Social History   I have reviewed this patient's social history and updated it with pertinent information if needed.  Social History     Tobacco Use     Smoking status: Never     Smokeless tobacco: Never   Substance Use Topics     Alcohol use: Yes     Comment: 1 drink week     Drug use: Never       Family History   I have reviewed this patient's family history and updated it with pertinent information if needed.  Family History   Problem Relation Age of Onset     Myocardial Infarction Mother      Cerebrovascular Disease Father        Prior to Admission Medications   Prior to Admission Medications   Prescriptions Last Dose Informant Patient Reported? Taking?   Cyanocobalamin (VITAMIN B 12 PO)   Yes No   Sig: Take 2,500 mcg by mouth   Niacin (VITAMIN B-3 OR)   Yes No   Sig: Take 500 mg by mouth   Pyridoxine HCl (VITAMIN B6 PO)   Yes No   Sig: Take 150 mg by mouth    acetaminophen (TYLENOL) 500 MG tablet   Yes No   Sig: Take 500-1,000 mg by mouth every 6 hours as needed for mild pain   allopurinol (ZYLOPRIM) 300 MG tablet   No No   Sig: Take 1 tablet (300 mg) by mouth daily   alpha-lipoic acid 600 MG capsule   Yes No   Sig: Take by mouth daily    amoxicillin (AMOXIL) 500 MG capsule   No No   Sig: TAKE 4 CAPSULES BY MOUTH 1  HOUR PRIOR TO DENTAL WORK   aspirin (ASA) 81 MG EC  tablet   No No   Sig: Take 1 tablet (81 mg) by mouth daily   carvedilol (COREG) 25 MG tablet   No No   Sig: Take 1 tablet (25 mg) by mouth 2 times daily (with meals)   cholecalciferol (VITAMIN D3) 5000 units TABS tablet   Yes No   Sig: Take 2,000 Units by mouth daily   furosemide (LASIX) 20 MG tablet   No No   Sig: TAKE 2 TABLETS BY MOUTH IN  THE MORNING AND 1 TABLET 6  HOURS LATER   levothyroxine (SYNTHROID/LEVOTHROID) 112 MCG tablet   No No   Sig: Take 1 tablet (112 mcg) by mouth daily   losartan (COZAAR) 100 MG tablet   No No   Sig: Take 0.5 tablets (50 mg) by mouth daily Please call for an appointment for further refills. [phone]   multivitamin (CENTRUM SILVER) tablet   Yes No   Sig: Take 1 tablet by mouth daily   potassium chloride ER (KLOR-CON M) 10 MEQ CR tablet   No No   Sig: TAKE 1 TABLET BY MOUTH  TWICE DAILY      Facility-Administered Medications: None     Allergies   No Known Allergies    Physical Exam   Vital Signs: Temp: 97.7  F (36.5  C) Temp src: Temporal BP: (!) 139/93 Pulse: 70   Resp: 20 SpO2: 94 % O2 Device: None (Room air)    Weight: 0 lbs 0 oz    Constitutional: Awake, alert, cooperative, no apparent distress.  Eyes: Conjunctiva and pupils examined and normal.  HEENT: Moist mucous membranes, normal dentition.  Respiratory: Clear to auscultation bilaterally, no crackles or wheezing.  Cardiovascular: Regular rate and rhythm, normal S1 and S2, and no murmur noted.  GI: Soft, non-distended, non-tender, normal bowel sounds.  Skin: No rashes, no cyanosis, 2+ bilateral lower extremity edema R>L, changes of chronic venous stasis,   Musculoskeletal: No joint swelling, erythema or tenderness.  Neurologic: Cranial nerves 2-12 intact, normal strength and sensation.  Psychiatric: Alert, oriented to person, place and time, no obvious anxiety or depression.    Data   Data reviewed today: I reviewed all medications, new labs and imaging results over the last 24 hours.     Recent Labs   Lab 11/05/22 1915    WBC 4.5   HGB 11.7*   *   *      POTASSIUM 4.8   CHLORIDE 111*   CO2 25   BUN 56*   CR 1.97*   ANIONGAP 2*   MIGUEL ÁNGEL 9.2   *     Recent Results (from the past 24 hour(s))   Abd/pelvis CT no contrast - Stone Protocol    Narrative    EXAM: CT ABDOMEN PELVIS W/O CONTRAST  LOCATION: [facility]  DATE/TIME: 11/5/2022 11:15 PM    INDICATION: left flank pain  COMPARISON: 11/05/2022 chest CT  TECHNIQUE: CT scan of the abdomen and pelvis was performed without IV contrast. Multiplanar reformats were obtained. Dose reduction techniques were used.  CONTRAST: None.    FINDINGS:   LOWER CHEST: Cardiomegaly. Partially visualized pacemaker leads. Bibasilar fibrotic changes with traction bronchiectasis. Left-sided asymmetric gynecomastia. See chest CT report for details.    HEPATOBILIARY: Cholelithiasis. Small volume of perihepatic ascites.    PANCREAS: Unremarkable    SPLEEN: Small volume of perisplenic ascites    ADRENAL GLANDS: Unremarkable    KIDNEYS/BLADDER: No hydronephrosis. No renal stones. Distal ureters and bladder are not well visualized.    BOWEL: Diverticulosis without diverticulitis.    LYMPH NODES: Scattered subcentimeter retroperitoneal nodes.    VASCULATURE: No abdominal aortic aneurysm    PELVIC ORGANS: Moderate volume of free pelvic fluid. Assessment of the pelvis is limited by streak artifact    MUSCULOSKELETAL: Bilateral hip arthroplasties. Chronic S1 deformity. Multilevel spondylosis.      Impression    IMPRESSION:   1.  Cholelithiasis.  2.   Abdominal and pelvic ascites.  3.  No hydronephrosis. Limited evaluation of the distal ureters and bladder.  4.  Additional findings as above.     CT Chest Pulmonary Embolism w Contrast    Narrative    EXAM: CT CHEST PULMONARY EMBOLISM W CONTRAST  LOCATION: [facility]  DATE/TIME: 11/5/2022 11:16 PM    INDICATION: Pain. Elevated D-dimer.  COMPARISON: CT chest without IV contrast  6/24/2022.  TECHNIQUE: CT chest pulmonary angiogram during arterial phase injection of IV contrast. Multiplanar reformats and MIP reconstructions were performed. Dose reduction techniques were used.   CONTRAST: 59 mL Isovue-370 IV.    FINDINGS:  ANGIOGRAM CHEST: Nonocclusive small right lower lobe pulmonary emboli (images 136-149, series 5). Atherosclerotic thoracic aorta. Aneurysmal ascending segment measures 4.6 x 5.0 cm (image 116, series 5), unchanged. Evidence of right heart strain pattern.    RV/LV RATIO: 1.52.    LUNGS AND PLEURA: Mild venous hypertension. Diffuse thickening of the bronchi. Dependent atelectasis bilaterally. A few foci of subpleural fibrosis in the lower lungs. No pleural effusion on either side.    MEDIASTINUM/AXILLAE: Cardiac enlargement. Dense coronary artery calcifications. No pericardial effusion. Pacer wires in the heart. No suspicious adenopathy.    CORONARY ARTERY CALCIFICATION: Severe.    UPPER ABDOMEN: Dependent gallstones. Vascular calcifications.    MUSCULOSKELETAL: Demineralization of the visualized bones. Degenerative changes both shoulders and the spine. Left chest pacer.      Impression    IMPRESSION:  1.  Nonocclusive small right lower lobe pulmonary emboli. Right heart strain pattern.    2.  Cardiac enlargement. Dense coronary artery calcifications. Left chest pacer, leads in the heart. No pericardial effusion. Mild venous hypertension.    3.  Diffuse thickening of the bronchi. A few foci of subpleural fibrosis in the lower lungs. Dependent atelectasis bilaterally. No suspicious adenopathy or pleural effusion on either side.    4.  Dependent gallstones. Lobulated contour of the liver raises the possibility of underlying fibrosis. Small amount of ascites.    5.  Demineralization of the visualized bones. Degenerative changes both shoulders and the spine.    6.  Findings discussed with the referring physician, Dr. Wellre, immediately following the scan at 2342 hours.    NOTE:  ABNORMAL REPORT    THE DICTATION ABOVE DESCRIBES AN ABNORMALITY FOR WHICH FOLLOW-UP IS NEEDED.

## 2022-11-06 NOTE — ED PROVIDER NOTES
History   Chief Complaint:  Flank Pain       The history is provided by the patient.      Lico Woo is a 89 year old male who presents with left-sided flank pain. At 9 am this morning he woke up with left-sided flank pain that has been intermittent and gradually worsening. The pain is now worsened and constant. His pain is a 5/10. He has shortness of breath which is not new. He also has some swelling in his feet and legs. He denies hematuria, dysuria, or a cough. About three weeks ago he switched from Eliquis to Aspirin. He was exercising yesterday as well.      Review of Systems   Respiratory: Positive for shortness of breath. Negative for cough.    Cardiovascular: Positive for leg swelling.   Genitourinary: Positive for flank pain. Negative for dysuria and hematuria.   All other systems reviewed and are negative.    Allergies:  The patient has no known allergies.     Medications:  Zyloprim  Amoxil  ASA 81 mg  Coreg  Lasix  Synthroid  Cozaar    Past Medical History:     Cardiomyopathy  Chronic atrial fibrillation  Mitral regurgitation  Hypertension  Pacemaker  Prostate cancer  Hypothyroidism  Aortic stenosis  Chronic systolic CHF  Tricuspid regurgitation  Gout  CKD  MGUS  Pulmonary hypertension  Aortic root dilatation     Past Surgical History:    Bilateral hip replacement  Implant pacemaker  Lip surgery     Family History:    Mother: MI  Father: cerebrovascular disease    Social History:  The patient presents to the ED with his son.  PCP: Mumtaz Hernandez    Physical Exam     Patient Vitals for the past 24 hrs:   BP Temp Temp src Pulse Resp SpO2   11/06/22 0002 -- -- -- -- -- 94 %   11/05/22 2236 (!) 139/93 -- -- 70 -- 94 %   11/05/22 2127 (!) 130/95 -- -- 72 -- --   11/05/22 1910 (!) 132/91 97.7  F (36.5  C) Temporal 74 20 97 %       Physical Exam  Eyes:  The pupils are equal and round    Conjunctivae and sclerae are normal  ENT:    The nose is normal    Pinnae are normal    The oropharynx is  normal  Neck:  Normal range of motion    There is no rigidity noted  CV:  Regular rate and rhythm     No edema  Resp:  Lungs are clear    Non-labored    No rales    No wheezing   GI:  Abdomen is soft, there is no rigidity    No distension    No rebound tenderness   MS:  Normal muscular tone    No asymmetric leg swelling    Unable to reproduce left lower   Skin:  No rash or acute skin lesions noted  Neuro:   Awake, alert.      Speech is normal and fluent.    Face is symmetric.     Moves all extremities    Emergency Department Course   ECG  ECG taken at 0122, ECG read at 0129  AV Dual Paced Rhythm   Rate 69 bpm. WY interval 128 ms. QRS duration 174 ms. QT/QTc 514/550 ms. P-R-T axes  263 78.     Imaging:  CT Chest Pulmonary Embolism w Contrast   Final Result   IMPRESSION:   1.  Nonocclusive small right lower lobe pulmonary emboli. Right heart strain pattern.      2.  Cardiac enlargement. Dense coronary artery calcifications. Left chest pacer, leads in the heart. No pericardial effusion. Mild venous hypertension.      3.  Diffuse thickening of the bronchi. A few foci of subpleural fibrosis in the lower lungs. Dependent atelectasis bilaterally. No suspicious adenopathy or pleural effusion on either side.      4.  Dependent gallstones. Lobulated contour of the liver raises the possibility of underlying fibrosis. Small amount of ascites.      5.  Demineralization of the visualized bones. Degenerative changes both shoulders and the spine.      6.  Findings discussed with the referring physician, Dr. Weller, immediately following the scan at 2342 hours.      NOTE: ABNORMAL REPORT      THE DICTATION ABOVE DESCRIBES AN ABNORMALITY FOR WHICH FOLLOW-UP IS NEEDED.       Abd/pelvis CT no contrast - Stone Protocol   Final Result   IMPRESSION:    1.  Cholelithiasis.   2.   Abdominal and pelvic ascites.   3.  No hydronephrosis. Limited evaluation of the distal ureters and bladder.   4.  Additional findings as above.           Report  per radiology    Laboratory:  Labs Ordered and Resulted from Time of ED Arrival to Time of ED Departure   BASIC METABOLIC PANEL - Abnormal       Result Value    Sodium 138      Potassium 4.8      Chloride 111 (*)     Carbon Dioxide (CO2) 25      Anion Gap 2 (*)     Urea Nitrogen 56 (*)     Creatinine 1.97 (*)     Calcium 9.2      Glucose 123 (*)     GFR Estimate 32 (*)    CBC WITH PLATELETS AND DIFFERENTIAL - Abnormal    WBC Count 4.5      RBC Count 3.45 (*)     Hemoglobin 11.7 (*)     Hematocrit 37.3 (*)      (*)     MCH 33.9 (*)     MCHC 31.4 (*)     RDW 15.5 (*)     Platelet Count 106 (*)     % Neutrophils 74      % Lymphocytes 17      % Monocytes 6      % Eosinophils 3      % Basophils 0      % Immature Granulocytes 0      NRBCs per 100 WBC 0      Absolute Neutrophils 3.3      Absolute Lymphocytes 0.8      Absolute Monocytes 0.3      Absolute Eosinophils 0.1      Absolute Basophils 0.0      Absolute Immature Granulocytes 0.0      Absolute NRBCs 0.0     D DIMER QUANTITATIVE - Abnormal    D-Dimer Quantitative 1.23 (*)    ROUTINE UA WITH MICROSCOPIC REFLEX TO CULTURE - Normal    Color Urine Light Yellow      Appearance Urine Clear      Glucose Urine Negative      Bilirubin Urine Negative      Ketones Urine Negative      Specific Gravity Urine 1.009      Blood Urine Negative      pH Urine 5.0      Protein Albumin Urine Negative      Urobilinogen Urine Normal      Nitrite Urine Negative      Leukocyte Esterase Urine Negative      RBC Urine <1      WBC Urine <1     TROPONIN I   NT PROBNP INPATIENT      Emergency Department Course:    Reviewed:  I reviewed nursing notes, vitals, past medical history and Care Everywhere    Assessments:  1917 I obtained history and examined the patient as noted above.   2133 I rechecked the patient and explained findings. He states no new symptoms at this time.    2219 I rechecked the patient and explained findings. Next steps were discussed with the patient.     Consults:  0000 I  spoke with radiology regarding the imaging results.   0021 I spoke with Dr. Bernard, hospitalist, who accepts the patient.    Interventions:  2302 NS 1000 mL IV    Disposition:  The patient was admitted to the hospital under the care of Dr. Bernard.     Impression & Plan     Medical Decision Making:  Lico Woo is a 89 year old male who presents to the emergency department with pain in his left flank and left lower rib area posteriorly.  Symptoms started this morning.  Pain initially started in waves and is now more constant.  Recently he was anticoagulated and stopped taking anticoagulation.  Urinalysis does not show any blood.  Given the location of pain D-dimer added onto screen for pulmonary embolism.  D-dimer was elevated.  CT scan does show pulmonary embolism but on the right side.  Unclear if there may be a small nonvisualized 1 on the left side.  Given the new PE as well as some signs of heart failure will recommend admission to the hospital for initiation of anticoagulation.  Discussed with the hospitalist agreed accept the patient as admission.  No signs of urinary tract infection but he does have signs of heart failure with some fluid in his abdomen.  He is not hypoxic and was not given any diuretics in the emergency department.    Diagnosis:    ICD-10-CM    1. Acute pulmonary embolism, unspecified pulmonary embolism type, unspecified whether acute cor pulmonale present (H)  I26.99       2. Congestive heart failure, unspecified HF chronicity, unspecified heart failure type (H)  I50.9           Scribe Disclosure:  I, BENITO COLETTE, am serving as a scribe at 9:23 PM on 11/5/2022 to document services personally performed by Gerardo Weller MD based on my observations and the provider's statements to me.        Gerardo Weller MD  11/06/22 0131

## 2022-11-06 NOTE — PROGRESS NOTES
11/06/22 1019   Appointment Info   Signing Clinician's Name / Credentials (PT) Gina Ortiz PT   Living Environment   People in Home spouse   Current Living Arrangements independent living facility   Home Accessibility no concerns   Transportation Anticipated car, drives self;family or friend will provide   Living Environment Comments Pt and spouse live at [facility], have 1 meal per day and help cleaning, etc. Otherwise they drive and do light grocery shopping, light meals (they buy prepared meals to warm up). Pt's spouse uses walker and has the beginning stages of macular degeneration.   Self-Care   Usual Activity Tolerance moderate   Current Activity Tolerance fair   Regular Exercise Yes   Activity/Exercise Type walking  (nu-step at facility gym)   Equipment Currently Used at Home cane, straight;walker, rolling   Fall history within last six months yes   Number of times patient has fallen within last six months 2   Activity/Exercise/Self-Care Comment Pt reports last fall was in June, 2022 (fell while traveling to [place] to visit son, once in airport and once at his son's home)   General Information   Onset of Illness/Injury or Date of Surgery 11/05/22   Referring Physician Rachana Bernard   Patient/Family Therapy Goals Statement (PT) home soon   Pertinent History of Current Problem (include personal factors and/or comorbidities that impact the POC) Pt admitted with L flank pain, found to have R PE, admitted to [facility]BS 11/6/22. No DVT either LE. PMH includes chronic heart failure (EF 25-30%), severe pulm HTN, chronic a fib not currently on anti-coag d/t fall risk, s/p PPM, CKD stage III, HTN, hypothyroid.   Cognition   Affect/Mental Status (Cognition) WFL   Orientation Status (Cognition) oriented x 4   Follows Commands (Cognition) WFL   Cognitive Status Comments pt appears cognitively intact   Pain Assessment   Patient Currently in Pain No   Integumentary/Edema   Integumentary/Edema Comments noah LE's  darkened/ariana skin with 1 skin tear noted R shin   Posture    Posture Forward head position;Kyphosis   Range of Motion (ROM)   Range of Motion ROM is WFL   Strength (Manual Muscle Testing)   Strength (Manual Muscle Testing) Deficits observed during functional mobility   Bed Mobility   Comment, (Bed Mobility) IND sit to/from supine without rail   Transfers   Comment, (Transfers) sit to/from stand mod IND with cane   Gait/Stairs (Locomotion)   [place] Level (Gait) supervision   Assistive Device (Gait) cane, straight   Distance in Feet (Required for LE Total Joints) 25'   Distance in Feet (Gait) 125'   Pattern (Gait) swing-through   Comment, (Gait/Stairs) CGA initially with cane, progressed quickly to SBA   Balance   Balance Comments balance deficits present at baseline. Tends to lose balance post.   Sensory Examination   Sensory Perception Comments pt reports neuropathy noah LEs   Coordination   Coordination no deficits were identified   Muscle Tone   Muscle Tone no deficits were identified   Clinical Impression   Criteria for Skilled Therapeutic Intervention Yes, treatment indicated   PT Diagnosis (PT) impaired balance and gait   Influenced by the following impairments decreased sensation and strength noah LEs   Functional limitations due to impairments fall risk, decreased IND with mobility   Clinical Presentation (PT Evaluation Complexity) Stable/Uncomplicated   Clinical Presentation Rationale per medical record   Clinical Decision Making (Complexity) low complexity   Planned Therapy Interventions (PT) balance training;gait training;home exercise program   Anticipated Equipment Needs at Discharge (PT)   (pt has cane and walker)   Risk & Benefits of therapy have been explained evaluation/treatment results reviewed;care plan/treatment goals reviewed   PT Total Evaluation Time   PT Asim, Low Complexity Minutes (90469) 15   Physical Therapy Goals   PT Frequency 3x/week   PT Predicted Duration/Target Date for Goal  "Attainment 11/13/22   PT Goals Gait;PT Goal 1   PT: Gait Modified independent;Straight cane;Greater than 200 feet   PT: Goal 1 pt will score greater than or equal to 20/24 to indicate decreased fall risk.   Interventions   Interventions Quick Adds Gait Training;Neuromuscular Re-ed   Gait Training   Gait Training Minutes (76115) 15   Symptoms Noted During/After Treatment (Gait Training) none   Treatment Detail/Skilled Intervention PT - gt training after eval portion 125' with cane and SBA, pt with reciprocal pattern and good use of cane, no LOB but does have increased sway when he stops and turns to talk to therapist, mildly unsteady with this. Discussed walker use, pt has walker but only uses when he has something to carry (puts it on the seat), doesn't want to depend on a walker \"full-time.\" Discussed possibility of cont PT after discharge to work on balance, pt is agreeable although doesn't know that it will help. Educated pt on multi-pronged reasons for decreased balance (neuropathy, muscular weakness, decreased cardio ability/activity tolerance) and how PT could help with strategies for balance as well as strengthening/practice.   [place] Level (Gait Training) stand-by assist   Physical Assistance Level (Gait Training) 1 person assist   Assistive Device (Gait Training) straight cane   Neuromuscular Re-education   Neuromuscular Re-Education Minutes (62895) 9   Symptoms Noted During/After Treatment none   Treatment Detail/Skilled Intervention Pt performed repeat sit to/from stand x 5 without cane with SBA, good balance and no LOB with transfer. Practiced static standing with CGA to SBA and cane, stood x 1 min without issue. Stood with eyes closed x 30'' with increased sway but no LOB.   PT Discharge Planning   PT Plan DGI, issue HEP standing and sitting exercises.   PT Discharge Recommendation (DC Rec) home with home care physical therapy;home with outpatient physical therapy   PT Rationale for DC Rec Pt is " near his baseline for mobility, normally lives at  with his wife, is quite IND but does have 1 meal per day and cleaning assistance. He drives and does light grocery shopping and meal prep. He uses a cane most of the time, occasionally a walker. He reports no falls since June, 2022. Currently, pt is up with SBA and cane, amb 125' with cane. Anticipate pt can return to  when medically stable, recommend OP or home PT to address some of his high level balance issues.   PT Brief overview of current status Pt transfers with SBA to mod IND, amb with cane 125' and SBA.   Total Session Time   Timed Code Treatment Minutes 24   Total Session Time (sum of timed and untimed services) 39       [facility]  OUTPATIENT PHYSICAL THERAPY EVALUATION  PLAN OF TREATMENT FOR OUTPATIENT REHABILITATION  (COMPLETE FOR INITIAL CLAIMS ONLY)  Patient's Last Name, First Name, M.I.  YOB: 1933  Lico Woo                        Provider's Name  [facility] Medical Record No.  [id number]                             Onset Date:  11/05/22   Start of Care Date:      Type:     _X_PT   ___OT   ___SLP Medical Diagnosis:                 PT Diagnosis:  impaired balance and gait Visits from SOC:  1     See note for plan of treatment, functional goals and certification details    I CERTIFY THE NEED FOR THESE SERVICES FURNISHED UNDER        THIS PLAN OF TREATMENT AND WHILE UNDER MY CARE     (Physician co-signature of this document indicates review and certification of the therapy plan).

## 2022-11-06 NOTE — PLAN OF CARE
Goal Outcome Evaluation:      Plan of Care Reviewed With: patient    Overall Patient Progress: improvingOverall Patient Progress: improving     Orientation/Cognitive: A&Ox4  Observation Goals (Met/ Not Met): Partially met awaiting Echo  Mobility Level/Assist Equipment: SBA w/ cane  Fall Risk (Y/N): Y  Behavior Concerns: NA  Pain Management: 4/10 Back pack relived by repositioning   Tele/VS/O2: BP elevated Tele:Vpaced( dual pacemaker)  ABNL Lab/BG: BNP:40,955 Hgb:11.7 Creat:1.97 D-dime:1.23  Diet: Regular w/ 2,000ml fluid restriction   Bowel/Bladder: Continent   Skin Concerns: Scattered bruising   Drains/Devices: PIV infusing heparin 1,200units/hr  Tests/Procedures for next shift: Echo  Anticipated DC date & active delays:possibly 11/06  Patient Stated Goal for Today: To go home  Observation goals  PRIOR TO DISCHARGE        Comments:   -diagnostic tests and consults completed and resulted Not met  -vital signs normal or at patient baseline:HTN    -returns to baseline functional status:Partially met   -safe disposition plan has been identified:Not met  Nurse to notify provider when observation goals have been met and patient is ready for discharge.

## 2022-11-06 NOTE — ED TRIAGE NOTES
Woke up with waves of sharp pain in flank states now pain has been constant. Denies blood in urine     Triage Assessment     Row Name 11/05/22 1908       Triage Assessment (Adult)    Airway WDL WDL       Respiratory WDL    Respiratory WDL WDL       Skin Circulation/Temperature WDL    Skin Circulation/Temperature WDL WDL       Cardiac WDL    Cardiac WDL WDL       Peripheral/Neurovascular WDL    Peripheral Neurovascular WDL WDL       Cognitive/Neuro/Behavioral WDL    Cognitive/Neuro/Behavioral WDL WDL

## 2022-11-06 NOTE — PROVIDER NOTIFICATION
MD Notification    Notified Person: MD    Notified Person Name:Dr. Bernard    Notification Date/Time:11/06 0232    Notification Interaction:AMCOM    Purpose of Notification:FYI pt is refusing 2AM lasix and is requesting it for the morning . Can we reschedule this for the morning?    Orders Received: Lasix rescheduled for 8AM     Comments:

## 2022-11-06 NOTE — PHARMACY-ADMISSION MEDICATION HISTORY
Pharmacy Medication History  Admission medication history interview status for the 11/5/2022  admission is complete. See EPIC admission navigator for prior to admission medications     Location of Interview: Patient room  Medication history sources: Patient and Surescripts    Significant changes made to the medication list:  Added Aleve  Adjusted:   -vitamin D (added as 5000 unit tablet with dose instructions to take 2000 units; patient thinks taking 2000 units)   -clarified frequency for B12, niacin, B6  -Lasix (last filled to take #3 tablets/day; patient at most takes #2 tablets/day)     In the past week, patient estimated taking medication this percent of the time: greater than 90%    Additional medication history information:   Patient is a reliable historian. Confirmed no longer taking Eliquis (2.5 mg tablets last filled 6/6/22 #90ds #180), now only taking low dose aspirin for the past 3-4 weeks. Has started taking Aleve now that Eliquis course has been completed.   Generally takes medications mid-late morning, but 11/5/22, took these first daily doses a bit later than normal.     Medication reconciliation completed by provider prior to medication history? No    Time spent in this activity: 20 minutes    Medication Sig Last Dose Taking? Auth Provider   acetaminophen (TYLENOL) 500 MG tablet Take 500-1,000 mg by mouth every 6 hours as needed for mild pain 11/5/2022 at Unknown time Yes Reported, Patient   allopurinol (ZYLOPRIM) 300 MG tablet Take 1 tablet (300 mg) by mouth daily 11/5/2022 at afternoon Yes Mumtaz Hernandez MD   alpha-lipoic acid 600 MG capsule Take 600 mg by mouth daily 11/5/2022 at afternoon Yes Reported, Patient   amoxicillin (AMOXIL) 500 MG capsule TAKE 4 CAPSULES BY MOUTH 1  HOUR PRIOR TO DENTAL WORK Unknown at PRN Yes Eladia Coon PA-C   aspirin (ASA) 81 MG EC tablet Take 1 tablet (81 mg) by mouth daily 11/5/2022 at afternoon Yes Mumtaz Hernandez MD   carvedilol (COREG) 25 MG  "tablet Take 1 tablet (25 mg) by mouth 2 times daily (with meals) 11/5/2022 at X1 dose Yes Ray De Leon MD   furosemide (LASIX) 20 MG tablet TAKE 2 TABLETS BY MOUTH IN  THE MORNING AND 1 TABLET 6  HOURS LATER  Patient taking differently: Take 20 mg by mouth 2 times daily Patient omits doses if planning on an \"active\" day. 11/5/2022 at X1 dose Yes Mumtaz Hernandez MD   levothyroxine (SYNTHROID/LEVOTHROID) 112 MCG tablet Take 1 tablet (112 mcg) by mouth daily 11/5/2022 at afternoon Yes Mumtaz Hernandez MD   losartan (COZAAR) 100 MG tablet Take 0.5 tablets (50 mg) by mouth daily Please call for an appointment for further refills. [phone] 11/5/2022 at afternoon Yes Mumtaz Hernandez MD   multivitamin (CENTRUM SILVER) tablet Take 1 tablet by mouth daily 11/5/2022 at afternoon Yes Reported, Patient   naproxen sodium (ANAPROX) 220 MG tablet Take 220 mg by mouth daily 11/5/2022 at afternoon Yes Unknown, Entered By History   Niacin (VITAMIN B-3 OR) Take 500 mg by mouth daily 11/5/2022 at afternoon Yes Reported, Patient   potassium chloride ER (KLOR-CON M) 10 MEQ CR tablet TAKE 1 TABLET BY MOUTH  TWICE DAILY 11/5/2022 at X1 dose Yes Mumtaz Hernandez MD   Pyridoxine HCl (VITAMIN B6 PO) Take 150 mg by mouth daily 11/5/2022 at afternoon Yes Reported, Patient   vitamin B-12 (CYANOCOBALAMIN) 2500 MCG sublingual tablet Take 2,500 mcg by mouth daily 11/5/2022 at afternoon Yes Unknown, Entered By History   vitamin D3 (CHOLECALCIFEROL) 50 mcg (2000 units) tablet Take 50 mcg by mouth daily 11/5/2022 at afternoon Yes Unknown, Entered By History       The information provided in this note is only as accurate as the sources available at the time of update(s)   Lauren Wray PharmD    "

## 2022-11-06 NOTE — PROGRESS NOTES
Observation goals  PRIOR TO DISCHARGE       Comments:   -diagnostic tests and consults completed and resulted: Not met    -vital signs normal or at patient baseline : Partially met    -returns to baseline functional status: Partially met     -safe disposition plan has been identified: Not met, PT to eval    Nurse to notify provider when observation goals have been met and patient is ready for discharge.

## 2022-11-06 NOTE — PROGRESS NOTES
[facility]    Hospitalist Progress Note      Assessment & Plan   Lico Woo is a 89 year old male with past medical history significant for chronic systolic heart failure (EF of 25 to 30%), severe pulmonary HTN, chronic atrial fibrillation (not recently on anticoagulation), s/p PPM, CKD stage III, HTN, hypothyroidism, idiopathic pulmonary fibrosis admitted on 11/5/2022 with left flank pain, found to have R sided PE.      Non-occlusive small Right lower lobe pulmonary emboli   PT has CT PE performed for evaluation of left sided flank pain. He was found to have right lower lobe PE. There is some strain apparent, but pt does have CHF with severe pulmonary HTN and RV dysfunction at baseline. He is hemodynamically stable and not hypoxic. Pt had previously been on Eliquis for A fib, but this was stopped in September due to frequent falls and risk of bleeding.   * LE US negative  - Continuous oximetry  - Cardiac monitoring   - Heparin started in the ED, plan to transition to po. Unfortunately CrCl is 26 with cut off for Eliquis use 25. No evidence regarding dose reduction for impaired renal function with treatment of PE. Discussed consideration of warfarin. Family inquiring about using lower dose of Eliquis +/- asa. Discussed case with Hematology who will offer their insight. Appreciate assistance.  - ECHO     Left upper back/flank pain  Pts presenting concern is left upper back/flank pain. Pain woke him from sleep and became more severe throughout the day. He also notes preceding pins and needles sensation over the past few days. UA was unremarkable. CT abdomen/pelvis negative for renal stones or other obvious pathology. CT PE as noted above with Right sided PE, no significant left sided findings. Skin examined and there does not appear to be any rash. Trop wnl. It is unclear what is causing the patient's left sided pain at this time but he reports it is improving. Highest suspicion for  musculoskeletal etiology, particularly given improvement with repositioning overnight   - Pain control PRN   --will try topical icyhot  - Monitor     Acute on chronic systolic heart failure  Idiopathic cardiomyopathy   Severe Pulmonary Hyprtension  Pt has Chronic systolic heart failure with EF of 25-30% based on most recent Echo as well as severe RV dysfunction and severe pulmonary hypertension. He has chronic lower extremity edema and chronic dyspnea on exertion. He does think his legs are  more swollen than normal. It doesn't seem like he is too far from his baseline but BNP is markedly elevated to 40,955 which is quite a bit higher than his baseline (13k on last check)  Weight 156lb (149 last clinic visit 9/30)  CT shows abdominal and pelvic ascites. Crackles on exam though difficult to interpret with pulmonary fibrosis.   PTA: furosemide 40mg qam and 20mg qpm though generally only takes 20mg daily and skips on days he is out and about   - Intake/Output, daily weights   - received 40mg IV lasix now, plan for additional 20mg IV this afternoon and likely increased po dosing for a few days at discharge pending trend in renal function   - ECHO pending      Hx of atrial fibrillation s/p bi-V pacemaker placement  Had been managed chronically with amiodarone and Eliquis. Due to his diagnosis of idiopathic pulmonary fibrosis the amiodarone was discontinued. Eliquis was also recently discontinued and he was started on ASA due to frequent falls and concern for bleeding complications.      CKD stage III  Creatinine is 1.97 on admission. Baseline creatinine has been highly variable over the past year  ranging from 1.3-2.2.   - Monitor renal function in am  - Avoid nephrotoxins      Hypertension  PTA medications include carvedilol and losartan 50mg  - Resume BB  -hold losartan pending stable renal function      Frequent Falls   Pt reports multiple falls over the recent past.   - PT consult, recommending home vs outpatient PT  at discharge      Hypothyroidism  PTA: Levothyroxine   - Resume at discharge     Chronic Anemia  Hgb 11.7 on admission   - Stable, monitor      Thrombocytopenia   Platelets are 106 on admission, 119 on recheck. In September his platelets were 123 and back in May they were wnl at 174. Decline perhaps due to hepatic dysfunction there is noted hepatic fibrosis on CT scan?    - Monitor platelet count closely while on anticoagulation   LFT unremarkable     DVT Prophylaxis: Heparin > Eliquis   Code Status: Full Code    Disposition: Expected discharge 11/7 pending re evaluation volume status, possibly later today pending ECHO result and discussion with family     Patient was discussed with Dr. Cotto who agrees with the above plan.     Airam Castillo PA-C    Interval History   Pt reports he is doing a bit better today. Back pain still present L flank, constant though improved to 5/10. No chest pain. No increased KAPOOR from baseline when ambulating to the bathroom. No resting dyspnea. Feels edema in his legs has been worsening over past several weeks.   He has a flight to [place] scheduled for tomorrow. Family working on delaying one day   Son, Dudley (local surgeon), updated over the phone this afternoon    -Data reviewed today: I reviewed all new labs and imaging results over the last 24 hours. I personally reviewed no images or EKG's today.    Physical Exam   Temp: 97.6  F (36.4  C) Temp src: Oral BP: (!) 136/91 Pulse: 76   Resp: 17 SpO2: 95 % O2 Device: None (Room air)    Vitals:    11/06/22 0138   Weight: 70.9 kg (156 lb 4.8 oz)     Vital Signs with Ranges  Temp:  [97.5  F (36.4  C)-97.7  F (36.5  C)] 97.6  F (36.4  C)  Pulse:  [70-77] 76  Resp:  [16-20] 17  BP: (130-146)/(91-96) 136/91  SpO2:  [93 %-97 %] 95 %  I/O last 3 completed shifts:  In: -   Out: 250 [Urine:250]    Constitutional: Alert and oriented, sitting up in chair. Appears comfortable and is appropriately conversant   ENT: moist mucous membranes  Eyes:   Sclera anicteric, EOMI  Respiratory: Lungs with mild bibasilar crackles. No increased work of breathing or wheezing   Cardiovascular: Regular rate and rhythm, 2+ bilateral LE edema R>L  GI:  active bowel sounds, abdomen soft, mildly distended, non-tender  Skin/Integumen: chronic stasis changes lower extremities   MSK:  Tender to palpation paraspinal muscles mid back. No midline tenderness. Moves all four extremities  Neuro:  Speech is clear. Face symmetric. Follows commands     Medications     heparin 1,200 Units/hr (11/06/22 0309)       furosemide  40 mg Intravenous Once       Data   Recent Labs   Lab 11/06/22 0725 11/05/22 1915   WBC 5.1 4.5   HGB 12.6* 11.7*   * 108*   * 106*   NA  --  138   POTASSIUM  --  4.8   CHLORIDE  --  111*   CO2  --  25   BUN  --  56*   CR  --  1.97*   ANIONGAP  --  2*   MIGUEL ÁNGEL  --  9.2   GLC  --  123*       Recent Results (from the past 24 hour(s))   Abd/pelvis CT no contrast - Stone Protocol    Narrative    EXAM: CT ABDOMEN PELVIS W/O CONTRAST  LOCATION: [facility]  DATE/TIME: 11/5/2022 11:15 PM    INDICATION: left flank pain  COMPARISON: 11/05/2022 chest CT  TECHNIQUE: CT scan of the abdomen and pelvis was performed without IV contrast. Multiplanar reformats were obtained. Dose reduction techniques were used.  CONTRAST: None.    FINDINGS:   LOWER CHEST: Cardiomegaly. Partially visualized pacemaker leads. Bibasilar fibrotic changes with traction bronchiectasis. Left-sided asymmetric gynecomastia. See chest CT report for details.    HEPATOBILIARY: Cholelithiasis. Small volume of perihepatic ascites.    PANCREAS: Unremarkable    SPLEEN: Small volume of perisplenic ascites    ADRENAL GLANDS: Unremarkable    KIDNEYS/BLADDER: No hydronephrosis. No renal stones. Distal ureters and bladder are not well visualized.    BOWEL: Diverticulosis without diverticulitis.    LYMPH NODES: Scattered subcentimeter retroperitoneal nodes.    VASCULATURE: No abdominal  aortic aneurysm    PELVIC ORGANS: Moderate volume of free pelvic fluid. Assessment of the pelvis is limited by streak artifact    MUSCULOSKELETAL: Bilateral hip arthroplasties. Chronic S1 deformity. Multilevel spondylosis.      Impression    IMPRESSION:   1.  Cholelithiasis.  2.   Abdominal and pelvic ascites.  3.  No hydronephrosis. Limited evaluation of the distal ureters and bladder.  4.  Additional findings as above.     CT Chest Pulmonary Embolism w Contrast    Narrative    EXAM: CT CHEST PULMONARY EMBOLISM W CONTRAST  LOCATION: [facility]  DATE/TIME: 11/5/2022 11:16 PM    INDICATION: Pain. Elevated D-dimer.  COMPARISON: CT chest without IV contrast 6/24/2022.  TECHNIQUE: CT chest pulmonary angiogram during arterial phase injection of IV contrast. Multiplanar reformats and MIP reconstructions were performed. Dose reduction techniques were used.   CONTRAST: 59 mL Isovue-370 IV.    FINDINGS:  ANGIOGRAM CHEST: Nonocclusive small right lower lobe pulmonary emboli (images 136-149, series 5). Atherosclerotic thoracic aorta. Aneurysmal ascending segment measures 4.6 x 5.0 cm (image 116, series 5), unchanged. Evidence of right heart strain pattern.    RV/LV RATIO: 1.52.    LUNGS AND PLEURA: Mild venous hypertension. Diffuse thickening of the bronchi. Dependent atelectasis bilaterally. A few foci of subpleural fibrosis in the lower lungs. No pleural effusion on either side.    MEDIASTINUM/AXILLAE: Cardiac enlargement. Dense coronary artery calcifications. No pericardial effusion. Pacer wires in the heart. No suspicious adenopathy.    CORONARY ARTERY CALCIFICATION: Severe.    UPPER ABDOMEN: Dependent gallstones. Vascular calcifications.    MUSCULOSKELETAL: Demineralization of the visualized bones. Degenerative changes both shoulders and the spine. Left chest pacer.      Impression    IMPRESSION:  1.  Nonocclusive small right lower lobe pulmonary emboli. Right heart strain pattern.    2.  Cardiac  enlargement. Dense coronary artery calcifications. Left chest pacer, leads in the heart. No pericardial effusion. Mild venous hypertension.    3.  Diffuse thickening of the bronchi. A few foci of subpleural fibrosis in the lower lungs. Dependent atelectasis bilaterally. No suspicious adenopathy or pleural effusion on either side.    4.  Dependent gallstones. Lobulated contour of the liver raises the possibility of underlying fibrosis. Small amount of ascites.    5.  Demineralization of the visualized bones. Degenerative changes both shoulders and the spine.    6.  Findings discussed with the referring physician, Dr. Weller, immediately following the scan at 2342 hours.    NOTE: ABNORMAL REPORT    THE DICTATION ABOVE DESCRIBES AN ABNORMALITY FOR WHICH FOLLOW-UP IS NEEDED.    US Lower Extremity Venous Duplex Bilateral    Narrative    EXAM: US LOWER EXTREMITY VENOUS DUPLEX BILATERAL  LOCATION: [facility]  DATE/TIME: 11/6/2022 2:57 AM    INDICATION: Pulmonary embolus. Bilateral leg swelling.  COMPARISON: None.  TECHNIQUE: Venous Duplex ultrasound of bilateral lower extremities with and without compression, augmentation and duplex. Color flow and spectral Doppler with waveform analysis performed.    FINDINGS: Exam includes the common femoral, femoral, popliteal veins as well as segmentally visualized deep calf veins and greater saphenous vein.     RIGHT: No deep vein thrombosis. No superficial thrombophlebitis. No popliteal cyst.    LEFT: No deep vein thrombosis. No superficial thrombophlebitis. No popliteal cyst.      Impression    IMPRESSION:  1.  No deep venous thrombosis in the bilateral lower extremities.

## 2022-11-06 NOTE — PROGRESS NOTES
Observation goals  PRIOR TO DISCHARGE       Comments:   -diagnostic tests and consults completed and resulted: Not met     -vital signs normal or at patient baseline: Met     -returns to baseline functional status: Partially met     -safe disposition plan has been identified: Not met, PT to eval     Nurse to notify provider when observation goals have been met and patient is ready for discharge.

## 2022-11-06 NOTE — ED NOTES
[facility]  ED Nurse Handoff Report    ED Chief complaint: Flank Pain      ED Diagnosis:   Final diagnoses:   Acute pulmonary embolism, unspecified pulmonary embolism type, unspecified whether acute cor pulmonale present (H)   Congestive heart failure, unspecified HF chronicity, unspecified heart failure type (H)       Code Status: to be addressed by admitting doctor    Allergies: No Known Allergies    Patient Story: pt woke up this Am with sharp L flank pain  Focused Assessment:  Pt alert voices needs pain is L Flank and has new SOB. Pt recently taken off his Blood thinner    Treatments and/or interventions provided: IV access, Labs, IV heparin infusion  Results for orders placed or performed during the hospital encounter of 11/05/22   Abd/pelvis CT no contrast - Stone Protocol     Status: None    Narrative    EXAM: CT ABDOMEN PELVIS W/O CONTRAST  LOCATION: [facility]  DATE/TIME: 11/5/2022 11:15 PM    INDICATION: left flank pain  COMPARISON: 11/05/2022 chest CT  TECHNIQUE: CT scan of the abdomen and pelvis was performed without IV contrast. Multiplanar reformats were obtained. Dose reduction techniques were used.  CONTRAST: None.    FINDINGS:   LOWER CHEST: Cardiomegaly. Partially visualized pacemaker leads. Bibasilar fibrotic changes with traction bronchiectasis. Left-sided asymmetric gynecomastia. See chest CT report for details.    HEPATOBILIARY: Cholelithiasis. Small volume of perihepatic ascites.    PANCREAS: Unremarkable    SPLEEN: Small volume of perisplenic ascites    ADRENAL GLANDS: Unremarkable    KIDNEYS/BLADDER: No hydronephrosis. No renal stones. Distal ureters and bladder are not well visualized.    BOWEL: Diverticulosis without diverticulitis.    LYMPH NODES: Scattered subcentimeter retroperitoneal nodes.    VASCULATURE: No abdominal aortic aneurysm    PELVIC ORGANS: Moderate volume of free pelvic fluid. Assessment of the pelvis is limited by streak  artifact    MUSCULOSKELETAL: Bilateral hip arthroplasties. Chronic S1 deformity. Multilevel spondylosis.      Impression    IMPRESSION:   1.  Cholelithiasis.  2.   Abdominal and pelvic ascites.  3.  No hydronephrosis. Limited evaluation of the distal ureters and bladder.  4.  Additional findings as above.     CT Chest Pulmonary Embolism w Contrast     Status: None    Narrative    EXAM: CT CHEST PULMONARY EMBOLISM W CONTRAST  LOCATION: [facility]  DATE/TIME: 11/5/2022 11:16 PM    INDICATION: Pain. Elevated D-dimer.  COMPARISON: CT chest without IV contrast 6/24/2022.  TECHNIQUE: CT chest pulmonary angiogram during arterial phase injection of IV contrast. Multiplanar reformats and MIP reconstructions were performed. Dose reduction techniques were used.   CONTRAST: 59 mL Isovue-370 IV.    FINDINGS:  ANGIOGRAM CHEST: Nonocclusive small right lower lobe pulmonary emboli (images 136-149, series 5). Atherosclerotic thoracic aorta. Aneurysmal ascending segment measures 4.6 x 5.0 cm (image 116, series 5), unchanged. Evidence of right heart strain pattern.    RV/LV RATIO: 1.52.    LUNGS AND PLEURA: Mild venous hypertension. Diffuse thickening of the bronchi. Dependent atelectasis bilaterally. A few foci of subpleural fibrosis in the lower lungs. No pleural effusion on either side.    MEDIASTINUM/AXILLAE: Cardiac enlargement. Dense coronary artery calcifications. No pericardial effusion. Pacer wires in the heart. No suspicious adenopathy.    CORONARY ARTERY CALCIFICATION: Severe.    UPPER ABDOMEN: Dependent gallstones. Vascular calcifications.    MUSCULOSKELETAL: Demineralization of the visualized bones. Degenerative changes both shoulders and the spine. Left chest pacer.      Impression    IMPRESSION:  1.  Nonocclusive small right lower lobe pulmonary emboli. Right heart strain pattern.    2.  Cardiac enlargement. Dense coronary artery calcifications. Left chest pacer, leads in the heart. No  pericardial effusion. Mild venous hypertension.    3.  Diffuse thickening of the bronchi. A few foci of subpleural fibrosis in the lower lungs. Dependent atelectasis bilaterally. No suspicious adenopathy or pleural effusion on either side.    4.  Dependent gallstones. Lobulated contour of the liver raises the possibility of underlying fibrosis. Small amount of ascites.    5.  Demineralization of the visualized bones. Degenerative changes both shoulders and the spine.    6.  Findings discussed with the referring physician, Dr. Weller, immediately following the scan at 2342 hours.    NOTE: ABNORMAL REPORT    THE DICTATION ABOVE DESCRIBES AN ABNORMALITY FOR WHICH FOLLOW-UP IS NEEDED.    [place] Draw     Status: None (In process)    Narrative    The following orders were created for panel order [place] Draw.  Procedure                               Abnormality         Status                     ---------                               -----------         ------                     Extra Blue Top Tube[[id number]]                              Final result               Extra Red Top Tube[[id number]]                               Final result               Extra Green Top (Lithium...[[id number]]                      Final result               Extra Purple Top Tube[[id number]]                            Final result               Extra Blood Bank Purple ...[[id number]]                                                   Please view results for these tests on the individual orders.   Extra Blue Top Tube     Status: None   Result Value Ref Range    Hold Specimen JIC    Extra Red Top Tube     Status: None   Result Value Ref Range    Hold Specimen JIC    Extra Green Top (Lithium Heparin) Tube     Status: None   Result Value Ref Range    Hold Specimen JIC    Extra Purple Top Tube     Status: None   Result Value Ref Range    Hold Specimen JIC    Basic metabolic panel     Status: Abnormal   Result Value Ref Range    Sodium 138 133 - 144  mmol/L    Potassium 4.8 3.4 - 5.3 mmol/L    Chloride 111 (H) 94 - 109 mmol/L    Carbon Dioxide (CO2) 25 20 - 32 mmol/L    Anion Gap 2 (L) 3 - 14 mmol/L    Urea Nitrogen 56 (H) 7 - 30 mg/dL    Creatinine 1.97 (H) 0.66 - 1.25 mg/dL    Calcium 9.2 8.5 - 10.1 mg/dL    Glucose 123 (H) 70 - 99 mg/dL    GFR Estimate 32 (L) >60 mL/min/1.73m2   UA with Microscopic reflex to Culture     Status: Normal    Specimen: Urine, Midstream   Result Value Ref Range    Color Urine Light Yellow Colorless, Straw, Light Yellow, Yellow    Appearance Urine Clear Clear    Glucose Urine Negative Negative mg/dL    Bilirubin Urine Negative Negative    Ketones Urine Negative Negative mg/dL    Specific Gravity Urine 1.009 1.003 - 1.035    Blood Urine Negative Negative    pH Urine 5.0 5.0 - 7.0    Protein Albumin Urine Negative Negative mg/dL    Urobilinogen Urine Normal Normal, 2.0 mg/dL    Nitrite Urine Negative Negative    Leukocyte Esterase Urine Negative Negative    RBC Urine <1 <=2 /HPF    WBC Urine <1 <=5 /HPF    Narrative    Urine Culture not indicated   CBC with platelets and differential     Status: Abnormal   Result Value Ref Range    WBC Count 4.5 4.0 - 11.0 10e3/uL    RBC Count 3.45 (L) 4.40 - 5.90 10e6/uL    Hemoglobin 11.7 (L) 13.3 - 17.7 g/dL    Hematocrit 37.3 (L) 40.0 - 53.0 %     (H) 78 - 100 fL    MCH 33.9 (H) 26.5 - 33.0 pg    MCHC 31.4 (L) 31.5 - 36.5 g/dL    RDW 15.5 (H) 10.0 - 15.0 %    Platelet Count 106 (L) 150 - 450 10e3/uL    % Neutrophils 74 %    % Lymphocytes 17 %    % Monocytes 6 %    % Eosinophils 3 %    % Basophils 0 %    % Immature Granulocytes 0 %    NRBCs per 100 WBC 0 <1 /100    Absolute Neutrophils 3.3 1.6 - 8.3 10e3/uL    Absolute Lymphocytes 0.8 0.8 - 5.3 10e3/uL    Absolute Monocytes 0.3 0.0 - 1.3 10e3/uL    Absolute Eosinophils 0.1 0.0 - 0.7 10e3/uL    Absolute Basophils 0.0 0.0 - 0.2 10e3/uL    Absolute Immature Granulocytes 0.0 <=0.4 10e3/uL    Absolute NRBCs 0.0 10e3/uL   D dimer quantitative      Status: Abnormal   Result Value Ref Range    D-Dimer Quantitative 1.23 (H) 0.00 - 0.50 ug/mL FEU    Narrative    This D-dimer assay is intended for use in conjunction with a clinical pretest probability assessment model to exclude pulmonary embolism (PE) and deep venous thrombosis (DVT) in outpatients suspected of PE or DVT. The cut-off value is 0.50 ug/mL FEU.   Troponin I (now)     Status: Normal   Result Value Ref Range    Troponin I High Sensitivity 23 <79 ng/L   CBC with platelets differential     Status: Abnormal    Narrative    The following orders were created for panel order CBC with platelets differential.  Procedure                               Abnormality         Status                     ---------                               -----------         ------                     CBC with platelets and d...[[id number]]  Abnormal            Final result                 Please view results for these tests on the individual orders.     Patient's response to treatments and/or interventions: tolerating well    To be done/followed up on inpatient unit:  unknown    Does this patient have any cognitive concerns?: none    Activity level - Baseline/Home:  Independent uses cane  Activity Level - Current:   Independent uses cane    Patient's Preferred language: English   Needed?: No    Isolation: None  Infection: Not Applicable  Patient tested for COVID 19 prior to admission: YES  Bariatric?: No    Vital Signs:   Vitals:    11/05/22 1910 11/05/22 2127 11/05/22 2236 11/06/22 0002   BP: (!) 132/91 (!) 130/95 (!) 139/93    Pulse: 74 72 70    Resp: 20      Temp: 97.7  F (36.5  C)      TempSrc: Temporal      SpO2: 97%  94% 94%       Cardiac Rhythm:     Was the PSS-3 completed:   Yes  What interventions are required if any?               Family Comments: n/a  OBS brochure/video discussed/provided to patient/family: Yes              Name of person given brochure if not patient: Pt              Relationship to  patient: Pt    For the majority of the shift this patient's behavior was Green.   Behavioral interventions performed were none.    ED NURSE PHONE NUMBER: [phone]

## 2022-11-06 NOTE — PROGRESS NOTES
RECEIVING UNIT ED HANDOFF REVIEW    ED Nurse Handoff Report was reviewed by: Gisel Oakes RN on November 6, 2022 at 1:39 AM

## 2022-11-06 NOTE — UTILIZATION REVIEW
Concurrent stay review; Secondary Review Determination     [facility]          Under the authority of the Utilization Management Committee, the utilization review process indicated a secondary review on the above patient.  The review outcome is based on review of the medical records, discussions with staff, and applying clinical experience noted on the date of the review.          (x) Observation Status Appropriate - Concurrent stay review    RATIONALE FOR DETERMINATION   89 year old male with past medical history significant for chronic systolic heart failure (EF of 25 to 30%), severe pulmonary HTN, chronic atrial fibrillation (not recently on anticoagulation), s/p PPM, CKD stage III, HTN, hypothyroidism, idiopathic pulmonary fibrosis admitted on 11/5/2022 with left flank pain, found to have R sided PE.     Discussed with the physician assistant, plan is unsure if he needs another night of intravenous diuresis at this time or he can go home with more oral diuresis.    This is a conditional review: If patient requires more than 2 midnights of ongoing hospital care, he has Medicare he should be advanced to inpatient.  If he is to discharge today observation status is appropriate as ordered by the attending physician.  Discussed with the Airam Borja           This document was produced using voice recognition software       The information on this document is developed by the utilization review team in order for the business office to ensure compliance.  This only denotes the appropriateness of proper admission status and does not reflect the quality of care rendered.         The definitions of Inpatient Status and Observation Status used in making the determination above are those provided in the CMS Coverage Manual, Chapter 1 and Chapter 6, section 70.4.      Sincerely,     SAMIRA CALDERON MD    System Medical Director  Utilization Management  [facility].

## 2022-11-06 NOTE — PROGRESS NOTES
Observation goals  PRIOR TO DISCHARGE        Comments:   -diagnostic tests and consults completed and resulted Not met  -vital signs normal or at patient baseline:HTN    -returns to baseline functional status:Partially met   -safe disposition plan has been identified:Not met  Nurse to notify provider when observation goals have been met and patient is ready for discharge.

## 2022-11-07 PROBLEM — I50.9 CONGESTIVE HEART FAILURE, UNSPECIFIED HF CHRONICITY, UNSPECIFIED HEART FAILURE TYPE (H): Status: ACTIVE | Noted: 2022-01-01

## 2022-11-07 PROBLEM — I26.99 ACUTE PULMONARY EMBOLISM, UNSPECIFIED PULMONARY EMBOLISM TYPE, UNSPECIFIED WHETHER ACUTE COR PULMONALE PRESENT (H): Status: ACTIVE | Noted: 2022-01-01

## 2022-11-07 NOTE — PLAN OF CARE
Goal Outcome Evaluation:  A&OX4, VSS on RA. Denies pain. All discharge meds and instructions reviewed with pt, pt verbalized understanding. All belongings returned to pt. Stable at time of discharge.

## 2022-11-07 NOTE — PLAN OF CARE
Physical Therapy Discharge Summary    Reason for therapy discharge:    Discharged to home.    Progress towards therapy goal(s). See goals on Care Plan in [facility] electronic health record for goal details.  Goals not met.  Barriers to achieving goals:   discharge from facility and Patient declined second visit stating he does not need further PT; at baseline. Only seen for initial evaluation and treatment.     Therapy recommendation(s):    No further therapy is recommended.   Addendum:  Noted per evaluating therapist that OP PT was recommended to address high level balance deficits.      Written per chart review and discussion with patient. Patient declined participating in session.

## 2022-11-07 NOTE — PROGRESS NOTES
Observation goals  PRIOR TO DISCHARGE       Comments:   -diagnostic tests and consults completed and resulted: Not met     -vital signs normal or at patient baseline: Met     -returns to baseline functional status: Partially met     -safe disposition plan has been identified: Met     Nurse to notify provider when observation goals have been met and patient is ready for discharge.

## 2022-11-07 NOTE — CONSULTS
[facility]    Hematology / Oncology Consultation     Date of Admission:  11/5/2022  Date of Consult (When I saw the patient): 11/06/22    Assessment & Plan   Lioc Woo is a 89 year old male who was admitted on 11/5/2022. I was asked to see the patient for pulmonary embolism.    Newly diagnosed asymptomatic pulmonary embolism involving right lower lobe  Congestive heart failure, pulmonary hypertension, atrial fibrillation   Chronic kidney disease stage IIIB  Multiple medical comorbidities.     Will was alone in the room at the time of my visit. We had a lengthy conversation today. He noticed pain in his left lower rib cage posteriorly involving a very small area the size of a quarter. He presented to ED and initially suspected renal stones which his son previously had experienced. Work up eventually has revealed a right lower lobe pulmonary embolism. He has stage IIIB chronic kidney disease with eGFR ~ 30 ml/min/1.73m2. He denies any shortness of breath. The pulmonary embolism is on contralateral side as his pain and is unrelated. He denies any shortness of breath today or recently. He is completely asymptomatic from his pulmonary embolism. He had lower extremity ultrasound which has been negative.     We are in a little bit difficult situation due to his marginal renal function with stage IIIB chronic kidney disease bordering on stage IV and his recurrent falls. He had been on anticoagulation for his congestive heart failure and atrial fibrillation but this was discontinued after his recurrent falls.     He now has recently diagnosed pulmonary embolism and this is a risk factor for recurrent pulmonary embolism which can be fatal. We would favor long term/ indefinite anticoagulation for him at this time given unprovoked pulmonary embolism and other atrial fibrillation/ congestive heart failure.     I extensively reviewed anticoagulation including injectables like unfractionated  heparin, low molecular heparins, oral vitamin k antagonist coumadin and newer oral anticoagulants. I explained them that these medication do not change thickness of blood which would literally imply the blood viscosity change. All of these interfere with the coagulation pathway and it takes longer to form clot. I reviewed coumadin at great length. One of the challenges with this medication is number of drug drug and food drug interactions. These lead to higher peaks and lower troughs with risk for bleeding and clotting.       I reviewed newer oral agents referred as direct anticoagulants - direct factor Xa and thrombin inhibitors which require less monitoring, have fewer drug interactions and possibly could be more effective. These are expensive as compared to coumadin and have shorter track record for safety and efficacy. I reviewed that both bleeding and coumadin failures arise from difficulty in keeping coumadin in therapeutic rage. INR on coumadin can keep fluctuating and so patients can be subtherapeutic and at risk for recurrent clot or be supra therapeutic with risk of increased bleeding. In the clinical trial - these oral direct anticoagulants appeared to be safer than coumadin. We do not have antagonist to these direct anticoagulants and would have to support patients through the acute bleeding process for the initial 12-24 hrs of activity of these agents.     Apixiban (Eliquis) can still be used in patients with stage IIIB/stage IV chronic kidney disease.  No increased incidence of DVT/ pulmonary embolism was noted with apixiban as compared to coumadin. Since he had asymptomatic pulmonary embolism that was diagnosed incidentally - we do not know on chronicity, it would be safer to skip the loading dose of apixiban.     Anticoagulants like coumadin do not dissolve clots. In fact it merely prevents further extension of the clot. Bodies own thrombolytic system does attempt to dissolve the clot. Since lower  extremities have low flow system unlike the lungs, the clots often persist. Thrombolytics are available and can resolve clots but they carry a huge risk of bleeding. They are used in the setting of saddle thrombus or other hemodynamically significant thrombus. These agents are more commonly used in the setting of arterial thrombosis as in acute MI, acute CVA or gangrene as the benefits of revascularization outweigh the risks of bleeding.       I reviewed that I would always be available as needed when the anticoagulation would have to be held for procedure.     RECOMMENDATIONS:  - Agree with IV heparin as current  - Switch to apixiban at the time of discharge - 5 mg po q12 h  - If we have problems with bleeding; we would have to place an IVC filter and discontinue anticoagulation  - Management of congestive heart failure exacerbation as per primary team.    I have reviewed his care with his son Isaac and hospitalist team.     Over 90 min spent on day of visit including review of tests, obtaining/reviewing separately obtained history/physical exam, counseling patient, ordering medications/tests/procedures, communicating with PCP/consultants, and documenting in electronic medical record.    Bandar Mercer  Hematologist and Medical Oncologist  [facility]       Bandar Mercer MD, MD    Code Status    Full Code    Reason for Consult   Reason for consult: I was asked by Dr. Castillo to evaluate this patient for pulmonary embolism.    Primary Care Physician   Mumtaz Hernandez    Chief Complaint   Pain in left lower rib cage posteriorly    History is obtained from the patient    History of Present Illness   Lico Woo is a 89 year old male who presents with pain on left side as above.     Will notes that he has a very sharp pain involving an area the size of a quarter. Pain has been on left side in lower ribs posteriorly.     Past Medical History   I have reviewed this patient's medical history and updated it with  pertinent information if needed.   Past Medical History:   Diagnosis Date     Aortic regurgitation     mild-moderate per 10/2018 Echo     Aortic stenosis     mild per 10/2018 Echo     Balance problem     few years     Cardiomyopathy (H) 10/2018    [place]; Echo 10/2018, EF 25%, down from 50% 12/2017, per notes, pt declines further work up to find etiology of drop in EF; echo 4/22 ef 25%, pulm htn, rv overload     Chronic atrial fibrillation (H) 2018    cardioversion 4/19     Chronic systolic CHF (congestive heart failure) (H)      CKD (chronic kidney disease) stage 3, GFR 30-59 ml/min (H)      Essential hypertension      Frequent falls      Gross hematuria 04/2019    cysto, ct neg     History of gout     none for years     Hypothyroidism      IPF (idiopathic pulmonary fibrosis) (H) 06/2022    ct confirmed     MGUS (monoclonal gammopathy of unknown significance) 07/2020    found during eval of falls and neuropathy.  Per curbside consult from [facility] to repeat labs every 6 months: CBC, BMP, SPEP, IgG and kappa-lambda free light chain     Mitral regurgitation     moderate per 10/2018 Echo     Neuropathy 1990    both lower legs     Pacemaker 2018     Prostate cancer (H) 2008    xrt, fine since     Pulmonary hypertension (H)      Tricuspid regurgitation     mioderate per 10/2018 Echo       Past Surgical History   I have reviewed this patient's surgical history and updated it with pertinent information if needed.  Past Surgical History:   Procedure Laterality Date     bilateral hip replacement  2017    done 6 months apart     IMPLANT PACEMAKER  11/12/2018    CRT-P     lip surgery for skin cancer         Prior to Admission Medications   Prior to Admission Medications   Prescriptions Last Dose Informant Patient Reported? Taking?   Niacin (VITAMIN B-3 OR) 11/5/2022 at afternoon Self Yes Yes   Sig: Take 500 mg by mouth daily   Pyridoxine HCl (VITAMIN B6 PO) 11/5/2022 at afternoon Self Yes Yes   Sig: Take 150 mg by mouth daily  "  acetaminophen (TYLENOL) 500 MG tablet 11/5/2022 at Unknown time Self Yes Yes   Sig: Take 500-1,000 mg by mouth every 6 hours as needed for mild pain   allopurinol (ZYLOPRIM) 300 MG tablet 11/5/2022 at afternoon Self No Yes   Sig: Take 1 tablet (300 mg) by mouth daily   alpha-lipoic acid 600 MG capsule 11/5/2022 at afternoon Self Yes Yes   Sig: Take 600 mg by mouth daily   amoxicillin (AMOXIL) 500 MG capsule Unknown at PRN Self No Yes   Sig: TAKE 4 CAPSULES BY MOUTH 1  HOUR PRIOR TO DENTAL WORK   aspirin (ASA) 81 MG EC tablet 11/5/2022 at afternoon Self No Yes   Sig: Take 1 tablet (81 mg) by mouth daily   carvedilol (COREG) 25 MG tablet 11/5/2022 at X1 dose Self No Yes   Sig: Take 1 tablet (25 mg) by mouth 2 times daily (with meals)   furosemide (LASIX) 20 MG tablet 11/5/2022 at X1 dose Self No Yes   Sig: TAKE 2 TABLETS BY MOUTH IN  THE MORNING AND 1 TABLET 6  HOURS LATER   Patient taking differently: Take 20 mg by mouth 2 times daily Patient omits doses if planning on an \"active\" day.   levothyroxine (SYNTHROID/LEVOTHROID) 112 MCG tablet 11/5/2022 at afternoon Self No Yes   Sig: Take 1 tablet (112 mcg) by mouth daily   losartan (COZAAR) 100 MG tablet 11/5/2022 at afternoon Self No Yes   Sig: Take 0.5 tablets (50 mg) by mouth daily Please call for an appointment for further refills. [phone]   multivitamin (CENTRUM SILVER) tablet 11/5/2022 at afternoon Self Yes Yes   Sig: Take 1 tablet by mouth daily   naproxen sodium (ANAPROX) 220 MG tablet 11/5/2022 at afternoon Self Yes Yes   Sig: Take 220 mg by mouth daily   potassium chloride ER (KLOR-CON M) 10 MEQ CR tablet 11/5/2022 at X1 dose Self No Yes   Sig: TAKE 1 TABLET BY MOUTH  TWICE DAILY   vitamin B-12 (CYANOCOBALAMIN) 2500 MCG sublingual tablet 11/5/2022 at afternoon Self Yes Yes   Sig: Take 2,500 mcg by mouth daily   vitamin D3 (CHOLECALCIFEROL) 50 mcg (2000 units) tablet 11/5/2022 at afternoon Self Yes Yes   Sig: Take 50 mcg by mouth daily    "   Facility-Administered Medications: None     Allergies   No Known Allergies    Social History   I have reviewed this patient's social history and updated it with pertinent information if needed. Lico Woo  reports that he has never smoked. He has never used smokeless tobacco. He reports current alcohol use. He reports that he does not use drugs.    Family History   I have reviewed this patient's family history and updated it with pertinent information if needed.   Family History   Problem Relation Age of Onset     Myocardial Infarction Mother      Cerebrovascular Disease Father        Review of Systems   The 10 point Review of Systems is negative other than noted in the HPI or here.     Physical Exam   Temp: 97.5  F (36.4  C) Temp src: Oral BP: 101/66 Pulse: 72   Resp: 16 SpO2: 98 % O2 Device: None (Room air)    Vital Signs with Ranges  Temp:  [97.5  F (36.4  C)-97.7  F (36.5  C)] 97.5  F (36.4  C)  Pulse:  [72-77] 72  Resp:  [16-18] 16  BP: (101-146)/(66-96) 101/66  SpO2:  [91 %-98 %] 98 %  156 lbs 4.8 oz      Data   Recent Labs   Lab Test 11/06/22 0725 11/05/22 1915 09/30/22 1411 05/13/22  1359 04/01/22  1300    138 137 140 138   POTASSIUM 4.5 4.8 4.8 4.2 4.3   CHLORIDE 110* 111* 106 106 107   CO2 24 25 25 31 28   ANIONGAP 7 2* 6 3 3   BUN 53* 56* 46* 51* 45*   CR 1.88* 1.97* 1.48* 1.73* 2.19*   * 123* 95 98 131*   MIGUEL ÁNGEL 9.2 9.2 9.2 9.0 9.4     No results for input(s): MAG, PHOS in the last 69212 hours.  Recent Labs   Lab Test 11/06/22 0725 11/05/22 1915 09/30/22 1411 05/13/22  1359 04/01/22  1300 12/06/21  1432 11/13/20  1434 07/24/20  1544   WBC 5.1 4.5 4.6 5.0  --  6.3  --  5.1   HGB 12.6* 11.7* 12.0* 11.5* 12.3* 12.6*   < > 11.9*   * 106* 123* 174  --  173  --  153   * 108* 107* 110*  --  105*  --  105*   NEUTROPHIL  --  74  --  65  --  72  --  71.1    < > = values in this interval not displayed.     Recent Labs   Lab Test 11/06/22  0725 05/13/22  1359 04/01/22  1300    BILITOTAL 0.9 0.8 0.6   ALKPHOS 135 145 190*   ALT 31 28 27   AST 35 32 29   ALBUMIN 3.5 3.1* 2.8*     TSH   Date Value Ref Range Status   09/30/2022 1.12 0.40 - 4.00 mU/L Final   04/01/2022 2.30 0.40 - 4.00 mU/L Final   05/25/2021 1.10 0.40 - 4.00 mU/L Final   03/05/2021 0.14 (L) 0.40 - 4.00 mU/L Final   12/07/2020 0.62 0.40 - 4.00 mU/L Final     No results for input(s): CEA in the last 79086 hours.  Results for orders placed or performed during the hospital encounter of 11/05/22   Abd/pelvis CT no contrast - Stone Protocol    Narrative    EXAM: CT ABDOMEN PELVIS W/O CONTRAST  LOCATION: [facility]  DATE/TIME: 11/5/2022 11:15 PM    INDICATION: left flank pain  COMPARISON: 11/05/2022 chest CT  TECHNIQUE: CT scan of the abdomen and pelvis was performed without IV contrast. Multiplanar reformats were obtained. Dose reduction techniques were used.  CONTRAST: None.    FINDINGS:   LOWER CHEST: Cardiomegaly. Partially visualized pacemaker leads. Bibasilar fibrotic changes with traction bronchiectasis. Left-sided asymmetric gynecomastia. See chest CT report for details.    HEPATOBILIARY: Cholelithiasis. Small volume of perihepatic ascites.    PANCREAS: Unremarkable    SPLEEN: Small volume of perisplenic ascites    ADRENAL GLANDS: Unremarkable    KIDNEYS/BLADDER: No hydronephrosis. No renal stones. Distal ureters and bladder are not well visualized.    BOWEL: Diverticulosis without diverticulitis.    LYMPH NODES: Scattered subcentimeter retroperitoneal nodes.    VASCULATURE: No abdominal aortic aneurysm    PELVIC ORGANS: Moderate volume of free pelvic fluid. Assessment of the pelvis is limited by streak artifact    MUSCULOSKELETAL: Bilateral hip arthroplasties. Chronic S1 deformity. Multilevel spondylosis.      Impression    IMPRESSION:   1.  Cholelithiasis.  2.   Abdominal and pelvic ascites.  3.  No hydronephrosis. Limited evaluation of the distal ureters and bladder.  4.  Additional findings as  above.     CT Chest Pulmonary Embolism w Contrast    Narrative    EXAM: CT CHEST PULMONARY EMBOLISM W CONTRAST  LOCATION: [facility]  DATE/TIME: 11/5/2022 11:16 PM    INDICATION: Pain. Elevated D-dimer.  COMPARISON: CT chest without IV contrast 6/24/2022.  TECHNIQUE: CT chest pulmonary angiogram during arterial phase injection of IV contrast. Multiplanar reformats and MIP reconstructions were performed. Dose reduction techniques were used.   CONTRAST: 59 mL Isovue-370 IV.    FINDINGS:  ANGIOGRAM CHEST: Nonocclusive small right lower lobe pulmonary emboli (images 136-149, series 5). Atherosclerotic thoracic aorta. Aneurysmal ascending segment measures 4.6 x 5.0 cm (image 116, series 5), unchanged. Evidence of right heart strain pattern.    RV/LV RATIO: 1.52.    LUNGS AND PLEURA: Mild venous hypertension. Diffuse thickening of the bronchi. Dependent atelectasis bilaterally. A few foci of subpleural fibrosis in the lower lungs. No pleural effusion on either side.    MEDIASTINUM/AXILLAE: Cardiac enlargement. Dense coronary artery calcifications. No pericardial effusion. Pacer wires in the heart. No suspicious adenopathy.    CORONARY ARTERY CALCIFICATION: Severe.    UPPER ABDOMEN: Dependent gallstones. Vascular calcifications.    MUSCULOSKELETAL: Demineralization of the visualized bones. Degenerative changes both shoulders and the spine. Left chest pacer.      Impression    IMPRESSION:  1.  Nonocclusive small right lower lobe pulmonary emboli. Right heart strain pattern.    2.  Cardiac enlargement. Dense coronary artery calcifications. Left chest pacer, leads in the heart. No pericardial effusion. Mild venous hypertension.    3.  Diffuse thickening of the bronchi. A few foci of subpleural fibrosis in the lower lungs. Dependent atelectasis bilaterally. No suspicious adenopathy or pleural effusion on either side.    4.  Dependent gallstones. Lobulated contour of the liver raises the possibility of  underlying fibrosis. Small amount of ascites.    5.  Demineralization of the visualized bones. Degenerative changes both shoulders and the spine.    6.  Findings discussed with the referring physician, Dr. Weller, immediately following the scan at 2342 hours.    NOTE: ABNORMAL REPORT    THE DICTATION ABOVE DESCRIBES AN ABNORMALITY FOR WHICH FOLLOW-UP IS NEEDED.    US Lower Extremity Venous Duplex Bilateral    Narrative    EXAM: US LOWER EXTREMITY VENOUS DUPLEX BILATERAL  LOCATION: [facility]  DATE/TIME: 2022 2:57 AM    INDICATION: Pulmonary embolus. Bilateral leg swelling.  COMPARISON: None.  TECHNIQUE: Venous Duplex ultrasound of bilateral lower extremities with and without compression, augmentation and duplex. Color flow and spectral Doppler with waveform analysis performed.    FINDINGS: Exam includes the common femoral, femoral, popliteal veins as well as segmentally visualized deep calf veins and greater saphenous vein.     RIGHT: No deep vein thrombosis. No superficial thrombophlebitis. No popliteal cyst.    LEFT: No deep vein thrombosis. No superficial thrombophlebitis. No popliteal cyst.      Impression    IMPRESSION:  1.  No deep venous thrombosis in the bilateral lower extremities.   Echocardiogram Complete     Value    LVEF  20-25%    Narrative    [id number]  [id number]  [id number]  2010^JOHN^CLAUDIO^ENID     [facility]  Echocardiography Laboratory  [address] [place] [address]     Name: KYLER THOMAS  MRN: [id number]  : 1933  Study Date: 2022 02:07 PM  Age: 89 yrs  Gender: Male  Patient Location: [facility]  Reason For Study: Syncope  Ordering Physician: CLAUDIO LOPEZ  Referring Physician: Mumtaz Hernandez  Performed By: Demetrio Lorenzo RDCS     BSA: 0.21 m2  Height: 66 in  Weight: 1 lb  HR: 80  BP: 139/93 mmHg  ______________________________________________________________________________  Procedure  Complete Portable Echo Adult.  Optison (NDC #[id number]) given intravenously.  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  There is evidence of severe pulmonary hypertension  The ascending aorta is severely dilated at 4.8 CM.  Compared to the prior study from 4/2022, thee is no significant change     ______________________________________________________________________________  Left Ventricle  Cardiomyopathy, dilated congestive. The visual ejection fraction is 20-25%.  There is severe global hypokinesia of the left ventricle.     Right Ventricle  The right ventricle is severely dilated. Global hypokinesia of right  ventricle.     Atria  The left atrium is severely dilated.     Mitral Valve  The mitral valve leaflets are moderately thickened. There is mild to moderate  (1-2+) mitral regurgitation.     Tricuspid Valve  Pacemaker seen. The right ventricular systolic pressure is approximated at  58.6 mmHg plus the right atrial pressure. Right ventricular systolic pressure  is elevated, consistent with severe pulmonary hypertension. There is moderate  to mod-severe (2-3+) tricuspid regurgitation.     Aortic Valve  A bicuspid aortic valve cannot be excluded. There is mild (1+) aortic  regurgitation. The mean AoV pressure gradient is 10.0 mmHg.     Pulmonic Valve  There is moderate (2+) pulmonic valvular regurgitation.     Vessels  Mild aortic root dilatation. The ascending aorta is Severely dilated.     ______________________________________________________________________________  MMode/2D Measurements & Calculations  IVSd: 1.2 cm  LVIDd: 5.7 cm  LVIDs: 5.5 cm  LVPWd: 1.1 cm  FS: 4.9 %  LV mass(C)d: 272.4 grams  LV mass(C)dI: 1295 grams/m2  Ao root diam: 4.1 cm  LA dimension: 5.2 cm  asc Aorta Diam: 4.8  cm  LA/Ao: 1.3  LVOT diam: 2.3 cm  LVOT area: 4.2 cm2     LA Volume (BP): 95.6 ml  LA Volume Index (BP): 455.2 ml/m2  RWT: 0.38     Doppler Measurements & Calculations  MV E max carlos: 66.0 cm/sec  MV A max carlos: 56.6 cm/sec  MV E/A: 1.2  MV dec time: 0.17 sec  Ao V2 max: 211.0 cm/sec  Ao max P.0 mmHg  Ao V2 mean: 145.0 cm/sec  Ao mean PG: 10.0 mmHg  Ao V2 VTI: 41.1 cm  LAUREN(I,D): 1.3 cm2  LAUREN(V,D): 1.3 cm2  LV V1 max P.7 mmHg  LV V1 max: 64.4 cm/sec  LV V1 VTI: 12.9 cm  SV(LVOT): 53.6 ml  SI(LVOT): 254.7 ml/m2     PA acc time: 0.08 sec  TR max carlos: 382.7 cm/sec  TR max P.6 mmHg  AV Carlos Ratio (DI): 0.31  LAUREN Index (cm2/m2): 6.2     ______________________________________________________________________________  Report approved by: Jesus Barrios 2022 02:41 PM

## 2022-11-07 NOTE — PLAN OF CARE
Goal Outcome Evaluation:         Orientation/Cognitive: WDL  Observation Goals (Met/ Not Met): Not met  Mobility Level/Assist Equipment: Assist of 1 with cane/gait belt  Fall Risk (Y/N): Yes  Behavior Concerns: None  Pain Management: Pt denied pain  Tele/VS/O2: Tele 100% AV Paced; AVSS; RA  ABNL Lab/BG: Crr 1.88; INR 1.21  Diet: Regular with 2,000 ml fluid restriction  Bowel/Bladder: Voiding frequently post Lasix in large amounts; BM x 1 overnight  Skin Concerns: None  Drains/Devices: External male cath secondary to frequency  Tests/Procedures for next shift: Monitor labs  Anticipated DC date & active delays: Possible discharge to home today.  Patient Stated Goal for Today: None given

## 2022-11-07 NOTE — DISCHARGE SUMMARY
[facility]  Hospitalist Discharge Summary      Date of Admission:  11/5/2022  Date of Discharge:  11/7/2022 12:38 PM  Discharging Provider: Eula Cotto MD  Discharge Service: Hospitalist Service    Discharge Diagnoses And hospital course      Lico Woo is an 89 year old male with chronic systolic heart failure (EF of 25 to 30%), severe pulmonary HTN, chronic atrial fibrillation (not recently on anticoagulation), s/p PPM, CKD stage III, HTN, hypothyroidism, idiopathic pulmonary fibrosis who presented on 11/5/2022 with left flank pain which was ultimately felt to be musculoskeletal.  He was incidentally found to have nonocclusive small right PE for which he was started on Eliquis 5 mg twice daily after extensive discussion.  He was also found to have acute CHF exacerbation.  He had been using less dose of Lasix than prescribed.  He diuresed well with IV Lasix.  He was discharged home on 11/7/2022.      Non-occlusive small Right lower lobe pulmonary emboli   - CT PE done for evaluation of left flank pain - noted to have small nonocclusive right PE   -He was hemodynamically stable, not hypoxic and had no right-sided chest pain.    -Used to be on Eliquis 2.5 mg twice daily for A. fib which was discontinued a month ago due to increased risk of falls.    -Extensive discussion was carried out, with patient, heme-onc, patient's son who is a surgeon, regarding indication, risks and benefits.  He was ultimately started on Eliquis 5 mg twice daily.  This dose can be reduced to 2.5 mg twice daily after 6 months  -If patient develops significant bleeding on Eliquis, IVC filter would need to be considered.   -Lower extremity ultrasound was negative for DVT       Left upper back/flank pain-likely musculoskeletal  -Complaint of left flank/back pain that woke him up from sleep.  CT scan did not show any kidney stones.  He did not develop any rash to indicate shingles.  This seems to be a  musculoskeletal pain and was quite localized.    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   -proBNP 40,955 on admission, up from 13,000   - Weight 156lb (was 149 last clinic visit 9/30)  - CT showed abdominal and pelvic ascites. Crackles on exam though difficult to interpret with pulmonary fibrosis.   PTA: furosemide 40mg qam and 20mg qpm though was taking 20 mg daily and skipping on days when he goes out.    -Diuresed with IV Lasix in hospital.  Down by 4.3 L at discharge.  Weight 150 pounds at discharge  -Echo 11/6/2022 showed severely dilated cardiomyopathy with EF 20-25%, severely dilated RV with global hypokinesis, moderately severe tricuspid regurgitation, severe pulmonary hypertension, severely dilated ascending aorta at 4.8 cm, no significant change from echo 4/2022  -Importance of taking Lasix as prescribed-40 mg in a.m. and 20 mg in p.m. asked with patient  -Additional day of hospital stay was offered for diuresis, patient declined.      Chronic permanent atrial fibrillation  S/p biventricular pacemaker implantation   -Used to be on amiodarone and Eliquis.  Amiodarone was discontinued due to diagnosis of pulmonary fibrosis and Eliquis was discontinued a month ago due to concern of bleed due to fall.  He was placed on aspirin   -Rates were controlled in hospital   -Eliquis was restarted at 5 mg twice daily dose for pulmonary embolism        CKD stage III  Creatinine is 1.97 on admission, improved to 1.71 with diuresis. Baseline creatinine has been highly variable over the past year  ranging from 1.3-2.2.        Hypertension  PTA  medications include carvedilol and losartan 50mg, continue     Frequent Falls   Pt reports multiple falls over the recent past.   - PT consult, recommending home vs outpatient PT at discharge .  Patient elected to go home.     Hypothyroidism  PTA: Levothyroxine, continue       Chronic Anemia  Hgb 11.7 on admission   - Stable, monitor      Subacute thrombocytopenia   Platelets are 106 on admission, 119 on recheck. In September his platelets were 123 and back in May they were wnl at 174. Decline perhaps due to hepatic dysfunction there is noted hepatic fibrosis on CT scan?    LFT unremarkable     Pulmonary fibrosis  -Stable.  Not on oxygen        Follow-ups Needed After Discharge   Follow-up Appointments     Follow-up and recommended labs and tests       Follow up with primary care provider, Mumtaz Hernandez, within 7 days for   hospital follow- up.  No follow up labs or test are needed.         {Additional follow-up instructions/to-do's for PCP    :    Unresulted Labs Ordered in the Past 30 Days of this Admission     No orders found from 10/6/2022 to 11/6/2022.          Discharge Disposition   Discharged to home  Condition at discharge: Stable        Consultations This Hospital Stay   PHARMACY IP CONSULT  PHARMACY IP CONSULT  PHYSICAL THERAPY ADULT IP CONSULT  PHARMACY IP CONSULT  PHARMACY IP CONSULT  PHARMACY IP CONSULT  HEMATOLOGY & ONCOLOGY IP CONSULT    Code Status   Full Code    Time Spent on this Encounter   I, Eula Cotto MD, personally saw the patient today and spent greater than 30 minutes discharging this patient.       Eula Cotto MD  [facility]  [address] [place] [id number]  Phone: [phone]  ______________________________________________________________________    Physical Exam   Vital Signs: Temp: 97.3  F (36.3  C) Temp src: Oral BP: 123/82 Pulse: 81   Resp: 16 SpO2: 94 % O2 Device: None (Room air)    Weight: 150 lbs 0  oz    Constitutional-patient is awake and alert, resting in bed, in no acute distress  Cardiovascular-regular rate and rhythm, no murmurs, 1+ edema  Pulmonary-bibasilar crackles, no wheezing or rhonchi   GI-abdomen is soft, nontender, nondistended, no hepatosplenomegaly or masses  Integumentary-skin is warm and dry, no rashes or ulcers  Neurological-patient is awake, alert and oriented x3.  Moving all 4 extremities, normal speech, no focal deficits       Primary Care Physician   Mumtaz Hernandez    Discharge Orders      Reason for your hospital stay    Left flank pain, small pulmonary embolism, heart failure     Follow-up and recommended labs and tests     Follow up with primary care provider, Mumtaz Hernandez, within 7 days for hospital follow- up.  No follow up labs or test are needed.     Activity    Your activity upon discharge: activity as tolerated     Diet    Follow this diet upon discharge: 2g salt. Fluid restriction of 2000 ml per day       Significant Results and Procedures   Results for orders placed or performed during the hospital encounter of 11/05/22   Abd/pelvis CT no contrast - Stone Protocol    Narrative    EXAM: CT ABDOMEN PELVIS W/O CONTRAST  LOCATION: [facility]  DATE/TIME: 11/5/2022 11:15 PM    INDICATION: left flank pain  COMPARISON: 11/05/2022 chest CT  TECHNIQUE: CT scan of the abdomen and pelvis was performed without IV contrast. Multiplanar reformats were obtained. Dose reduction techniques were used.  CONTRAST: None.    FINDINGS:   LOWER CHEST: Cardiomegaly. Partially visualized pacemaker leads. Bibasilar fibrotic changes with traction bronchiectasis. Left-sided asymmetric gynecomastia. See chest CT report for details.    HEPATOBILIARY: Cholelithiasis. Small volume of perihepatic ascites.    PANCREAS: Unremarkable    SPLEEN: Small volume of perisplenic ascites    ADRENAL GLANDS: Unremarkable    KIDNEYS/BLADDER: No hydronephrosis. No renal stones. Distal ureters and  bladder are not well visualized.    BOWEL: Diverticulosis without diverticulitis.    LYMPH NODES: Scattered subcentimeter retroperitoneal nodes.    VASCULATURE: No abdominal aortic aneurysm    PELVIC ORGANS: Moderate volume of free pelvic fluid. Assessment of the pelvis is limited by streak artifact    MUSCULOSKELETAL: Bilateral hip arthroplasties. Chronic S1 deformity. Multilevel spondylosis.      Impression    IMPRESSION:   1.  Cholelithiasis.  2.   Abdominal and pelvic ascites.  3.  No hydronephrosis. Limited evaluation of the distal ureters and bladder.  4.  Additional findings as above.     CT Chest Pulmonary Embolism w Contrast    Narrative    EXAM: CT CHEST PULMONARY EMBOLISM W CONTRAST  LOCATION: [facility]  DATE/TIME: 11/5/2022 11:16 PM    INDICATION: Pain. Elevated D-dimer.  COMPARISON: CT chest without IV contrast 6/24/2022.  TECHNIQUE: CT chest pulmonary angiogram during arterial phase injection of IV contrast. Multiplanar reformats and MIP reconstructions were performed. Dose reduction techniques were used.   CONTRAST: 59 mL Isovue-370 IV.    FINDINGS:  ANGIOGRAM CHEST: Nonocclusive small right lower lobe pulmonary emboli (images 136-149, series 5). Atherosclerotic thoracic aorta. Aneurysmal ascending segment measures 4.6 x 5.0 cm (image 116, series 5), unchanged. Evidence of right heart strain pattern.    RV/LV RATIO: 1.52.    LUNGS AND PLEURA: Mild venous hypertension. Diffuse thickening of the bronchi. Dependent atelectasis bilaterally. A few foci of subpleural fibrosis in the lower lungs. No pleural effusion on either side.    MEDIASTINUM/AXILLAE: Cardiac enlargement. Dense coronary artery calcifications. No pericardial effusion. Pacer wires in the heart. No suspicious adenopathy.    CORONARY ARTERY CALCIFICATION: Severe.    UPPER ABDOMEN: Dependent gallstones. Vascular calcifications.    MUSCULOSKELETAL: Demineralization of the visualized bones. Degenerative changes both  shoulders and the spine. Left chest pacer.      Impression    IMPRESSION:  1.  Nonocclusive small right lower lobe pulmonary emboli. Right heart strain pattern.    2.  Cardiac enlargement. Dense coronary artery calcifications. Left chest pacer, leads in the heart. No pericardial effusion. Mild venous hypertension.    3.  Diffuse thickening of the bronchi. A few foci of subpleural fibrosis in the lower lungs. Dependent atelectasis bilaterally. No suspicious adenopathy or pleural effusion on either side.    4.  Dependent gallstones. Lobulated contour of the liver raises the possibility of underlying fibrosis. Small amount of ascites.    5.  Demineralization of the visualized bones. Degenerative changes both shoulders and the spine.    6.  Findings discussed with the referring physician, Dr. Weller, immediately following the scan at 2342 hours.    NOTE: ABNORMAL REPORT    THE DICTATION ABOVE DESCRIBES AN ABNORMALITY FOR WHICH FOLLOW-UP IS NEEDED.    US Lower Extremity Venous Duplex Bilateral    Narrative    EXAM: US LOWER EXTREMITY VENOUS DUPLEX BILATERAL  LOCATION: [facility]  DATE/TIME: 11/6/2022 2:57 AM    INDICATION: Pulmonary embolus. Bilateral leg swelling.  COMPARISON: None.  TECHNIQUE: Venous Duplex ultrasound of bilateral lower extremities with and without compression, augmentation and duplex. Color flow and spectral Doppler with waveform analysis performed.    FINDINGS: Exam includes the common femoral, femoral, popliteal veins as well as segmentally visualized deep calf veins and greater saphenous vein.     RIGHT: No deep vein thrombosis. No superficial thrombophlebitis. No popliteal cyst.    LEFT: No deep vein thrombosis. No superficial thrombophlebitis. No popliteal cyst.      Impression    IMPRESSION:  1.  No deep venous thrombosis in the bilateral lower extremities.   Echocardiogram Complete     Value    LVEF  20-25%    Narrative     [id number]  [facility]  [id number]  2010^JOHN^CLAUDIO^A     [facility]  Echocardiography Laboratory  [address] [place] [address]     Name: KYLER THOMAS  MRN: [id number]  : 1933  Study Date: 2022 02:07 PM  Age: 89 yrs  Gender: Male  Patient Location: [facility]  Reason For Study: Syncope  Ordering Physician: CLAUDIO LOPEZ  Referring Physician: Mumtaz Hernandez  Performed By: Demetrio Lorenzo RDCS     BSA: 0.21 m2  Height: 66 in  Weight: 1 lb  HR: 80  BP: 139/93 mmHg  ______________________________________________________________________________  Procedure  Complete Portable Echo Adult. Optison (NDC #9688-7202) given intravenously.  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  There is evidence of severe pulmonary hypertension  The ascending aorta is severely dilated at 4.8 CM.  Compared to the prior study from 2022, thee is no significant change     ______________________________________________________________________________  Left Ventricle  Cardiomyopathy, dilated congestive. The visual ejection fraction is 20-25%.  There is severe global hypokinesia of the left ventricle.     Right Ventricle  The right ventricle is severely dilated. Global hypokinesia of right  ventricle.     Atria  The left atrium is severely dilated.     Mitral Valve  The mitral valve leaflets are moderately thickened. There is mild to moderate  (1-2+) mitral regurgitation.     Tricuspid Valve  Pacemaker seen. The right ventricular systolic pressure is approximated at  58.6 mmHg plus the right atrial pressure. Right ventricular systolic pressure  is elevated, consistent with severe pulmonary hypertension. There is moderate  to mod-severe (2-3+)  tricuspid regurgitation.     Aortic Valve  A bicuspid aortic valve cannot be excluded. There is mild (1+) aortic  regurgitation. The mean AoV pressure gradient is 10.0 mmHg.     Pulmonic Valve  There is moderate (2+) pulmonic valvular regurgitation.     Vessels  Mild aortic root dilatation. The ascending aorta is Severely dilated.     ______________________________________________________________________________  MMode/2D Measurements & Calculations  IVSd: 1.2 cm  LVIDd: 5.7 cm  LVIDs: 5.5 cm  LVPWd: 1.1 cm  FS: 4.9 %  LV mass(C)d: 272.4 grams  LV mass(C)dI: 1295 grams/m2  Ao root diam: 4.1 cm  LA dimension: 5.2 cm  asc Aorta Diam: 4.8 cm  LA/Ao: 1.3  LVOT diam: 2.3 cm  LVOT area: 4.2 cm2     LA Volume (BP): 95.6 ml  LA Volume Index (BP): 455.2 ml/m2  RWT: 0.38     Doppler Measurements & Calculations  MV E max carlos: 66.0 cm/sec  MV A max carlos: 56.6 cm/sec  MV E/A: 1.2  MV dec time: 0.17 sec  Ao V2 max: 211.0 cm/sec  Ao max P.0 mmHg  Ao V2 mean: 145.0 cm/sec  Ao mean PG: 10.0 mmHg  Ao V2 VTI: 41.1 cm  LAUREN(I,D): 1.3 cm2  LAUREN(V,D): 1.3 cm2  LV V1 max P.7 mmHg  LV V1 max: 64.4 cm/sec  LV V1 VTI: 12.9 cm  SV(LVOT): 53.6 ml  SI(LVOT): 254.7 ml/m2     PA acc time: 0.08 sec  TR max carlos: 382.7 cm/sec  TR max P.6 mmHg  AV Carlos Ratio (DI): 0.31  LAUREN Index (cm2/m2): 6.2     ______________________________________________________________________________  Report approved by: Jesus Barrios 2022 02:41 PM               Discharge Medications   Discharge Medication List as of 2022 11:35 AM      START taking these medications    Details   apixaban ANTICOAGULANT (ELIQUIS) 5 MG tablet Take 1 tablet (5 mg) by mouth 2 times daily, Disp-60 tablet, R-0, E-Prescribe         CONTINUE these medications which have NOT CHANGED    Details   acetaminophen (TYLENOL) 500 MG tablet Take 500-1,000 mg by mouth every 6 hours as needed for mild pain, Historical      allopurinol (ZYLOPRIM) 300 MG tablet Take 1 tablet (300  mg) by mouth daily, Disp-90 tablet, R-3, E-Prescribe      alpha-lipoic acid 600 MG capsule Take 600 mg by mouth daily, Historical      amoxicillin (AMOXIL) 500 MG capsule TAKE 4 CAPSULES BY MOUTH 1  HOUR PRIOR TO DENTAL WORK, Disp-4 capsule, R-3, E-Prescribe      aspirin (ASA) 81 MG EC tablet Take 1 tablet (81 mg) by mouth daily, No Print Out      carvedilol (COREG) 25 MG tablet Take 1 tablet (25 mg) by mouth 2 times daily (with meals), Disp-180 tablet, R-2, E-Prescribe      furosemide (LASIX) 20 MG tablet TAKE 2 TABLETS BY MOUTH IN  THE MORNING AND 1 TABLET 6  HOURS LATER, Disp-90 tablet, R-11, E-PrescribeRequesting 1 year supply      levothyroxine (SYNTHROID/LEVOTHROID) 112 MCG tablet Take 1 tablet (112 mcg) by mouth daily, Disp-90 tablet, R-3, E-Prescribe      losartan (COZAAR) 100 MG tablet Take 0.5 tablets (50 mg) by mouth daily Please call for an appointment for further refills. [phone], Disp-45 tablet, R-3, E-Prescribe      multivitamin (CENTRUM SILVER) tablet Take 1 tablet by mouth daily, Historical      naproxen sodium (ANAPROX) 220 MG tablet Take 220 mg by mouth daily, Historical      Niacin (VITAMIN B-3 OR) Take 500 mg by mouth daily, Historical      potassium chloride ER (KLOR-CON M) 10 MEQ CR tablet TAKE 1 TABLET BY MOUTH  TWICE DAILY, Disp-180 tablet, R-2, E-PrescribeRequesting 1 year supply      Pyridoxine HCl (VITAMIN B6 PO) Take 150 mg by mouth daily, Historical      vitamin B-12 (CYANOCOBALAMIN) 2500 MCG sublingual tablet Take 2,500 mcg by mouth daily, Historical      vitamin D3 (CHOLECALCIFEROL) 50 mcg (2000 units) tablet Take 50 mcg by mouth daily, Historical           Allergies   No Known Allergies

## 2022-11-07 NOTE — PROGRESS NOTES
Observation goals  PRIOR TO DISCHARGE       Comments:   -diagnostic tests and consults completed and resulted: Not met     -vital signs normal or at patient baseline: Partially met, soft BP     -returns to baseline functional status: Partially met     -safe disposition plan has been identified: Met     Nurse to notify provider when observation goals have been met and patient is ready for discharge.

## 2022-11-07 NOTE — PROGRESS NOTES
Internal Medicine Teaching Service  Progress Note    Patient: Mildred Crouch Date of Service: 12/1/2021   YOB: 1984 Admission Date: 11/30/2021   MRN: [id number] Attending: Pilar Diaz MD       SIM Crouch is a 37 year old female who was admitted on 11/30/2021 for   Chief Complaint   Patient presents with   • Hypotension   • Syncope       Hospital Summary:   This patient is a 37 year old female with a PMHx significant for EtOH abuse, iron deficiency anemia, recurrent CVA, CKD, T2DM with neuropathy, hyperlipidemia, HTN, GERD, PFO, AFIB, who presented to the ED on 11/30/21 with an episode of syncope. While walking to the bathroom at home she felt dizzy and then states she fainted and lost consciousness. She has felt ill, dizzy, and weak for the past two days. She normally drinks 2 pints of alcohol a day, but over the last two days she was unable to drink her normal amount. Her last drink was at 1 am on 11/30/21. Currently has a headache, 7/10. She vomited once while in the ED and has some abdominal pain. Denies shortness of breath, chest pain, nausea, blood in her stool and urine, or numbness or weakness of extremities. She denies drug or tobacco use. She believes she is hallucinating in the ED and is going through alcohol withdrawal. In the ED she had /82 but vitals were stable otherwise. Exam did show some diffuse abdominal tenderness and mild edema of bilateral ankles with pain to palpation consistent with peripheral neuropathy. Labs showed Hgb 8.3, alcohol 145, potassium 2.9, AST//68. Patient was admitted under CIWA protocol and placed on maintenance fluids. She is being evaluated at this time for likely vasovagal syncope and for microcytic anemia. Started her on venofer. Stool sample came back positive for C.diff PCR and she was placed on 10 day course of vancomycin.         24 Hour Events:     No acute events occurred overnight. Patient is in some distress due to  Orientation/Cognitive: A&Ox4  Observation Goals (Met/ Not Met): Partially met  Mobility Level/Assist Equipment: SBA w/ cane  Fall Risk (Y/N): Y  Behavior Concerns: NA  Pain Management: L flank pain, declining intervention.   Tele/VS/O2: Soft BP BP, Tele: AV-paced.   ABNL Lab/BG: BNP:40,955 Hgb:12.6 Creat:1.88, D-dime:1.23, INR 1.21.   Diet: Regular w/ 2,000ml fluid restriction   Bowel/Bladder: Incontinent of B&B.   Skin Concerns: Scattered bruising   Drains/Devices: PIV SL.   Tests/Procedures for next shift: None  Anticipated DC date & active delays:possibly 11/07  Patient Stated Goal for Today: Rest.    alcohol withdrawal. She says she is still slightly dizzy and has some abdominal pain. She was having some increased anxiety this morning. States she is hallucinating again. She is talking to people she sees in the room. Still under [facility] protocol. No other complaints at this time.     OBJECTIVE     Scheduled Medications vancomycin, 125 mg, 4x Daily  magnesium oxide, 400 mg, Once  gabapentin, 600 mg, 3 times per day  sodium chloride (PF), 2 mL, 2 times per day  Potassium Standard Replacement Protocol, , See Admin Instructions  Magnesium Standard Replacement Protocol, , See Admin Instructions  Phosphorus Standard Replacement Protocol, , See Admin Instructions  folic acid, 1 mg, Daily  thiamine, 100 mg, Daily  metoPROLOL tartrate, 150 mg, BID  pantoprazole, 40 mg, BID  iron sucrose (VENOFER) IVPB, 200 mg, Daily  rivaroxaban, 20 mg, Daily with dinner      Continuous Infusions     PHYSICAL EXAM   Vital signs:    Visit Vitals  /80 (BP Location: RFA - Right forearm, Patient Position: Semi-Aguila's)   Pulse 88   Temp 97.6 °F (36.4 °C) (Oral)   Resp 16   Ht 5' 9\" (1.753 m)   Wt 108.9 kg (240 lb 1.3 oz)   LMP 03/06/2021 (LMP Unknown) Comment: irregular   SpO2 96%   BMI 35.45 kg/m²       Gen:   NAD, comfortable appearing female  HEENT:   anicteric, no conjunctival pallor, no nasal discharge  CVS:   RRR, systolic murmer S1/S2 normal, no JVD, mild bilateral edema of ankles. Tenderness to light tough and palpation of left and right foot  Pulm:   CTAB, no W/C/R, no retractions or increased work of breathing  GI:    +BS, soft, mild diffuse tenderness to palpation  MSK:    ROM normal throughout,   Skin:   No rashes, lesions, ecchymoses or jaundice  Neuro:  A/Ox3, no gross motor or sensory deficits, no facial droop or dysarthria  Psych:   Appropriate mood/affect    I&O'S / LABS / IMAGING     I/Os:      Intake/Output Summary (Last 24 hours) at 12/1/2021 1259  Last data filed at 12/1/2021 0428  Gross per 24 hour   Intake 1680 ml    Output 50 ml   Net 1630 ml       LAB RESULTS  Recent Labs   Lab 12/01/21  0338 11/30/21  0150   WBC 5.0 4.9   HCT 24.7* 27.2*   HGB 7.3* 8.3*    184     Recent Labs   Lab 12/01/21  0338 11/30/21  0203 11/30/21  0150 11/30/21  0150   SODIUM 137  --   --  140   POTASSIUM 3.1*  --   --  2.9*   CHLORIDE 101  --   --  100   CO2 27  --   --  26   GLUCOSE 144*  --   --  149*   BUN 7  --   --  7   CREATININE 0.77 1.00*   < > 0.72    < > = values in this interval not displayed.       IMAGING  XR Chest AP or PA    Result Date: 11/19/2021  Narrative: Exam: XR CHEST AP OR PA Indication: cp Comparison: August 16, 2021 Findings: Monitoring leads overlying the chest. The pulmonary vascularity is normal. The cardiomediastinal silhouette is unremarkable. There are no pleural effusions, focal consolidations, or pneumothoraces. No acute osseous findings.     Impression: Impression: No acute cardiopulmonary process.     CT ABDOMEN PELVIS W CONTRAST    Result Date: 11/30/2021  Narrative: EXAM: CT ABDOMEN PELVIS W CONTRAST CLINICAL INFORMATION: Abdominal pain. Fall. COMPARISON:  None. TECHNIQUE: Axial 3 mm images of the abdomen and pelvis were obtained after the intravenous administration of 100 mL of Omnipaque 300. Sagittal and coronal reformatted images were obtained. FINDINGS: LOWER CHEST The visualized lung bases are clear. No pleural effusion or pneumothorax. The heart is normal in size. No pericardial effusion. ABDOMEN / PELVIS Abdominal aorta:  Normal. Inferior vena cava:  Normal in caliber. Free fluid:  None. Liver:  Intact. Diffuse hypoattenuation with more severe hypoattenuation adjacent to the falciform ligament, compatible with hepatic steatosis and focal fatty infiltration. Gallbladder:  Intact. Pancreas:  Intact. Spleen:  Intact. Adrenals:  Intact. Kidneys:  Intact.  Bowel and mesentery:  Normal. Urinary bladder:  Intact. Other findings:  None. BONES/SOFT TISSUES No fracture.  No fracture or subluxation of the  lumbar spine on reformatted views. No acute soft tissue abnormality.     Impression: Impression: 1.  No acute traumatic injury. 2.  Hepatic steatosis.     CT HEAD WO CONTRAST, CT CERVICAL SPINE WO CONTRAST    Result Date: 11/30/2021  Narrative: EXAM: CT CERVICAL SPINE WO CONTRAST, CT HEAD WO CONTRAST CLINICAL INDICATION: Head and neck trauma. Syncope. Fall. TECHNIQUE: Axial CT images were obtained through the head and cervical spine without the administration of intravenous contrast. Sagittal and coronal reformations were performed by the technologist and submitted to the radiologist for review. COMPARISON: CT head on 6/30/2021. CT cervical spine on 1/18/2021. FINDINGS: Head: No evidence for intracranial hemorrhage or abnormal extra-axial fluid collection. There is no evidence of mass or mass effect and the ventricular system is midline, without evidence for hydrocephalus.  Size and configuration of the ventricles and sulci are considered within normal limits for age. No significant white matter abnormality. The gray-white matter differentiation pattern is preserved. There is no CT evidence of acute ischemic infarction. The basal cisterns are patent. Allowing for beam hardening artifact, no abnormalities identified in the posterior fossa or brainstem.  The calvarium is intact. The visualized paranasal sinuses and mastoid air cells are clear. Cervical spine: No fracture or subluxation. The disc space heights and vertebral body heights are preserved. Small anterior osteophyte formation at C4-5, unchanged. Normal anatomic alignment, including the craniocervical junction and atlantoaxial articulation. The facet joints articulate well with each other. No high-grade osseous spinal canal or neuroforaminal stenosis. Soft tissue windows show the prevertebral soft tissues are normal. The visualized lung apices are unremarkable.     Impression: IMPRESSION: 1.  No acute intracranial findings. 2.  No cervical spine fracture or  subluxation.       ASSESSMENT & PLAN     Mildred Crouch is a 37 year old female who was admitted on 11/30/2021 for vasovagal reflex syncope, chronic alcohol abuse currently in withdrawal, and diarrhea. She is currently stable. We plan to proceed as follows.     1. Syncope- Likely vasovagal reflex  - Patient is still reporting dizziness and feeling lightheaded  - Patient is on maintenance fluids  - Was able to obtain semifowlers and sitting BP for orthostatic vitals. Patient refused to stand. Semi-fowlers 140/66, sitting 140/83.  - currently on Tele for a total of 48 hrs  - Will continue to monitor patient status.     2. Chronic alcohol abuse- withdrawal  - Patients normal intake is 2pints/day. Alcohol level was 145 in the ED on 11/30/21  - Admitted under CIWA protocol. CIWA score currently 4.   - Increased Gabapentin to home therapeutic dose of 600mg.   - Patient was refusing resources for alcohol cessation on admission. She now wishes to receive resources as of 12/1/21. Will place consult for psych.     3. Diarrhea  - Patient had 2 more episodes of diarrhea since admission of 11/30/21.  - Stool sample sent for C.Diff culture. Results were positive for C. Diff PCR but negative for C. Diff EIA.   - Has a history of C.Diff infection diagnosed 10/10/21 but patient did not finish her antibiotics at that time.  - Patient was started on 10 day course of vancomycin  - will place order for probiotic     4. Transaminitis   - AST 68/ALT 45. Decreased from admission.  - Will continue to monitor    5. Chronic Microcytic anemia- Iron deficiency   - Hgb 7.3, down from 8.3 on admission. Patient consulted on possible need for blood transfusion if Hgb falls below 7. She is willing to receive transfusion if needed.   - Iron studies showed ferritin 22, Iron 27, %saturation 7  - Continue venofer  - Daily CBC  - Recommend patient have follow-up with GI outpatient.     6. Hypokalemia  - 2.9 on admission  - level currently at 3.1  -  magnesium level was ordered and was 1.5  - Patient currently on potassium and magnesium standard replacement protocol.   - Repeat CMP in the morning to monitor    Chronic medical conditions  #T2DM  #HTN  - PTA metoprolol  #A.Fib  - Hx of PFO and strokes  - continue xarelto      Nutrition  PO carb consistent    GI Prophylaxis:  Pantoprazole PO   DVT Prophylaxis:  Enoxaparin SQ DVT ppx, SCDs      Pain Control:Tylenol 650mg Q4h PRN    FEN: PO    Fluids: PO    Dispo: Inpatient                          Assessment and plan discussed with Pilar Diaz MD who agrees with the above. Please see addendum for additional information.    Signed:  Noemy Kennedy  Medical Student, MS3    12/01/21  12:59 PM

## 2022-11-09 NOTE — PROGRESS NOTES
Clinic Care Coordination Contact  [facility] [[email or web address]]/Voicemail       Clinical Data: Care Coordinator Outreach  Outreach attempted x 2.  Left message on patient's voicemail with call back information and requested return call.    Plan: Care Coordinator will do no further outreaches at this time.    SIMA Benavides  Connected Care Resource Center  [facility]     *Connected Care Resource Team does NOT follow patient ongoing. Referrals are identified based on internal discharge reports and the outreach is to ensure patient has an understanding of their discharge instructions.

## 2022-12-05 NOTE — TELEPHONE ENCOUNTER
Routing refill request to provider for review/approval because:  Failed protocol: needs confirmation/review  Danni LOPEZ RN  [facility]

## 2023-01-01 ENCOUNTER — ANCILLARY PROCEDURE (OUTPATIENT)
Dept: GENERAL RADIOLOGY | Facility: CLINIC | Age: 88
End: 2023-01-01
Attending: INTERNAL MEDICINE
Payer: MEDICARE

## 2023-01-01 ENCOUNTER — TRANSFERRED RECORDS (OUTPATIENT)
Dept: HEALTH INFORMATION MANAGEMENT | Facility: CLINIC | Age: 88
End: 2023-01-01
Payer: MEDICARE

## 2023-01-01 ENCOUNTER — TELEPHONE (OUTPATIENT)
Dept: FAMILY MEDICINE | Facility: CLINIC | Age: 88
End: 2023-01-01
Payer: MEDICARE

## 2023-01-01 ENCOUNTER — APPOINTMENT (OUTPATIENT)
Dept: CT IMAGING | Facility: CLINIC | Age: 88
End: 2023-01-01
Attending: EMERGENCY MEDICINE
Payer: MEDICARE

## 2023-01-01 ENCOUNTER — ANCILLARY PROCEDURE (OUTPATIENT)
Dept: CARDIOLOGY | Facility: CLINIC | Age: 88
End: 2023-01-01
Attending: INTERNAL MEDICINE
Payer: MEDICARE

## 2023-01-01 ENCOUNTER — HOSPITAL ENCOUNTER (EMERGENCY)
Facility: CLINIC | Age: 88
End: 2023-01-24
Attending: EMERGENCY MEDICINE | Admitting: EMERGENCY MEDICINE
Payer: MEDICARE

## 2023-01-01 ENCOUNTER — APPOINTMENT (OUTPATIENT)
Dept: GENERAL RADIOLOGY | Facility: CLINIC | Age: 88
End: 2023-01-01
Attending: EMERGENCY MEDICINE
Payer: MEDICARE

## 2023-01-01 ENCOUNTER — TELEPHONE (OUTPATIENT)
Dept: FAMILY MEDICINE | Facility: CLINIC | Age: 88
End: 2023-01-01

## 2023-01-01 ENCOUNTER — APPOINTMENT (OUTPATIENT)
Dept: ULTRASOUND IMAGING | Facility: CLINIC | Age: 88
End: 2023-01-01
Attending: EMERGENCY MEDICINE
Payer: MEDICARE

## 2023-01-01 ENCOUNTER — HOSPITAL ENCOUNTER (EMERGENCY)
Facility: CLINIC | Age: 88
Discharge: HOME | End: 2023-01-21
Attending: EMERGENCY MEDICINE | Admitting: EMERGENCY MEDICINE
Payer: MEDICARE

## 2023-01-01 ENCOUNTER — OFFICE VISIT (OUTPATIENT)
Dept: FAMILY MEDICINE | Facility: CLINIC | Age: 88
End: 2023-01-01
Payer: MEDICARE

## 2023-01-01 VITALS
DIASTOLIC BLOOD PRESSURE: 75 MMHG | BODY MASS INDEX: 23.78 KG/M2 | RESPIRATION RATE: 16 BRPM | TEMPERATURE: 96.9 F | HEIGHT: 66 IN | OXYGEN SATURATION: 97 % | HEART RATE: 83 BPM | SYSTOLIC BLOOD PRESSURE: 113 MMHG | WEIGHT: 148 LBS

## 2023-01-01 VITALS
WEIGHT: 151 LBS | HEIGHT: 67 IN | BODY MASS INDEX: 23.7 KG/M2 | DIASTOLIC BLOOD PRESSURE: 84 MMHG | RESPIRATION RATE: 20 BRPM | HEART RATE: 88 BPM | TEMPERATURE: 97.6 F | OXYGEN SATURATION: 95 % | SYSTOLIC BLOOD PRESSURE: 127 MMHG

## 2023-01-01 VITALS
DIASTOLIC BLOOD PRESSURE: 50 MMHG | OXYGEN SATURATION: 100 % | WEIGHT: 149.91 LBS | SYSTOLIC BLOOD PRESSURE: 104 MMHG | BODY MASS INDEX: 23.48 KG/M2

## 2023-01-01 DIAGNOSIS — Z95.0 CARDIAC PACEMAKER IN SITU: ICD-10-CM

## 2023-01-01 DIAGNOSIS — K92.2 GASTROINTESTINAL HEMORRHAGE, UNSPECIFIED GASTROINTESTINAL HEMORRHAGE TYPE: ICD-10-CM

## 2023-01-01 DIAGNOSIS — I26.99 ACUTE PULMONARY EMBOLISM, UNSPECIFIED PULMONARY EMBOLISM TYPE, UNSPECIFIED WHETHER ACUTE COR PULMONALE PRESENT (H): ICD-10-CM

## 2023-01-01 DIAGNOSIS — M54.50 ACUTE LEFT-SIDED LOW BACK PAIN WITHOUT SCIATICA: ICD-10-CM

## 2023-01-01 DIAGNOSIS — N18.30 STAGE 3 CHRONIC KIDNEY DISEASE, UNSPECIFIED WHETHER STAGE 3A OR 3B CKD (H): ICD-10-CM

## 2023-01-01 DIAGNOSIS — D69.6 THROMBOCYTOPENIA (H): ICD-10-CM

## 2023-01-01 DIAGNOSIS — M25.511 ACUTE PAIN OF RIGHT SHOULDER: ICD-10-CM

## 2023-01-01 DIAGNOSIS — I48.20 CHRONIC ATRIAL FIBRILLATION (H): ICD-10-CM

## 2023-01-01 DIAGNOSIS — I77.810 ASCENDING AORTA DILATATION (H): ICD-10-CM

## 2023-01-01 DIAGNOSIS — I50.21 ACUTE HFREF (HEART FAILURE WITH REDUCED EJECTION FRACTION) (H): Primary | ICD-10-CM

## 2023-01-01 DIAGNOSIS — E87.5 HYPERKALEMIA: ICD-10-CM

## 2023-01-01 DIAGNOSIS — I10 ESSENTIAL HYPERTENSION: ICD-10-CM

## 2023-01-01 DIAGNOSIS — I27.20 PULMONARY HYPERTENSION (H): ICD-10-CM

## 2023-01-01 DIAGNOSIS — I50.9 CONGESTIVE HEART FAILURE, UNSPECIFIED HF CHRONICITY, UNSPECIFIED HEART FAILURE TYPE (H): ICD-10-CM

## 2023-01-01 DIAGNOSIS — R04.0 EPISTAXIS: ICD-10-CM

## 2023-01-01 DIAGNOSIS — R29.6 FALLS FREQUENTLY: ICD-10-CM

## 2023-01-01 DIAGNOSIS — S82.035A CLOSED NONDISPLACED TRANSVERSE FRACTURE OF LEFT PATELLA, INITIAL ENCOUNTER: ICD-10-CM

## 2023-01-01 DIAGNOSIS — C61 PROSTATE CANCER (H): ICD-10-CM

## 2023-01-01 DIAGNOSIS — R57.8 HEMORRHAGIC SHOCK (H): ICD-10-CM

## 2023-01-01 DIAGNOSIS — M54.2 NECK PAIN: ICD-10-CM

## 2023-01-01 DIAGNOSIS — J84.112 IPF (IDIOPATHIC PULMONARY FIBROSIS) (H): ICD-10-CM

## 2023-01-01 LAB
ABO/RH(D): NORMAL
ALBUMIN SERPL BCG-MCNC: 1.5 G/DL (ref 3.5–5.2)
ALP SERPL-CCNC: 62 U/L (ref 40–129)
ALT SERPL W P-5'-P-CCNC: 20 U/L (ref 10–50)
ANION GAP SERPL CALCULATED.3IONS-SCNC: 11 MMOL/L (ref 7–15)
ANION GAP SERPL CALCULATED.3IONS-SCNC: 12 MMOL/L (ref 7–15)
ANION GAP SERPL CALCULATED.3IONS-SCNC: 9 MMOL/L (ref 7–15)
ANTIBODY SCREEN: NEGATIVE
APTT PPP: 32 SECONDS (ref 22–38)
APTT PPP: 75 SECONDS (ref 22–38)
AST SERPL W P-5'-P-CCNC: 41 U/L (ref 10–50)
BASOPHILS # BLD AUTO: 0 10E3/UL (ref 0–0.2)
BASOPHILS # BLD AUTO: 0 10E3/UL (ref 0–0.2)
BASOPHILS # BLD MANUAL: 0 10E3/UL (ref 0–0.2)
BASOPHILS NFR BLD AUTO: 0 %
BASOPHILS NFR BLD AUTO: 0 %
BASOPHILS NFR BLD MANUAL: 0 %
BILIRUB SERPL-MCNC: 0.2 MG/DL
BLD PROD TYP BPU: NORMAL
BLOOD COMPONENT TYPE: NORMAL
BUN SERPL-MCNC: 39.6 MG/DL (ref 8–23)
BUN SERPL-MCNC: 52.4 MG/DL (ref 8–23)
BUN SERPL-MCNC: 68.6 MG/DL (ref 8–23)
BURR CELLS BLD QL SMEAR: SLIGHT
CALCIUM SERPL-MCNC: 6.2 MG/DL (ref 8.8–10.2)
CALCIUM SERPL-MCNC: 9.3 MG/DL (ref 8.8–10.2)
CALCIUM SERPL-MCNC: 9.5 MG/DL (ref 8.8–10.2)
CHLORIDE SERPL-SCNC: 108 MMOL/L (ref 98–107)
CHLORIDE SERPL-SCNC: 109 MMOL/L (ref 98–107)
CHLORIDE SERPL-SCNC: 124 MMOL/L (ref 98–107)
CODING SYSTEM: NORMAL
CPB POCT: NO
CPB POCT: NO
CREAT SERPL-MCNC: 1.24 MG/DL (ref 0.67–1.17)
CREAT SERPL-MCNC: 1.7 MG/DL (ref 0.67–1.17)
CREAT SERPL-MCNC: 2.23 MG/DL (ref 0.67–1.17)
DEPRECATED HCO3 PLAS-SCNC: 11 MMOL/L (ref 22–29)
DEPRECATED HCO3 PLAS-SCNC: 23 MMOL/L (ref 22–29)
DEPRECATED HCO3 PLAS-SCNC: 25 MMOL/L (ref 22–29)
EOSINOPHIL # BLD AUTO: 0.1 10E3/UL (ref 0–0.7)
EOSINOPHIL # BLD AUTO: 0.2 10E3/UL (ref 0–0.7)
EOSINOPHIL # BLD MANUAL: 0 10E3/UL (ref 0–0.7)
EOSINOPHIL NFR BLD AUTO: 2 %
EOSINOPHIL NFR BLD AUTO: 3 %
EOSINOPHIL NFR BLD MANUAL: 0 %
ERYTHROCYTE [DISTWIDTH] IN BLOOD BY AUTOMATED COUNT: 14.7 % (ref 10–15)
ERYTHROCYTE [DISTWIDTH] IN BLOOD BY AUTOMATED COUNT: 15.4 % (ref 10–15)
ERYTHROCYTE [DISTWIDTH] IN BLOOD BY AUTOMATED COUNT: 16.7 % (ref 10–15)
FIBRINOGEN PPP-MCNC: 101 MG/DL (ref 170–490)
GFR SERPL CREATININE-BSD FRML MDRD: 27 ML/MIN/1.73M2
GFR SERPL CREATININE-BSD FRML MDRD: 38 ML/MIN/1.73M2
GFR SERPL CREATININE-BSD FRML MDRD: 56 ML/MIN/1.73M2
GLUCOSE SERPL-MCNC: 102 MG/DL (ref 70–99)
GLUCOSE SERPL-MCNC: 110 MG/DL (ref 70–99)
GLUCOSE SERPL-MCNC: 88 MG/DL (ref 70–99)
HCO3 BLDV-SCNC: 12 MMOL/L (ref 21–28)
HCO3 BLDV-SCNC: 20 MMOL/L (ref 21–28)
HCT VFR BLD AUTO: 20.4 % (ref 40–53)
HCT VFR BLD AUTO: 33 % (ref 40–53)
HCT VFR BLD AUTO: 34.5 % (ref 40–53)
HCT VFR BLD CALC: 23 % (ref 40–53)
HCT VFR BLD CALC: <10 % (ref 40–53)
HGB BLD-MCNC: 10 G/DL (ref 13.3–17.7)
HGB BLD-MCNC: 10.5 G/DL (ref 13.3–17.7)
HGB BLD-MCNC: 5.8 G/DL (ref 13.3–17.7)
HGB BLD-MCNC: 7.8 G/DL (ref 13.3–17.7)
HGB BLD-MCNC: ABNORMAL G/DL
IMM GRANULOCYTES # BLD: 0 10E3/UL
IMM GRANULOCYTES # BLD: 0 10E3/UL
IMM GRANULOCYTES NFR BLD: 0 %
IMM GRANULOCYTES NFR BLD: 0 %
INR PPP: 1.2 (ref 0.85–1.15)
INR PPP: 2.71 (ref 0.85–1.15)
ISSUE DATE AND TIME: NORMAL
LYMPHOCYTES # BLD AUTO: 1.1 10E3/UL (ref 0.8–5.3)
LYMPHOCYTES # BLD AUTO: 1.3 10E3/UL (ref 0.8–5.3)
LYMPHOCYTES # BLD MANUAL: 5.2 10E3/UL (ref 0.8–5.3)
LYMPHOCYTES NFR BLD AUTO: 23 %
LYMPHOCYTES NFR BLD AUTO: 23 %
LYMPHOCYTES NFR BLD MANUAL: 53 %
MCH RBC QN AUTO: 33.3 PG (ref 26.5–33)
MCH RBC QN AUTO: 33.8 PG (ref 26.5–33)
MCH RBC QN AUTO: 34.3 PG (ref 26.5–33)
MCHC RBC AUTO-ENTMCNC: 28.4 G/DL (ref 31.5–36.5)
MCHC RBC AUTO-ENTMCNC: 30.3 G/DL (ref 31.5–36.5)
MCHC RBC AUTO-ENTMCNC: 30.4 G/DL (ref 31.5–36.5)
MCV RBC AUTO: 112 FL (ref 78–100)
MCV RBC AUTO: 113 FL (ref 78–100)
MCV RBC AUTO: 117 FL (ref 78–100)
MONOCYTES # BLD AUTO: 0.4 10E3/UL (ref 0–1.3)
MONOCYTES # BLD AUTO: 0.4 10E3/UL (ref 0–1.3)
MONOCYTES # BLD MANUAL: 1 10E3/UL (ref 0–1.3)
MONOCYTES NFR BLD AUTO: 7 %
MONOCYTES NFR BLD AUTO: 8 %
MONOCYTES NFR BLD MANUAL: 10 %
NEUTROPHILS # BLD AUTO: 3.2 10E3/UL (ref 1.6–8.3)
NEUTROPHILS # BLD AUTO: 3.9 10E3/UL (ref 1.6–8.3)
NEUTROPHILS # BLD MANUAL: 3.6 10E3/UL (ref 1.6–8.3)
NEUTROPHILS NFR BLD AUTO: 67 %
NEUTROPHILS NFR BLD AUTO: 67 %
NEUTROPHILS NFR BLD MANUAL: 37 %
NRBC # BLD AUTO: 0 10E3/UL
NRBC # BLD AUTO: 0.5 10E3/UL
NRBC BLD AUTO-RTO: 0 /100
NRBC BLD MANUAL-RTO: 5 %
PCO2 BLDV: 40 MM HG (ref 40–50)
PCO2 BLDV: 59 MM HG (ref 40–50)
PH BLDV: 7.09 [PH] (ref 7.32–7.43)
PH BLDV: 7.13 [PH] (ref 7.32–7.43)
PLAT MORPH BLD: ABNORMAL
PLATELET # BLD AUTO: 130 10E3/UL (ref 150–450)
PLATELET # BLD AUTO: 138 10E3/UL (ref 150–450)
PLATELET # BLD AUTO: 141 10E3/UL (ref 150–450)
PO2 BLDV: 25 MM HG (ref 25–47)
PO2 BLDV: 32 MM HG (ref 25–47)
POTASSIUM BLD-SCNC: 4.7 MMOL/L (ref 3.4–5.3)
POTASSIUM BLD-SCNC: 6.4 MMOL/L (ref 3.4–5.3)
POTASSIUM SERPL-SCNC: 4 MMOL/L (ref 3.4–5.3)
POTASSIUM SERPL-SCNC: 5.2 MMOL/L (ref 3.4–5.3)
POTASSIUM SERPL-SCNC: 5.6 MMOL/L (ref 3.4–5.3)
PROT SERPL-MCNC: 2.6 G/DL (ref 6.4–8.3)
RBC # BLD AUTO: 1.74 10E6/UL (ref 4.4–5.9)
RBC # BLD AUTO: 2.96 10E6/UL (ref 4.4–5.9)
RBC # BLD AUTO: 3.06 10E6/UL (ref 4.4–5.9)
RBC MORPH BLD: ABNORMAL
SAO2 % BLDV: 28 % (ref 94–100)
SAO2 % BLDV: 42 % (ref 94–100)
SODIUM BLD-SCNC: 141 MMOL/L (ref 133–144)
SODIUM BLD-SCNC: 147 MMOL/L (ref 133–144)
SODIUM SERPL-SCNC: 142 MMOL/L (ref 136–145)
SODIUM SERPL-SCNC: 144 MMOL/L (ref 136–145)
SODIUM SERPL-SCNC: 146 MMOL/L (ref 136–145)
SPECIMEN EXPIRATION DATE: NORMAL
TROPONIN T SERPL HS-MCNC: 44 NG/L
UNIT ABO/RH: NORMAL
UNIT NUMBER: NORMAL
UNIT STATUS: NORMAL
UNIT TYPE ISBT: 2800
UNIT TYPE ISBT: 2800
UNIT TYPE ISBT: 5100
UNIT TYPE ISBT: 6200
UNIT TYPE ISBT: 7300
UNIT TYPE ISBT: 8400
UNIT TYPE ISBT: 8400
WBC # BLD AUTO: 4.9 10E3/UL (ref 4–11)
WBC # BLD AUTO: 5.9 10E3/UL (ref 4–11)
WBC # BLD AUTO: 9.8 10E3/UL (ref 4–11)

## 2023-01-01 PROCEDURE — 258N000003 HC RX IP 258 OP 636: Performed by: EMERGENCY MEDICINE

## 2023-01-01 PROCEDURE — 82803 BLOOD GASES ANY COMBINATION: CPT

## 2023-01-01 PROCEDURE — 96375 TX/PRO/DX INJ NEW DRUG ADDON: CPT

## 2023-01-01 PROCEDURE — 85025 COMPLETE CBC W/AUTO DIFF WBC: CPT | Performed by: EMERGENCY MEDICINE

## 2023-01-01 PROCEDURE — 80048 BASIC METABOLIC PNL TOTAL CA: CPT | Performed by: INTERNAL MEDICINE

## 2023-01-01 PROCEDURE — P9016 RBC LEUKOCYTES REDUCED: HCPCS

## 2023-01-01 PROCEDURE — 94002 VENT MGMT INPAT INIT DAY: CPT

## 2023-01-01 PROCEDURE — 96365 THER/PROPH/DIAG IV INF INIT: CPT

## 2023-01-01 PROCEDURE — 85384 FIBRINOGEN ACTIVITY: CPT | Performed by: EMERGENCY MEDICINE

## 2023-01-01 PROCEDURE — P9035 PLATELET PHERES LEUKOREDUCED: HCPCS

## 2023-01-01 PROCEDURE — 36430 TRANSFUSION BLD/BLD COMPNT: CPT | Mod: 59

## 2023-01-01 PROCEDURE — 99215 OFFICE O/P EST HI 40 MIN: CPT | Performed by: INTERNAL MEDICINE

## 2023-01-01 PROCEDURE — 93970 EXTREMITY STUDY: CPT

## 2023-01-01 PROCEDURE — C9113 INJ PANTOPRAZOLE SODIUM, VIA: HCPCS | Performed by: EMERGENCY MEDICINE

## 2023-01-01 PROCEDURE — 999N000157 HC STATISTIC RCP TIME EA 10 MIN

## 2023-01-01 PROCEDURE — 86901 BLOOD TYPING SEROLOGIC RH(D): CPT | Performed by: EMERGENCY MEDICINE

## 2023-01-01 PROCEDURE — 29505 APPLICATION LONG LEG SPLINT: CPT | Mod: LT

## 2023-01-01 PROCEDURE — 72070 X-RAY EXAM THORAC SPINE 2VWS: CPT | Mod: TC | Performed by: RADIOLOGY

## 2023-01-01 PROCEDURE — 85730 THROMBOPLASTIN TIME PARTIAL: CPT | Performed by: EMERGENCY MEDICINE

## 2023-01-01 PROCEDURE — 99285 EMERGENCY DEPT VISIT HI MDM: CPT | Mod: 25

## 2023-01-01 PROCEDURE — 73562 X-RAY EXAM OF KNEE 3: CPT | Mod: LT

## 2023-01-01 PROCEDURE — 250N000011 HC RX IP 250 OP 636: Performed by: EMERGENCY MEDICINE

## 2023-01-01 PROCEDURE — 84484 ASSAY OF TROPONIN QUANT: CPT | Performed by: EMERGENCY MEDICINE

## 2023-01-01 PROCEDURE — 70450 CT HEAD/BRAIN W/O DYE: CPT | Mod: ME

## 2023-01-01 PROCEDURE — 85025 COMPLETE CBC W/AUTO DIFF WBC: CPT | Performed by: INTERNAL MEDICINE

## 2023-01-01 PROCEDURE — 250N000009 HC RX 250: Performed by: EMERGENCY MEDICINE

## 2023-01-01 PROCEDURE — 80048 BASIC METABOLIC PNL TOTAL CA: CPT | Performed by: EMERGENCY MEDICINE

## 2023-01-01 PROCEDURE — 93296 REM INTERROG EVL PM/IDS: CPT | Performed by: INTERNAL MEDICINE

## 2023-01-01 PROCEDURE — P9012 CRYOPRECIPITATE EACH UNIT: HCPCS

## 2023-01-01 PROCEDURE — 93294 REM INTERROG EVL PM/LDLS PM: CPT | Performed by: INTERNAL MEDICINE

## 2023-01-01 PROCEDURE — 85007 BL SMEAR W/DIFF WBC COUNT: CPT | Performed by: EMERGENCY MEDICINE

## 2023-01-01 PROCEDURE — 72100 X-RAY EXAM L-S SPINE 2/3 VWS: CPT | Mod: TC | Performed by: RADIOLOGY

## 2023-01-01 PROCEDURE — 31500 INSERT EMERGENCY AIRWAY: CPT

## 2023-01-01 PROCEDURE — P9037 PLATE PHERES LEUKOREDU IRRAD: HCPCS

## 2023-01-01 PROCEDURE — 85610 PROTHROMBIN TIME: CPT | Performed by: EMERGENCY MEDICINE

## 2023-01-01 PROCEDURE — 36415 COLL VENOUS BLD VENIPUNCTURE: CPT | Performed by: EMERGENCY MEDICINE

## 2023-01-01 PROCEDURE — 80053 COMPREHEN METABOLIC PANEL: CPT | Performed by: EMERGENCY MEDICINE

## 2023-01-01 PROCEDURE — 85027 COMPLETE CBC AUTOMATED: CPT | Performed by: EMERGENCY MEDICINE

## 2023-01-01 PROCEDURE — 92950 HEART/LUNG RESUSCITATION CPR: CPT

## 2023-01-01 PROCEDURE — P9059 PLASMA, FRZ BETWEEN 8-24HOUR: HCPCS

## 2023-01-01 PROCEDURE — 36415 COLL VENOUS BLD VENIPUNCTURE: CPT | Performed by: INTERNAL MEDICINE

## 2023-01-01 RX ORDER — FENTANYL CITRATE-0.9 % NACL/PF 10 MCG/ML
100 PLASTIC BAG, INJECTION (ML) INTRAVENOUS ONCE
Status: COMPLETED | OUTPATIENT
Start: 2023-01-01 | End: 2023-01-01

## 2023-01-01 RX ORDER — IOPAMIDOL 755 MG/ML
92 INJECTION, SOLUTION INTRAVASCULAR ONCE
Status: COMPLETED | OUTPATIENT
Start: 2023-01-01 | End: 2023-01-01

## 2023-01-01 RX ORDER — CALCIUM CHLORIDE 100 MG/ML
1 INJECTION INTRAVENOUS; INTRAVENTRICULAR ONCE
Status: COMPLETED | OUTPATIENT
Start: 2023-01-01 | End: 2023-01-01

## 2023-01-01 RX ORDER — NOREPINEPHRINE BITARTRATE 0.02 MG/ML
.01-.6 INJECTION, SOLUTION INTRAVENOUS CONTINUOUS
Status: DISCONTINUED | OUTPATIENT
Start: 2023-01-01 | End: 2023-01-01 | Stop reason: HOSPADM

## 2023-01-01 RX ADMIN — IOPAMIDOL 92 ML: 755 INJECTION, SOLUTION INTRAVENOUS at 04:16

## 2023-01-01 RX ADMIN — SODIUM CHLORIDE 1000 ML: 9 INJECTION, SOLUTION INTRAVENOUS at 03:38

## 2023-01-01 RX ADMIN — Medication 0.1 MCG/KG/MIN: at 03:25

## 2023-01-01 RX ADMIN — Medication 100 MCG: at 04:12

## 2023-01-01 RX ADMIN — SODIUM BICARBONATE 50 MEQ: 84 INJECTION INTRAVENOUS at 03:28

## 2023-01-01 RX ADMIN — SODIUM CHLORIDE 1000 ML: 9 INJECTION, SOLUTION INTRAVENOUS at 03:43

## 2023-01-01 RX ADMIN — MIDAZOLAM HYDROCHLORIDE 1 MG: 1 INJECTION, SOLUTION INTRAMUSCULAR; INTRAVENOUS at 03:51

## 2023-01-01 RX ADMIN — Medication 100 MCG: at 03:25

## 2023-01-01 RX ADMIN — PANTOPRAZOLE SODIUM 80 MG: 40 INJECTION, POWDER, FOR SOLUTION INTRAVENOUS at 03:44

## 2023-01-01 RX ADMIN — Medication 100 MCG: at 03:33

## 2023-01-01 RX ADMIN — SODIUM CHLORIDE 74 ML: 900 INJECTION INTRAVENOUS at 04:16

## 2023-01-01 RX ADMIN — CALCIUM CHLORIDE 1 G: 100 INJECTION INTRAVENOUS; INTRAVENTRICULAR at 03:23

## 2023-01-01 RX ADMIN — Medication 100 MCG: at 04:08

## 2023-01-01 ASSESSMENT — ENCOUNTER SYMPTOMS
VOMITING: 1
ARTHRALGIAS: 1
HEADACHES: 0
JOINT SWELLING: 1
DIZZINESS: 0
NAUSEA: 0
SHORTNESS OF BREATH: 0

## 2023-01-01 ASSESSMENT — PAIN SCALES - GENERAL: PAINLEVEL: EXTREME PAIN (8)

## 2023-01-01 ASSESSMENT — ACTIVITIES OF DAILY LIVING (ADL)
ADLS_ACUITY_SCORE: 35

## 2023-01-04 NOTE — TELEPHONE ENCOUNTER
Pt called requesting appt with PCP before 02/13 for pain on his back, shoulder neck and spine. Needs follow up for 11/05 ED visit and also wants to discuss taking Eliquist benefits vs risks. Does provider approve use of same day visit  01/12/2022 at 1:00 PM?    Pt would need a call back with providers advice.     Appt is on hold waiting for providers response.

## 2023-01-05 NOTE — TELEPHONE ENCOUNTER
Pt was called . Future OV was scheduled. Pt states is not urgent he just wants to be seen before February since for his pains.

## 2023-01-12 PROBLEM — I77.810 ASCENDING AORTA DILATATION (H): Status: ACTIVE | Noted: 2022-01-01

## 2023-01-12 PROBLEM — D69.6 THROMBOCYTOPENIA (H): Status: ACTIVE | Noted: 2023-01-01

## 2023-01-12 NOTE — PATIENT INSTRUCTIONS
Continue to do daily weights.  Call my office if it goes up above 151 at home.  Call if you develop worsening swelling or shortness of breath.  Call if you have chest pain.    Please take 2 of the furosemide a day and let's see if we can get the leg swelling down.    You should see an orthopedic doctor for your shoulder.  If you do not have one Dr. Pardo at [facility] in [place] is very good.    I want to see you back in 3 to 4 weeks for a follow up office visit.    I will send you all the test results.      Mumtaz Hernandez M.D.]

## 2023-01-12 NOTE — PROGRESS NOTES
Son [phone]     This is a very pleasant 89-year-old who I am seeing with his wife.  As noted and reviewed he was in the hospital from  to the .  At that time he presented with left flank pain which was felt likely to be musculoskeletal and he was incidentally found to have a nonocclusive small right pulmonary embolism for which he was started on Eliquis.  He was also found to have acute congestive heart failure.  He had been using a lower dose of Lasix than prescribed.  He was diuresed with IV Lasix and was discharged home on  on eliquis and higher dose of lasix.    The pulmonary embolism was found during a CT for evaluation of left flank pain.  He was hemodynamically stable and not hypoxic.  He had been on Eliquis 2.5 mg twice a day for A. fib in the past but it was discontinued a few months ago due to his increased risk of falling.  During the hospital stay discussion was carried out with the patient, hematology oncology as well as the patient's son who is a surgeon and he was ultimately started on Eliquis 5 mg twice a day with a recommendation to dose adjust to 2.5 mg twice daily after 6 months.  If he has  bleeding while on this it was recommended that an IVC filter would be placed.  During his hospital stay lower extremity ultrasound was negative for DVT.    The patient did have a cardiac echo during that stay which showed a severely dilated cardiomyopathy with an EF of 20 to 25%.  He had moderate to moderately severe tricuspid regurg regurgitation with severe pulmonary hypertension.  His ascending aorta was dilated at 4.8 cm.  Compared to a prior echo from April there was not felt to be any change.  His proBNP was over 40,000 on admission.  His weight was also up approximately 7 pounds from his last clinic visit.  A CT showed abdominal and pelvic ascites.  As noted he was diuresed and down by 4.3 L at discharge.  His weight was noted to be 150 pounds at  discharge.    The patient does have a pacemaker as well as chronic permanent A. fib.  He is not on amiodarone due to prior pulmonary fibrosis and has noted his Eliquis was recently stopped due to fall risk.    Patient also has hypertension, CKD, chronic anemia and evidence of slightly low platelets.    Patient returns today with his wife in follow-up.    O last saw the patient on September 30.  At that point he was doing well with no chest pain or shortness of breath but frequent falls were noted.  Because of this his anticoagulation was stopped.  Aspirin was initiated.  It was recommended that he see pulmonary given his pulmonary fibrosis.  He was noted at that time to have bilateral leg edema.    The patient returns with his wife today and has a laundry list of things.  The patient and I went over things today.  He is mostly bothered by low back pain on the left side.  It is better with heat or pushing his back against a chair or with erect posture.  It does not radiate.  It is not into the legs.  He also has neck pain in the central upper back/neck area.  Its irritating and not severe.  It does not radiate.  Lastly he notes right upper extremity decreased range of motion and pain since the fall.  It hurts to try and use his arm.  He feels a timely arm a bit.    With respect to his heart failure he is using the Lasix but just 1 pill a day.  His weight is stable.  He has some edema.  He is not having chest pain or shortness of breath.    As noted, the patient has had frequent falls including the one in November.  He tends to lose his balance.  He does not have syncope.    He did have some vision loss overnight on December 5 of his left eye.  Since then he has had slow improvement and has seen Dr. Frank for this.  He is using eyedrops.    The patient has pulmonary fibrosis.  This was discovered on a CT when I heard crackles.  He has not been to the [facility] pulmonary yet for follow-up of  this.    With respect to his anticoagulation the patient stopped his Eliquis approximately a month ago because he was having more bruising.    The patient also notes epistaxis.  This has been ongoing for some time despite stopping the Eliquis.    He also has arthritis and neuropathy.  He does use NSAIDs in the past.    A complete review of systems is otherwise negative.    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    4.8cm     Balance problem     few years     Cardiomyopathy (H) 10/2018    [place]; Echo 10/2018, EF 25%, down from 50% 12/2017, per notes, pt declines further work up to find etiology of drop in EF; echo 4/22 ef 25%, pulm htn, rv overload     Chronic atrial fibrillation (H) 2018    cardioversion 4/19     Chronic systolic CHF (congestive heart failure) (H)      CKD (chronic kidney disease) stage 3, GFR 30-59 ml/min (H)      Essential hypertension      Frequent falls      Gross hematuria 04/2019    cysto, ct neg     History of gout     none for years     Hypothyroidism      IPF (idiopathic pulmonary fibrosis) (H) 06/2022    ct confirmed     MGUS (monoclonal gammopathy of unknown significance) 07/2020    found during eval of falls and neuropathy.  Per curbside consult from [facility] to repeat labs every 6 months: CBC, BMP, SPEP, IgG and kappa-lambda free light chain     Mitral regurgitation     moderate per 10/2018 Echo     Neuropathy 1990    both lower legs     Pacemaker 2018     Prostate cancer (H) 2008    xrt, fine since     Pulmonary hypertension (H)      Thrombocytopenia (H)      Tricuspid regurgitation     mioderate per 10/2018 Echo     Past Surgical History:   Procedure Laterality Date     bilateral hip replacement  2017    done 6 months apart     IMPLANT PACEMAKER  11/12/2018    CRT-P     lip surgery for skin cancer       Social History     Socioeconomic  "History     Marital status:      Spouse name: Not on file     Number of children: 3     Years of education: Not on file     Highest education level: Not on file   Occupational History     Occupation: owned marketing services company in [place]   Tobacco Use     Smoking status: Never     Smokeless tobacco: Never   Substance and Sexual Activity     Alcohol use: Yes     Comment: 1 drink week     Drug use: Never     Sexual activity: Not Currently   Other Topics Concern     Parent/sibling w/ CABG, MI or angioplasty before 65F 55M? Not Asked   Social History Narrative     Not on file     Social Determinants of Health     Financial Resource Strain: Not on file   Food Insecurity: Not on file   Transportation Needs: Not on file   Physical Activity: Not on file   Stress: Not on file   Social Connections: Not on file   Intimate Partner Violence: Not on file   Housing Stability: Not on file     Current Outpatient Medications   Medication Sig Dispense Refill     acetaminophen (TYLENOL) 500 MG tablet Take 500-1,000 mg by mouth every 6 hours as needed for mild pain       allopurinol (ZYLOPRIM) 300 MG tablet Take 1 tablet (300 mg) by mouth daily 90 tablet 3     alpha-lipoic acid 600 MG capsule Take 600 mg by mouth daily       amoxicillin (AMOXIL) 500 MG capsule TAKE 4 CAPSULES BY MOUTH 1  HOUR PRIOR TO DENTAL WORK 4 capsule 3     aspirin (ASA) 81 MG EC tablet Take 1 tablet (81 mg) by mouth daily       carvedilol (COREG) 25 MG tablet Take 1 tablet (25 mg) by mouth 2 times daily (with meals) 180 tablet 2     furosemide (LASIX) 20 MG tablet TAKE 2 TABLETS BY MOUTH IN  THE MORNING AND 1 TABLET 6  HOURS LATER (Patient taking differently: Take 20 mg by mouth 2 times daily Patient omits doses if planning on an \"active\" day.) 90 tablet 11     levothyroxine (SYNTHROID/LEVOTHROID) 112 MCG tablet TAKE 1 TABLET BY MOUTH  DAILY 90 tablet 2     losartan (COZAAR) 100 MG tablet Take 0.5 tablets (50 mg) by mouth daily Please call for an " "appointment for further refills. [phone] 45 tablet 3     multivitamin (CENTRUM SILVER) tablet Take 1 tablet by mouth daily       Niacin (VITAMIN B-3 OR) Take 500 mg by mouth daily       potassium chloride ER (KLOR-CON M) 10 MEQ CR tablet TAKE 1 TABLET BY MOUTH  TWICE DAILY 180 tablet 2     Pyridoxine HCl (VITAMIN B6 PO) Take 150 mg by mouth daily       vitamin B-12 (CYANOCOBALAMIN) 2500 MCG sublingual tablet Take 2,500 mcg by mouth daily       vitamin D3 (CHOLECALCIFEROL) 50 mcg (2000 units) tablet Take 50 mcg by mouth daily       No Known Allergies  FAMILY HISTORY NOTED AND REVIEWED    REVIEW OF SYSTEMS: above    PHYSICAL EXAM    /75 (BP Location: Right arm, Patient Position: Sitting, Cuff Size: Adult Regular)   Pulse 83   Temp 96.9  F (36.1  C)   Resp 16   Ht 1.676 m (5' 6\")   Wt 67.1 kg (148 lb)   SpO2 97%   BMI 23.89 kg/m      Patient appears non toxic  He has markedly decreased range of motion of the right shoulder.  There is no pain at rest.  No supraclavicular, cervical or axillary lymphadenopathy  Lungs basilar fine crackles bilat  cv reglar rate and rhythm 2/6 sm across chest, no jvp, 1+ bilat lower leg edema  Abdomen normal active bowel sounds, soft non-tender, no mgr, no hepatosplenomegaly    Labs sent    ASSESSMENT:  1. Acute systolic chf, resolved, on lasix but needs higher dose although fluid status ok now, on coreg and arb, not on sglt2 inhibitor  2. Pulmonary embolis, is a very difficult situation.  He has shown us that he falls and has fallen frequently so I am very hesitant to place him back on anticoagulation even in the face of a PE as well as A. fib.  We need to balance the benefit and risks.  I have a call out to his son who is a plastic surgeon and will speak with him when I get the call back.  3.  Pulmonary hypertension, suspect due to heart failure and possibly PE.  4.  IPF, not sure there is much that can be done about this.  5.  Ascending aortic dilatation, monitor " this.  6.  CKD, follow-up labs today.  7.  Thrombocytopenia, follow-up labs today.  8.  Hypertension, controlled.  9.  Left-sided low back pain without sciatica, suspect musculoskeletal.  I will check an x-ray.  He does have a distant history of prostate cancer but I doubt metastatic disease.  10.  Neck pain, suspect musculoskeletal.  11.  Shoulder pain, decreased range of motion, suspect rotator cuff pathology and he needs to see orthopedics for this.  12.  Epistaxis, will need to see ENT for this.  13.  Frequent falls.    PLAN:  Call out to his son  Directions written down for patient  Monitor weight and call if it goes up more than 3 pounds or he has chest pain or shortness of breath or edema.  Stay off the Eliquis for now.  See orthopedics.  See ENT.  Follow-up with me in 3 weeks.  Labs now.  X-rays now.    Mumtaz Hernandez M.D.    I spoke with Dr. Isaac Woo last night.  Explained above and will see ortho and ent.  No noac for now, follow up as noted.  Will order physical therapy for back pain.    Mumtaz Hernandez M.D.

## 2023-01-14 NOTE — TELEPHONE ENCOUNTER
Please call patient.  I spoke with son Isaac.  I would like him to see ent for epistaxis, Dr. Graff or partner.  His xrays did not show anything significant.  I will order physical therapy for the back and neck pain, see ortho for shoulder, follow up with me as scheduled.    Also labs stable, k level high so change kcl to one a day from 2 a day    Mumtaz Hernandez M.D.

## 2023-01-16 NOTE — TELEPHONE ENCOUNTER
Call to patient. Message left for return call to clinic.     Writer does not see referral for ENT in chart. Also need new ortho referral. Orders pended for provider to review and sign if appropriate.         Jayna Murray, RN BSN MSN  [facility]

## 2023-01-17 NOTE — TELEPHONE ENCOUNTER
"FYI:     Called patient to relay numbers:     [facility]:  [phone] - states he is going to [facility] instead (Dr Pardo)   Dr Graff's ENT office:  [phone] - states he wants to delay seeing Dr Graff for now, dealing with \"more urgent\" issues and no recent nosebleeds     He wants to hold off on PT for now    States he fell on Saturday evening, landed \"flat on my head\" - foot got caught in carpet. Recovering from that as well as everything else, laid on floor 1:45 mins and then son came over. States he was asymptomatic other than scraping top of head and knee pain which he is seeing [facility] for. Retained consciousness, no headache. States his son is a physician and examined him and cleaned up his wound at the time of the fall      Jenny MONTIEL Triage RN  [facility]             "

## 2023-01-21 NOTE — ED TRIAGE NOTES
Presents from [facility] for left posterior knee pain for 3 days with left leg swelling for 3 days. Reports 2 falls in last 2 months most recent being 1 week ago. Struck head and is unsure how he injured knee. States no knee pain until 3 days ago. Walks with cane and walker.

## 2023-01-21 NOTE — ED PROVIDER NOTES
History     Chief Complaint:  Leg swelling; knee pain     HPI   Lico Woo is a 89 year old male who presents with leg swelling and posterior left knee pain. Two months ago, he had a fall while he was getting dressed. He fell backwards and hit his head. A week ago he had a fall where he fell backwards and hit his head again. At this time he had no headache, loss of consciousness, nausea, or dizziness. His son, who is an orthopedic surgeon, cleaned his wound and did not advise him to present to the ED at this time. Four to five days ago, however, his legs began swelling more than normally; particularly his left knee. His knee his painful with flexion on the posterior aspect. He was formerly on Eliquis but was taken off 3 months ago. He was started again on this recently after a PE was found during a hospital stay for CHF from 11/5-11/7. Several week ago he discontinued Eliquis again with conjunction with his PCP for concerns of bleeding associated with his frequent falls. Upon evaluation no shortness of breath, chest pain, nausea, headache, or dizziness. He also increased his Furosemide a week ago.     Independent Historian: patient      Review of External Notes: Notes reviewed in regards to the patient's anticoagulation.  He was found to have a PE during a recent hospital admission.  He was prescribed Eliquis.    ROS:  Review of Systems   Respiratory: Negative for shortness of breath.    Cardiovascular: Positive for leg swelling. Negative for chest pain.   Gastrointestinal: Negative for nausea.   Musculoskeletal: Positive for arthralgias and joint swelling.   Neurological: Negative for dizziness and headaches.   All other systems reviewed and are negative.      Allergies:  No Known Allergies     Medications:    acetaminophen   allopurinol   alpha-lipoic acid  amoxicillin   aspirin 81 mg  carvedilol   furosemide   levothyroxine  losartan   Niacin   potassium chloride ER  Pyridoxine HCl   vitamin B-12  "  vitamin D3     Past Medical History:    Aortic regurgitation   Aortic stenosis   Ascending aorta dilatation   Balance problem   Cardiomyopathy   Chronic atrial fibrillation    Chronic systolic CHF  CKD stage 3  Essential hypertension   Frequent falls   Gross hematuria   History of gout   Hypothyroidism   Iidiopathic pulmonary fibrosis  MGUS   Mitral regurgitation   Neuropathy   Pacemaker   Prostate cancer   Pulmonary hypertension   Thrombocytopenia   Tricuspid regurgitation     Past Surgical History:    bilateral hip replacement   Implant pacemaker   Lip surgery      Family History:    family history includes Cerebrovascular Disease in his father; Myocardial Infarction in his mother.    Social History:   reports that he has never smoked. He has never used smokeless tobacco. He reports current alcohol use. He reports that he does not use drugs.  PCP: Mumtaz Hernandez     Physical Exam     Patient Vitals for the past 24 hrs:   BP Temp Temp src Pulse Resp SpO2 Height Weight   01/21/23 1900 119/77 -- -- 78 18 95 % -- --   01/21/23 1756 118/84 97.6  F (36.4  C) Oral 79 20 97 % 1.702 m (5' 7\") 68.5 kg (151 lb)        Physical Exam  Constitutional:       General: He is not in acute distress.     Appearance: He is not diaphoretic.   HENT:      Head:      Comments: Healing right scalp laceration.     Mouth/Throat:      Mouth: Mucous membranes are moist.      Pharynx: Oropharynx is clear.   Eyes:      General: No scleral icterus.     Pupils: Pupils are equal, round, and reactive to light.   Cardiovascular:      Rate and Rhythm: Normal rate and regular rhythm.      Heart sounds: Normal heart sounds.   Pulmonary:      Effort: No respiratory distress.      Breath sounds: Normal breath sounds.   Abdominal:      Palpations: Abdomen is soft.      Tenderness: There is no abdominal tenderness.   Musculoskeletal:      Comments: Bilateral lower extremity edema, left greater than right.  Perfusion of the feet is normal.  There is " minimal tenderness of the anterior left patella.  He is able to perform a straight leg raise lifting the heel off the cart with the left leg.  There is fullness of the left calf and thigh area but compartments are soft and supple.  He does have a small appearing left knee effusion on exam.  Range of motion limited by pain and swelling.   Skin:     General: Skin is warm.      Capillary Refill: Capillary refill takes less than 2 seconds.      Findings: No rash.   Neurological:      General: No focal deficit present.      Mental Status: He is oriented to person, place, and time.   Psychiatric:         Mood and Affect: Mood normal.         Behavior: Behavior normal.           Emergency Department Course       Imaging:  US Lower Extremity Venous Duplex Bilateral   Final Result   IMPRESSION:   1.  No evidence for deep venous thrombosis in either lower extremity.   2.  There is edema in the subcutaneous adipose tissue of the lower left leg.      XR Knee Left 3 Views   Final Result   IMPRESSION:    1.  Nondisplaced transverse fracture of the inferior pole of the patella.   2.  Normal knee joint spacing and alignment.   3.  Small-moderate-sized knee joint effusion.   4.  Bone demineralization.   5.  Soft tissue swelling about the knee.          Report per radiology    Laboratory:  Labs Ordered and Resulted from Time of ED Arrival to Time of ED Departure   BASIC METABOLIC PANEL - Abnormal       Result Value    Sodium 142      Potassium 5.6 (*)     Chloride 108 (*)     Carbon Dioxide (CO2) 25      Anion Gap 9      Urea Nitrogen 68.6 (*)     Creatinine 2.23 (*)     Calcium 9.5      Glucose 110 (*)     GFR Estimate 27 (*)    INR - Abnormal    INR 1.20 (*)    CBC WITH PLATELETS AND DIFFERENTIAL - Abnormal    WBC Count 4.9      RBC Count 2.96 (*)     Hemoglobin 10.0 (*)     Hematocrit 33.0 (*)      (*)     MCH 33.8 (*)     MCHC 30.3 (*)     RDW 14.7      Platelet Count 141 (*)     % Neutrophils 67      % Lymphocytes 23       % Monocytes 8      % Eosinophils 2      % Basophils 0      % Immature Granulocytes 0      NRBCs per 100 WBC 0      Absolute Neutrophils 3.2      Absolute Lymphocytes 1.1      Absolute Monocytes 0.4      Absolute Eosinophils 0.1      Absolute Basophils 0.0      Absolute Immature Granulocytes 0.0      Absolute NRBCs 0.0     PARTIAL THROMBOPLASTIN TIME - Normal    aPTT 32          Emergency Department Course & Assessments:    Independent Interpretation (X-rays, CTs, rhythm strip):  I independently reviewed the patient's knee x-ray.  He has a transverse fracture that does not appear to be displaced through the patella    Social Determinants of Health affecting care:  Patient is living in group home.     Disposition:  The patient was discharged to home.     Impression & Plan      Medical Decision Making:  This patient is an 89-year-old man who presents to the ED for evaluation of left leg swelling.  He had a fall 1 week ago.  He struck his head and the wound was cleaned by his family.  He is concerned no swelling of his leg as he had a prior PE and recently stopped his anticoagulation.    The patient does have a wound over the right scalp.  He declined CT scan as he is not having any headache, nausea, dizziness, or any symptoms related to head injury.    DVT study negative.  X-ray of the knee does demonstrate a transverse, nondisplaced fracture of the patella.  He is able to perform straight leg raise.  I spoke with Dr. Ayden Pardo who is covering orthopedics.  This appears to be nonoperative.  The patient was placed in a knee immobilizer and is able to get up with a walker.  He will follow-up in the orthopedic clinic in the early part of this next week for repeat x-rays and repeat evaluation.  Will return if worse.    The patient has baseline renal insufficiency.  Creatinine is 2.23.  He has been in this range over the course of the last year.  He is on a potassium supplement with his Lasix.  Potassium is just slightly  high.  He was asked to hold the potassium supplement for the next 4 days and had a recheck of his lab work through his primary care clinic.    I spoke with the patient's son Isaac who will be assisting him home today.      Diagnosis:    ICD-10-CM    1. Closed nondisplaced transverse fracture of left patella, initial encounter  S82.035A       2. Hyperkalemia  E87.5          Scribe Disclosure:  I, Joshua Kjer, am serving as a scribe at 5:46 PM on 1/21/2023 to document services personally performed by Geoffrey Melton MD based on my observations and the provider's statements to me.    1/21/2023   Geoffrey Melton MD McRoberts, Sean Edward, MD  01/21/23 2207       Geoffrey Melton MD  01/21/23 2208

## 2023-01-21 NOTE — ED NOTES
Bed: ED07  Expected date:   Expected time:   Means of arrival:   Comments:  Mir 533 89M fall, left knee pain

## 2023-01-22 NOTE — DISCHARGE INSTRUCTIONS
Please follow-up in the next 5 days with your orthopedic clinic for further evaluation of your wound.    You can bear weight and walk on your left leg only if you are using the immobilizer.    Your potassium level was found to be slightly elevated today which is likely related in part to her medications.  Please hold your potassium supplement for the next 4 days and then have a recheck of your chemistries through primary care clinic.    Return to the ER for increased pain or swelling of the leg, difficulty breathing, dizziness, inability to walk with a knee immobilizer, or any new concerns.

## 2023-01-24 NOTE — ED TRIAGE NOTES
0310: Pt presents to ED via EMS with cesar on- CPR in progress- per EMS report pt lives at [facility] independently with wife who called 911 today for GI bleed that started around 6 pm last night. EMS state that he was A & O X 4 upon getting in ambulance then had 1 bloody emesis BP of 51/30 and lost a pulse. Pt was placed on cesar for CPR and 18 g IV started and 500 ml NS bolus administered. No medications administered, no glucose check.  0311: 1 mg epi administered and CPR continued  0314: pulse check completed and pt noted to have pulse- ST  0319: pt intubated by Dr. Lucero with succ and etomidate   0319: calcium chloride 1 g administered for elevated potassium on istat  0320: massive transfusion protocol initiated  0331: unit PRBC hung  0332: 100 mcg phenylephrine given  0333: CPR resumed for PEA arrest  0334: epi 1 mg administered  0335: second unit PRBC given along with 1 G calcium chloride and pulse check complete- pt has a pulse at ST  0354: 3rd unit PRBC administered  0355: pt transported to CT  0408: 100 mcg phenylepherine administered  0412: 100 mcg phenyleherine administered  0415: 1 unit platelets and 4th units PRBC  Hung  0420: comfort care measures initiated    Please see MAR, code blue record and RSI documentation for complete medication and IVF documentation

## 2023-01-24 NOTE — ED NOTES
Dr. Lucero spoke to family and family requests comfort care at this time. All IV medications and blood discontinued per MD order

## 2023-01-24 NOTE — ED PROVIDER NOTES
History     Chief Complaint:  Cardiac Arrest       HPI   Lico Woo is a 89 year old male who presents with GI bleed.  Patient initially started to have bloody stools at home and called EMS.  EMS reports that he started vomiting blood. they state that on arrival the patient was hypotensive 51/30 with a heart rate in the 90s and oxygen sats in the 50s. En route the patient had an episode of bloody emesis and then coded at 0300. EMS states they began bagging and CPR at this point. Per chart review the patient was most recently seen for frequent falls and fracture of left patella 1/21/23. At that time the patient had stopped Eliquis for several weeks due to increase in falls and concerns for bleeding.    Independent Historian: EMS      Review of External Notes: Emergency Department note 1/21/23 (see HPI).  The note mentions that his Eliquis had been stopped recently.    ROS:  Review of Systems   Unable to perform ROS: Patient unresponsive   Gastrointestinal: Positive for vomiting.     Allergies:  No Known Allergies     Medications:    Zyloprim   Aspirin   Lasix   Coreg   Synthroid   Cozaar     Past Medical History:    Aortic regurgitation        Aortic stenosis   Ascending aorta dilatation    Cardiomyopathy   Atrial fibrillation            CHF  CKD stage 3  Hypertension                  Hypothyroidism             Iidiopathic pulmonary fibrosis  MGUS   Mitral regurgitation        Neuropathy       Pacemaker        Prostate cancer   Tricuspid regurgitation               Past Surgical History:    Bilateral hip replacement   Pacemaker placement   Lip surgery for skin cancer    Family History:    family history includes Cerebrovascular Disease in his father; Myocardial Infarction in his mother.    Social History:   reports that he has never smoked. He has never used smokeless tobacco. He reports current alcohol use. He reports that he does not use drugs.  PCP: Mumtaz Hernandez     Physical Exam     Patient  Vitals for the past 24 hrs:   BP Pulse Resp SpO2 Weight   01/24/23 0428 -- 100 13 100 % --   01/24/23 0426 104/50 81 -- -- --   01/24/23 0342 129/75 118 (!) 8 99 % --   01/24/23 0336 (!) 153/94 115 (!) 101 95 % --   01/24/23 0333 (!) 103/20 74 14 (!) 77 % --   01/24/23 0330 (!) 50/30 69 17 90 % --   01/24/23 0325 (!) 42/31 86 15 (!) 77 % --   01/24/23 0324 (!) 52/39 90 13 (!) 69 % --   01/24/23 0323 (!) 57/42 91 (!) 34 (!) 55 % --   01/24/23 0316 91/63 107 11 -- --   01/24/23 0314 -- 107 -- -- 68 kg (149 lb 14.6 oz)        Physical Exam  General: Lying on bed, unresponsive  Eyes:  The pupils are equal and round, not reactive    Conjunctivae and sclerae are normal  ENT:    No head trauma.  Blood at the mouth  Neck:  Normal range of motion  CV:  CPR in progress    Skin warm and well perfused   Resp:  Being bagged by EMS  GI:  Abdomen is soft, there is no rigidity    No distension    No rebound tenderness   Rectal:  Black stool at rectum  MS:  No spontaneous movement of extremities  Skin:  No rash or acute skin lesions noted  Neuro:   Unresponsive, no spontaneous movement    Emergency Department Course   ECG  ECG taken at 0340, ECG read at 0353  Atrial fibrillation  Left axis deviation   Right bundle branch block   T wave abnormality, consider lateral ischemia    Rate 98 bpm. HI interval * ms. QRS duration 166 ms. QT/QTc 408/520 ms. P-R-T axes * -82 140.     Imaging:  Head CT w/o contrast   Final Result   IMPRESSION:   1.  No acute intracranial process.    2.  Age-related changes described above.            Report per radiology    Laboratory:  Labs Ordered and Resulted from Time of ED Arrival to Time of ED Departure   ISTAT GASES ELECTROLYTES VENOUS POCT - Abnormal       Result Value    CPB Applied No      Hematocrit POCT 23 (*)     Bicarbonate Venous POCT 20 (*)     Hemoglobin POCT 7.8 (*)     Potassium POCT 6.4 (*)     Sodium POCT 141      pCO2 Venous POCT 59 (*)     pH Venous POCT 7.13 (*)     pO2 Venous POCT 25       O2 Sat, Venous POCT 28 (*)    COMPREHENSIVE METABOLIC PANEL - Abnormal    Sodium 146 (*)     Potassium 4.0      Chloride 124 (*)     Carbon Dioxide (CO2) 11 (*)     Anion Gap 11      Urea Nitrogen 52.4 (*)     Creatinine 1.24 (*)     Calcium 6.2 (*)     Glucose 102 (*)     Alkaline Phosphatase 62      AST 41      ALT 20      Protein Total 2.6 (*)     Albumin 1.5 (*)     Bilirubin Total 0.2      GFR Estimate 56 (*)    INR - Abnormal    INR 2.71 (*)    CBC WITH PLATELETS AND DIFFERENTIAL - Abnormal    WBC Count 9.8      RBC Count 1.74 (*)     Hemoglobin 5.8 (*)     Hematocrit 20.4 (*)      (*)     MCH 33.3 (*)     MCHC 28.4 (*)     RDW 16.7 (*)     Platelet Count 130 (*)     NRBCs per 100 WBC 0      Absolute NRBCs 0.0     TROPONIN T, HIGH SENSITIVITY - Abnormal    Troponin T, High Sensitivity 44 (*)    PARTIAL THROMBOPLASTIN TIME - Abnormal    aPTT 75 (*)    FIBRINOGEN ACTIVITY - Abnormal    Fibrinogen Activity 101 (*)    PREPARE RED BLOOD CELLS (UNIT)    ISSUE DATE AND TIME 20230124031700      Blood Component Type Red Blood Cells      Product Code D5571P04      Unit Status Issued      Unit Number [id number]      UNIT ABO/RH O+      CODING SYSTEM SQGU741      UNIT TYPE ISBT 5100     PREPARE RED BLOOD CELLS (UNIT)    ISSUE DATE AND TIME 20230124031700      Blood Component Type Red Blood Cells      Product Code S6823H75      Unit Status Issued      Unit Number [id number]      UNIT ABO/RH O+      CODING SYSTEM [id number]      UNIT TYPE ISBT 5100     PREPARE PLASMA (UNIT)    ISSUE DATE AND TIME 20230124032400      Blood Component Type Plasma      Product Code G9005I35      Unit Status Issued      Unit Number [id number]      UNIT ABO/RH A+      CODING SYSTEM KIXG482      UNIT TYPE ISBT 6200     PREPARE PLASMA (UNIT)    ISSUE DATE AND TIME 20230124032400      Blood Component Type Plasma      Product Code U5985F00      Unit Status Issued      Unit Number [id number]      UNIT ABO/RH AB-      CODING SYSTEM  FNSI103      UNIT TYPE ISBT 2800     PREPARE RED BLOOD CELLS (UNIT)    ISSUE DATE AND TIME 20230124032400      Blood Component Type Red Blood Cells      Product Code N6640C11      Unit Status Issued      Unit Number [id number]      UNIT ABO/RH O+      CODING SYSTEM JNIX573      UNIT TYPE ISBT 5100     PREPARE RED BLOOD CELLS (UNIT)    ISSUE DATE AND TIME 20230124032400      Blood Component Type Red Blood Cells      Product Code P0479C09      Unit Status Issued      Unit Number [id number]      UNIT ABO/RH O+      CODING SYSTEM AMVL531      UNIT TYPE ISBT 5100     PREPARE RED BLOOD CELLS (UNIT)    ISSUE DATE AND TIME 20230124032400      Blood Component Type Red Blood Cells      Product Code T6433F37      Unit Status Issued      Unit Number [id number]      UNIT ABO/RH O+      CODING SYSTEM MRIH973      UNIT TYPE ISBT 5100     PREPARE RED BLOOD CELLS (UNIT)    ISSUE DATE AND TIME 20230124032400      Blood Component Type Red Blood Cells      Product Code S1271Y17      Unit Status Issued      Unit Number [id number]      UNIT ABO/RH O+      CODING SYSTEM RRKH249      UNIT TYPE ISBT 5100     TYPE AND SCREEN, ADULT    ABO/RH(D) A POS      Antibody Screen Negative      SPECIMEN EXPIRATION DATE 20230127235900     PREPARE PHERESED PLATELETS (UNIT)    ISSUE DATE AND TIME [id number]      Blood Component Type Platelets      Product Code C4848J33      Unit Status Issued      Unit Number [id number]      UNIT ABO/RH O+      CODING SYSTEM HGAJ203      UNIT TYPE ISBT 5100     PREPARE RED BLOOD CELLS (UNIT)    ISSUE DATE AND TIME 20230124040500      Blood Component Type Red Blood Cells      Product Code Z1655A92      Unit Status Issued      Unit Number [id number]      UNIT ABO/RH A+      CODING SYSTEM KZJX191      UNIT TYPE ISBT 6200     PREPARE RED BLOOD CELLS (UNIT)    ISSUE DATE AND TIME 20230124040500      Blood Component Type Red Blood Cells      Product Code S3174D85      Unit Status Issued      Unit Number  [id number]      UNIT ABO/RH A+      CODING SYSTEM SXKC573      UNIT TYPE ISBT 6200     PREPARE RED BLOOD CELLS (UNIT)    ISSUE DATE AND TIME 20230124040500      Blood Component Type Red Blood Cells      Product Code J8141Y07      Unit Status Issued      Unit Number [id number]      UNIT ABO/RH A+      CODING SYSTEM PEOH543      UNIT TYPE ISBT 6200     PREPARE RED BLOOD CELLS (UNIT)    ISSUE DATE AND TIME 20230124040500      Blood Component Type Red Blood Cells      Product Code V0485L25      Unit Status Issued      Unit Number [id number]      UNIT ABO/RH A+      CODING SYSTEM VCTU480      UNIT TYPE ISBT 6200     PREPARE PLASMA (UNIT)    ISSUE DATE AND TIME 20230124040500      Blood Component Type Plasma      Product Code H2394S55      Unit Status Issued      Unit Number [id number]      UNIT ABO/RH AB+      CODING SYSTEM YLGC975      UNIT TYPE ISBT 8400     PREPARE PLASMA (UNIT)    ISSUE DATE AND TIME 20230124040500      Blood Component Type Plasma      Product Code K0918R69      Unit Status Issued      Unit Number [id number]      UNIT ABO/RH AB+      CODING SYSTEM RZMG100      UNIT TYPE ISBT 8400     PREPARE PHERESED PLATELETS (UNIT)    ISSUE DATE AND TIME 20230124040500      Blood Component Type Platelets      Product Code X1842K65      Unit Status Issued      Unit Number [id number]      UNIT ABO/RH A+      CODING SYSTEM NYDL684      UNIT TYPE ISBT 6200     PREPARE CRYOPRECIPITATE (SINGLE UNIT)    Blood Component Type Cryoprecipitate      Product Code O6336W88      Unit Status Issued      Unit Number [id number]      CODING SYSTEM TFUE831      ISSUE DATE AND TIME 20230124042800      UNIT ABO/RH B+      UNIT TYPE ISBT 7300     PREPARE RED BLOOD CELLS (UNIT)    ISSUE DATE AND TIME 20230124032400      Blood Component Type Red Blood Cells      Product Code E9905C54      Unit Status Returned      Unit Number [id number]      UNIT ABO/RH O+      CODING SYSTEM QAHD365      UNIT TYPE ISBT 5100      PREPARE PLASMA (UNIT)    ISSUE DATE AND TIME 20230124032400      Blood Component Type Plasma      Product Code M2232O44      Unit Status Returned      Unit Number [id number]      UNIT ABO/RH AB-      CODING SYSTEM AUKL648      UNIT TYPE ISBT 2800     PREPARE PLASMA (UNIT)    ISSUE DATE AND TIME 20230124032400      Blood Component Type Plasma      Product Code V7100N29      Unit Status Returned      Unit Number [id number]      UNIT ABO/RH A+      CODING SYSTEM XESO251      UNIT TYPE ISBT 6200     ABO/RH TYPE AND SCREEN        [facility]    -Intubation    Date/Time: 1/24/2023 3:21 AM  Performed by: Lydia Lucero MD  Authorized by: Lydia Lucero MD     Emergent condition/consent implied      UNIVERSAL PROTOCOL   Site Marked: NA  Prior Images Obtained and Reviewed:  No  Required items: Required blood products, implants, devices and special equipment available    Patient identity confirmed:  Anonymous protocol, patient vented/unresponsive  Patient was reevaluated immediately before administering moderate or deep sedation or anesthesia  Confirmation Checklist:  Procedure was appropriate and matched the consent or emergent situation and correct equipment/implants were available  Time out: Immediately prior to the procedure a time out was called    Universal Protocol: the Joint Commission Universal Protocol was followed    Preparation: Patient was prepped and draped in usual sterile fashion      PRE-PROCEDURE DETAILS     Patient status:  Unresponsive    Pretreatment medications:  None  PROCEDURE DETAILS     Preoxygenation:  Bag valve mask    CPR in progress: no      Intubation method:  Oral    Oral intubation technique:  Video-assisted    Laryngoscope blade:  Mac 3    Tube size (mm):  7.5    Tube type:  Cuffed    Number of attempts:  1    Cricoid pressure: no      Tube visualized through cords: yes      PLACEMENT ASSESSMENT     ETT to lip:  25    Tube secured with:  ETT  thorne    Breath sounds:  Equal and absent over the epigastrium    Placement verification: chest rise, condensation, direct visualization, equal breath sounds and ETCO2 detector      PROCEDURE    Patient Tolerance:  Patient tolerated the procedure well with no immediate complications       Emergency Department Course & Assessments:    Interventions:    Medications   norepinephrine (LEVOPHED) 4 mg in  mL infusion PREMIX (0 mcg/kg/min × 68 kg Intravenous Stopped 23 0420)   pantoprazole (PROTONIX) 80 mg in sodium chloride 0.9 % 100 mL infusion (8 mg/hr Intravenous Not Given 23 0504)   calcium chloride injection 1 g (1 g Intravenous Given 23 0323)   phenylephrine (BHAVESH-SYNEPHRINE) injection 100 mcg (100 mcg Intravenous Given 23 0325)   sodium bicarbonate 8.4 % injection 50 mEq (50 mEq Intravenous Given 23 0328)   phenylephrine (BHAVESH-SYNEPHRINE) injection 100 mcg (100 mcg Intravenous Given 23 0333)   0.9% sodium chloride BOLUS (0 mLs Intravenous Stopped 23 0338)   pantoprazole (PROTONIX) IV push injection 80 mg (80 mg Intravenous Given 23 0344)   0.9% sodium chloride BOLUS (0 mLs Intravenous Stopped 23 0349)   midazolam (VERSED) injection 1 mg (1 mg Intravenous Given 23 0351)   iopamidol (ISOVUE-370) solution 92 mL (92 mLs Intravenous Given 23 0416)   Saline Flush (74 mLs Intravenous Given 23 0416)   phenylephrine (BHAVESH-SYNEPHRINE) injection 100 mcg (100 mcg Intravenous Given 23 0412)   phenylephrine (BHAVESH-SYNEPHRINE) injection 100 mcg (100 mcg Intravenous Given 23 0408)       ED Course as of 23   0308 I obtained history and examined the patient as noted above.     0321 I intubated the patient, see procedure note above        Disposition:  The patient .     Impression & Plan      Medical Decision Making:  Lico Woo is a [age]-year-old male who presented to the emergency department with cardiac arrest.  The  call was initially for a GI bleed.  He was having black and bloody stools and then started vomiting blood.  He arrested shortly before arrival to the emergency department.  CPR was in progress on arrival to the emergency department.  He received CPR and ACLS protocol for about 15 minutes and then we did get a pulse back.  He was given IV fluids, massive transfusion protocol was activated.  Blood was started as soon as possible.  His initial i-STAT hemoglobin was about 7.  Initial potassium was elevated though suspect it may have been hemolyzed but regardless was given calcium.  Patient was intubated.  It was difficult to see the tube go through the cords but with suctioning I was able to see that it was through the cords.  The tube needed to be pulled back at 1 point.  He did have a pulse back for period of time but then lost his pulse again.  He was started on Levophed.  He was started on Protonix drip.  The patient's wife was contacted early on in the ED course and informed of what was going on.  We did get his pulse back and ultimately he was sent to CT to see if there is a source of bleeding that IR would be amenable.  While in CT he started to have a decrease in his blood pressure again.  I spoke to the patient's wife on the phone as well as the son on the phone.  The son is a physician and I discussed the critical illness of the patient as well as my concern that he may have neurologic injury as well as he would likely not return to his baseline.  The son notes that the patient did not have a good quality of life.  The son actually says that the patient is DNR/DNI.  The son is in agreement that we should stop resuscitation and let him pass peacefully.  I updated the wife on this discussion and she is in agreement with this.  Resuscitation was stopped and he was extubated.  He  peacefully.    Critical Care time:  was 90 minutes for this patient excluding procedures.    Diagnosis:    ICD-10-CM    1.  Gastrointestinal hemorrhage, unspecified gastrointestinal hemorrhage type  K92.2       2. Hemorrhagic shock (H)  R57.8            Scribe Disclosure:  I, Liliana Vazquez, am serving as a scribe at 5:00 AM on 1/24/2023 to document services personally performed by Lydia Lucero MD based on my observations and the provider's statements to me.     1/24/2023   Lydia Lucero MD Goertz, Maria Kristine, MD  01/24/23 0907

## 2023-01-24 NOTE — PROGRESS NOTES
Intubation Note    Date of intubation: [id number]  Time of intubation: 0320  Location of intubation: ED  Intubated by: MD  Size of ETT:  7.5 CM  Location (cm) at lip: 4    ETT placement confirmed by: ETCO2 and chest x-ray.

## 2023-02-05 LAB
MDC_IDC_EPISODE_DTM: NORMAL
MDC_IDC_EPISODE_DURATION: 1 S
MDC_IDC_EPISODE_DURATION: 2 S
MDC_IDC_EPISODE_DURATION: 2 S
MDC_IDC_EPISODE_DURATION: 3 S
MDC_IDC_EPISODE_DURATION: 3 S
MDC_IDC_EPISODE_DURATION: 45 S
MDC_IDC_EPISODE_DURATION: 5 S
MDC_IDC_EPISODE_DURATION: 5 S
MDC_IDC_EPISODE_DURATION: 6 S
MDC_IDC_EPISODE_DURATION: 8 S
MDC_IDC_EPISODE_DURATION: 8 S
MDC_IDC_EPISODE_DURATION: 9 S
MDC_IDC_EPISODE_ID: 5027
MDC_IDC_EPISODE_ID: 5028
MDC_IDC_EPISODE_ID: 5029
MDC_IDC_EPISODE_ID: 5030
MDC_IDC_EPISODE_ID: 5031
MDC_IDC_EPISODE_ID: 5032
MDC_IDC_EPISODE_ID: 5033
MDC_IDC_EPISODE_ID: 84
MDC_IDC_EPISODE_ID: 85
MDC_IDC_EPISODE_ID: 86
MDC_IDC_EPISODE_ID: 87
MDC_IDC_EPISODE_ID: 88
MDC_IDC_EPISODE_TYPE: NORMAL
MDC_IDC_LEAD_IMPLANT_DT: NORMAL
MDC_IDC_LEAD_LOCATION: NORMAL
MDC_IDC_LEAD_LOCATION_DETAIL_1: NORMAL
MDC_IDC_LEAD_MFG: NORMAL
MDC_IDC_LEAD_MODEL: NORMAL
MDC_IDC_LEAD_POLARITY_TYPE: NORMAL
MDC_IDC_LEAD_SERIAL: NORMAL
MDC_IDC_MSMT_BATTERY_DTM: NORMAL
MDC_IDC_MSMT_BATTERY_REMAINING_LONGEVITY: 57 MO
MDC_IDC_MSMT_BATTERY_RRT_TRIGGER: 2.6
MDC_IDC_MSMT_BATTERY_STATUS: NORMAL
MDC_IDC_MSMT_BATTERY_VOLTAGE: 2.95 V
MDC_IDC_MSMT_LEADCHNL_LV_IMPEDANCE_VALUE: 247 OHM
MDC_IDC_MSMT_LEADCHNL_LV_IMPEDANCE_VALUE: 361 OHM
MDC_IDC_MSMT_LEADCHNL_LV_IMPEDANCE_VALUE: 418 OHM
MDC_IDC_MSMT_LEADCHNL_LV_IMPEDANCE_VALUE: 437 OHM
MDC_IDC_MSMT_LEADCHNL_LV_IMPEDANCE_VALUE: 532 OHM
MDC_IDC_MSMT_LEADCHNL_LV_IMPEDANCE_VALUE: 608 OHM
MDC_IDC_MSMT_LEADCHNL_LV_IMPEDANCE_VALUE: 608 OHM
MDC_IDC_MSMT_LEADCHNL_LV_IMPEDANCE_VALUE: 627 OHM
MDC_IDC_MSMT_LEADCHNL_LV_IMPEDANCE_VALUE: 722 OHM
MDC_IDC_MSMT_LEADCHNL_LV_IMPEDANCE_VALUE: 779 OHM
MDC_IDC_MSMT_LEADCHNL_LV_PACING_THRESHOLD_AMPLITUDE: 0.75 V
MDC_IDC_MSMT_LEADCHNL_LV_PACING_THRESHOLD_PULSEWIDTH: 1 MS
MDC_IDC_MSMT_LEADCHNL_RA_IMPEDANCE_VALUE: 266 OHM
MDC_IDC_MSMT_LEADCHNL_RA_IMPEDANCE_VALUE: 399 OHM
MDC_IDC_MSMT_LEADCHNL_RA_PACING_THRESHOLD_AMPLITUDE: 0.88 V
MDC_IDC_MSMT_LEADCHNL_RA_PACING_THRESHOLD_PULSEWIDTH: 0.4 MS
MDC_IDC_MSMT_LEADCHNL_RA_SENSING_INTR_AMPL: 1.38 MV
MDC_IDC_MSMT_LEADCHNL_RA_SENSING_INTR_AMPL: 1.38 MV
MDC_IDC_MSMT_LEADCHNL_RV_IMPEDANCE_VALUE: 285 OHM
MDC_IDC_MSMT_LEADCHNL_RV_IMPEDANCE_VALUE: 437 OHM
MDC_IDC_MSMT_LEADCHNL_RV_PACING_THRESHOLD_AMPLITUDE: 0.5 V
MDC_IDC_MSMT_LEADCHNL_RV_PACING_THRESHOLD_PULSEWIDTH: 0.4 MS
MDC_IDC_MSMT_LEADCHNL_RV_SENSING_INTR_AMPL: 3.88 MV
MDC_IDC_MSMT_LEADCHNL_RV_SENSING_INTR_AMPL: 3.88 MV
MDC_IDC_PG_IMPLANT_DTM: NORMAL
MDC_IDC_PG_MFG: NORMAL
MDC_IDC_PG_MODEL: NORMAL
MDC_IDC_PG_SERIAL: NORMAL
MDC_IDC_PG_TYPE: NORMAL
MDC_IDC_SESS_CLINIC_NAME: NORMAL
MDC_IDC_SESS_DTM: NORMAL
MDC_IDC_SESS_TYPE: NORMAL
MDC_IDC_SET_BRADY_AT_MODE_SWITCH_RATE: 171 {BEATS}/MIN
MDC_IDC_SET_BRADY_LOWRATE: 70 {BEATS}/MIN
MDC_IDC_SET_BRADY_MAX_SENSOR_RATE: 115 {BEATS}/MIN
MDC_IDC_SET_BRADY_MAX_TRACKING_RATE: 115 {BEATS}/MIN
MDC_IDC_SET_BRADY_MODE: NORMAL
MDC_IDC_SET_BRADY_PAV_DELAY_HIGH: 100 MS
MDC_IDC_SET_BRADY_PAV_DELAY_LOW: 130 MS
MDC_IDC_SET_BRADY_SAV_DELAY_HIGH: 70 MS
MDC_IDC_SET_BRADY_SAV_DELAY_LOW: 100 MS
MDC_IDC_SET_CRT_LVRV_DELAY: 20 MS
MDC_IDC_SET_CRT_PACED_CHAMBERS: NORMAL
MDC_IDC_SET_LEADCHNL_LV_PACING_AMPLITUDE: 1.25 V
MDC_IDC_SET_LEADCHNL_LV_PACING_ANODE_ELECTRODE_1: NORMAL
MDC_IDC_SET_LEADCHNL_LV_PACING_ANODE_LOCATION_1: NORMAL
MDC_IDC_SET_LEADCHNL_LV_PACING_CAPTURE_MODE: NORMAL
MDC_IDC_SET_LEADCHNL_LV_PACING_CATHODE_ELECTRODE_1: NORMAL
MDC_IDC_SET_LEADCHNL_LV_PACING_CATHODE_LOCATION_1: NORMAL
MDC_IDC_SET_LEADCHNL_LV_PACING_POLARITY: NORMAL
MDC_IDC_SET_LEADCHNL_LV_PACING_PULSEWIDTH: 1 MS
MDC_IDC_SET_LEADCHNL_RA_PACING_AMPLITUDE: 1.5 V
MDC_IDC_SET_LEADCHNL_RA_PACING_ANODE_ELECTRODE_1: NORMAL
MDC_IDC_SET_LEADCHNL_RA_PACING_ANODE_LOCATION_1: NORMAL
MDC_IDC_SET_LEADCHNL_RA_PACING_CAPTURE_MODE: NORMAL
MDC_IDC_SET_LEADCHNL_RA_PACING_CATHODE_ELECTRODE_1: NORMAL
MDC_IDC_SET_LEADCHNL_RA_PACING_CATHODE_LOCATION_1: NORMAL
MDC_IDC_SET_LEADCHNL_RA_PACING_POLARITY: NORMAL
MDC_IDC_SET_LEADCHNL_RA_PACING_PULSEWIDTH: 0.4 MS
MDC_IDC_SET_LEADCHNL_RA_SENSING_ANODE_ELECTRODE_1: NORMAL
MDC_IDC_SET_LEADCHNL_RA_SENSING_ANODE_LOCATION_1: NORMAL
MDC_IDC_SET_LEADCHNL_RA_SENSING_CATHODE_ELECTRODE_1: NORMAL
MDC_IDC_SET_LEADCHNL_RA_SENSING_CATHODE_LOCATION_1: NORMAL
MDC_IDC_SET_LEADCHNL_RA_SENSING_POLARITY: NORMAL
MDC_IDC_SET_LEADCHNL_RA_SENSING_SENSITIVITY: 0.6 MV
MDC_IDC_SET_LEADCHNL_RV_PACING_AMPLITUDE: 2 V
MDC_IDC_SET_LEADCHNL_RV_PACING_ANODE_ELECTRODE_1: NORMAL
MDC_IDC_SET_LEADCHNL_RV_PACING_ANODE_LOCATION_1: NORMAL
MDC_IDC_SET_LEADCHNL_RV_PACING_CAPTURE_MODE: NORMAL
MDC_IDC_SET_LEADCHNL_RV_PACING_CATHODE_ELECTRODE_1: NORMAL
MDC_IDC_SET_LEADCHNL_RV_PACING_CATHODE_LOCATION_1: NORMAL
MDC_IDC_SET_LEADCHNL_RV_PACING_POLARITY: NORMAL
MDC_IDC_SET_LEADCHNL_RV_PACING_PULSEWIDTH: 0.4 MS
MDC_IDC_SET_LEADCHNL_RV_SENSING_ANODE_ELECTRODE_1: NORMAL
MDC_IDC_SET_LEADCHNL_RV_SENSING_ANODE_LOCATION_1: NORMAL
MDC_IDC_SET_LEADCHNL_RV_SENSING_CATHODE_ELECTRODE_1: NORMAL
MDC_IDC_SET_LEADCHNL_RV_SENSING_CATHODE_LOCATION_1: NORMAL
MDC_IDC_SET_LEADCHNL_RV_SENSING_POLARITY: NORMAL
MDC_IDC_SET_LEADCHNL_RV_SENSING_SENSITIVITY: 0.9 MV
MDC_IDC_SET_ZONE_DETECTION_INTERVAL: 350 MS
MDC_IDC_SET_ZONE_DETECTION_INTERVAL: 400 MS
MDC_IDC_SET_ZONE_TYPE: NORMAL
MDC_IDC_STAT_AT_BURDEN_PERCENT: 0 %
MDC_IDC_STAT_AT_DTM_END: NORMAL
MDC_IDC_STAT_AT_DTM_START: NORMAL
MDC_IDC_STAT_BRADY_AP_VP_PERCENT: 94.43 %
MDC_IDC_STAT_BRADY_AP_VS_PERCENT: 0.12 %
MDC_IDC_STAT_BRADY_AS_VP_PERCENT: 4.83 %
MDC_IDC_STAT_BRADY_AS_VS_PERCENT: 0.63 %
MDC_IDC_STAT_BRADY_DTM_END: NORMAL
MDC_IDC_STAT_BRADY_DTM_START: NORMAL
MDC_IDC_STAT_BRADY_RA_PERCENT_PACED: 94.74 %
MDC_IDC_STAT_BRADY_RV_PERCENT_PACED: 99.25 %
MDC_IDC_STAT_CRT_DTM_END: NORMAL
MDC_IDC_STAT_CRT_DTM_START: NORMAL
MDC_IDC_STAT_CRT_LV_PERCENT_PACED: 99.23 %
MDC_IDC_STAT_CRT_PERCENT_PACED: 99.22 %
MDC_IDC_STAT_EPISODE_RECENT_COUNT: 0
MDC_IDC_STAT_EPISODE_RECENT_COUNT: 5
MDC_IDC_STAT_EPISODE_RECENT_COUNT_DTM_END: NORMAL
MDC_IDC_STAT_EPISODE_RECENT_COUNT_DTM_START: NORMAL
MDC_IDC_STAT_EPISODE_TOTAL_COUNT: 0
MDC_IDC_STAT_EPISODE_TOTAL_COUNT: 1
MDC_IDC_STAT_EPISODE_TOTAL_COUNT: 1
MDC_IDC_STAT_EPISODE_TOTAL_COUNT: 29
MDC_IDC_STAT_EPISODE_TOTAL_COUNT: 57
MDC_IDC_STAT_EPISODE_TOTAL_COUNT_DTM_END: NORMAL
MDC_IDC_STAT_EPISODE_TOTAL_COUNT_DTM_START: NORMAL
MDC_IDC_STAT_EPISODE_TYPE: NORMAL

## 2023-07-06 NOTE — PROGRESS NOTES
ALL SMARTFIELDS MUST BE COMPLETED FOR PATIENT CARE AND BILLING    12/9/2021     E-Consult has been accepted.    Interprofessional consultation requested by:  Mumtaz Hernandez MD      Clinical Question/Purpose: MY CLINICAL QUESTION IS: anything need to be done re his abnormal labs/mgus    Patient assessment and information reviewed: 88-year-old man with MGUS.  He has chronic renal insufficiency with baseline creatinine around 1.7 for the past 2 years.  He had an SPEP on 7/20/2020 that showed 0.9 g/dL monoclonal protein and immunofixation showed that it was an IgG lambda.  Repeat SPEP on 12/6/2021 shows a monoclonal peak of 0.8 g/dL.  Both the kappa and lambda light chains are elevated with a normal ratio.  He has not had any imaging, however and notes I do not see any reports of any sort of pain, other than some rib pain from a fall.     Recommendations: MGUS- the Chavez prognostic scoring system looks at the  IgG subtype, total amount of M protein less than 1.5 g/dL, and normal kappa/lambda ratio as all good prognostic signs.  Therefore his risk of developing myeloma is extremely low.  If his rib pain from the fall does not improve or he develops other pain, he should have imaging to look for lytic bone lesions.  There is no strong reason to continue routine monitoring the SPEP and kappa lambda light chains.if he has new symptoms or anemia, hypercalcemia, or unexplained rapid worsening of the creatinine, then he can be rechecked.  Ilda Laurent MD  Hematology      The recommendations provided in this E-Consult are based on the clinical data available to me in the medical record, and are furnished without the benefit of a comprehensive in-person or virtual patient evaluation.  This consultation should not replace the clinical judgement and evaluation of the provider ordering this E-Consult. Any new clinical issues, or changes in patient status since the filing of this E-Consult will need to be taken into  account when assessing these recommendations. Please contact me if you have further questions.    My total time spent reviewing clinical information and formulating assessment was 10 minutes.    Report sent automatically to requesting provider once signed.     Ilda Laurent MD   Patient Weight (Optional But Required For Cumulative Dose-Numbers And Decimals Only): 180